# Patient Record
Sex: FEMALE | Race: WHITE | HISPANIC OR LATINO | Employment: UNEMPLOYED | URBAN - NONMETROPOLITAN AREA
[De-identification: names, ages, dates, MRNs, and addresses within clinical notes are randomized per-mention and may not be internally consistent; named-entity substitution may affect disease eponyms.]

---

## 2017-06-19 ENCOUNTER — OFFICE VISIT (OUTPATIENT)
Dept: MEDICAL GROUP | Facility: CLINIC | Age: 32
End: 2017-06-19
Payer: MEDICAID

## 2017-06-19 VITALS
TEMPERATURE: 98.2 F | WEIGHT: 213 LBS | HEART RATE: 84 BPM | RESPIRATION RATE: 16 BRPM | DIASTOLIC BLOOD PRESSURE: 70 MMHG | SYSTOLIC BLOOD PRESSURE: 112 MMHG | OXYGEN SATURATION: 96 % | HEIGHT: 65 IN | BODY MASS INDEX: 35.49 KG/M2

## 2017-06-19 DIAGNOSIS — F41.0 PANIC ATTACK: ICD-10-CM

## 2017-06-19 DIAGNOSIS — E66.9 OBESITY (BMI 30-39.9): ICD-10-CM

## 2017-06-19 DIAGNOSIS — Z23 NEED FOR VACCINATION: ICD-10-CM

## 2017-06-19 PROCEDURE — 90472 IMMUNIZATION ADMIN EACH ADD: CPT | Performed by: PHYSICIAN ASSISTANT

## 2017-06-19 PROCEDURE — 90471 IMMUNIZATION ADMIN: CPT | Performed by: PHYSICIAN ASSISTANT

## 2017-06-19 PROCEDURE — 90715 TDAP VACCINE 7 YRS/> IM: CPT | Performed by: PHYSICIAN ASSISTANT

## 2017-06-19 PROCEDURE — 90707 MMR VACCINE SC: CPT | Performed by: PHYSICIAN ASSISTANT

## 2017-06-19 PROCEDURE — 99203 OFFICE O/P NEW LOW 30 MIN: CPT | Mod: 25 | Performed by: PHYSICIAN ASSISTANT

## 2017-06-19 RX ORDER — HYDROXYZINE HYDROCHLORIDE 25 MG/1
25 TABLET, FILM COATED ORAL 3 TIMES DAILY PRN
Qty: 30 TAB | Refills: 0 | Status: SHIPPED | OUTPATIENT
Start: 2017-06-19 | End: 2017-07-17 | Stop reason: SDUPTHER

## 2017-06-19 ASSESSMENT — PATIENT HEALTH QUESTIONNAIRE - PHQ9: CLINICAL INTERPRETATION OF PHQ2 SCORE: 2

## 2017-06-19 NOTE — PROGRESS NOTES
Chief Complaint   Patient presents with   • Establish Care   • Panic Attack     2 x week        HISTORY OF THE PRESENT ILLNESS: This is a 32 y.o. female new patient to our practice who presents today for evaluation and management of:    Panic attack  During her episodes of anxiety, patient will have racing, illogical thoughts. She feels lightheaded and confused with slightly illogical speech. She has epigastric pain and some shortness of breath. She has an episode approx 2 times per week on average. She states she will occasionally wake up startled and begin to feel symptoms. She has never taken a medication for this before. These began when her  passed away 3 years ago. She has been through therapy for a month to help with grieving but states they did not give her tools to help alleviate anxiety.     Need for vaccination  Patient has an immunization record card from [facility] in [place] and it shows she needs MMR, Hep B #2, and TDaP today. She is healthy and willing to receive these vaccines today.     Obesity (BMI 30-39.9)  Patient is not currently working on losing weight however she is aware that she needs to do so.       History reviewed. No pertinent past medical history.    History reviewed. No pertinent past surgical history.    No family status information on file.   History reviewed. No pertinent family history.    Social History   Substance Use Topics   • Smoking status: Current Some Day Smoker     Types: Cigarettes   • Smokeless tobacco: Never Used   • Alcohol Use: 0.0 oz/week     0 Standard drinks or equivalent per week       Allergies: Penicillins    Current Outpatient Prescriptions Ordered in [facility]   Medication Sig Dispense Refill   • hydrOXYzine (ATARAX) 25 MG Tab Take 1 Tab by mouth 3 times a day as needed for Anxiety. 30 Tab 0     No current Epic-ordered facility-administered medications on file.     Review of Systems:   Constitutional: Negative for fever, chills, unplanned weight change and  "malaise/fatigue.   HENT: Negative for ear pain or tinnitus, nosebleeds, congestion, sore throat and neck pain.    Eyes: Negative for blurred or decreased vision.   Respiratory: Negative for cough, sputum production, shortness of breath and wheezing.    Cardiovascular: Negative for chest pain, palpitations, orthopnea, syncope and leg swelling.   Gastrointestinal: Negative for heartburn, nausea, vomiting.  Genitourinary: Negative for dysuria, urgency and frequency.   Musculoskeletal: Negative for myalgias, back pain and joint pain.   Skin: Negative for rash, itching, nail changes or skin changes.   Neurological: Negative for tremors, focal weakness, tingling and headaches.   Endo/Heme/Allergies: Does not bruise/bleed easily.    Psychiatric/Behavioral: See HPI above. Negative for depression, suicidal ideas and memory loss.  Pt does not use recreational drugs or excessive alcohol.        Exam:  Blood pressure 112/70, pulse 84, temperature 36.8 °C (98.2 °F), resp. rate 16, height 1.651 m (5' 5\"), weight 96.616 kg (213 lb), last menstrual period 06/12/2017, SpO2 96 %, not currently breastfeeding.  General:  Overweight female in NAD  Eyes: Conjunctiva clear, lids without ptosis, pupils equal and reactive to light accommodation.  ENMT: Nose and lips without deformity.   Neck: Supple without masses upon palpation. Thyroid is not visibly enlarged.  Pulmonary:  Normal effort.   Cardiovascular: Carotid and radial pulses are intact and equal bilaterally.   Extremities: No clubbing or cyanosis. Bilateral upper and lower extremities without edema.   GI: Soft, nontender, nondistended.   Skin: Warm and dry.  No obvious lesions.  Musculoskeletal: Normal gait.   Psych: Normal mood and affect. Alert and oriented x3. Judgment and insight is normal.    Medical Decision Making & Discussion:   1. Panic attack  Patient educated about the 04975 grounding technique to help her during an acute panic or anxiety attack. She will try this in " addition to rescue doses of atarax as needed for anxiety. She was educated that she may need a daily medication to alleviate anxiety if she feels this continues to be problematic.   - hydrOXYzine (ATARAX) 25 MG Tab; Take 1 Tab by mouth 3 times a day as needed for Anxiety.  Dispense: 30 Tab; Refill: 0  - REFERRAL TO PSYCHOLOGY    2. Obesity (BMI 30-39.9)  - Patient identified as having weight management issue.  Appropriate orders and counseling given.    3. Need for vaccination  - Tdap =>8yo IM  - Consent for Tetanus/Diptheria/AcellularPertussis Booster  - Consent for Measles/ Mumps/Rubella Vaccine  - MMR SQ    Regarding Health Maintenance: Patient will schedule a well female exam ASAP. She is receiving needed vaccinations today although this clinic is out of Hep B vaccines so she will need to have this administered at a later date.     Please note that this dictation was created using voice recognition software. I have made every reasonable attempt to correct obvious errors, but I expect that there are errors of grammar and possibly content that I did not discover before finalizing the note.

## 2017-06-19 NOTE — ASSESSMENT & PLAN NOTE
Patient has an immunization record card from [facility] in [place] and it shows she needs MMR, Hep B #2, and TDaP today. She is healthy and willing to receive these vaccines today.

## 2017-06-19 NOTE — ASSESSMENT & PLAN NOTE
During her episodes of anxiety, patient will have racing, illogical thoughts. She feels lightheaded and confused with slightly illogical speech. She has epigastric pain and some shortness of breath. She has an episode approx 2 times per week on average. She states she will occasionally wake up startled and begin to feel symptoms. She has never taken a medication for this before. These began when her  passed away 3 years ago. She has been through therapy for a month to help with grieving but states they did not give her tools to help alleviate anxiety.

## 2017-06-19 NOTE — MR AVS SNAPSHOT
"        Alie Lowe   2017 9:00 AM   Office Visit   MRN: [id number]    Department:  [facility] Phone:  [phone]    Description:  Female : 1985   Provider:  Serina Moreland PA-C           Reason for Visit     Establish Care     Panic Attack 2 x week       Allergies as of 2017     Allergen Noted Reactions    Penicillins 2017   Unspecified    Previous hospitalization after penicillin - does not remember reaction      You were diagnosed with     Panic attack   [025593]       Obesity (BMI 30-39.9)   [732984]       Need for vaccination   [675448]         Vital Signs     Blood Pressure Pulse Temperature Respirations Height Weight    112/70 mmHg 84 36.8 °C (98.2 °F) 16 1.651 m (5' 5\") 96.616 kg (213 lb)    Body Mass Index Oxygen Saturation Last Menstrual Period Breastfeeding? Smoking Status       35.44 kg/m2 96% 2017 No Current Some Day Smoker       Basic Information     Date Of Birth Sex Race Ethnicity Preferred Language    1985 Female White Non- English      Problem List              ICD-10-CM Priority Class Noted - Resolved    Panic attack F41.0   2017 - Present    Obesity (BMI 30-39.9) E66.9   2017 - Present    Need for vaccination Z23   2017 - Present      Health Maintenance     Patient has no pending health maintenance at this time      Current Immunizations     Hepatitis A Vaccine, Ped/Adol 1999    Hepatitis B Vaccine Non-Recombivax (Ped/Adol) 1999    MMR Vaccine  Incomplete, 1999    TD Vaccine 1999    Tdap Vaccine  Incomplete      Below and/or attached are the medications your provider expects you to take. Review all of your home medications and newly ordered medications with your provider and/or pharmacist. Follow medication instructions as directed by your provider and/or pharmacist. Please keep your medication list with you and share with your provider. Update the information when medications are discontinued, " doses are changed, or new medications (including over-the-counter products) are added; and carry medication information at all times in the event of emergency situations     Allergies:  PENICILLINS - Unspecified               Medications  Valid as of: June 19, 2017 - 10:02 AM    Generic Name Brand Name Tablet Size Instructions for use    HydrOXYzine HCl (Tab) ATARAX 25 MG Take 1 Tab by mouth 3 times a day as needed for Anxiety.        .                 Medicines prescribed today were sent to:     [address] [place] [address]    Phone: [phone] Fax: [phone]    Open 24 Hours?: No      Medication refill instructions:       If your prescription bottle indicates you have medication refills left, it is not necessary to call your provider’s office. Please contact your pharmacy and they will refill your medication.    If your prescription bottle indicates you do not have any refills left, you may request refills at any time through one of the following ways: The online [employer] system (except Urgent Care), by calling your provider’s office, or by asking your pharmacy to contact your provider’s office with a refill request. Medication refills are processed only during regular business hours and may not be available until the next business day. Your provider may request additional information or to have a follow-up visit with you prior to refilling your medication.   *Please Note: Medication refills are assigned a new Rx number when refilled electronically. Your pharmacy may indicate that no refills were authorized even though a new prescription for the same medication is available at the pharmacy. Please request the medicine by name with the pharmacy before contacting your provider for a refill.        Referral     A referral request has been sent to our patient care coordination department. Please allow 3-5 business days for us to process this request  and contact you either by phone or mail. If you do not hear from us by the 5th business day, please call us at [phone].           [employer] Access Code: [email or web address]  Expires: 7/19/2017 10:02 AM    Your email address is not on file at "[facility].  Email Addresses are required for you to sign up for [employer], please contact [phone] to verify your personal information and to provide your email address prior to attempting to register for [employer].    Alie Lowe  [address] [place] [address]    [employer]  A secure, online tool to manage your health information     "[facility]’s [employer]® is a secure, online tool that connects you to your personalized health information from the privacy of your home -- day or night - making it very easy for you to manage your healthcare. Once the activation process is completed, you can even access your medical information using the [employer] jemma, which is available for free in the Apple Jemma store or Google Play store.     To learn more about [employer], visit [email or web address]    There are two levels of access available (as shown below):   My Chart Features  [facility] Primary Care Doctor [facility]  Specialists [facility]  Urgent  Care Non-[facility] Primary Care Doctor   Email your healthcare team securely and privately 24/7 X X X    Manage appointments: schedule your next appointment; view details of past/upcoming appointments X      Request prescription refills. X      View recent personal medical records, including lab and immunizations X X X X   View health record, including health history, allergies, medications X X X X   Read reports about your outpatient visits, procedures, consult and ER notes X X X X   See your discharge summary, which is a recap of your hospital and/or ER visit that includes your diagnosis, lab results, and care plan X X  X     How to register for [employer]:  Once your e-mail address has been verified, follow the following steps to sign up for  [employer].     1. Go to  [email or web address]  2. Click on the Sign Up Now box, which takes you to the New Member Sign Up page. You will need to provide the following information:  a. Enter your [employer] Access Code exactly as it appears at the top of this page. (You will not need to use this code after you’ve completed the sign-up process. If you do not sign up before the expiration date, you must request a new code.)   b. Enter your date of birth.   c. Enter your home email address.   d. Click Submit, and follow the next screen’s instructions.  3. Create a [employer] ID. This will be your [employer] login ID and cannot be changed, so think of one that is secure and easy to remember.  4. Create a [employer] password. You can change your password at any time.  5. Enter your Password Reset Question and Answer. This can be used at a later time if you forget your password.   6. Enter your e-mail address. This allows you to receive e-mail notifications when new information is available in [employer].  7. Click Sign Up. You can now view your health information.    For assistance activating your [employer] account, call [phone]         Quit Tobacco Information     Do you want to quit using tobacco?    Quitting tobacco decreases risks of cancer, heart and lung disease, increases life expectancy, improves sense of taste and smell, and increases spending money, among other benefits.    If you are thinking about quitting, we can help.  • [facility] Quit Tobacco Program: 900.854.9595  o Program occurs weekly for four weeks and includes pharmacist consultation on products to support quitting smoking or chewing tobacco. A provider referral is needed for pharmacist consultation.  • Tobacco Users Help Hotline: 7-800-QUIT-NOW (246-6385) or https://[place].quitlogix.org/  o Free, confidential telephone and online coaching for [place] residents. Sessions are designed on a schedule that is convenient for you. Eligible clients receive free  nicotine replacement therapy.  • Nationally: www.smokefree.gov  o Information and professional assistance to support both immediate and long-term needs as you become, and remain, a non-smoker. Smokefree.gov allows you to choose the help that best fits your needs.

## 2017-07-06 ENCOUNTER — HOSPITAL ENCOUNTER (OUTPATIENT)
Facility: MEDICAL CENTER | Age: 32
End: 2017-07-06
Attending: PHYSICIAN ASSISTANT
Payer: MEDICAID

## 2017-07-06 ENCOUNTER — OFFICE VISIT (OUTPATIENT)
Dept: MEDICAL GROUP | Facility: CLINIC | Age: 32
End: 2017-07-06
Payer: MEDICAID

## 2017-07-06 VITALS
WEIGHT: 216 LBS | SYSTOLIC BLOOD PRESSURE: 110 MMHG | OXYGEN SATURATION: 98 % | DIASTOLIC BLOOD PRESSURE: 68 MMHG | HEIGHT: 65 IN | BODY MASS INDEX: 35.99 KG/M2 | TEMPERATURE: 98.9 F | RESPIRATION RATE: 16 BRPM | HEART RATE: 80 BPM

## 2017-07-06 DIAGNOSIS — Z01.419 WELL FEMALE EXAM WITH ROUTINE GYNECOLOGICAL EXAM: ICD-10-CM

## 2017-07-06 DIAGNOSIS — Z22.322 MRSA CARRIER: ICD-10-CM

## 2017-07-06 PROCEDURE — 87624 HPV HI-RISK TYP POOLED RSLT: CPT

## 2017-07-06 PROCEDURE — 88175 CYTOPATH C/V AUTO FLUID REDO: CPT

## 2017-07-06 PROCEDURE — 99395 PREV VISIT EST AGE 18-39: CPT | Mod: EP | Performed by: PHYSICIAN ASSISTANT

## 2017-07-06 PROCEDURE — 87491 CHLMYD TRACH DNA AMP PROBE: CPT

## 2017-07-06 PROCEDURE — 87591 N.GONORRHOEAE DNA AMP PROB: CPT

## 2017-07-06 ASSESSMENT — PAIN SCALES - GENERAL: PAINLEVEL: NO PAIN

## 2017-07-06 NOTE — MR AVS SNAPSHOT
"Alie Lowe   2017 11:00 AM   Office Visit   MRN: [id number]    Department:  [facility]   Dept Phone:  [phone]    Description:  Female : 1985   Provider:  Serina Moreland PA-C           Reason for Visit     Gynecologic Exam           Allergies as of 2017     Allergen Noted Reactions    Penicillins 2017   Unspecified    Previous hospitalization after penicillin - does not remember reaction      You were diagnosed with     Well female exam with routine gynecological exam   [662135]       MRSA carrier   [[id number]]         Vital Signs     Blood Pressure Pulse Temperature Respirations Height Weight    110/68 mmHg 80 37.2 °C (98.9 °F) 16 1.651 m (5' 5\") 97.977 kg (216 lb)    Body Mass Index Oxygen Saturation Last Menstrual Period Smoking Status          35.94 kg/m2 98% 2017 Current Some Day Smoker        Basic Information     Date Of Birth Sex Race Ethnicity Preferred Language    1985 Female White Non- English      Your appointments     2017 11:20 AM   Established Patient with Serina Moreland PA-C   [facility] (--)    [address] [place] [address]   [phone]           You will be receiving a confirmation call a few days before your appointment from our automated call confirmation system.              Problem List              ICD-10-CM Priority Class Noted - Resolved    Panic attack F41.0   2017 - Present    Obesity (BMI 30-39.9) E66.9   2017 - Present    Need for vaccination Z23   2017 - Present    Well female exam with routine gynecological exam Z01.419   2017 - Present    MRSA carrier Z22.322   2017 - Present      Health Maintenance        Date Due Completion Dates    PAP SMEAR 2006 ---    IMM INFLUENZA (1) 2017 ---    IMM DTaP/Tdap/Td Vaccine (3 - Td) 2027, 1999            Current Immunizations     Hepatitis A Vaccine, Ped/Adol 1999   " Hepatitis B Vaccine Non-Recombivax (Ped/Adol) 8/12/1999    MMR Vaccine 6/19/2017, 8/12/1999    TD Vaccine 8/12/1999    Tdap Vaccine 6/19/2017      Below and/or attached are the medications your provider expects you to take. Review all of your home medications and newly ordered medications with your provider and/or pharmacist. Follow medication instructions as directed by your provider and/or pharmacist. Please keep your medication list with you and share with your provider. Update the information when medications are discontinued, doses are changed, or new medications (including over-the-counter products) are added; and carry medication information at all times in the event of emergency situations     Allergies:  PENICILLINS - Unspecified               Medications  Valid as of: July 06, 2017 - 12:49 PM    Generic Name Brand Name Tablet Size Instructions for use    HydrOXYzine HCl (Tab) ATARAX 25 MG Take 1 Tab by mouth 3 times a day as needed for Anxiety.        .                 Medicines prescribed today were sent to:     [address] [address]    Phone: [phone] Fax: [phone]    Open 24 Hours?: No      Medication refill instructions:       If your prescription bottle indicates you have medication refills left, it is not necessary to call your provider’s office. Please contact your pharmacy and they will refill your medication.    If your prescription bottle indicates you do not have any refills left, you may request refills at any time through one of the following ways: The online [employer] system (except Urgent Care), by calling your provider’s office, or by asking your pharmacy to contact your provider’s office with a refill request. Medication refills are processed only during regular business hours and may not be available until the next business day. Your provider may request additional information or to have a follow-up visit with  you prior to refilling your medication.   *Please Note: Medication refills are assigned a new Rx number when refilled electronically. Your pharmacy may indicate that no refills were authorized even though a new prescription for the same medication is available at the pharmacy. Please request the medicine by name with the pharmacy before contacting your provider for a refill.        Your To Do List     Future Labs/Procedures Complete By Expires    CHLAMYDIA/GC PCR URINE OR SWAB  As directed 7/6/2018    [id number]  As directed 7/6/2018    RPR (SYPHILIS)  As directed 7/6/2018    THINPREP PAP W/HPV AND CTNG  As directed 7/6/2018         [employer] Access Code: [email or web address]  Expires: 7/19/2017 10:02 AM    Your email address is not on file at [employer].  Email Addresses are required for you to sign up for [employer], please contact [phone] to verify your personal information and to provide your email address prior to attempting to register for [employer].    Alie Lowe  [address] [place] [address]    [employer]  A secure, online tool to manage your health information     [employer]’s [employer]® is a secure, online tool that connects you to your personalized health information from the privacy of your home -- day or night - making it very easy for you to manage your healthcare. Once the activation process is completed, you can even access your medical information using the [employer] jemma, which is available for free in the Apple Jemma store or Google Play store.     To learn more about [employer], visit [email or web address]    There are two levels of access available (as shown below):   My Chart Features  [facility] Primary Care Doctor [facility]  Specialists [facility]  Urgent  Care Non-[facility] Primary Care Doctor   Email your healthcare team securely and privately 24/7 X X X    Manage appointments: schedule your next appointment; view details of past/upcoming appointments X      Request prescription refills. X       View recent personal medical records, including lab and immunizations X X X X   View health record, including health history, allergies, medications X X X X   Read reports about your outpatient visits, procedures, consult and ER notes X X X X   See your discharge summary, which is a recap of your hospital and/or ER visit that includes your diagnosis, lab results, and care plan X X  X     How to register for [employer]:  Once your e-mail address has been verified, follow the following steps to sign up for [employer].     1. Go to  [email or web address]  2. Click on the Sign Up Now box, which takes you to the New Member Sign Up page. You will need to provide the following information:  a. Enter your [employer] Access Code exactly as it appears at the top of this page. (You will not need to use this code after you’ve completed the sign-up process. If you do not sign up before the expiration date, you must request a new code.)   b. Enter your date of birth.   c. Enter your home email address.   d. Click Submit, and follow the next screen’s instructions.  3. Create a [employer] ID. This will be your [employer] login ID and cannot be changed, so think of one that is secure and easy to remember.  4. Create a [employer] password. You can change your password at any time.  5. Enter your Password Reset Question and Answer. This can be used at a later time if you forget your password.   6. Enter your e-mail address. This allows you to receive e-mail notifications when new information is available in [employer].  7. Click Sign Up. You can now view your health information.    For assistance activating your [employer] account, call [phone]         Quit Tobacco Information     Do you want to quit using tobacco?    Quitting tobacco decreases risks of cancer, heart and lung disease, increases life expectancy, improves sense of taste and smell, and increases spending money, among other benefits.    If you are thinking about quitting, we can  help.  • [facility] Quit Tobacco Program: 004-903-3487  o Program occurs weekly for four weeks and includes pharmacist consultation on products to support quitting smoking or chewing tobacco. A provider referral is needed for pharmacist consultation.  • Tobacco Users Help Hotline: 8-912-QUIT-NOW (074-3151) or https://[place].quitlogix.org/  o Free, confidential telephone and online coaching for [place] residents. Sessions are designed on a schedule that is convenient for you. Eligible clients receive free nicotine replacement therapy.  • Nationally: www.smokefree.gov  o Information and professional assistance to support both immediate and long-term needs as you become, and remain, a non-smoker. Smokefree.gov allows you to choose the help that best fits your needs.

## 2017-07-06 NOTE — PROGRESS NOTES
"SUBJECTIVE: 32 y.o. female for routine pap and checkup.  Chief Complaint   Patient presents with   • Gynecologic Exam         Last Pap:   Contraception: condoms sometimes, none otherwise  H/O Abnormal Pap no  H/O STI no  Current partner: MALE, monogomous     LMP: Patient's last menstrual period was 2017.    Allergies: Penicillins     ROS:  Menses every month with mild, none cramping  Bleeding is moderate.    midol analgesics required during menses.  No menstrual depression, labile mood, anxiety, bloating/fluid retention, insomnia, migraine headaches, libido changes   no menopause symptoms of hot flashes, night sweats, sleep disruption, mood changes, vaginal dryness.   No pelvic pain, or dyspareunia. No vaginal discharge   No breast tenderness, mass, nipple discharge, changes in size or contour, or abnormal cyclic discomfort.  No urinary tract symptoms, no incontinence.   No abdominal pain, change in bowel habits, black or bloody stools.    No unusual headaches, no visual changes.   No prolonged cough. No dyspnea or chest pain on exertion.    No skin lesions, concerns.     Preventive Care:  Mammogram not due    DEXA not due.   Colonoscopy not due    Current Outpatient Prescriptions   Medication Sig Dispense Refill   • hydrOXYzine (ATARAX) 25 MG Tab Take 1 Tab by mouth 3 times a day as needed for Anxiety. 30 Tab 0     No current facility-administered medications for this visit.     She  has no past medical history on file.  She  has no past surgical history on file.     Family History: No family history on file.    Family History negative for :  Colon, Lung, or female organ cancer, CAD, Diabetes, osteoporosis.     OBJECTIVE:   /68 mmHg  Pulse 80  Temp(Src) 37.2 °C (98.9 °F)  Resp 16  Ht 1.651 m (5' 5\")  Wt 97.977 kg (216 lb)  BMI 35.94 kg/m2  SpO2 98%  LMP 2017  Body mass index is 35.94 kg/(m^2).      HEAD AND NECK:  Ears normal.  Throat, oral cavity and tongue normal.  Neck supple. " No adenopathy or masses in the neck or supraclavicular regions.  No carotid bruits. No thyromegaly. Tongue piercing present.  NEURO: Cranial nerves II-XI are normal.   CHEST:  Clear, good air entry, no wheezes or rales.   HEART:  Regular rate and rhythm.  S1 and S2 normal.  No edema or JVD. ABDOMEN:  Soft without tenderness, guarding, mass or organomegaly.  No CVA tenderness or inguinal adenopathy.   EXTREMITIES:  Extremities, reflexes and peripheral pulses are normal.    SKIN:  No rashes or suspicious skin lesions noted. One 1.5x 1.5 nevus present at left upper abdominal quadrant     Breast Exam: Performed with instruction during examination. No axillary lymphadenopathy. No fixed or dominant masses. No nipple retraction or skin abnormalities.    PELVIC EXAMINATION    Chaperone Desirae Cr MA    Labia: normal, no lesions or erythema noted   Urethral Orifice: normal  Vagina: vaginal canal clear, no lesions  Cervix: Friable and bleeds to gentle use of PAP brush. No polyps, masses or lesions noted. Yellow/green and slightly foul smelling discharge noted.     BIMANUAL EXAM   Vaginal Walls: normal  Cervix: No CMT  Uterus: normal   Adnexa: difficult to palpate due to habitus, no noticeable abnormalities.   Rectal: not performed.       <ASSESSMENT>  1. Well female exam with routine gynecological exam  CHLAMYDIA/GC PCR URINE OR SWAB    THINPREP PAP W/HPV AND CTNG    PANEL 165391 (HIV ANTIBODIES)    HEPATITIS PANEL ACUTE (4 COMPONENTS)    RPR (SYPHILIS)    [id number]    POCT Pregnancy   2. MRSA carrier         Discussed breast self exam, mammography screening, STD prevention, HIV risk factors and prevention, use and side effects of OCPs, use and side effects of HRT, family planning choices and menopause   Follow-up in 1 years for next Gyn exam and Pap, pending negative results 3 years will be appropriate.

## 2017-07-07 LAB
C TRACH DNA GENITAL QL NAA+PROBE: NEGATIVE
CYTOLOGY REG CYTOL: NORMAL
HPV HR 12 DNA CVX QL NAA+PROBE: NEGATIVE
HPV16 DNA SPEC QL NAA+PROBE: NEGATIVE
HPV18 DNA SPEC QL NAA+PROBE: NEGATIVE
N GONORRHOEA DNA GENITAL QL NAA+PROBE: NEGATIVE
SPECIMEN SOURCE: NORMAL
SPECIMEN SOURCE: NORMAL

## 2017-07-10 ENCOUNTER — TELEPHONE (OUTPATIENT)
Dept: URGENT CARE | Facility: PHYSICIAN GROUP | Age: 32
End: 2017-07-10

## 2017-07-10 NOTE — TELEPHONE ENCOUNTER
----- Message from Serina Moreland PA-C sent at 7/10/2017  8:45 AM PDT -----  I reviewed labs. Everything is within normal limits and looks good. Return for follow up as scheduled.

## 2017-07-10 NOTE — TELEPHONE ENCOUNTER
Phone Number Called: [phone] (home)       Message: spoke with patient she understand her results and will follow up as scheduled    Left Message for patient to call back: no

## 2017-07-17 ENCOUNTER — OFFICE VISIT (OUTPATIENT)
Dept: MEDICAL GROUP | Facility: CLINIC | Age: 32
End: 2017-07-17
Payer: MEDICAID

## 2017-07-17 VITALS
BODY MASS INDEX: 35.99 KG/M2 | WEIGHT: 216 LBS | DIASTOLIC BLOOD PRESSURE: 64 MMHG | RESPIRATION RATE: 18 BRPM | OXYGEN SATURATION: 94 % | HEIGHT: 65 IN | HEART RATE: 88 BPM | TEMPERATURE: 98.5 F | SYSTOLIC BLOOD PRESSURE: 122 MMHG

## 2017-07-17 DIAGNOSIS — F41.0 PANIC ATTACK: ICD-10-CM

## 2017-07-17 DIAGNOSIS — S91.302A OPEN WOUND OF FOOT, LEFT, INITIAL ENCOUNTER: ICD-10-CM

## 2017-07-17 PROBLEM — S91.309A OPEN WOUND OF FOOT: Status: ACTIVE | Noted: 2017-07-17

## 2017-07-17 PROBLEM — Z01.419 WELL FEMALE EXAM WITH ROUTINE GYNECOLOGICAL EXAM: Status: RESOLVED | Noted: 2017-07-06 | Resolved: 2017-07-17

## 2017-07-17 PROBLEM — Z23 NEED FOR VACCINATION: Status: RESOLVED | Noted: 2017-06-19 | Resolved: 2017-07-17

## 2017-07-17 PROCEDURE — 99212 OFFICE O/P EST SF 10 MIN: CPT | Performed by: PHYSICIAN ASSISTANT

## 2017-07-17 RX ORDER — HYDROXYZINE HYDROCHLORIDE 25 MG/1
25 TABLET, FILM COATED ORAL 3 TIMES DAILY PRN
Qty: 30 TAB | Refills: 5 | Status: SHIPPED | OUTPATIENT
Start: 2017-07-17 | End: 2019-01-05

## 2017-07-17 ASSESSMENT — PAIN SCALES - GENERAL: PAINLEVEL: NO PAIN

## 2017-07-17 NOTE — MR AVS SNAPSHOT
"        Alie Lowe   2017 1:40 PM   Office Visit   MRN: [id number]    Department:  [facility] Phone:  [phone]    Description:  Female : 1985   Provider:  Serina Moreland PA-C           Reason for Visit     Follow-Up medication - boyfriend states it makes her too calm      Allergies as of 2017     Allergen Noted Reactions    Penicillins 2017   Unspecified    Previous hospitalization after penicillin - does not remember reaction      You were diagnosed with     Panic attack   [[id number]]         Vital Signs     Blood Pressure Pulse Temperature Respirations Height Weight    122/64 mmHg 88 36.9 °C (98.5 °F) 18 1.651 m (5' 5\") 97.977 kg (216 lb)    Body Mass Index Oxygen Saturation Last Menstrual Period Smoking Status          35.94 kg/m2 94% 2017 Current Some Day Smoker        Basic Information     Date Of Birth Sex Race Ethnicity Preferred Language    1985 Female White Non- English      Problem List              ICD-10-CM Priority Class Noted - Resolved    Panic attack F41.0   2017 - Present    Obesity (BMI 30-39.9) E66.9   2017 - Present    Need for vaccination Z23   2017 - Present    MRSA carrier Z22.322   2017 - Present      Health Maintenance        Date Due Completion Dates    IMM INFLUENZA (1) 2017 ---    PAP SMEAR 2020, 2017    IMM DTaP/Tdap/Td Vaccine (3 - Td) 2027, 1999            Current Immunizations     Hepatitis A Vaccine, Ped/Adol 1999    Hepatitis B Vaccine Non-Recombivax (Ped/Adol) 1999    MMR Vaccine 2017, 1999    TD Vaccine 1999    Tdap Vaccine 2017      Below and/or attached are the medications your provider expects you to take. Review all of your home medications and newly ordered medications with your provider and/or pharmacist. Follow medication instructions as directed by your provider and/or pharmacist. Please keep your medication list with " you and share with your provider. Update the information when medications are discontinued, doses are changed, or new medications (including over-the-counter products) are added; and carry medication information at all times in the event of emergency situations     Allergies:  PENICILLINS - Unspecified               Medications  Valid as of: July 17, 2017 -  2:23 PM    Generic Name Brand Name Tablet Size Instructions for use    HydrOXYzine HCl (Tab) ATARAX 25 MG Take 1 Tab by mouth 3 times a day as needed for Anxiety.        .                 Medicines prescribed today were sent to:     [address] [place] [address]    Phone: [phone] Fax: [phone]    Open 24 Hours?: No      Medication refill instructions:       If your prescription bottle indicates you have medication refills left, it is not necessary to call your provider’s office. Please contact your pharmacy and they will refill your medication.    If your prescription bottle indicates you do not have any refills left, you may request refills at any time through one of the following ways: The online Caremerge system (except Urgent Care), by calling your provider’s office, or by asking your pharmacy to contact your provider’s office with a refill request. Medication refills are processed only during regular business hours and may not be available until the next business day. Your provider may request additional information or to have a follow-up visit with you prior to refilling your medication.   *Please Note: Medication refills are assigned a new Rx number when refilled electronically. Your pharmacy may indicate that no refills were authorized even though a new prescription for the same medication is available at the pharmacy. Please request the medicine by name with the pharmacy before contacting your provider for a refill.           Caremerge Access Code: [email or web address]  Expires: 7/19/2017  10:02 AM    Your email address is not on file at [facility].  Email Addresses are required for you to sign up for [employer], please contact [phone] to verify your personal information and to provide your email address prior to attempting to register for [employer].    Alie Lowe  [address] [place] [address]    goTennat  A secure, online tool to manage your health information     [facility]’s [employer]® is a secure, online tool that connects you to your personalized health information from the privacy of your home -- day or night - making it very easy for you to manage your healthcare. Once the activation process is completed, you can even access your medical information using the [employer] jemma, which is available for free in the Apple Jemma store or Google Play store.     To learn more about [employer], visit [email or web address]    There are two levels of access available (as shown below):   My Chart Features  [facility] Primary Care Doctor [facility]  Specialists [facility]  Urgent  Care Non-[facility] Primary Care Doctor   Email your healthcare team securely and privately 24/7 X X X    Manage appointments: schedule your next appointment; view details of past/upcoming appointments X      Request prescription refills. X      View recent personal medical records, including lab and immunizations X X X X   View health record, including health history, allergies, medications X X X X   Read reports about your outpatient visits, procedures, consult and ER notes X X X X   See your discharge summary, which is a recap of your hospital and/or ER visit that includes your diagnosis, lab results, and care plan X X  X     How to register for [employer]:  Once your e-mail address has been verified, follow the following steps to sign up for goTennat.     1. Go to  [email or web address]  2. Click on the Sign Up Now box, which takes you to the New Member Sign Up page. You will need to provide the following information:  a. Enter your  M[employer] Access Code exactly as it appears at the top of this page. (You will not need to use this code after you’ve completed the sign-up process. If you do not sign up before the expiration date, you must request a new code.)   b. Enter your date of birth.   c. Enter your home email address.   d. Click Submit, and follow the next screen’s instructions.  3. Create a Mazree ID. This will be your Mazree login ID and cannot be changed, so think of one that is secure and easy to remember.  4. Create a Blue Pillart password. You can change your password at any time.  5. Enter your Password Reset Question and Answer. This can be used at a later time if you forget your password.   6. Enter your e-mail address. This allows you to receive e-mail notifications when new information is available in Mazree.  7. Click Sign Up. You can now view your health information.    For assistance activating your Mazree account, call [phone]         Quit Tobacco Information     Do you want to quit using tobacco?    Quitting tobacco decreases risks of cancer, heart and lung disease, increases life expectancy, improves sense of taste and smell, and increases spending money, among other benefits.    If you are thinking about quitting, we can help.  • [facility] Quit Tobacco Program: 856.205.1248  o Program occurs weekly for four weeks and includes pharmacist consultation on products to support quitting smoking or chewing tobacco. A provider referral is needed for pharmacist consultation.  • Tobacco Users Help Hotline: 8-004-QUIT-NOW (878-8677) or https://[place].quitlogix.org/  o Free, confidential telephone and online coaching for [place] residents. Sessions are designed on a schedule that is convenient for you. Eligible clients receive free nicotine replacement therapy.  • Nationally: www.smokefree.gov  o Information and professional assistance to support both immediate and long-term needs as you become, and remain, a non-smoker.  Smokefree.gov allows you to choose the help that best fits your needs.

## 2017-07-17 NOTE — ASSESSMENT & PLAN NOTE
Patient complains that the very small wound on her right great toe seems to be getting warm and not healing very quickly. She hasn't tried anything to make this better and would like advice in wound care.

## 2017-07-17 NOTE — ASSESSMENT & PLAN NOTE
"Patient states her hydroxyzine is working very well to alleviate her occasional panic attacks. She states her fiancé even believes she may be \"too calm\". Patient believes that she is in fact now normal and that her fiancé simply is not use to this calm version of her. She is very happy with these results and would like to continue taking hydroxyzine on an as-needed basis. She is not currently having any negative side effects.  "

## 2017-07-17 NOTE — PROGRESS NOTES
"Chief Complaint   Patient presents with   • Follow-Up     medication - boyfriend states it makes her too calm       HISTORY OF PRESENT ILLNESS: Patient is a 32 y.o. female established patient who presents today for evaluation and management of:    Panic attack  Patient states her hydroxyzine is working very well to alleviate her occasional panic attacks. She states her fiancé even believes she may be \"too calm\". Patient believes that she is in fact now normal and that her fiancé simply is not use to this calm version of her. She is very happy with these results and would like to continue taking hydroxyzine on an as-needed basis. She is not currently having any negative side effects.    Open wound of foot  Patient complains that the very small wound on her right great toe seems to be getting warm and not healing very quickly. She hasn't tried anything to make this better and would like advice in wound care.          Patient Active Problem List    Diagnosis Date Noted   • Open wound of foot 07/17/2017   • MRSA carrier 07/06/2017   • Panic attack 06/19/2017   • Obesity (BMI 30-39.9) 06/19/2017       Allergies:Penicillins    Current Outpatient Prescriptions   Medication Sig Dispense Refill   • hydrOXYzine (ATARAX) 25 MG Tab Take 1 Tab by mouth 3 times a day as needed for Anxiety. 30 Tab 5     No current facility-administered medications for this visit.       Social History   Substance Use Topics   • Smoking status: Current Some Day Smoker -- 0.10 packs/day for 17 years     Types: Cigarettes   • Smokeless tobacco: Never Used   • Alcohol Use: 1.2 oz/week     2 Standard drinks or equivalent per week       No family status information on file.   History reviewed. No pertinent family history.    Review of Systems:   Constitutional: Negative for fever, chills, weight loss and malaise/fatigue.   HENT: Negative for ear pain, nosebleeds, congestion, sore throat and neck pain.    Eyes: Negative for blurred vision.   Respiratory: " "Negative for cough, sputum production, shortness of breath and wheezing.    Cardiovascular: Negative for chest pain, palpitations, orthopnea and leg swelling.   Gastrointestinal: Negative for heartburn, nausea, vomiting and abdominal pain.   Genitourinary: Negative for dysuria, urgency and frequency.   Musculoskeletal: Negative for myalgias, back pain and joint pain.   Skin: See HPI above.   Neurological: Negative for dizziness, tingling, tremors, sensory change, focal weakness and headaches.   Endo/Heme/Allergies: Does not bruise/bleed easily.   Psychiatric/Behavioral: Negative for depression, suicidal ideas and memory loss.  The patient is not nervous/anxious and does not have insomnia.      Exam:  Blood pressure 122/64, pulse 88, temperature 36.9 °C (98.5 °F), resp. rate 18, height 1.651 m (5' 5\"), weight 97.977 kg (216 lb), last menstrual period 08/11/2017, SpO2 94 %.  Body mass index is 35.94 kg/(m^2).  General:  Obese female in NAD  Head: is grossly normal.  Neck: Supple without masses. Thyroid is not visibly enlarged.  Pulmonary: Normal effort.  Extremities: no clubbing, cyanosis, or edema.  Skin: .75 x .5 cm open wound near right great toe is slightly dusky around the edges, erythematous and warm to touch. No oozing.     Medical decision-making and discussion:  1. Panic attack  Continue taking atarax as needed. Refills given today.   - hydrOXYzine (ATARAX) 25 MG Tab; Take 1 Tab by mouth 3 times a day as needed for Anxiety.  Dispense: 30 Tab; Refill: 5    2. Open wound of foot, left, initial encounter  Patient advised to clean the wound and apply antibiotic ointment with a bandage in the mornings and to allow the wound to breathe in the evenings. Should this wound fail to heal in the next 1-2 weeks, she was advised to return for follow up visit.       Return if symptoms worsen or fail to improve.    "

## 2017-08-16 ENCOUNTER — OFFICE VISIT (OUTPATIENT)
Dept: URGENT CARE | Facility: PHYSICIAN GROUP | Age: 32
End: 2017-08-16
Payer: MEDICAID

## 2017-08-16 ENCOUNTER — APPOINTMENT (OUTPATIENT)
Dept: RADIOLOGY | Facility: IMAGING CENTER | Age: 32
End: 2017-08-16
Attending: NURSE PRACTITIONER
Payer: MEDICAID

## 2017-08-16 VITALS
WEIGHT: 217 LBS | HEIGHT: 66 IN | RESPIRATION RATE: 20 BRPM | HEART RATE: 107 BPM | SYSTOLIC BLOOD PRESSURE: 124 MMHG | OXYGEN SATURATION: 97 % | BODY MASS INDEX: 34.87 KG/M2 | TEMPERATURE: 98.7 F | DIASTOLIC BLOOD PRESSURE: 70 MMHG

## 2017-08-16 DIAGNOSIS — M25.511 ACUTE PAIN OF RIGHT SHOULDER: ICD-10-CM

## 2017-08-16 DIAGNOSIS — S67.21XA CRUSHING INJURY OF RIGHT HAND, INITIAL ENCOUNTER: ICD-10-CM

## 2017-08-16 DIAGNOSIS — T07.XXXA BLUNT TRAUMA OF MULTIPLE SITES: ICD-10-CM

## 2017-08-16 DIAGNOSIS — S60.221A CONTUSION OF RIGHT HAND, INITIAL ENCOUNTER: ICD-10-CM

## 2017-08-16 DIAGNOSIS — S40.011A CONTUSION OF RIGHT SHOULDER, INITIAL ENCOUNTER: ICD-10-CM

## 2017-08-16 PROCEDURE — 73130 X-RAY EXAM OF HAND: CPT | Mod: RT | Performed by: FAMILY MEDICINE

## 2017-08-16 PROCEDURE — 99999 PR NO CHARGE: CPT | Performed by: NURSE PRACTITIONER

## 2017-08-16 PROCEDURE — 99204 OFFICE O/P NEW MOD 45 MIN: CPT | Performed by: NURSE PRACTITIONER

## 2017-08-16 PROCEDURE — 73030 X-RAY EXAM OF SHOULDER: CPT | Mod: RT | Performed by: FAMILY MEDICINE

## 2017-08-16 RX ORDER — KETOROLAC TROMETHAMINE 30 MG/ML
60 INJECTION, SOLUTION INTRAMUSCULAR; INTRAVENOUS ONCE
Status: COMPLETED | OUTPATIENT
Start: 2017-08-16 | End: 2017-08-16

## 2017-08-16 RX ADMIN — KETOROLAC TROMETHAMINE 60 MG: 30 INJECTION, SOLUTION INTRAMUSCULAR; INTRAVENOUS at 13:00

## 2017-08-16 ASSESSMENT — ENCOUNTER SYMPTOMS
ORTHOPNEA: 0
HEADACHES: 0
FALLS: 0
BLURRED VISION: 0
BACK PAIN: 0
FOCAL WEAKNESS: 0
SENSORY CHANGE: 1
FEVER: 0
PALPITATIONS: 0
BRUISES/BLEEDS EASILY: 0
TINGLING: 1
LOSS OF CONSCIOUSNESS: 0
DIZZINESS: 0
MYALGIAS: 1
DOUBLE VISION: 0
SHORTNESS OF BREATH: 0
WEAKNESS: 0
NECK PAIN: 0
PHOTOPHOBIA: 0
SPEECH CHANGE: 0
CHILLS: 0

## 2017-08-16 NOTE — PROGRESS NOTES
"Subjective:      Alie Lowe is a 32 y.o. female who presents with Shoulder Injury            Shoulder Injury   Associated symptoms include tingling. Pertinent negatives include no chest pain.   Alie is a 32 year old female who is here for right shoulder injury and right hand injury today.  was working under a car on jacks and the jacks fell and car was on top of her. States she was laying on her left shoulder at incident. States she was able to lift the car up to get out. States her right shoulder was leaning forward but managed to \"pop\" it back in place. States unable to move right hand at ring/pinky fingers and pain in palm of hand in same region. No pain or difficulty with moving right elbow, Some discomfort with right wrist but can move without difficulty. Ice application at time of incident. No NSAID used. States may have hit head on ground, no abrasion or bleeding from head. Denies HA, vision change, confusion of LOC. No witnesses at time of incident. Experiencing paresthesia at right hand.    PMH:  has no past medical history on file.  MEDS:   Current outpatient prescriptions:   •  hydrOXYzine (ATARAX) 25 MG Tab, Take 1 Tab by mouth 3 times a day as needed for Anxiety., Disp: 30 Tab, Rfl: 5    Current facility-administered medications:   •  ketorolac (TORADOL) injection 60 mg, 60 mg, Intramuscular, Once, Nora Dueñas, F.N.P.  ALLERGIES:   Allergies   Allergen Reactions   • Penicillins Unspecified     Previous hospitalization after penicillin - does not remember reaction     SURGHX: History reviewed. No pertinent past surgical history.  SOCHX:  reports that she has been smoking Cigarettes.  She has a 1.7 pack-year smoking history. She has never used smokeless tobacco. She reports that she drinks about 1.2 oz of alcohol per week. She reports that she does not use illicit drugs.  FH: Family history was reviewed, no pertinent findings to report      Review of Systems   Constitutional: " "Negative for fever, chills and malaise/fatigue.   Eyes: Negative for blurred vision, double vision and photophobia.   Respiratory: Negative for shortness of breath.    Cardiovascular: Negative for chest pain, palpitations and orthopnea.   Musculoskeletal: Positive for myalgias and joint pain. Negative for back pain, falls and neck pain.   Neurological: Positive for tingling and sensory change. Negative for dizziness, speech change, focal weakness, loss of consciousness, weakness and headaches.   Endo/Heme/Allergies: Does not bruise/bleed easily.   All other systems reviewed and are negative.         Objective:     /70 mmHg  Pulse 107  Temp(Src) 37.1 °C (98.7 °F)  Resp 20  Ht 1.676 m (5' 6\")  Wt 98.431 kg (217 lb)  BMI 35.04 kg/m2  SpO2 97%     Physical Exam   Constitutional: She is oriented to person, place, and time. Vital signs are normal. She appears well-developed and well-nourished. She is active and cooperative.  Non-toxic appearance. She does not have a sickly appearance. She does not appear ill. No distress.   HENT:   Head: Normocephalic.   Eyes: Conjunctivae and EOM are normal. Pupils are equal, round, and reactive to light.   Neck: Normal range of motion. Neck supple.   Cardiovascular: Normal rate and regular rhythm.    Pulmonary/Chest: Effort normal and breath sounds normal.   Musculoskeletal:        Right shoulder: She exhibits tenderness, bony tenderness and pain. She exhibits normal range of motion, no swelling, no effusion, no crepitus, no deformity and normal strength.        Arms:       Right hand: She exhibits decreased range of motion, tenderness, bony tenderness, disruption of two-point discrimination and swelling. She exhibits normal capillary refill, no deformity and no laceration. Decreased sensation noted. Decreased sensation is present in the ulnar distribution. Decreased strength noted. She exhibits finger abduction and thumb/finger opposition.        Hands:  TTP at 4th-5th " metacarpals of right hand with swelling, small superficial abrasion seen on lateral aspect of ulnar aspect of right hand with no active bleeding. Decreased ROM due to pain, decreased strength in hand due to pain. FROM of right wrist with discomfort. Decreased ROM of right shoulder joint bu able to lateral and front arm raise with difficulty due to discomfort. TTP at posterior and anterior aspect of right shoulder joint, Superficial abrasion/bruising seen at lateral aspect of right shoulder joint/upper deltoid muscle with TTP.   Neurological: She is alert and oriented to person, place, and time.   Skin: Skin is warm and dry. She is not diaphoretic. No erythema.   Vitals reviewed.    Right hand xray FINDINGS:  There is no evidence of fracture or dislocation. Alignment is maintained. No periosteal new bone formation is seen. No osseous erosion is identified. No substantial soft tissue swelling is seen.     Right shoulder xray FINDINGS:  There is no evidence of fracture or dislocation. Glenohumeral and acromioclavicular articulation is maintained.  Visualized right lung field is clear     MA applied right hand velcro wrist splint  Assessment/Plan:     1. Blunt trauma of multiple sites    - ketorolac (TORADOL) injection 60 mg; 2 mL by Intramuscular route Once.  - DX-HAND 3+ RIGHT; Future  - DX-SHOULDER 2+ RIGHT; Future    2. Acute pain of right shoulder    - ketorolac (TORADOL) injection 60 mg; 2 mL by Intramuscular route Once.  - DX-SHOULDER 2+ RIGHT; Future    3. Crushing injury of right hand, initial encounter    - ketorolac (TORADOL) injection 60 mg; 2 mL by Intramuscular route Once.  - DX-HAND 3+ RIGHT; Future    4. Contusion of right hand, initial encounter    5. Contusion of right shoulder, initial encounter    Take Ibuprofen prn for discomfort, up to 600 mg every 4-6 hrs prn  May use cool compresses for swelling and warm compresses for muscle stiffness   May perform muscle stretches as tolerated after warm  compresses to maintain mobility, avoid abdominal crunches  May apply topical analgesics prn  Perform proper body mechanics with lifting, twisting, bending and reaching. Ask for assistance with heavy objects  Monitor for numbness/tingling in upper extremities, decreased ROM with lifting difficulty- need re-evaluation

## 2017-08-16 NOTE — MR AVS SNAPSHOT
"        Alie Sommersuilar   2017 11:45 AM   Office Visit   MRN: [id number]    Department:  [facility]   Dept Phone:  [phone]    Description:  Female : 1985   Provider:  TESS Delgado           Reason for Visit     Shoulder Injury truck fell in right shoulder , right hand x today      Allergies as of 2017     Allergen Noted Reactions    Penicillins 2017   Unspecified    Previous hospitalization after penicillin - does not remember reaction      You were diagnosed with     Blunt trauma of multiple sites   [182578]       Acute pain of right shoulder   [1055632]       Crushing injury of right hand, initial encounter   [604521]       Contusion of right hand, initial encounter   [639821]       Contusion of right shoulder, initial encounter   [658811]         Vital Signs     Blood Pressure Pulse Temperature Respirations Height Weight    124/70 mmHg 107 37.1 °C (98.7 °F) 20 1.676 m (5' 6\") 98.431 kg (217 lb)    Body Mass Index Oxygen Saturation Smoking Status             35.04 kg/m2 97% Current Some Day Smoker         Basic Information     Date Of Birth Sex Race Ethnicity Preferred Language    1985 Female White Non- English      Problem List              ICD-10-CM Priority Class Noted - Resolved    Panic attack F41.0   2017 - Present    Obesity (BMI 30-39.9) E66.9   2017 - Present    MRSA carrier Z22.322   2017 - Present    Open wound of foot S91.309A   2017 - Present      Health Maintenance        Date Due Completion Dates    IMM INFLUENZA (1) 2017 ---    PAP SMEAR 2020, 2017    IMM DTaP/Tdap/Td Vaccine (3 - Td) 2027, 1999            Current Immunizations     Hepatitis A Vaccine, Ped/Adol 1999    Hepatitis B Vaccine Non-Recombivax (Ped/Adol) 1999    MMR Vaccine 2017, 1999    TD Vaccine 1999    Tdap Vaccine 2017      Below and/or attached are the medications your provider " expects you to take. Review all of your home medications and newly ordered medications with your provider and/or pharmacist. Follow medication instructions as directed by your provider and/or pharmacist. Please keep your medication list with you and share with your provider. Update the information when medications are discontinued, doses are changed, or new medications (including over-the-counter products) are added; and carry medication information at all times in the event of emergency situations     Allergies:  PENICILLINS - Unspecified               Medications  Valid as of: August 16, 2017 -  2:02 PM    Generic Name Brand Name Tablet Size Instructions for use    HydrOXYzine HCl (Tab) ATARAX 25 MG Take 1 Tab by mouth 3 times a day as needed for Anxiety.        .                 Medicines prescribed today were sent to:     [address] [address]    Phone: [phone] Fax: [phone]    Open 24 Hours?: No      Medication refill instructions:       If your prescription bottle indicates you have medication refills left, it is not necessary to call your provider’s office. Please contact your pharmacy and they will refill your medication.    If your prescription bottle indicates you do not have any refills left, you may request refills at any time through one of the following ways: The online [employer] system (except Urgent Care), by calling your provider’s office, or by asking your pharmacy to contact your provider’s office with a refill request. Medication refills are processed only during regular business hours and may not be available until the next business day. Your provider may request additional information or to have a follow-up visit with you prior to refilling your medication.   *Please Note: Medication refills are assigned a new Rx number when refilled electronically. Your pharmacy may indicate that no refills were authorized even  though a new prescription for the same medication is available at the pharmacy. Please request the medicine by name with the pharmacy before contacting your provider for a refill.        Your To Do List     Future Labs/Procedures Complete By Expires    DX-HAND 3+ RIGHT  As directed 8/16/2018    DX-SHOULDER 2+ RIGHT  As directed 8/16/2018         Abcellute Access Code: [email or web address]  Expires: 9/15/2017  2:02 PM    Abcellute  A secure, online tool to manage your health information     [employer] Abcellute® is a secure, online tool that connects you to your personalized health information from the privacy of your home -- day or night - making it very easy for you to manage your healthcare. Once the activation process is completed, you can even access your medical information using the Abcellute jemma, which is available for free in the Apple Jemma store or Google Play store.     Abcellute provides the following levels of access (as shown below):   My Chart Features   [facility] Primary Care Doctor [facility]  Specialists [facility]  Urgent  Care Non-[facility]  Primary Care  Doctor   Email your healthcare team securely and privately 24/7 X X X    Manage appointments: schedule your next appointment; view details of past/upcoming appointments X      Request prescription refills. X      View recent personal medical records, including lab and immunizations X X X X   View health record, including health history, allergies, medications X X X X   Read reports about your outpatient visits, procedures, consult and ER notes X X X X   See your discharge summary, which is a recap of your hospital and/or ER visit that includes your diagnosis, lab results, and care plan. X X       How to register for Abcellute:  1. Go to  [email or web address].  2. Click on the Sign Up Now box, which takes you to the New Member Sign Up page. You will need to provide the following information:  a. Enter your Abcellute Access Code exactly as it appears at the top of this  page. (You will not need to use this code after you’ve completed the sign-up process. If you do not sign up before the expiration date, you must request a new code.)   b. Enter your date of birth.   c. Enter your home email address.   d. Click Submit, and follow the next screen’s instructions.  3. Create a [employer] ID. This will be your [employer] login ID and cannot be changed, so think of one that is secure and easy to remember.  4. Create a [employer] password. You can change your password at any time.  5. Enter your Password Reset Question and Answer. This can be used at a later time if you forget your password.   6. Enter your e-mail address. This allows you to receive e-mail notifications when new information is available in [employer]  7. Click Sign Up. You can now view your health information.    For assistance activating your [employer] account, call [phone]        Quit Tobacco Information     Do you want to quit using tobacco?    Quitting tobacco decreases risks of cancer, heart and lung disease, increases life expectancy, improves sense of taste and smell, and increases spending money, among other benefits.    If you are thinking about quitting, we can help.  • [facility] Quit Tobacco Program: 390.458.4972  o Program occurs weekly for four weeks and includes pharmacist consultation on products to support quitting smoking or chewing tobacco. A provider referral is needed for pharmacist consultation.  • Tobacco Users Help Hotline: 8-800-QUIT-NOW (530-9628) or https://[place].quitlogix.org/  o Free, confidential telephone and online coaching for [place] residents. Sessions are designed on a schedule that is convenient for you. Eligible clients receive free nicotine replacement therapy.  • Nationally: www.smokefree.gov  o Information and professional assistance to support both immediate and long-term needs as you become, and remain, a non-smoker. Smokefree.gov allows you to choose the help that best fits your needs.

## 2018-06-06 ENCOUNTER — OFFICE VISIT (OUTPATIENT)
Dept: MEDICAL GROUP | Facility: CLINIC | Age: 33
End: 2018-06-06
Payer: MEDICAID

## 2018-06-06 VITALS
RESPIRATION RATE: 18 BRPM | BODY MASS INDEX: 36.64 KG/M2 | WEIGHT: 228 LBS | HEIGHT: 66 IN | SYSTOLIC BLOOD PRESSURE: 120 MMHG | OXYGEN SATURATION: 97 % | HEART RATE: 74 BPM | TEMPERATURE: 97.4 F | DIASTOLIC BLOOD PRESSURE: 70 MMHG

## 2018-06-06 DIAGNOSIS — Z23 NEED FOR VACCINATION: ICD-10-CM

## 2018-06-06 DIAGNOSIS — Z30.013 ENCOUNTER FOR INITIAL PRESCRIPTION OF INJECTABLE CONTRACEPTIVE: ICD-10-CM

## 2018-06-06 DIAGNOSIS — E66.9 OBESITY (BMI 30-39.9): ICD-10-CM

## 2018-06-06 PROBLEM — S91.309A OPEN WOUND OF FOOT: Status: RESOLVED | Noted: 2017-07-17 | Resolved: 2018-06-06

## 2018-06-06 LAB
INT CON NEG: NEGATIVE
INT CON POS: POSITIVE
POC URINE PREGNANCY TEST: NORMAL

## 2018-06-06 PROCEDURE — 81025 URINE PREGNANCY TEST: CPT | Performed by: PHYSICIAN ASSISTANT

## 2018-06-06 PROCEDURE — 90471 IMMUNIZATION ADMIN: CPT | Performed by: PHYSICIAN ASSISTANT

## 2018-06-06 PROCEDURE — 99213 OFFICE O/P EST LOW 20 MIN: CPT | Mod: 25 | Performed by: PHYSICIAN ASSISTANT

## 2018-06-06 PROCEDURE — 90732 PPSV23 VACC 2 YRS+ SUBQ/IM: CPT | Performed by: PHYSICIAN ASSISTANT

## 2018-06-06 RX ORDER — MEDROXYPROGESTERONE ACETATE 150 MG/ML
150 INJECTION, SUSPENSION INTRAMUSCULAR ONCE
OUTPATIENT
Start: 2018-06-20 | End: 2018-06-21

## 2018-06-06 ASSESSMENT — PATIENT HEALTH QUESTIONNAIRE - PHQ9: CLINICAL INTERPRETATION OF PHQ2 SCORE: 0

## 2018-06-06 NOTE — PROGRESS NOTES
Chief Complaint   Patient presents with   • Contraception     wants to get back on depo       HISTORY OF PRESENT ILLNESS: Patient is a 33 y.o. female established patient who presents today for evaluation and management of:    Obesity (BMI 30-39.9)  Patient is not currently working on losing weight however she is aware that she needs to do so.     Encounter for initial prescription of injectable contraceptive  This patient states that she has previously used Depo-Provera, before she became pregnant with her 2 children.  She is requesting to restart this medication as she has recently become sexually active and no longer wishes to become pregnant.  She has previously used Mirena which resulted in pain in her uterus and does typically remember to take a pill on a daily basis.  She is aware that Depo-Provera may result in weight gain and will remain cognizant of this.       Patient Active Problem List    Diagnosis Date Noted   • Encounter for initial prescription of injectable contraceptive 06/06/2018   • MRSA carrier 07/06/2017   • Panic attack 06/19/2017   • Obesity (BMI 30-39.9) 06/19/2017       Allergies:Penicillins    Current Outpatient Prescriptions   Medication Sig Dispense Refill   • hydrOXYzine (ATARAX) 25 MG Tab Take 1 Tab by mouth 3 times a day as needed for Anxiety. 30 Tab 5     Current Facility-Administered Medications   Medication Dose Route Frequency Provider Last Rate Last Dose   • [START ON 6/20/2018] medroxyPROGESTERone (DEPO-PROVERA) injection 150 mg  150 mg Intramuscular Once JESSICA WheelerAJesus-CHARLES           Social History   Substance Use Topics   • Smoking status: Current Some Day Smoker     Packs/day: 0.10     Years: 17.00     Types: Cigarettes   • Smokeless tobacco: Never Used      Comment: socially, only every few months   • Alcohol use 1.2 oz/week     2 Standard drinks or equivalent per week      Comment: socially - rare       Family Status   Relation Status   • Mother Alive   • Father Alive  "  • Sister Alive   • Brother Alive   • Sister Alive   • Child Alive   • Child Alive     Family History   Problem Relation Age of Onset   • Cancer Mother      uterine and skin   • Thyroid Mother      removed due to lump   • Diabetes Father    • Hypertension Father    • Cancer Sister      ovarian   • Alcohol abuse Brother        Review of Systems: See HPI above.   Constitutional: Negative for fever, chills, weight loss and malaise/fatigue.     Eyes: Negative for blurred vision.   Respiratory: Negative for cough, sputum production, shortness of breath and wheezing.    Cardiovascular: Negative for chest pain, palpitations, orthopnea and leg swelling.   Genitourinary: Negative for dysuria, urgency and frequency. LMP started today.   Musculoskeletal: Negative for myalgias, back pain and joint pain.   Neurological: Negative for dizzinessand headaches.   Endo/Heme/Allergies: Does not bruise/bleed easily.      Exam:  Blood pressure 120/70, pulse 74, temperature 36.3 °C (97.4 °F), resp. rate 18, height 1.676 m (5' 6\"), weight 103.4 kg (228 lb), SpO2 97 %.  Body mass index is 36.8 kg/m².  General:  Obese female in NAD  Head: is grossly normal.  Neck: Supple without masses. Thyroid is not visibly enlarged.  Pulmonary: Normal effort.  Cardiovascular:  Carotid and radial pulses are intact and equal bilaterally.  Extremities: no clubbing, cyanosis, or edema.    Medical decision-making and discussion:  1. Need for vaccination  - Pneumococal Polysaccharide Vaccine 23-Valent =>3yo SQ/IM    2. Encounter for initial prescription of injectable contraceptive  - POCT Pregnancy  - medroxyPROGESTERone (DEPO-PROVERA) injection 150 mg; 1 mL by Intramuscular route Once.    3. Obesity (BMI 30-39.9)  - Patient identified as having weight management issue.  Appropriate orders and counseling given.      Please note that this dictation was created using voice recognition software. I have made every reasonable attempt to correct obvious errors, but " I expect that there are errors of grammar and possibly content that I did not discover before finalizing the note.      Return for 2 weeks MA visit for depo provera injection.

## 2018-06-06 NOTE — ASSESSMENT & PLAN NOTE
This patient states that she has previously used Depo-Provera, before she became pregnant with her 2 children.  She is requesting to restart this medication as she has recently become sexually active and no longer wishes to become pregnant.  She has previously used Mirena which resulted in pain in her uterus and does typically remember to take a pill on a daily basis.  She is aware that Depo-Provera may result in weight gain and will remain cognizant of this.

## 2018-10-08 ENCOUNTER — OFFICE VISIT (OUTPATIENT)
Dept: URGENT CARE | Facility: PHYSICIAN GROUP | Age: 33
End: 2018-10-08
Payer: MEDICAID

## 2018-10-08 VITALS
WEIGHT: 220 LBS | BODY MASS INDEX: 35.36 KG/M2 | TEMPERATURE: 98.2 F | DIASTOLIC BLOOD PRESSURE: 80 MMHG | HEART RATE: 96 BPM | SYSTOLIC BLOOD PRESSURE: 110 MMHG | OXYGEN SATURATION: 98 % | HEIGHT: 66 IN

## 2018-10-08 DIAGNOSIS — M62.830 BACK MUSCLE SPASM: ICD-10-CM

## 2018-10-08 DIAGNOSIS — J02.9 SORE THROAT: ICD-10-CM

## 2018-10-08 DIAGNOSIS — J02.0 STREP PHARYNGITIS: ICD-10-CM

## 2018-10-08 LAB
INT CON NEG: NORMAL
INT CON POS: NORMAL
S PYO AG THROAT QL: POSITIVE

## 2018-10-08 PROCEDURE — 99214 OFFICE O/P EST MOD 30 MIN: CPT | Performed by: NURSE PRACTITIONER

## 2018-10-08 PROCEDURE — 87880 STREP A ASSAY W/OPTIC: CPT | Performed by: NURSE PRACTITIONER

## 2018-10-08 RX ORDER — AZITHROMYCIN 250 MG/1
TABLET, FILM COATED ORAL
Qty: 6 TAB | Refills: 0 | Status: SHIPPED | OUTPATIENT
Start: 2018-10-08 | End: 2018-11-07

## 2018-10-08 RX ORDER — KETOROLAC TROMETHAMINE 30 MG/ML
60 INJECTION, SOLUTION INTRAMUSCULAR; INTRAVENOUS ONCE
Status: COMPLETED | OUTPATIENT
Start: 2018-10-08 | End: 2018-10-08

## 2018-10-08 RX ORDER — CYCLOBENZAPRINE HCL 5 MG
5 TABLET ORAL 3 TIMES DAILY PRN
Qty: 21 TAB | Refills: 0 | Status: SHIPPED | OUTPATIENT
Start: 2018-10-08 | End: 2018-10-15

## 2018-10-08 RX ORDER — IBUPROFEN 200 MG
200 TABLET ORAL EVERY 6 HOURS PRN
COMMUNITY
End: 2019-05-03

## 2018-10-08 RX ADMIN — KETOROLAC TROMETHAMINE 60 MG: 30 INJECTION, SOLUTION INTRAMUSCULAR; INTRAVENOUS at 18:26

## 2018-10-09 ASSESSMENT — ENCOUNTER SYMPTOMS
COUGH: 0
ABDOMINAL PAIN: 0
ORTHOPNEA: 0
VOMITING: 0
SHORTNESS OF BREATH: 0
FEVER: 0
SORE THROAT: 1
MYALGIAS: 1
CHILLS: 0
FLANK PAIN: 0
BACK PAIN: 1
WEAKNESS: 0
BRUISES/BLEEDS EASILY: 0
PALPITATIONS: 0
NAUSEA: 0
SENSORY CHANGE: 0
TINGLING: 0

## 2018-10-09 NOTE — PROGRESS NOTES
"Subjective:      Alie Lowe is a 33 y.o. female who presents with Back Pain (x4 days. Back pain. Pain when moving.) and Pharyngitis (x2 days. Cough, sore throat.)            HPI  Alie is here for acute back pain x 4 days. States was picking up heavy boxes and \"threw my back out\". Denies numbness/tingling in lower extremities. Using NSAID, IcyHot with lidocaine, and stretching. States \"any movement causes pain\".  present.    PMH:  has no past medical history on file.  MEDS:   Current Outpatient Prescriptions:   •  ibuprofen (MOTRIN) 200 MG Tab, Take 200 mg by mouth every 6 hours as needed., Disp: , Rfl:   •  azithromycin (ZITHROMAX) 250 MG Tab, Take 2 tabs by mouth on day 1, then take 1 tab on days 2-5, Disp: 6 Tab, Rfl: 0  •  cyclobenzaprine (FLEXERIL) 5 MG tablet, Take 1 Tab by mouth 3 times a day as needed for Muscle Spasms for up to 7 days., Disp: 21 Tab, Rfl: 0  •  hydrOXYzine (ATARAX) 25 MG Tab, Take 1 Tab by mouth 3 times a day as needed for Anxiety., Disp: 30 Tab, Rfl: 5  ALLERGIES:   Allergies   Allergen Reactions   • Penicillins Unspecified     Previous hospitalization after penicillin - does not remember reaction     SURGHX: History reviewed. No pertinent surgical history.  SOCHX:  reports that she has been smoking Cigarettes.  She has a 1.70 pack-year smoking history. She has never used smokeless tobacco. She reports that she drinks about 1.2 oz of alcohol per week . She reports that she does not use drugs.  FH: Family history was reviewed, no pertinent findings to report    Review of Systems   Constitutional: Negative for chills, fever and malaise/fatigue.   HENT: Positive for sore throat. Negative for congestion and ear pain.    Respiratory: Negative for cough and shortness of breath.    Cardiovascular: Negative for chest pain, palpitations and orthopnea.   Gastrointestinal: Negative for abdominal pain, nausea and vomiting.   Genitourinary: Negative for dysuria, flank pain, frequency, " "hematuria and urgency.   Musculoskeletal: Positive for back pain and myalgias.   Neurological: Negative for tingling, sensory change and weakness.   Endo/Heme/Allergies: Does not bruise/bleed easily.   All other systems reviewed and are negative.         Objective:     /80   Pulse 96   Temp 36.8 °C (98.2 °F)   Ht 1.676 m (5' 6\")   Wt 99.8 kg (220 lb)   SpO2 98%   BMI 35.51 kg/m²      Physical Exam   Constitutional: She is oriented to person, place, and time. Vital signs are normal. She appears well-developed and well-nourished. She is active and cooperative.  Non-toxic appearance. She does not have a sickly appearance. She does not appear ill. No distress.   HENT:   Head: Normocephalic.   Right Ear: Hearing, tympanic membrane, external ear and ear canal normal.   Left Ear: Hearing, tympanic membrane, external ear and ear canal normal.   Nose: No mucosal edema, rhinorrhea or sinus tenderness.   Mouth/Throat: Uvula is midline. Mucous membranes are dry. No uvula swelling. Posterior oropharyngeal edema and posterior oropharyngeal erythema present. Tonsils are 1+ on the right. Tonsils are 1+ on the left. No tonsillar exudate.   Eyes: Pupils are equal, round, and reactive to light. Conjunctivae and EOM are normal.   Neck: Normal range of motion. Neck supple.   Cardiovascular: Normal rate and regular rhythm.    Pulmonary/Chest: Effort normal and breath sounds normal. No accessory muscle usage. No respiratory distress. She has no decreased breath sounds. She has no wheezes. She has no rhonchi. She has no rales.   Abdominal: Soft. Bowel sounds are normal. She exhibits no distension. There is no tenderness. There is no rebound and no guarding.   Musculoskeletal:        Lumbar back: She exhibits decreased range of motion, tenderness, pain and spasm. She exhibits no bony tenderness, no swelling, no edema and no deformity.   Neurological: She is alert and oriented to person, place, and time.   Skin: Skin is warm and " dry. She is not diaphoretic.   Vitals reviewed.              Assessment/Plan:     1. Back muscle spasm    - ketorolac (TORADOL) injection 60 mg; 2 mL by Intramuscular route Once.  - cyclobenzaprine (FLEXERIL) 5 MG tablet; Take 1 Tab by mouth 3 times a day as needed for Muscle Spasms for up to 7 days.  Dispense: 21 Tab; Refill: 0    2. Strep pharyngitis  - azithromycin (ZITHROMAX) 250 MG Tab; Take 2 tabs by mouth on day 1, then take 1 tab on days 2-5  Dispense: 6 Tab; Refill: 0    3. Sore throat    - POCT Rapid Strep A    Increase water intake  May use Ibuprofen/Tylenol prn for any fever, body aches or throat pain  May take long acting antihistamine for seasonal allergy symptoms prn  May use saline nasal spray/wan pot for nasal congestion prn  May use Nasacort/Flonase prn for nasal congestion  May use throat lozenges for throat discomfort  May gargle with salt water prn for throat discomfort  May drink smoothies for nutrition if too painful to swallow solid foods  Monitor for continued fever with treatment, any sinus pain/pressure with sinus congestion with thick mucus production and HA- need re-evaluation  Monitor for productive cough, SOB and chest pain/tightness, fever- need re-evaluation    Take NSAID prn for discomfort  May use cool compresses for swelling and warm compresses for muscle stiffness   May perform muscle stretches as tolerated after warm compresses to maintain mobility, avoid abdominal crunches  May continue Flexeril prn when at home only   May apply topical analgesics prn  Perform proper body mechanics with lifting, twisting, bending and reaching. Ask for assistance with heay objects  Monitor for bowel/urination problems, numbness/tingling in lower extremities, decreased ROM with ambulation difficulty- need re-evaluation

## 2018-11-07 ENCOUNTER — OFFICE VISIT (OUTPATIENT)
Dept: URGENT CARE | Facility: PHYSICIAN GROUP | Age: 33
End: 2018-11-07
Payer: MEDICAID

## 2018-11-07 VITALS
HEIGHT: 66 IN | OXYGEN SATURATION: 96 % | RESPIRATION RATE: 16 BRPM | DIASTOLIC BLOOD PRESSURE: 76 MMHG | BODY MASS INDEX: 38.25 KG/M2 | TEMPERATURE: 99.3 F | HEART RATE: 100 BPM | WEIGHT: 238 LBS | SYSTOLIC BLOOD PRESSURE: 120 MMHG

## 2018-11-07 DIAGNOSIS — R20.2 NUMBNESS AND TINGLING IN RIGHT HAND: ICD-10-CM

## 2018-11-07 DIAGNOSIS — R20.0 NUMBNESS AND TINGLING IN RIGHT HAND: ICD-10-CM

## 2018-11-07 DIAGNOSIS — M54.50 ACUTE MIDLINE LOW BACK PAIN WITHOUT SCIATICA: ICD-10-CM

## 2018-11-07 PROCEDURE — 99214 OFFICE O/P EST MOD 30 MIN: CPT | Performed by: FAMILY MEDICINE

## 2018-11-07 RX ORDER — KETOROLAC TROMETHAMINE 30 MG/ML
60 INJECTION, SOLUTION INTRAMUSCULAR; INTRAVENOUS ONCE
Status: COMPLETED | OUTPATIENT
Start: 2018-11-07 | End: 2018-11-07

## 2018-11-07 RX ORDER — CYCLOBENZAPRINE HCL 5 MG
TABLET ORAL
Qty: 30 TAB | Refills: 0 | Status: SHIPPED | OUTPATIENT
Start: 2018-11-07 | End: 2019-01-05

## 2018-11-07 RX ADMIN — KETOROLAC TROMETHAMINE 60 MG: 30 INJECTION, SOLUTION INTRAMUSCULAR; INTRAVENOUS at 18:36

## 2018-11-08 NOTE — PROGRESS NOTES
Chief Complaint:    Chief Complaint   Patient presents with   • Numbness     right hand today   • Back Pain     low back x 1.5 days       History of Present Illness:    Boyfriend present. This is a new problem. For 1.5 days, she has severe pain in lower back, most of it is in the midline, maybe a little in the right lower back. No injury or trauma. No radiating pain down legs. No loss of bowel/bladder control. She had similar back pain at visit 10/8/18, for which Toradol 60 mg and Cyclobenzaprine 5 mg worked well and were tolerated. Today, she also has felt some numbness and tingling in right hand. No loss of movement or strength in right arm/hand. No change in temperature in right arm/hand.      Review of Systems:    Constitutional: Negative for fever, chills, and diaphoresis.   Eyes: Negative for change in vision, photophobia, pain, redness, and discharge.  ENT: Negative for ear pain, ear discharge, hearing loss, tinnitus, nasal congestion, nosebleeds, and sore throat.    Respiratory: Negative for cough, hemoptysis, sputum production, shortness of breath, wheezing, and stridor.    Cardiovascular: Negative for chest pain, palpitations, orthopnea, claudication, leg swelling, and PND.   Gastrointestinal: Negative for abdominal pain, nausea, vomiting, diarrhea, constipation, blood in stool, and melena.   Genitourinary: Negative for dysuria, urinary urgency, urinary frequency, hematuria, and flank pain.   Musculoskeletal: See HPI.   Skin: Negative for rash and itching.   Neurological: See HPI.   Endo: Negative for polydipsia.   Heme: Does not bruise/bleed easily.   Psychiatric/Behavioral: Negative for depression, suicidal ideas, hallucinations, memory loss and substance abuse. The patient is not nervous/anxious and does not have insomnia.        Past Medical History:    History reviewed. No pertinent past medical history.    Past Surgical History:    History reviewed. No pertinent surgical history.    Social  "History:    Social History     Social History   • Marital status: Single     Spouse name: N/A   • Number of children: N/A   • Years of education: N/A     Occupational History   • Not on file.     Social History Main Topics   • Smoking status: Current Some Day Smoker     Packs/day: 0.10     Years: 17.00     Types: Cigarettes   • Smokeless tobacco: Never Used      Comment: socially, only every few months   • Alcohol use 1.2 oz/week     2 Standard drinks or equivalent per week      Comment: socially - rare   • Drug use: No   • Sexual activity: Yes     Partners: Male     Birth control/ protection: Condom     Other Topics Concern   • Not on file     Social History Narrative   • No narrative on file     Family History:    Family History   Problem Relation Age of Onset   • Cancer Mother         uterine and skin   • Thyroid Mother         removed due to lump   • Diabetes Father    • Hypertension Father    • Cancer Sister         ovarian   • Alcohol abuse Brother      Medications:    Current Outpatient Prescriptions on File Prior to Visit   Medication Sig Dispense Refill   • ibuprofen (MOTRIN) 200 MG Tab Take 200 mg by mouth every 6 hours as needed.     • hydrOXYzine (ATARAX) 25 MG Tab Take 1 Tab by mouth 3 times a day as needed for Anxiety. (Patient not taking: Reported on 11/7/2018) 30 Tab 5     No current facility-administered medications on file prior to visit.      Allergies:    Allergies   Allergen Reactions   • Penicillins Unspecified     Previous hospitalization after penicillin - does not remember reaction       Vitals:    Vitals:    11/07/18 1748   BP: 120/76   Pulse: 100   Resp: 16   Temp: 37.4 °C (99.3 °F)   TempSrc: Temporal   SpO2: 96%   Weight: 108 kg (238 lb)   Height: 1.676 m (5' 6\")       Physical Exam:    Constitutional: Vital signs reviewed. Appears well-developed and well-nourished. No acute distress.   Eyes: Sclera white, conjunctivae clear.  ENT: External ears normal. Hearing normal.  Cardiovascular: " Peripheral pulses 2+.   Pulmonary/Chest: Respirations non-labored.  Musculoskeletal: Severely decreased lumbar range of motion due to pain, mostly in midline lower back. Moderately tender to palpation midline lower back. Some movements of neck and shoulders cause pain in mid-lower back, but not pain in neck or shoulders. Normal range of motion throughout both arms. No muscular atrophy or weakness.  Neurological: Alert and oriented to person, place, and time. Muscle tone normal. Coordination normal. Light touch and sensation normal. Reflexes 2+.  Skin: No rashes or lesions. Warm, dry, normal turgor.  Psychiatric: Normal mood and affect. Behavior is normal. Judgment and thought content normal.       Assessment / Plan:    1. Acute midline low back pain without sciatica  - ketorolac (TORADOL) injection 60 mg; 2 mL by Intramuscular route Once.  - cyclobenzaprine (FLEXERIL) 5 MG tablet; 1 TAB EVERY 8 HOURS ONLY IF NEEDED FOR PAIN, MUSCLE SPASM, AND/OR MUSCLE TIGHTNESS. MAY CAUSE DROWSINESS.  Dispense: 30 Tab; Refill: 0    2. Numbness and tingling in right hand      Discussed with them DDX, management options, and risks, benefits, and alternatives to treatment plan agreed upon.    Likely MSK inflammation and/or muscle tightness/spasm as cause of lower back symptoms.     Rec'd relative rest.    Agreeable to medications given and prescribed.    She has no loss of movement or strength throughout both arms/hands and normal temperature in arms/hands, just numbness feeling in right hand that started today. Will further observe this and see if problem self-resolves. Discussed red flag signs and symptoms and to be seen for evaluation if develops red flag signs and symptoms.    Discussed expected course of duration, time for improvement, and to seek follow-up in Emergency Room, urgent care, or with PCP if getting worse at any time or not improving within expected time frame.

## 2019-01-05 ENCOUNTER — OFFICE VISIT (OUTPATIENT)
Dept: URGENT CARE | Facility: PHYSICIAN GROUP | Age: 34
End: 2019-01-05
Payer: MEDICAID

## 2019-01-05 VITALS
SYSTOLIC BLOOD PRESSURE: 110 MMHG | TEMPERATURE: 97.3 F | HEART RATE: 100 BPM | DIASTOLIC BLOOD PRESSURE: 72 MMHG | OXYGEN SATURATION: 95 % | RESPIRATION RATE: 16 BRPM

## 2019-01-05 DIAGNOSIS — K52.9 ACUTE GASTROENTERITIS: ICD-10-CM

## 2019-01-05 DIAGNOSIS — R52 BODY ACHES: ICD-10-CM

## 2019-01-05 PROCEDURE — 99214 OFFICE O/P EST MOD 30 MIN: CPT | Performed by: FAMILY MEDICINE

## 2019-01-05 RX ORDER — KETOROLAC TROMETHAMINE 30 MG/ML
60 INJECTION, SOLUTION INTRAMUSCULAR; INTRAVENOUS ONCE
Status: COMPLETED | OUTPATIENT
Start: 2019-01-05 | End: 2019-01-05

## 2019-01-05 RX ORDER — DICYCLOMINE HCL 20 MG
TABLET ORAL
Qty: 20 TAB | Refills: 0 | Status: SHIPPED | OUTPATIENT
Start: 2019-01-05 | End: 2019-05-03

## 2019-01-05 RX ORDER — ONDANSETRON 4 MG/1
TABLET, ORALLY DISINTEGRATING ORAL
Qty: 15 TAB | Refills: 0 | Status: SHIPPED | OUTPATIENT
Start: 2019-01-05 | End: 2019-05-03

## 2019-01-05 RX ORDER — ONDANSETRON 2 MG/ML
4 INJECTION INTRAMUSCULAR; INTRAVENOUS ONCE
Status: COMPLETED | OUTPATIENT
Start: 2019-01-05 | End: 2019-01-05

## 2019-01-05 RX ADMIN — ONDANSETRON 4 MG: 2 INJECTION INTRAMUSCULAR; INTRAVENOUS at 12:26

## 2019-01-05 RX ADMIN — KETOROLAC TROMETHAMINE 60 MG: 30 INJECTION, SOLUTION INTRAMUSCULAR; INTRAVENOUS at 12:25

## 2019-01-05 NOTE — PROGRESS NOTES
Chief Complaint:    Chief Complaint   Patient presents with   • Emesis     Pt states poss food poison since yesterday       History of Present Illness:    This is a new problem. Yesterday, she started with vomiting and diarrhea after eating [employer]. Her roommate also had [employer] and had similar symptoms. Patient had intense, prolonged episodes of vomiting and dry heaving and eventually she started hurting in bilateral rib cage regions. No meds taken for this. Able to keep some small amount of fluids down. Has had subjective fever. Mild nasal congestion with purulent mucus from nose which she attributes to allergies as this is typical for her. She has some sore throat she feels is due to vomiting. No cough or urine symptoms. Toradol IM has worked well in past for other pains.      Review of Systems:    Constitutional: See HPI.  Eyes: Negative for change in vision, photophobia, pain, redness, and discharge.  ENT: See HPI.  Respiratory: Negative for cough, hemoptysis, sputum production, shortness of breath, wheezing, and stridor.    Cardiovascular: Negative for chest pain, palpitations, orthopnea, claudication, leg swelling, and PND.   Gastrointestinal: See HPI.   Genitourinary: Negative for dysuria, urinary urgency, urinary frequency, hematuria, and flank pain.   Musculoskeletal: See HPI.   Skin: Negative for rash and itching.   Neurological: Negative for dizziness, tingling, tremors, sensory change, speech change, focal weakness, seizures, loss of consciousness, and headaches.   Endo: Negative for polydipsia.   Heme: Does not bruise/bleed easily.   Psychiatric/Behavioral: Negative for depression, suicidal ideas, hallucinations, memory loss and substance abuse. The patient is not nervous/anxious and does not have insomnia.        Past Medical History:    History reviewed. No pertinent past medical history.    Past Surgical History:    History reviewed. No pertinent surgical history.    Social History:    Social  History     Social History   • Marital status: Single     Spouse name: N/A   • Number of children: N/A   • Years of education: N/A     Occupational History   • Not on file.     Social History Main Topics   • Smoking status: Current Some Day Smoker     Packs/day: 0.10     Years: 17.00     Types: Cigarettes   • Smokeless tobacco: Never Used      Comment: socially, only every few months   • Alcohol use 1.2 oz/week     2 Standard drinks or equivalent per week      Comment: socially - rare   • Drug use: No   • Sexual activity: Yes     Partners: Male     Birth control/ protection: Condom     Other Topics Concern   • Not on file     Social History Narrative   • No narrative on file     Family History:    Family History   Problem Relation Age of Onset   • Cancer Mother         uterine and skin   • Thyroid Mother         removed due to lump   • Diabetes Father    • Hypertension Father    • Cancer Sister         ovarian   • Alcohol abuse Brother      Medications:    Current Outpatient Prescriptions on File Prior to Visit   Medication Sig Dispense Refill   • ibuprofen (MOTRIN) 200 MG Tab Take 200 mg by mouth every 6 hours as needed.       No current facility-administered medications on file prior to visit.      Allergies:    Allergies   Allergen Reactions   • Penicillins Unspecified     Previous hospitalization after penicillin - does not remember reaction       Vitals:    Vitals:    01/05/19 1140   BP: 110/72   Pulse: 100   Resp: 16   Temp: 36.3 °C (97.3 °F)   TempSrc: Temporal   SpO2: 95%     LMP ended 12/24/18      Physical Exam:      Constitutional: Vital signs reviewed. Appears well-developed and well-nourished. No acute distress.   Eyes: Sclera white, conjunctivae clear.   ENT: External ears normal. External auditory canals normal without discharge. TMs translucent and non-bulging. Hearing normal. Nasal mucosa pink. Lips/teeth are normal. Oral mucosa pink and moist. Posterior pharynx: WNL.  Neck: Neck supple.    Cardiovascular: Regular rate and rhythm. No murmur.  Pulmonary/Chest: Respirations non-labored. Clear to auscultation bilaterally.  Abdomen: Bowel sounds are normal active. Soft, non-distended, and non-tender to palpation.  Musculoskeletal: Tender to palpation bilateral lateral ribs and mid back regions. Normal gait. No muscular atrophy or weakness.  Neurological: Alert and oriented to person, place, and time. Muscle tone normal. Coordination normal.   Skin: No rashes or lesions. Warm, dry, normal turgor.  Psychiatric: Normal mood and affect. Behavior is normal. Judgment and thought content normal.       Assessment / Plan:    1. Acute gastroenteritis  - ondansetron (ZOFRAN) syringe/vial injection 4 mg; 2 mL by Intramuscular route Once.  - ondansetron (ZOFRAN ODT) 4 MG TABLET DISPERSIBLE; 1 TAB, ALLOW TO DISINTEGRATE IN MOUTH, EVERY 8 HOURS ONLY IF NEEDED FOR NAUSEA OR VOMITING.  Dispense: 15 Tab; Refill: 0  - dicyclomine (BENTYL) 20 MG Tab; 1 TAB BY MOUTH EVERY 6 HOURS ONLY IF NEEDED FOR INTESTINAL CRAMPS AND/OR DIARRHEA.  Dispense: 20 Tab; Refill: 0    2. Body aches  - ketorolac (TORADOL) injection 60 mg; 2 mL by Intramuscular route Once.      Work note given - excuse for 1/5 thru and including 1/7/19. May return sooner if feeling better.    Discussed with her DDX, management options, and risks, benefits, and alternatives to treatment plan agreed upon.    Recommended to treat symptoms, but otherwise GI symptoms will likely need to self-resolve, as likely viral etiology at this time.    Agreeable to medications given and prescribed.    Recommended stay hydrated with small but frequent quantities of p.o. fluids. May eat small quantities of soft, bland foods. Advance diet as tolerated.    Discussed signs and symptoms of dehydration and go to Emergency Room for likely IV fluids (we do not do here) if symptoms are severe.    Discussed expected course of duration, time for improvement, and to seek follow-up in  Emergency Room, urgent care, or with PCP if getting worse at any time or not improving within expected time frame.

## 2019-01-05 NOTE — LETTER
January 5, 2019         Patient: Alie Lowe   YOB: 1985   Date of Visit: 1/5/2019           To Whom it May Concern:    Alie Lowe was seen in my clinic on 1/5/2019.     Please excuse from work for 1/5 thru and including 1/7/19 due to medical condition.    May return sooner if feeling better.    If you have any questions or concerns, please don't hesitate to call.        Sincerely,           Sushant Ryder M.D.  Electronically Signed

## 2019-03-23 ENCOUNTER — HOSPITAL ENCOUNTER (EMERGENCY)
Facility: MEDICAL CENTER | Age: 34
End: 2019-03-23
Attending: EMERGENCY MEDICINE
Payer: COMMERCIAL

## 2019-03-23 VITALS
HEART RATE: 99 BPM | RESPIRATION RATE: 16 BRPM | BODY MASS INDEX: 38.87 KG/M2 | SYSTOLIC BLOOD PRESSURE: 165 MMHG | OXYGEN SATURATION: 97 % | HEIGHT: 66 IN | WEIGHT: 241.84 LBS | DIASTOLIC BLOOD PRESSURE: 89 MMHG | TEMPERATURE: 98 F
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
## 2019-03-23 PROCEDURE — 99283 EMERGENCY DEPT VISIT LOW MDM: CPT

## 2019-03-23 RX ORDER — DOXYCYCLINE 100 MG/1
100 CAPSULE ORAL 2 TIMES DAILY
Qty: 10 CAP | Refills: 0 | Status: SHIPPED | OUTPATIENT
Start: 2019-03-23 | End: 2019-03-28

## 2019-03-24 NOTE — ED TRIAGE NOTES
Chief Complaint   Patient presents with   • Dog Bite     Bilateral hands hand left wrist abrasions and puncture wounds.  History of MRSA.  Up to date on TDAP.  Bleeding controlled.  Wound bandaged.  Triage process explained to patient.  Pt back to waiting room.  Pt instructed to inform RN if any changes or questions arise.

## 2019-03-24 NOTE — ED PROVIDER NOTES
"ED Provider Note    CHIEF COMPLAINT  Chief Complaint   Patient presents with   • Dog Bite       HPI  Alie Lowe is a 33 y.o. female who presents with a bite to her left forearm by her family dog.  The patient does have her tetanus status up-to-date.  She denies any numbness or tingling.  She said this happened a few hours prior.  She describes it as aching pain but is able to move her arm and wrist without any significant difficulty.    REVIEW OF SYSTEMS  See HPI for further details. All other systems are negative.     PAST MEDICAL HISTORY       SOCIAL HISTORY  Social History     Social History Main Topics   • Smoking status: Current Some Day Smoker     Packs/day: 0.10     Years: 17.00     Types: Cigarettes   • Smokeless tobacco: Never Used      Comment: socially, only every few months   • Alcohol use 1.2 oz/week     2 Standard drinks or equivalent per week      Comment: socially - rare   • Drug use: No   • Sexual activity: Yes     Partners: Male     Birth control/ protection: Condom       SURGICAL HISTORY  patient denies any surgical history    CURRENT MEDICATIONS  Home Medications    **Home medications have not yet been reviewed for this encounter**         ALLERGIES  Allergies   Allergen Reactions   • Penicillins Unspecified     Previous hospitalization after penicillin - does not remember reaction       PHYSICAL EXAM  VITAL SIGNS: BP (!) 165/89   Pulse 99   Temp 36.7 °C (98 °F) (Temporal)   Resp 16   Ht 1.676 m (5' 6\")   Wt 109.7 kg (241 lb 13.5 oz)   SpO2 97%   BMI 39.03 kg/m²  @DAVID[[id number]::@  Pulse ox interpretation: I interpret this pulse ox as normal.  Constitutional: Alert in no apparent distress.  HENT: Normocephalic, Atraumatic, Bilateral external ears normal. Nose normal.   Eyes: Pupils are equal and reactive. Conjunctiva normal, non-icteric.   Heart: Regular rate and rythm, no murmurs.    Lungs: Clear to auscultation bilaterally.  Skin: 3 puncture wounds noted to the ventral aspect of the " patient's left upper extremity around the wrist area.  She is able to range the wrist without any difficulty.  There is 1 puncture wound on the dorsal aspect as well.  There is some bruising noted.  There is no bony tenderness of the radius or ulna.  Neurologic: Alert, Grossly non-focal.   Psychiatric: Affect normal, Judgment normal, Mood normal, Appears appropriate and not intoxicated.           COURSE & MEDICAL DECISION MAKING  Pertinent Labs & Imaging studies reviewed. (See chart for details)    33-year-old female who presents with what appears to be a dog bite.  This is not appear to be infected.  At this time my plan is to clean the wound and give the patient Augmentin.  I will give her strict return precautions and follow-up.    The patient will not drink alcohol nor drive with prescribed medications. The patient will return for worsening symptoms and is stable at the time of discharge. The patient verbalizes understanding and will comply.    FINAL IMPRESSION  1. Dog bite, initial encounter              Electronically signed by: Reji Oseguera, 3/23/2019 9:50 PM

## 2019-05-03 ENCOUNTER — OFFICE VISIT (OUTPATIENT)
Dept: URGENT CARE | Facility: PHYSICIAN GROUP | Age: 34
End: 2019-05-03
Payer: COMMERCIAL

## 2019-05-03 VITALS
WEIGHT: 230 LBS | DIASTOLIC BLOOD PRESSURE: 60 MMHG | TEMPERATURE: 98.6 F | SYSTOLIC BLOOD PRESSURE: 132 MMHG | HEART RATE: 76 BPM | BODY MASS INDEX: 37.12 KG/M2 | OXYGEN SATURATION: 99 % | RESPIRATION RATE: 16 BRPM

## 2019-05-03 DIAGNOSIS — Z32.01 POSITIVE URINE PREGNANCY TEST: ICD-10-CM

## 2019-05-03 LAB
INT CON NEG: NORMAL
INT CON POS: NORMAL
POC URINE PREGNANCY TEST: POSITIVE

## 2019-05-03 PROCEDURE — 81025 URINE PREGNANCY TEST: CPT | Performed by: FAMILY MEDICINE

## 2019-05-03 PROCEDURE — 99213 OFFICE O/P EST LOW 20 MIN: CPT | Performed by: FAMILY MEDICINE

## 2019-05-03 NOTE — PROGRESS NOTES
Chief Complaint:    Chief Complaint   Patient presents with   • Other     pregnancy test-positive at home       History of Present Illness:    She had positive pregnancy test at home, here to confirm. LMP was mid March 2019. Some tolerable nausea. She has had 2 kids, last OB was in [place], [place].      Review of Systems:    Constitutional: Negative for fever, chills, and diaphoresis.   Eyes: Negative for change in vision, photophobia, pain, redness, and discharge.  ENT: Negative for ear pain, ear discharge, hearing loss, tinnitus, nasal congestion, nosebleeds, and sore throat.    Respiratory: Negative for cough, hemoptysis, sputum production, shortness of breath, wheezing, and stridor.    Cardiovascular: Negative for chest pain, palpitations, orthopnea, claudication, leg swelling, and PND.   Gastrointestinal: See HPI. Negative for abdominal pain, vomiting, diarrhea, constipation, blood in stool, and melena.   Genitourinary: Negative for dysuria, urinary urgency, urinary frequency, hematuria, and flank pain.   Musculoskeletal: Negative for myalgias, joint pain, neck pain, and back pain.   Skin: Negative for rash and itching.   Neurological: Negative for dizziness, tingling, tremors, sensory change, speech change, focal weakness, seizures, loss of consciousness, and headaches.   Endo: Negative for polydipsia.   Heme: Does not bruise/bleed easily.   Psychiatric/Behavioral: Negative for depression, suicidal ideas, hallucinations, memory loss and substance abuse. The patient is not nervous/anxious and does not have insomnia.        Past Medical History:    History reviewed. No pertinent past medical history.    Past Surgical History:    History reviewed. No pertinent surgical history.    Social History:    Social History     Social History   • Marital status:      Spouse name: N/A   • Number of children: N/A   • Years of education: N/A     Occupational History   • Not on file.     Social History Main Topics   •  Smoking status: Current Some Day Smoker     Packs/day: 0.10     Years: 17.00     Types: Cigarettes   • Smokeless tobacco: Never Used      Comment: socially, only every few months   • Alcohol use 1.2 oz/week     2 Standard drinks or equivalent per week      Comment: socially - rare   • Drug use: No   • Sexual activity: Yes     Partners: Male     Birth control/ protection: Condom     Other Topics Concern   • Not on file     Social History Narrative   • No narrative on file     Family History:    Family History   Problem Relation Age of Onset   • Cancer Mother         uterine and skin   • Thyroid Mother         removed due to lump   • Diabetes Father    • Hypertension Father    • Cancer Sister         ovarian   • Alcohol abuse Brother      Medications:    No current outpatient prescriptions on file prior to visit.     No current facility-administered medications on file prior to visit.      Allergies:    Allergies   Allergen Reactions   • Penicillins Unspecified     Previous hospitalization after penicillin - does not remember reaction       Vitals:    Vitals:    05/03/19 1001   BP: 132/60   Pulse: 76   Resp: 16   Temp: 37 °C (98.6 °F)   SpO2: 99%   Weight: 104.3 kg (230 lb)       Physical Exam:    Constitutional: Vital signs reviewed. Appears well-developed and well-nourished. No acute distress.   Eyes: Sclera white, conjunctivae clear.   ENT: External ears normal. Hearing normal.  Neck: Neck supple.   Pulmonary/Chest: Respirations non-labored.  Abdomen: Bowel sounds are normal active. Soft, non-distended, and non-tender to palpation.  Musculoskeletal: Normal gait. Normal range of motion. No muscular atrophy or weakness.  Neurological: Alert and oriented to person, place, and time. Muscle tone normal. Coordination normal.   Skin: No rashes or lesions. Warm, dry, normal turgor.  Psychiatric: Normal mood and affect. Behavior is normal. Judgment and thought content normal.     Diagnostics:    POCT PREGNANCY (Order  #[id number]) on 5/3/19   Component Results     Component Value Ref Range & Units Status   POC Urine Pregnancy Test POSITIVE  Negative Final   Internal Control Positive Valid   Final   Internal Control Negative Valid   Final   Last Resulted Time   Fri May 3, 2019 10:07 AM       Assessment / Plan:    1. Positive urine pregnancy test  - POCT PREGNANCY      Note given: She is currently pregnant, confirmed by testing in clinic today.    Discussed with her DDX, management options, and risks, benefits, and alternatives to treatment plan agreed upon.    Advised no alcohol or smoking, get OTC Prenatal Vitamins and take, and see OB for further management.    May return to urgent care if needed.

## 2019-05-03 NOTE — LETTER
May 3, 2019         Patient: Alie Lowe   YOB: 1985   Date of Visit: 5/3/2019           To Whom it May Concern:    Alie Lowe was seen in my clinic on 5/3/2019.     She is currently pregnant, confirmed by testing in clinic today.    If you have any questions or concerns, please don't hesitate to call.        Sincerely,           Sushant Ryder M.D.  Electronically Signed

## 2019-05-06 ENCOUNTER — OFFICE VISIT (OUTPATIENT)
Dept: URGENT CARE | Facility: PHYSICIAN GROUP | Age: 34
End: 2019-05-06
Payer: COMMERCIAL

## 2019-05-06 VITALS
OXYGEN SATURATION: 97 % | BODY MASS INDEX: 36.96 KG/M2 | SYSTOLIC BLOOD PRESSURE: 108 MMHG | HEART RATE: 90 BPM | TEMPERATURE: 98 F | RESPIRATION RATE: 14 BRPM | HEIGHT: 66 IN | WEIGHT: 230 LBS | DIASTOLIC BLOOD PRESSURE: 66 MMHG

## 2019-05-06 DIAGNOSIS — R12 HEART BURN: ICD-10-CM

## 2019-05-06 PROCEDURE — 99214 OFFICE O/P EST MOD 30 MIN: CPT | Performed by: PHYSICIAN ASSISTANT

## 2019-05-06 RX ORDER — RANITIDINE 150 MG/1
150 TABLET ORAL 2 TIMES DAILY
Qty: 60 TAB | Refills: 0 | Status: SHIPPED | OUTPATIENT
Start: 2019-05-06 | End: 2019-07-15

## 2019-05-06 NOTE — PROGRESS NOTES
Chief Complaint   Patient presents with   • Gastrophageal Reflux     making her to vomit       HISTORY OF PRESENT ILLNESS: Patient is a 34 y.o. female who presents today for the following:    Heart burn - chronic  Worse over last week or so; recently pregnant  Hasn't taken anything OTC  Doesn't yet have an appt with OB     Patient Active Problem List    Diagnosis Date Noted   • Encounter for initial prescription of injectable contraceptive 06/06/2018   • MRSA carrier 07/06/2017   • Panic attack 06/19/2017   • Obesity (BMI 30-39.9) 06/19/2017       Allergies:Penicillins    Current Outpatient Prescriptions Ordered in [facility]   Medication Sig Dispense Refill   • raNITidine (ZANTAC) 150 MG Tab Take 1 Tab by mouth 2 times a day. 60 Tab 0     No current Epic-ordered facility-administered medications on file.        No past medical history on file.    Social History   Substance Use Topics   • Smoking status: Current Some Day Smoker     Packs/day: 0.10     Years: 17.00     Types: Cigarettes   • Smokeless tobacco: Never Used      Comment: socially, only every few months   • Alcohol use 1.2 oz/week     2 Standard drinks or equivalent per week      Comment: socially - rare       Family Status   Relation Status   • Mo Alive   • Fa Alive   • Sis Alive   • Bro Alive   • Sis Alive   • Child Alive   • Child Alive     Family History   Problem Relation Age of Onset   • Cancer Mother         uterine and skin   • Thyroid Mother         removed due to lump   • Diabetes Father    • Hypertension Father    • Cancer Sister         ovarian   • Alcohol abuse Brother        Review of Systems:    Constitutional ROS: No unexpected change in weight, No weakness, No fatigue  Eye ROS: No recent significant change in vision, No eye pain, redness, discharge  Ear ROS: No drainage, No tinnitus or vertigo, No recent change in hearing  Mouth/Throat ROS: No teeth or gum problems, No bleeding gums, No tongue complaints  Neck ROS: No swollen glands, No  "significant pain in neck  Pulmonary ROS: No chronic cough, sputum, or hemoptysis, No dyspnea on exertion, No wheezing  Cardiovascular ROS: No diaphoresis, No edema, No palpitations  Gastrointestinal ROS: Positive for heartburn.  Musculoskeletal/Extremities ROS: No peripheral edema, No pain, redness or swelling on the joints  Hematologic/Lymphatic ROS: No chills, No night sweats, No weight loss  Skin/Integumentary ROS: No edema, No evidence of rash, No itching      Exam:  /66   Pulse 90   Temp 36.7 °C (98 °F) (Temporal)   Resp 14   Ht 1.676 m (5' 6\")   Wt 104.3 kg (230 lb)   SpO2 97%   General: Well developed, well nourished. No distress.  Pulmonary: Unlabored respiratory effort.    Neurologic: Grossly nonfocal. No facial asymmetry noted.  Skin: Warm, dry, good turgor. No rashes in visible areas.   Psych: Normal mood. Alert and oriented x3. Judgment and insight is normal.    Assessment/Plan:  Use all medication as directed.  Follow-up with OB. Follow up for worsening or persistent symptoms.  1. Heart burn  raNITidine (ZANTAC) 150 MG Tab    H/o the same. Worse since pregnancy.        "

## 2019-05-21 ENCOUNTER — OFFICE VISIT (OUTPATIENT)
Dept: URGENT CARE | Facility: PHYSICIAN GROUP | Age: 34
End: 2019-05-21
Payer: COMMERCIAL

## 2019-05-21 VITALS
WEIGHT: 239 LBS | TEMPERATURE: 97.4 F | HEART RATE: 78 BPM | RESPIRATION RATE: 16 BRPM | BODY MASS INDEX: 38.58 KG/M2 | SYSTOLIC BLOOD PRESSURE: 124 MMHG | DIASTOLIC BLOOD PRESSURE: 70 MMHG | OXYGEN SATURATION: 97 %

## 2019-05-21 DIAGNOSIS — O21.0 HYPEREMESIS GRAVIDARUM: ICD-10-CM

## 2019-05-21 LAB
APPEARANCE UR: NORMAL
BILIRUB UR STRIP-MCNC: NORMAL MG/DL
COLOR UR AUTO: NORMAL
GLUCOSE UR STRIP.AUTO-MCNC: NORMAL MG/DL
KETONES UR STRIP.AUTO-MCNC: NORMAL MG/DL
LEUKOCYTE ESTERASE UR QL STRIP.AUTO: NORMAL
NITRITE UR QL STRIP.AUTO: NORMAL
PH UR STRIP.AUTO: 7 [PH] (ref 5–8)
PROT UR QL STRIP: NORMAL MG/DL
RBC UR QL AUTO: NORMAL
SP GR UR STRIP.AUTO: 1.02
UROBILINOGEN UR STRIP-MCNC: 1 MG/DL

## 2019-05-21 PROCEDURE — 81002 URINALYSIS NONAUTO W/O SCOPE: CPT | Performed by: FAMILY MEDICINE

## 2019-05-21 PROCEDURE — 99214 OFFICE O/P EST MOD 30 MIN: CPT | Mod: 25 | Performed by: FAMILY MEDICINE

## 2019-05-21 RX ORDER — ONDANSETRON HYDROCHLORIDE 8 MG/1
8 TABLET, FILM COATED ORAL EVERY 8 HOURS PRN
Qty: 20 TAB | Refills: 0 | Status: SHIPPED | OUTPATIENT
Start: 2019-05-21 | End: 2019-07-15

## 2019-05-21 ASSESSMENT — ENCOUNTER SYMPTOMS
BLOOD IN STOOL: 0
DIARRHEA: 0
VOMITING: 1
CONSTIPATION: 1
NAUSEA: 1
SHORTNESS OF BREATH: 0
HEADACHES: 0
FEVER: 0
ABDOMINAL PAIN: 0

## 2019-05-22 NOTE — PROGRESS NOTES
Subjective:     Alie Lowe is a 34 y.o. female who presents for Nausea (Pt is 9 wks pregnant)    HPI  Pt presents for evaluation of a new problem   Patient is G3, P2  Pt is currently 9 weeks pregnant   Has been vomiting the past 2 weeks   Vomiting several times per day   Has had significant vomiting with all pregnancies in the past, however this pregnancy has been the worst  Last meal was yesterday at 3pm   Was previously seen in ER and given fluids about a week ago  Was given Zofran and helped greatly, however has run out of this medication  She has appointment with an OB/GYN in 4 weeks    Review of Systems   Constitutional: Negative for fever.   Respiratory: Negative for shortness of breath.    Cardiovascular: Negative for chest pain.   Gastrointestinal: Positive for constipation, nausea and vomiting. Negative for abdominal pain, blood in stool and diarrhea.   Skin: Negative for rash.   Neurological: Negative for headaches.       PMH: Chronic GERD  MEDS:   Current Outpatient Prescriptions:   •  raNITidine (ZANTAC) 150 MG Tab, Take 1 Tab by mouth 2 times a day., Disp: 60 Tab, Rfl: 0  ALLERGIES:   Allergies   Allergen Reactions   • Penicillins Unspecified     Previous hospitalization after penicillin - does not remember reaction     SURGHX: No past surgical history on file.  SOCHX:  reports that she has been smoking Cigarettes.  She has a 1.70 pack-year smoking history. She has never used smokeless tobacco. She reports that she drinks about 1.2 oz of alcohol per week . She reports that she does not use drugs.  FH: Family history was reviewed, not contributing to acute vomiting     Objective:   /70   Pulse 78   Temp 36.3 °C (97.4 °F) (Temporal)   Resp 16   Wt 108.4 kg (239 lb)   SpO2 97%   BMI 38.58 kg/m²     Physical Exam   Constitutional: She is oriented to person, place, and time. She appears well-developed and well-nourished. No distress.   HENT:   Head: Normocephalic and atraumatic.   Eyes:  Conjunctivae and EOM are normal.   Neck: Normal range of motion. No tracheal deviation present.   Pulmonary/Chest: Effort normal.   Abdominal: Soft. Bowel sounds are normal. She exhibits no distension and no mass. There is no tenderness. There is no rebound and no guarding.   Neurological: She is alert and oriented to person, place, and time. No sensory deficit.   Skin: Skin is warm and dry. She is not diaphoretic. No erythema.   Psychiatric: She has a normal mood and affect. Her behavior is normal. Judgment and thought content normal.       Assessment/Plan:   Assessment    1. Hyperemesis gravidarum  Patient is a 34-year-old female with hyperemesis.  Does not have any urinary symptoms and point-of-care urine not consistent with infection.  Will treat with Zofran and fluids.  Reviewed diet advancement, follow-up precautions, and ER precautions.  She will follow-up with her OB/GYN.  - POCT Urinalysis  - ondansetron (ZOFRAN) 8 MG Tab; Take 1 Tab by mouth every 8 hours as needed for Nausea/Vomiting.  Dispense: 20 Tab; Refill: 0

## 2019-06-12 LAB
ABO GROUP BLD: NORMAL
HBV SURFACE AG SERPL QL IA: NORMAL
HIV 1+2 AB+HIV1 P24 AG SERPL QL IA: NORMAL
RH BLD: NORMAL
RUBV IGG SERPL IA-ACNC: NORMAL

## 2019-07-01 PROCEDURE — 99283 EMERGENCY DEPT VISIT LOW MDM: CPT

## 2019-07-02 ENCOUNTER — HOSPITAL ENCOUNTER (EMERGENCY)
Facility: MEDICAL CENTER | Age: 34
End: 2019-07-02
Attending: EMERGENCY MEDICINE
Payer: COMMERCIAL

## 2019-07-02 ENCOUNTER — APPOINTMENT (OUTPATIENT)
Dept: RADIOLOGY | Facility: MEDICAL CENTER | Age: 34
End: 2019-07-02
Attending: EMERGENCY MEDICINE
Payer: COMMERCIAL

## 2019-07-02 ENCOUNTER — NON-PROVIDER VISIT (OUTPATIENT)
Dept: OCCUPATIONAL MEDICINE | Facility: CLINIC | Age: 34
End: 2019-07-02

## 2019-07-02 VITALS
HEART RATE: 69 BPM | SYSTOLIC BLOOD PRESSURE: 113 MMHG | BODY MASS INDEX: 38.13 KG/M2 | TEMPERATURE: 97.9 F | RESPIRATION RATE: 16 BRPM | HEIGHT: 65 IN | OXYGEN SATURATION: 96 % | WEIGHT: 228.84 LBS | DIASTOLIC BLOOD PRESSURE: 76 MMHG

## 2019-07-02 DIAGNOSIS — Z02.1 PRE-EMPLOYMENT DRUG SCREENING: ICD-10-CM

## 2019-07-02 DIAGNOSIS — S39.012A STRAIN OF LUMBAR REGION, INITIAL ENCOUNTER: ICD-10-CM

## 2019-07-02 DIAGNOSIS — Z02.83 ENCOUNTER FOR DRUG SCREENING: ICD-10-CM

## 2019-07-02 DIAGNOSIS — Z34.92 SECOND TRIMESTER PREGNANCY: ICD-10-CM

## 2019-07-02 LAB
AMP AMPHETAMINE: NORMAL
APPEARANCE UR: CLEAR
BAR BARBITURATES: NORMAL
BILIRUB UR QL STRIP.AUTO: NEGATIVE
BREATH ALCOHOL COMMENT: NORMAL
BZO BENZODIAZEPINES: NORMAL
COC COCAINE: NORMAL
COLOR UR: YELLOW
GLUCOSE UR STRIP.AUTO-MCNC: NEGATIVE MG/DL
INT CON NEG: NORMAL
INT CON POS: NORMAL
KETONES UR STRIP.AUTO-MCNC: NEGATIVE MG/DL
LEUKOCYTE ESTERASE UR QL STRIP.AUTO: NEGATIVE
MDMA ECSTASY: NORMAL
MET METHAMPHETAMINES: NORMAL
MICRO URNS: NORMAL
MTD METHADONE: NORMAL
NITRITE UR QL STRIP.AUTO: NEGATIVE
OPI OPIATES: NORMAL
OXY OXYCODONE: NORMAL
PCP PHENCYCLIDINE: NORMAL
PH UR STRIP.AUTO: 5.5 [PH]
POC BREATHALIZER: 0 PERCENT (ref 0–0.01)
POC URINE DRUG SCREEN OCDRS: NEGATIVE
PROT UR QL STRIP: NEGATIVE MG/DL
RBC UR QL AUTO: NEGATIVE
SP GR UR REFRACTOMETRY: 1.02
THC: NORMAL

## 2019-07-02 PROCEDURE — 82075 ASSAY OF BREATH ETHANOL: CPT | Performed by: PREVENTIVE MEDICINE

## 2019-07-02 PROCEDURE — 76815 OB US LIMITED FETUS(S): CPT

## 2019-07-02 PROCEDURE — 8899 PR URINE 11 PANEL - AFTER HOURS: Performed by: PREVENTIVE MEDICINE

## 2019-07-02 PROCEDURE — 80305 DRUG TEST PRSMV DIR OPT OBS: CPT | Performed by: PREVENTIVE MEDICINE

## 2019-07-02 PROCEDURE — 81003 URINALYSIS AUTO W/O SCOPE: CPT

## 2019-07-02 RX ORDER — ACETAMINOPHEN 500 MG
500-1000 TABLET ORAL EVERY 6 HOURS PRN
Qty: 20 TAB | Refills: 0 | Status: SHIPPED | OUTPATIENT
Start: 2019-07-02 | End: 2023-02-25

## 2019-07-02 NOTE — LETTER
"  FORM C-4:  EMPLOYEE’S CLAIM FOR COMPENSATION/ REPORT OF INITIAL TREATMENT  EMPLOYEE’S CLAIM - PROVIDE ALL INFORMATION REQUESTED   First Name  Alie Last Name  Mynor Birthdate             Age  1985 34 y.o. Sex  female Claim Number   Home Employee Address  [address]                                     Zip  [address] Height  1.651 m (5' 5\") Weight  103.8 kg (228 lb 13.4 oz) [facility]  [id number]   Mailing Employee Address                           [address]               Zip  [address] Telephone  [phone] (home)  Primary Language Spoken  ENGLISH   Insurer  *** Third Party   [employer] Employee's Occupation (Job Title) When Injury or Occupational Disease Occurred     Employer's Name  [employer] Telephone  [phone]    Employer Address  [address] Zip  [address]   Date of Injury  7/1/2019       Hour of Injury  9:45 PM Date Employer Notified  7/1/2019 Last Day of Work after Injury or Occupational Disease  7/1/2019 Supervisor to Whom Injury Reported  [place]   Address or Location of Acc[facility] (if applicable)  [facility]   What were you doing at the time of accident? (if applicable)  work    How did this injury or occupational disease occur? Be specific and answer in detail. Use additional sheet if necessary)  was scouting back chair when it tripped over was able to catch self but hurt back still   If you believe that you have an occupational disease, when did you first have knowledge of the disability and it relationship to your employment?  n/a Witnesses to the Accident  Silvestre     Nature of Injury or Occupational Disease  Sprain  Part(s) of Body Injured or Affected  Lower Back Area (Lumbar Area & Lumbo-Sacral), N/A, N/A    I certify that the above is true and correct to the best of my knowledge and that I have provided this " information in order to obtain the benefits of [place]).  I hereby authorize any physician, chiropractor, surgeon, practitioner, or other person, any hospital, including [facility] hospital, any medical service organization, any insurance company, or other institution or organization to release to each other, any medical or other information, including benefits paid or payable, pertinent to this injury or disease, except information relative to diagnosis, treatment and/or counseling for AIDS, psychological conditions, alcohol or controlled substances, for which I must give specific authorization.  A Photostat of this authorization shall be as valid as the original.   Date Place   Employee’s Signature   THIS REPORT MUST BE COMPLETED AND MAILED WITHIN 3 WORKING DAYS OF TREATMENT   Place  [facility], EMERGENCY DEPT  Name of Facility   [facility]   Date  7/1/2019 Diagnosis  No diagnosis found. Is there evidence the injured employee was under the influence of alcohol and/or another controlled substance at the time of accident?   Hour  2:51 AM Description of Injury or Disease       Treatment     Have you advised the patient to remain off work five days or more?             X-Ray Findings      If Yes   From Date    To Date      From information given by the employee, together with medical evidence, can you directly connect this injury or occupational disease as job incurred?    If No, is the employee capable of: Full Duty    Modified Duty      Is additional medical care by a physician indicated?    If Modified Duty, Specify any Limitations / Restrictions        Do you know of any previous injury or disease contributing to this condition or occupational disease?      Date  7/2/2019 Print Doctor’s Name  Robin Pretty I certify the  "employer’s copy of this form was mailed on:   Address  [address] [place] [id number] [phone] Insurer’s Use Only   [facility]  [id number]    Provider’s Tax ID Number    Telephone  Dept: [phone]    Doctor’s Signature    Degree       Original - TREATING PHYSICIAN OR CHIROPRACTOR   Pg 2-Insurer/TPA   Pg 3-Employer   Pg 4-Employee                                                                                                  Form C-4 (rev01/03)     BRIEF DESCRIPTION OF RIGHTS AND BENEFITS  (Pursuant to [place] 616C.[place])    Notice of Injury or Occupational Disease (Incident Report Form C-1): If an injury or occupational disease (OD) arises out of and in the course of employment, you must provide written notice to your employer as soon as practicable, but no later than 7 days after the accident or OD. Your employer shall maintain a sufficient supply of the required forms.    Claim for Compensation (Form C-4): If medical treatment is sought, the form C-4 is available at the place of initial treatment. A completed \"Claim for Compensation\" (Form C-4) must be filed within 90 days after an accident or OD. The treating physician or chiropractor must, within 3 working days after treatment, complete and mail to the employer, the employer's insurer and third-party , the Claim for Compensation.    Medical Treatment: If you require medical treatment for your on-the-job injury or OD, you may be required to select a physician or chiropractor from a list provided by your workers’ compensation insurer, if it has contracted with an Organization for Managed Care (MCO) or Preferred Provider Organization (PPO) or providers of health care. If your employer has not entered into a contract with an MCO or PPO, you may select a physician or chiropractor from the Panel of Physicians and Chiropractors. Any medical costs related to your industrial injury or OD will be paid by your " insurer.    Temporary Total Disability (TTD): If your doctor has certified that you are unable to work for a period of at least 5 consecutive days, or 5 cumulative days in a 20-day period, or places restrictions on you that your employer does not accommodate, you may be entitled to TTD compensation.    Temporary Partial Disability (TPD): If the wage you receive upon reemployment is less than the compensation for TTD to which you are entitled, the insurer may be required to pay you TPD compensation to make up the difference. TPD can only be paid for a maximum of 24 months.    Permanent Partial Disability (PPD): When your medical condition is stable and there is an indication of a PPD as a result of your injury or OD, within 30 days, your insurer must arrange for an evaluation by a rating physician or chiropractor to determine the degree of your PPD. The amount of your PPD award depends on the date of injury, the results of the PPD evaluation and your age and wage.    Permanent Total Disability (PTD): If you are medically certified by a treating physician or chiropractor as permanently and totally disabled and have been granted a PTD status by your insurer, you are entitled to receive monthly benefits not to exceed 66 2/3% of your average monthly wage. The amount of your PTD payments is subject to reduction if you previously received a PPD award.    Vocational Rehabilitation Services: You may be eligible for vocational rehabilitation services if you are unable to return to the job due to a permanent physical impairment or permanent restrictions as a result of your injury or occupational disease.    Transportation and Per Ronnie Reimbursement: You may be eligible for travel expenses and per ronnie associated with medical treatment.  Reopening: You may be able to reopen your claim if your condition worsens after claim closure.    Appeal Process: If you disagree with a written determination issued by the insurer or the  insurer does not respond to your request, you may appeal to the Department of Administration, , by following the instructions contained in your determination letter. You must appeal the determination within 70 days from the date of the determination letter at [address], [address], [place], [place] [address], or [address], [address], [place], [place] [address]. If you disagree with the  decision, you may appeal to the Department of Administration, . You must file your appeal within 30 days from the date of the  decision letter at [address], [address], [place], [place] [address], or [address], [address], [place], [place] [address]. If you disagree with a decision of an , you may file a petition for judicial review with the District Court. You must do so within 30 days of the Appeal Officer’s decision. You may be represented by an  at your own expense or you may contact the [facility] for possible representation.    [place]  for Injured Workers (NAIW): If you disagree with a  decision, you may request that NAIW represent you without charge at an  Hearing. For information regarding denial of benefits, you may contact the [facility] at: [address], [address], [place] [address], [phone], or [address], [place] [address], [phone]    To File a Complaint with the Division: If you wish to file a complaint with the  of the Division of Industrial Relations (DIR), please contact the Workers’ Compensation Section, [address], [address], [place], [place] [address], telephone [phone]) [phone], or [address], [place] [address], telephone [phone].    For assistance with Workers’ Compensation Issues: you may contact the Office of the [facility] Consumer Health [facility], [address], [address], [place], [place] [address], Toll Free 1-609.911.2290, Web site: [email or web address].[email or web address]., E-mail [email or web address].                                                                                                                                                                               __________________________________________________________________                                    _________________            Employee Name / Signature                                                                                                                            Date                                       D-2 (rev. 10/07)

## 2019-07-02 NOTE — ED NOTES
Dc instructions and medications discussed with patient at bedside. All questions answered at this time. No IV to remove. VSS. Pt ambulated to lobby with steady gait.

## 2019-07-02 NOTE — ED PROVIDER NOTES
ED Provider Note    ED Provider Note    Scribed for Vic Pretty MD by Vic Pretty. 7/2/2019, 1:10 AM.    Primary care provider: Serina Moreland P.A.-C.  Means of arrival: Private  History obtained from: Patient  History limited by: None    CHIEF COMPLAINT  Chief Complaint   Patient presents with   • Back Pain     was attempting to scoot chair back and it tipped over and landing on back   pt is16 weeks pregnant         HPI  Alie Lowe is a 34 y.o. female who presents to the Emergency Department of fall off chair.  She is 16 weeks pregnant.  This occurred at about 945, noting she was trying to exclude a chair backward, she fell forward against the table.  She has no actual pelvic or abdominal pain, no vaginal bleeding.  She notes pain more so to the bilateral flanks describes a spasm sensation.  She is able to ambulate, she notes no numbness or weakness, nausea but no vomiting, no fever, no hematuria.  Pain more so with movement, improves somewhat when sitting still.    REVIEW OF SYSTEMS  Pertinent positives include pain in back, second trimester pregnancy. Pertinent negatives include no vaginal bleeding, no numbness or weakness.      PAST MEDICAL HISTORY       SURGICAL HISTORY  patient denies any surgical history    SOCIAL HISTORY  Social History   Substance Use Topics   • Smoking status: Former Smoker     Packs/day: 0.10     Years: 17.00   • Smokeless tobacco: Never Used      Comment: socially, only every few months   • Alcohol use No      Comment: socially - rare      History   Drug Use No       FAMILY HISTORY  Family History   Problem Relation Age of Onset   • Cancer Mother         uterine and skin   • Thyroid Mother         removed due to lump   • Diabetes Father    • Hypertension Father    • Cancer Sister         ovarian   • Alcohol abuse Brother        CURRENT MEDICATIONS  Home Medications     Reviewed by Richelle Teixeira R.N. (Registered Nurse) on 07/02/19 at 0010  Med List  "Status: Partial   Medication Last Dose Status   Ibuprofen (MOTRIN PO) 7/2/2019 Active   ondansetron (ZOFRAN) 8 MG Tab 7/2/2019 Active   raNITidine (ZANTAC) 150 MG Tab 7/2/2019 Active                ALLERGIES  Allergies   Allergen Reactions   • Penicillins Unspecified     Previous hospitalization after penicillin - does not remember reaction       PHYSICAL EXAM  VITAL SIGNS: /76   Pulse 69   Temp 36.6 °C (97.9 °F)   Resp 16   Ht 1.651 m (5' 5\")   Wt 103.8 kg (228 lb 13.4 oz)   LMP 03/10/2019   SpO2 96%   BMI 38.08 kg/m²     General: Alert, no acute distress  Skin: Warm, dry, normal for ethnicity  Head: Normocephalic, atraumatic  Neck: Trachea midline, no tenderness  Eye: PERRL, normal conjunctiva  ENMT: Oral mucosa moist, no pharyngeal erythema or exudate  Cardiovascular: Regular rate and rhythm, No murmur, Normal peripheral perfusion  Respiratory: Lungs CTA, respirations are non-labored, breath sounds are equal  Gastrointestinal: Soft, nontender, non distended, gravid.  Musculoskeletal: No swelling, no deformity.  Paraspinous/CVA tenderness to the upper lumbar region, no midline tenderness  Neurological: Alert and oriented to person, place, time, and situation.  No footdrop, no saddle anesthesia, 5 x 5 dorsal and plantar flexion of the feet against resistance, 2+ symmetrical patellar reflexes.  Lymphatics: No lymphadenopathy  Psychiatric: Cooperative, appropriate mood & affect      DIAGNOSTIC STUDIES/PROCEDURES    LABS  Results for orders placed or performed during the hospital encounter of 07/02/19   URINALYSIS CULTURE, IF INDICATED   Result Value Ref Range    Color Yellow     Character Clear     Ph 5.5 5.0 - 8.0    Glucose Negative Negative mg/dL    Ketones Negative Negative mg/dL    Protein Negative Negative mg/dL    Bilirubin Negative Negative    Nitrite Negative Negative    Leukocyte Esterase Negative Negative    Occult Blood Negative Negative    Micro Urine Req see below    REFRACTOMETER SG "   Result Value Ref Range    Specific Gravity 1.025      All labs reviewed by me.      RADIOLOGY  US-OB LIMITED TRANSABDOMINAL   Final Result      Single intrauterine pregnancy of an estimated gestational age of Average 16w 2d with an estimated date of delivery of 12/15/2019.      This does not constitute a formal diagnostic fetal anatomic survey.        The radiologist's interpretation of all radiological studies have been reviewed by me.    COURSE & MEDICAL DECISION MAKING  Pertinent Labs & Imaging studies reviewed. (See chart for details)    1:10 AM - Patient seen and examined at bedside.  Ordered urinalysis and transabdominal pelvic ultrasound to evaluate her symptoms. The differential diagnoses include but are not limited to: Contusion, sprain    0345: Patient reassessed, apologize for the long wait to obtain ultrasound, she is in no acute distress.  She does tell me she is been taking ibuprofen, I cautioned her against taking NSAIDs during pregnancy, recommend acetaminophen instead.    Decision Making:  This is a 34 y.o. year old female who presents with fall from chair.  No footdrop, no saddle anesthesia, no actual midline tenderness on exam of the back.  She is neurovascular intact and able to ambulate.  No indication for emergent MRI nor neurosurgical intervention.  Suspect likely contusion and sprain.  I counseled her against using NSAIDs as written for acetaminophen given she is pregnant.  There is no hematuria, no proteinuria that would be consistent with any kidney injury.  Ultrasound unremarkable showing normal-appearing intrauterine pregnancy with size compatible for dates.    The patient will return for new or worsening symptoms and is stable at the time of discharge.    Patient has had high blood pressure while in the emergency department, felt likely secondary to medical condition. Counseled patient to monitor blood pressure at home and follow up with primary care physician.      DISPOSITION:  Patient will be discharged home in stable condition.    FOLLOW UP:  Serina Moreland P.A.-C.  [address]  [facility] [id number] [phone]    Schedule an appointment as soon as possible for a visit in 3 days        OUTPATIENT MEDICATIONS:  Discharge Medication List as of 7/2/2019  3:52 AM      START taking these medications    Details   acetaminophen (TYLENOL) 500 MG Tab Take 1-2 Tabs by mouth every 6 hours as needed for Moderate Pain., Disp-20 Tab, R-0, Print Rx Paper               FINAL IMPRESSION  1. Strain of lumbar region, initial encounter    2. Second trimester pregnancy          Vic SHINE (Scribe), am scribing for, and in the presence of, Vic Pretty MD.    Electronically signed by: Vic Pretty (Scribe), 7/2/2019    Vic SHINE MD personally performed the services described in this documentation, as scribed by Vic Pretty in my presence, and it is both accurate and complete    The note accurately reflects work and decisions made by me.  Vic Pretty  7/2/2019  6:40 AM

## 2019-07-02 NOTE — LETTER
"  FORM C-4:  EMPLOYEE’S CLAIM FOR COMPENSATION/ REPORT OF INITIAL TREATMENT  EMPLOYEE’S CLAIM - PROVIDE ALL INFORMATION REQUESTED   First Name  Alie Last Name  Mynor Birthdate             Age  1985 34 y.o. Sex  female Claim Number   Home Employee Address  [address]                                     Zip  [address] Height  1.651 m (5' 5\") Weight  103.8 kg (228 lb 13.4 oz) [facility]  [id number]   Mailing Employee Address                           [address]               Zip  [address] Telephone  [phone] (home)  Primary Language Spoken  ENGLISH   Insurer  *** Third Party   [employer] Employee's Occupation (Job Title) When Injury or Occupational Disease Occurred     Employer's Name  [employer] Telephone  [phone]    Employer Address  [address] Zip  [address]   Date of Injury  7/1/2019       Hour of Injury  9:45 PM Date Employer Notified  7/1/2019 Last Day of Work after Injury or Occupational Disease  7/1/2019 Supervisor to Whom Injury Reported  [place]   Address or Location [facility] Acc[facility] (if applicable)  [facility]   What were you doing at the time of accident? (if applicable)  work    How did this injury or occupational disease occur? Be specific and answer in detail. Use additional sheet if necessary)  was scouting back chair when it tripped over was able to catch self but hurt back still   If you believe that you have an occupational disease, when did you first have knowledge of the disability and it relationship to your employment?  n/a Witnesses to the Accident  Silvestre     Nature of Injury or Occupational Disease  Sprain  Part(s) of Body Injured or Affected  Lower Back Area (Lumbar Area & Lumbo-Sacral), N/A, N/A    I certify that the above is true and correct to the best of my knowledge and that I have provided this " information in order to obtain the benefits of [place] inclusive or Chapter 617 of [place]RS).  I hereby authorize any physician, chiropractor, surgeon, practitioner, or other person, any hospital, including [facility], any medical service organization, any insurance company, or other institution or organization to release to each other, any medical or other information, including benefits paid or payable, pertinent to this injury or disease, except information relative to diagnosis, treatment and/or counseling for AIDS, psychological conditions, alcohol or controlled substances, for which I must give specific authorization.  A Photostat of this authorization shall be as valid as the original.   Date Place   Employee’s Signature   THIS REPORT MUST BE COMPLETED AND MAILED WITHIN 3 WORKING DAYS OF TREATMENT   Place  [facility], EMERGENCY DEPT  Name of Facility   [facility]   Date  7/1/2019 Diagnosis  (S39.012A) Strain of lumbar region, initial encounter  (Z34.92) Second trimester pregnancy Is there evidence the injured employee was under the influence of alcohol and/or another controlled substance at the time of accident?   Hour  3:47 AM Description of Injury or Disease  Strain of lumbar region, initial encounter  Second trimester pregnancy     Treatment     Have you advised the patient to remain off work five days or more?             X-Ray Findings      If Yes   From Date    To Date      From information given by the employee, together with medical evidence, can you directly connect this injury or occupational disease as job incurred?    If No, is the employee capable of: Full Duty    Modified Duty      Is additional medical care by a physician indicated?    If Modified Duty, Specify any Limitations / Restrictions        Do you know of any previous injury or disease  "contributing to this condition or occupational disease?      Date  7/2/2019 Print Doctor’s Name  Sergio PrettyTarikcarolyn GM SHINE certify the employer’s copy of this form was mailed on:   Address  [address] [place] [address] [phone] Insurer’s Use Only   [facility]  [id number]    Provider’s Tax ID Number    Telephone  Dept: [phone]    Doctor’s Signature    Degree       Original - TREATING PHYSICIAN OR CHIROPRACTOR   Pg 2-Insurer/TPA   Pg 3-Employer   Pg 4-Employee                                                                                                  Form C-4 (rev01/03)     BRIEF DESCRIPTION OF RIGHTS AND BENEFITS  (Pursuant to NRS 616C.050)    Notice of Injury or Occupational Disease (Incident Report Form C-1): If an injury or occupational disease (OD) arises out of and in the course of employment, you must provide written notice to your employer as soon as practicable, but no later than 7 days after the accident or OD. Your employer shall maintain a sufficient supply of the required forms.    Claim for Compensation (Form C-4): If medical treatment is sought, the form C-4 is available at the place of initial treatment. A completed \"Claim for Compensation\" (Form C-4) must be filed within 90 days after an accident or OD. The treating physician or chiropractor must, within 3 working days after treatment, complete and mail to the employer, the employer's insurer and third-party , the Claim for Compensation.    Medical Treatment: If you require medical treatment for your on-the-job injury or OD, you may be required to select a physician or chiropractor from a list provided by your workers’ compensation insurer, if it has contracted with an Organization for Managed Care (MCO) or Preferred Provider Organization (PPO) or providers of health care. If your employer has not entered into a contract with an MCO or PPO, you may select a physician or chiropractor from the " Panel of Physicians and Chiropractors. Any medical costs related to your industrial injury or OD will be paid by your insurer.    Temporary Total Disability (TTD): If your doctor has certified that you are unable to work for a period of at least 5 consecutive days, or 5 cumulative days in a 20-day period, or places restrictions on you that your employer does not accommodate, you may be entitled to TTD compensation.    Temporary Partial Disability (TPD): If the wage you receive upon reemployment is less than the compensation for TTD to which you are entitled, the insurer may be required to pay you TPD compensation to make up the difference. TPD can only be paid for a maximum of 24 months.    Permanent Partial Disability (PPD): When your medical condition is stable and there is an indication of a PPD as a result of your injury or OD, within 30 days, your insurer must arrange for an evaluation by a rating physician or chiropractor to determine the degree of your PPD. The amount of your PPD award depends on the date of injury, the results of the PPD evaluation and your age and wage.    Permanent Total Disability (PTD): If you are medically certified by a treating physician or chiropractor as permanently and totally disabled and have been granted a PTD status by your insurer, you are entitled to receive monthly benefits not to exceed 66 2/3% of your average monthly wage. The amount of your PTD payments is subject to reduction if you previously received a PPD award.    Vocational Rehabilitation Services: You may be eligible for vocational rehabilitation services if you are unable to return to the job due to a permanent physical impairment or permanent restrictions as a result of your injury or occupational disease.    Transportation and Per Ronnie Reimbursement: You may be eligible for travel expenses and per ronnie associated with medical treatment.  Reopening: You may be able to reopen your claim if your condition worsens  after claim closure.    Appeal Process: If you disagree with a written determination issued by the insurer or the insurer does not respond to your request, you may appeal to the Department of Administration, , by following the instructions contained in your determination letter. You must appeal the determination within 70 days from the date of the determination letter at [address], [address], [place], [place] [address], or [address], [place], [place] [address]. If you disagree with the  decision, you may appeal to the Department of Administration, . You must file your appeal within 30 days from the date of the  decision letter at [address], [address], [place], [place] [address], or [address], [facility] [address], [place], [place] [address]. If you disagree with a decision of an , you may file a petition for judicial review with the District Court. You must do so within 30 days of the Appeal Officer’s decision. You may be represented by an  at your own expense or you may contact the [facility] for possible representation.    [place]  for Injured Workers (NAIW): If you disagree with a  decision, you may request that NAIW represent you without charge at an  Hearing. For information regarding denial of benefits, you may contact the NA at: [address], [address], [place] [address], [phone], or [address], [facility] [address], [place] [address], [phone]    To File a Complaint with the Division: If you wish to file a complaint with the  of the Division of Industrial Relations (DIR), please contact the Workers’ Compensation Section, [address], [facility] [address], [place], [place] [address], telephone [phone]) [phone], or [address] [facility], [place] [address], telephone [phone].    For  assistance with Workers’ Compensation Issues: you may contact the Office of the Governor Consumer Health Assistance, [address] [place] [address], [place] [address], [place] [address], Toll Free 1-197.815.9046, Web site: http://govcha.[facility].nv., E-mail [email or web address].                                                                                                                                                                               __________________________________________________________________                                    _________________            Employee Name / Signature                                                                                                                            Date                                       D-2 (rev. 10/07)

## 2019-07-02 NOTE — ED TRIAGE NOTES
Pt comes in from work  Pt was attempting to scoot chair back and it tipped over landing on her back  Now having pain to back s/p injury  Pt is 16 weeks pregnant  Work is concerned about injury and her being pregnant

## 2019-07-02 NOTE — LETTER
"  FORM C-4:  EMPLOYEE’S CLAIM FOR COMPENSATION/ REPORT OF INITIAL TREATMENT  EMPLOYEE’S CLAIM - PROVIDE ALL INFORMATION REQUESTED   First Name  Alie Last Name  Mynor Birthdate             Age  1985 34 y.o. Sex  female Claim Number   Home Employee Address  [address]                                     Zip  [address] Height  1.651 m (5' 5\") Weight  103.8 kg (228 lb 13.4 oz) [facility]     Mailing Employee Address                           [address]               Zip  [address] Telephone  [phone] (home)  Primary Language Spoken  ENGLISH   Insurer   Third Party   [employer] Employee's Occupation (Job Title) When Injury or Occupational Disease Occurred     Employer's Name  [employer] Telephone  [phone]    Employer Address  [address] [facility] [address]] Zip  [address]   Date of Injury  7/1/2019       Hour of Injury  9:45 PM Date Employer Notified  7/1/2019 Last Day of Work after Injury or Occupational Disease  7/1/2019 Supervisor to Whom Injury Reported  Patirck   Address or Location of Accident (if applicable)  [[facility]]   What were you doing at the time of accident? (if applicable)  work    How did this injury or occupational disease occur? Be specific and answer in detail. Use additional sheet if necessary)  was scouting back chair when it tripped over was able to catch self but hurt back still   If you believe that you have an occupational disease, when did you first have knowledge of the disability and it relationship to your employment?  n/a Witnesses to the Accident  Silvestre     Nature of Injury or Occupational Disease  Sprain  Part(s) of Body Injured or Affected  Lower Back Area (Lumbar Area & Lumbo-Sacral), N/A, N/A    I certify that the above is true and correct to the best of my knowledge and that I have provided this " information in order to obtain the benefits of [place]’s [place] and [place] ([place] 616A to [place]D, inclusive or Chapter 617 of NRS).  I hereby authorize any physician, chiropractor, surgeon, practitioner, or other person, any hospital, including [facility], any medical service organization, any insurance company, or other institution or organization to release to each other, any medical or other information, including benefits paid or payable, pertinent to this injury or disease, except information relative to diagnosis, treatment and/or counseling for AIDS, psychological conditions, alcohol or controlled substances, for which I must give specific authorization.  A Photostat of this authorization shall be as valid as the original.   Date  July 2, 2019 Place  [facility] Employee’s Signature   THIS REPORT MUST BE COMPLETED AND MAILED WITHIN 3 WORKING DAYS OF TREATMENT   Place  [facility], EMERGENCY DEPT  Name of Facility   [facility]   Date  7/1/2019 Diagnosis  (S39.012A) Strain of lumbar region, initial encounter  (Z34.92) Second trimester pregnancy Is there evidence the injured employee was under the influence of alcohol and/or another controlled substance at the time of accident?   Hour  3:49 AM Description of Injury or Disease  Strain of lumbar region, initial encounter  Second trimester pregnancy No   Treatment  Acetaminophen  Have you advised the patient to remain off work five days or more?         No   X-Ray Findings  Negative   If Yes   From Date  7/2/2019 To Date  7/3/2019   From information given by the employee, together with medical evidence, can you directly connect this injury or occupational disease as job incurred?  Yes If No, is the employee capable of: Full Duty  Yes Modified Duty      Is additional medical care by a physician indicated?    Comments:follow-up with  "established OB/GYN and workers' comp If Modified Duty, Specify any Limitations / Restrictions        Do you know of any previous injury or disease contributing to this condition or occupational disease?  No   Date  7/2/2019 Print Doctor’s Name  Samuel Pretty I certify the employer’s copy of this form was mailed on:   Address  [address] [place] [id number] [phone] Insurer’s Use Only   [facility]  [address]    Provider’s Tax ID Number  [id number] Telephone  Dept: [phone]    Doctor’s Signature  e-SAMUEL Lowe M.D. Degree   MD    Original - TREATING PHYSICIAN OR CHIROPRACTOR   Pg 2-Insurer/TPA   Pg 3-Employer   Pg 4-Employee                                                                                                  Form C-4 (rev01/03)     BRIEF DESCRIPTION OF RIGHTS AND BENEFITS  ([place]ursuant to NRS 616C.050)    Notice of Injury or Occupational Disease (Incident Report Form C-1): If an injury or occupational disease (OD) arises out of and in the course of employment, you must provide written notice to your employer as soon as practicable, but no later than 7 days after the accident or OD. Your employer shall maintain a sufficient supply of the required forms.    Claim for Compensation (Form C-4): If medical treatment is sought, the form C-4 is available at the place of initial treatment. A completed \"Claim for Compensation\" (Form C-4) must be filed within 90 days after an accident or OD. The treating physician or chiropractor must, within 3 working days after treatment, complete and mail to the employer, the employer's insurer and third-party , the Claim for Compensation.    Medical Treatment: If you require medical treatment for your on-the-job injury or OD, you may be required to select a physician or chiropractor from a list provided by your workers’ compensation insurer, if it has contracted with an Organization for Managed Care (MCO) or " Preferred Provider Organization (PPO) or providers of health care. If your employer has not entered into a contract with an MCO or PPO, you may select a physician or chiropractor from the Panel of Physicians and Chiropractors. Any medical costs related to your industrial injury or OD will be paid by your insurer.    Temporary Total Disability (TTD): If your doctor has certified that you are unable to work for a period of at least 5 consecutive days, or 5 cumulative days in a 20-day period, or places restrictions on you that your employer does not accommodate, you may be entitled to TTD compensation.    Temporary Partial Disability (TPD): If the wage you receive upon reemployment is less than the compensation for TTD to which you are entitled, the insurer may be required to pay you TPD compensation to make up the difference. TPD can only be paid for a maximum of 24 months.    Permanent Partial Disability (PPD): When your medical condition is stable and there is an indication of a PPD as a result of your injury or OD, within 30 days, your insurer must arrange for an evaluation by a rating physician or chiropractor to determine the degree of your PPD. The amount of your PPD award depends on the date of injury, the results of the PPD evaluation and your age and wage.    Permanent Total Disability (PTD): If you are medically certified by a treating physician or chiropractor as permanently and totally disabled and have been granted a PTD status by your insurer, you are entitled to receive monthly benefits not to exceed 66 2/3% of your average monthly wage. The amount of your PTD payments is subject to reduction if you previously received a PPD award.    Vocational Rehabilitation Services: You may be eligible for vocational rehabilitation services if you are unable to return to the job due to a permanent physical impairment or permanent restrictions as a result of your injury or occupational disease.    Transportation and  Per Ronnie Reimbursement: You may be eligible for travel expenses and per ronnie associated with medical treatment.  Reopening: You may be able to reopen your claim if your condition worsens after claim closure.    Appeal Process: If you disagree with a written determination issued by the insurer or the insurer does not respond to your request, you may appeal to the Department of Administration, , by following the instructions contained in your determination letter. You must appeal the determination within 70 days from the date of the determination letter at [address], [address], [place], [place] [address], or [address] [facility], [place], [place] [address]. If you disagree with the  decision, you may appeal to the Department of Administration, . You must file your appeal within 30 days from the date of the  decision letter at [address], [address], [place], [place] [address], or [address] [facility] [facility] [address], [place], [place] [address]. If you disagree with a decision of an , you may file a petition for judicial review with the District Court. You must do so within 30 days of the Appeal Officer’s decision. You may be represented by an  at your own expense or you may contact the [facility] for possible representation.    [place]  for Injured Workers (NAIW): If you disagree with a  decision, you may request that NAIW represent you without charge at an  Hearing. For information regarding denial of benefits, you may contact the [facility] at: [address], [address], [place] [address], [phone], or [address], [address], [place] [address], [phone]    To File a Complaint with the Division: If you wish to file a complaint with the  of the Division of Industrial Relations (DIR), please contact the Workers’ Compensation Section, [facility]  [facility], [address], [place], [place] [address], telephone (140) 735-3530, or [address], [address], [place], [place] [address], telephone [phone].    For assistance with Workers’ Compensation Issues: you may contact the Office of the [facility] Consumer Health Assistance, [address] [place] [address], [address], [place], [place] [address], Toll Free 1-364.815.8563, Web site: http://[employer]Ease.[facility].nv., E-mail [email or web address].                                                                                                                                                                               __________________________________________________________________                                    July 2, 2019            Employee Name / Signature                                                                                                                            Date                                       D-2 (rev. 10/07)

## 2019-07-02 NOTE — LETTER
"  FORM C-4:  EMPLOYEE’S CLAIM FOR COMPENSATION/ REPORT OF INITIAL TREATMENT  EMPLOYEE’S CLAIM - PROVIDE ALL INFORMATION REQUESTED   First Name  Alie Last Name  Mynor Birthdate             Age  1985 34 y.o. Sex  female Claim Number   Home Employee Address  [address]                                     Zip  [address] Height  1.651 m (5' 5\") Weight  103.8 kg (228 lb 13.4 oz) [facility]  [id number]   Mailing Employee Address                           [address]               Zip  [address] Telephone  [phone] (home)  Primary Language Spoken  ENGLISH   Insurer  *** Third Party   [employer] Employee's Occupation (Job Title) When Injury or Occupational Disease Occurred     Employer's Name  [employer] Telephone  [phone]    Employer Address  [address] Zip  [address]   Date of Injury  7/1/2019       Hour of Injury  9:45 PM Date Employer Notified  7/1/2019 Last Day of Work after Injury or Occupational Disease  7/1/2019 Supervisor to Whom Injury Reported  [place]   Address or Location of Accident (if applicable)  [facility]   What were you doing at the time of accident? (if applicable)  work    How did this injury or occupational disease occur? Be specific and answer in detail. Use additional sheet if necessary)  was scouting back chair when it tripped over was able to catch self but hurt back still   If you believe that you have an occupational disease, when did you first have knowledge of the disability and it relationship to your employment?  n/a Witnesses to the Accident  Silvestre     Nature of Injury or Occupational Disease  Sprain  Part(s) of Body Injured or Affected  Lower Back Area (Lumbar Area & Lumbo-Sacral), N/A, N/A    I certify that the above is true and correct to the best of my knowledge and that I have provided this " information in order to obtain the benefits of [place] or [place]).  I hereby authorize any physician, chiropractor, surgeon, practitioner, or other person, any hospital, including [facility] or [facility] hospital, any medical service organization, any insurance company, or other institution or organization to release to each other, any medical or other information, including benefits paid or payable, pertinent to this injury or disease, except information relative to diagnosis, treatment and/or counseling for AIDS, psychological conditions, alcohol or controlled substances, for which I must give specific authorization.  A Photostat of this authorization shall be as valid as the original.   Date Place   Employee’s Signature   THIS REPORT MUST BE COMPLETED AND MAILED WITHIN 3 WORKING DAYS OF TREATMENT   Place  [facility], EMERGENCY DEPT  Name of Facility   [facility]   Date  7/1/2019 Diagnosis  No diagnosis found. Is there evidence the injured employee was under the influence of alcohol and/or another controlled substance at the time of accident?   Hour  1:39 AM Description of Injury or Disease       Treatment     Have you advised the patient to remain off work five days or more?             X-Ray Findings      If Yes   From Date    To Date      From information given by the employee, together with medical evidence, can you directly connect this injury or occupational disease as job incurred?    If No, is the employee capable of: Full Duty    Modified Duty      Is additional medical care by a physician indicated?    If Modified Duty, Specify any Limitations / Restrictions        Do you know of any previous injury or disease contributing to this condition or occupational disease?      Date  7/2/2019 Print Doctor’s Name  Robin Pretty I certify the  "employer’s copy of this form was mailed on:   Address  [address] [place] [id number] [phone] Insurer’s Use Only   [facility]  [address]    Provider’s Tax ID Number    Telephone  Dept: [phone]    Doctor’s Signature    Degree       Original - TREATING PHYSICIAN OR CHIROPRACTOR   Pg 2-Insurer/TPA   Pg 3-Employer   Pg 4-Employee                                                                                                  Form C-4 (rev01/03)     BRIEF DESCRIPTION OF RIGHTS AND BENEFITS  (Pursuant to [place] 616C.050)    Notice of Injury or Occupational Disease (Incident Report Form C-1): If an injury or occupational disease (OD) arises out of and in the course of employment, you must provide written notice to your employer as soon as practicable, but no later than 7 days after the accident or OD. Your employer shall maintain a sufficient supply of the required forms.    Claim for Compensation (Form C-4): If medical treatment is sought, the form C-4 is available at the place of initial treatment. A completed \"Claim for Compensation\" (Form C-4) must be filed within 90 days after an accident or OD. The treating physician or chiropractor must, within 3 working days after treatment, complete and mail to the employer, the employer's insurer and third-party , the Claim for Compensation.    Medical Treatment: If you require medical treatment for your on-the-job injury or OD, you may be required to select a physician or chiropractor from a list provided by your workers’ compensation insurer, if it has contracted with an Organization for Managed Care (MCO) or Preferred Provider Organization (PPO) or providers of health care. If your employer has not entered into a contract with an MCO or PPO, you may select a physician or chiropractor from the Panel of Physicians and Chiropractors. Any medical costs related to your industrial injury or OD will be paid by your " insurer.    Temporary Total Disability (TTD): If your doctor has certified that you are unable to work for a period of at least 5 consecutive days, or 5 cumulative days in a 20-day period, or places restrictions on you that your employer does not accommodate, you may be entitled to TTD compensation.    Temporary Partial Disability (TPD): If the wage you receive upon reemployment is less than the compensation for TTD to which you are entitled, the insurer may be required to pay you TPD compensation to make up the difference. TPD can only be paid for a maximum of 24 months.    Permanent Partial Disability (PPD): When your medical condition is stable and there is an indication of a PPD as a result of your injury or OD, within 30 days, your insurer must arrange for an evaluation by a rating physician or chiropractor to determine the degree of your PPD. The amount of your PPD award depends on the date of injury, the results of the PPD evaluation and your age and wage.    Permanent Total Disability (PTD): If you are medically certified by a treating physician or chiropractor as permanently and totally disabled and have been granted a PTD status by your insurer, you are entitled to receive monthly benefits not to exceed 66 2/3% of your average monthly wage. The amount of your PTD payments is subject to reduction if you previously received a PPD award.    Vocational Rehabilitation Services: You may be eligible for vocational rehabilitation services if you are unable to return to the job due to a permanent physical impairment or permanent restrictions as a result of your injury or occupational disease.    Transportation and Per Ronnie Reimbursement: You may be eligible for travel expenses and per ronnie associated with medical treatment.  Reopening: You may be able to reopen your claim if your condition worsens after claim closure.    Appeal Process: If you disagree with a written determination issued by the insurer or the  insurer does not respond to your request, you may appeal to the Department of Administration, , by following the instructions contained in your determination letter. You must appeal the determination within 70 days from the date of the determination letter at [address], [address], [place], [place] [address], or [address], [address], [place], [place] [address]. If you disagree with the  decision, you may appeal to the Department of Administration, . You must file your appeal within 30 days from the date of the  decision letter at [address], [address], [place], [place] [address], or [address], [address], [place], [place] [address]. If you disagree with a decision of an , you may file a petition for judicial review with the District Court. You must do so within 30 days of the Appeal Officer’s decision. You may be represented by an  at your own expense or you may contact the [facility] for possible representation.    [place]  for Injured Workers (NAIW): If you disagree with a  decision, you may request that NAIW represent you without charge at an  Hearing. For information regarding denial of benefits, you may contact the [facility] at: [address], [address], [place] [address], [phone], or [address], [place] [address], [phone]    To File a Complaint with the Division: If you wish to file a complaint with the  of the Division of Industrial Relations (DIR), please contact the Workers’ Compensation Section, [address], [address], [place], [place] [address], telephone [phone], or [address], [place] [address], telephone [phone].    For assistance with Workers’ Compensation Issues: you may contact the Office of the [facility] Consumer Health Assistance, [address], [address], [place], [place] [address], Toll Free 1-512.545.5704, Web site: http://[email or web address]., E-mail [email or web address].                                                                                                                                                                               __________________________________________________________________                                    _________________            Employee Name / Signature                                                                                                                            Date                                       D-2 (rev. 10/07)

## 2019-07-02 NOTE — ED NOTES
Pt  upset again that US is taking a long time. I explained the process and apologized for long wait times. Pt not in distress at this time. Waiting for US

## 2019-07-15 LAB
ALBUMIN SERPL BCP-MCNC: 3.5 G/DL (ref 3.2–4.9)
ALBUMIN/GLOB SERPL: 0.9 G/DL
ALP SERPL-CCNC: 52 U/L (ref 30–99)
ALT SERPL-CCNC: 19 U/L (ref 2–50)
ANION GAP SERPL CALC-SCNC: 9 MMOL/L (ref 0–11.9)
APPEARANCE UR: CLEAR
AST SERPL-CCNC: 13 U/L (ref 12–45)
B-HCG SERPL-ACNC: ABNORMAL MIU/ML (ref 0–5)
BASOPHILS # BLD AUTO: 0.4 % (ref 0–1.8)
BASOPHILS # BLD: 0.05 K/UL (ref 0–0.12)
BILIRUB SERPL-MCNC: 0.2 MG/DL (ref 0.1–1.5)
BILIRUB UR QL STRIP.AUTO: NEGATIVE
BUN SERPL-MCNC: 12 MG/DL (ref 8–22)
CALCIUM SERPL-MCNC: 9.4 MG/DL (ref 8.5–10.5)
CHLORIDE SERPL-SCNC: 107 MMOL/L (ref 96–112)
CO2 SERPL-SCNC: 23 MMOL/L (ref 20–33)
COLOR UR: YELLOW
CREAT SERPL-MCNC: 0.71 MG/DL (ref 0.5–1.4)
EOSINOPHIL # BLD AUTO: 0.07 K/UL (ref 0–0.51)
EOSINOPHIL NFR BLD: 0.5 % (ref 0–6.9)
ERYTHROCYTE [DISTWIDTH] IN BLOOD BY AUTOMATED COUNT: 45.1 FL (ref 35.9–50)
GLOBULIN SER CALC-MCNC: 3.8 G/DL (ref 1.9–3.5)
GLUCOSE SERPL-MCNC: 88 MG/DL (ref 65–99)
GLUCOSE UR STRIP.AUTO-MCNC: NEGATIVE MG/DL
HCT VFR BLD AUTO: 36.6 % (ref 37–47)
HGB BLD-MCNC: 11.8 G/DL (ref 12–16)
IMM GRANULOCYTES # BLD AUTO: 0.04 K/UL (ref 0–0.11)
IMM GRANULOCYTES NFR BLD AUTO: 0.3 % (ref 0–0.9)
KETONES UR STRIP.AUTO-MCNC: NEGATIVE MG/DL
LEUKOCYTE ESTERASE UR QL STRIP.AUTO: NEGATIVE
LIPASE SERPL-CCNC: 12 U/L (ref 11–82)
LYMPHOCYTES # BLD AUTO: 4.59 K/UL (ref 1–4.8)
LYMPHOCYTES NFR BLD: 34.4 % (ref 22–41)
MCH RBC QN AUTO: 26.5 PG (ref 27–33)
MCHC RBC AUTO-ENTMCNC: 32.2 G/DL (ref 33.6–35)
MCV RBC AUTO: 82.1 FL (ref 81.4–97.8)
MICRO URNS: NORMAL
MONOCYTES # BLD AUTO: 0.71 K/UL (ref 0–0.85)
MONOCYTES NFR BLD AUTO: 5.3 % (ref 0–13.4)
NEUTROPHILS # BLD AUTO: 7.87 K/UL (ref 2–7.15)
NEUTROPHILS NFR BLD: 59.1 % (ref 44–72)
NITRITE UR QL STRIP.AUTO: NEGATIVE
NRBC # BLD AUTO: 0 K/UL
NRBC BLD-RTO: 0 /100 WBC
PH UR STRIP.AUTO: 5.5 [PH]
PLATELET # BLD AUTO: 344 K/UL (ref 164–446)
PMV BLD AUTO: 10 FL (ref 9–12.9)
POTASSIUM SERPL-SCNC: 3.7 MMOL/L (ref 3.6–5.5)
PROT SERPL-MCNC: 7.3 G/DL (ref 6–8.2)
PROT UR QL STRIP: NEGATIVE MG/DL
RBC # BLD AUTO: 4.46 M/UL (ref 4.2–5.4)
RBC UR QL AUTO: NEGATIVE
SODIUM SERPL-SCNC: 139 MMOL/L (ref 135–145)
SP GR UR STRIP.AUTO: >=1.03
UROBILINOGEN UR STRIP.AUTO-MCNC: 0.2 MG/DL
WBC # BLD AUTO: 13.3 K/UL (ref 4.8–10.8)

## 2019-07-15 PROCEDURE — 99283 EMERGENCY DEPT VISIT LOW MDM: CPT

## 2019-07-15 PROCEDURE — 85025 COMPLETE CBC W/AUTO DIFF WBC: CPT

## 2019-07-15 PROCEDURE — 84702 CHORIONIC GONADOTROPIN TEST: CPT

## 2019-07-15 PROCEDURE — 81003 URINALYSIS AUTO W/O SCOPE: CPT

## 2019-07-15 PROCEDURE — 36415 COLL VENOUS BLD VENIPUNCTURE: CPT

## 2019-07-15 PROCEDURE — 83690 ASSAY OF LIPASE: CPT

## 2019-07-15 PROCEDURE — 80053 COMPREHEN METABOLIC PANEL: CPT

## 2019-07-16 ENCOUNTER — HOSPITAL ENCOUNTER (EMERGENCY)
Facility: MEDICAL CENTER | Age: 34
End: 2019-07-16
Attending: EMERGENCY MEDICINE
Payer: COMMERCIAL

## 2019-07-16 VITALS
BODY MASS INDEX: 37.56 KG/M2 | RESPIRATION RATE: 16 BRPM | DIASTOLIC BLOOD PRESSURE: 91 MMHG | SYSTOLIC BLOOD PRESSURE: 125 MMHG | HEART RATE: 74 BPM | HEIGHT: 66 IN | WEIGHT: 233.69 LBS | OXYGEN SATURATION: 96 % | TEMPERATURE: 97.1 F

## 2019-07-16 DIAGNOSIS — Z3A.18 18 WEEKS GESTATION OF PREGNANCY: ICD-10-CM

## 2019-07-16 DIAGNOSIS — R10.9 BILATERAL FLANK PAIN: ICD-10-CM

## 2019-07-16 PROBLEM — Z33.1 IUP (INTRAUTERINE PREGNANCY), INCIDENTAL: Status: ACTIVE | Noted: 2019-07-16

## 2019-07-16 RX ORDER — LIDOCAINE HYDROCHLORIDE 10 MG/ML
INJECTION, SOLUTION INFILTRATION; PERINEURAL
Status: COMPLETED
Start: 2019-07-16 | End: 2019-07-16

## 2019-07-16 NOTE — ED PROVIDER NOTES
ED Provider Note    CHIEF COMPLAINT  Chief Complaint   Patient presents with   • Flank Pain   • Back Pain        HPI    Primary care provider: Serina Moreland P.A.-C.   History obtained from: Patient  History limited by: None     Alie Lowe is a 34 y.o. female who presents to the ED complaining of bilateral flank pain since yesterday without any known injury/trauma/inciting event.  Movement or sitting down seems to make her symptoms worse and she has not noticed anything that has helped with her symptoms.  Patient states that she is 18 weeks pregnant and she is currently  with one previous miscarriage and one elective AB.  She denies vaginal bleeding/discharge/pain/fever/diarrhea/constipation/dysuria.  She reports that her nausea and vomiting has significant improveded since her first trimester.  She has been seen by her OB/GYN in [place] during this pregnancy.    REVIEW OF SYSTEMS  Please see HPI for pertinent positives/negatives.  All other systems reviewed and are negative.     PAST MEDICAL HISTORY  Past Medical History:   Diagnosis Date   • Heart murmur         SURGICAL HISTORY  History reviewed. No pertinent surgical history.     SOCIAL HISTORY  Social History     Social History Main Topics   • Smoking status: Former Smoker     Packs/day: 0.10     Years: 17.00   • Smokeless tobacco: Never Used      Comment: socially, only every few months   • Alcohol use No      Comment: socially - rare   • Drug use: No   • Sexual activity: Yes     Partners: Male     Birth control/ protection: Condom        FAMILY HISTORY  Family History   Problem Relation Age of Onset   • Cancer Mother         uterine and skin   • Thyroid Mother         removed due to lump   • Diabetes Father    • Hypertension Father    • Cancer Sister         ovarian   • Alcohol abuse Brother         CURRENT MEDICATIONS  Home Medications     Reviewed by Elaine Barker R.N. (Registered Nurse) on 19 at 0050  Med List Status: Complete  "  Medication Last Dose Status   acetaminophen (TYLENOL) 500 MG Tab  Active   Prenatal w/o A Vit-Fe Fum-FA (PRENATA PO)  Active                 ALLERGIES  Allergies   Allergen Reactions   • Penicillins Unspecified     Previous hospitalization after penicillin - does not remember reaction    Cannot tolerate any Cillin        PHYSICAL EXAM  VITAL SIGNS: /91   Pulse 74   Temp 36.2 °C (97.1 °F) (Temporal)   Resp 16   Ht 1.676 m (5' 6\")   Wt 106 kg (233 lb 11 oz)   LMP 03/10/2019   SpO2 96%   BMI 37.72 kg/m²  @DAVID[[id number]::@     Pulse ox interpretation: 100% I interpret this pulse ox as normal     Constitutional: Well developed, well nourished, alert in no apparent distress, nontoxic appearance    HENT: No external signs of trauma, normocephalic, oropharynx moist and clear, nose normal    Eyes: PERRL, conjunctiva without erythema, no discharge, no icterus    Neck: Soft and supple, trachea midline, no stridor, no tenderness, no LAD, no JVD, good ROM    Cardiovascular: Regular rate and rhythm, no murmurs/rubs/gallops, strong distal pulses and good perfusion    Thorax & Lungs: No respiratory distress, CTAB   Abdomen: Soft, mild bilateral flank pain, nondistended, no guarding, no rebound, normal BS    Back: No CVAT    Extremities: No cyanosis, no edema, no gross deformity, good ROM, no tenderness, intact distal pulses with brisk cap refill    Skin: Warm, dry, no pallor/cyanosis, no rash noted    Lymphatic: No lymphadenopathy noted    Neuro: A/O times 3, no focal deficits noted    Psychiatric: Cooperative, normal mood and affect, normal judgement, appropriate for clinical situation        DIAGNOSTIC STUDIES / PROCEDURES    Limited bedside ultrasound performed by me showed active IUP with positive fetal heartbeat,    LABS  All labs reviewed by me.     Results for orders placed or performed during the hospital encounter of 07/16/19   CBC WITH DIFFERENTIAL   Result Value Ref Range    WBC 13.3 (H) 4.8 - 10.8 K/uL    " RBC 4.46 4.20 - 5.40 M/uL    Hemoglobin 11.8 (L) 12.0 - 16.0 g/dL    Hematocrit 36.6 (L) 37.0 - 47.0 %    MCV 82.1 81.4 - 97.8 fL    MCH 26.5 (L) 27.0 - 33.0 pg    MCHC 32.2 (L) 33.6 - 35.0 g/dL    RDW 45.1 35.9 - 50.0 fL    Platelet Count 344 164 - 446 K/uL    MPV 10.0 9.0 - 12.9 fL    Neutrophils-Polys 59.10 44.00 - 72.00 %    Lymphocytes 34.40 22.00 - 41.00 %    Monocytes 5.30 0.00 - 13.40 %    Eosinophils 0.50 0.00 - 6.90 %    Basophils 0.40 0.00 - 1.80 %    Immature Granulocytes 0.30 0.00 - 0.90 %    Nucleated RBC 0.00 /100 WBC    Neutrophils (Absolute) 7.87 (H) 2.00 - 7.15 K/uL    Lymphs (Absolute) 4.59 1.00 - 4.80 K/uL    Monos (Absolute) 0.71 0.00 - 0.85 K/uL    Eos (Absolute) 0.07 0.00 - 0.51 K/uL    Baso (Absolute) 0.05 0.00 - 0.12 K/uL    Immature Granulocytes (abs) 0.04 0.00 - 0.11 K/uL    NRBC (Absolute) 0.00 K/uL   COMP METABOLIC PANEL   Result Value Ref Range    Sodium 139 135 - 145 mmol/L    Potassium 3.7 3.6 - 5.5 mmol/L    Chloride 107 96 - 112 mmol/L    Co2 23 20 - 33 mmol/L    Anion Gap 9.0 0.0 - 11.9    Glucose 88 65 - 99 mg/dL    Bun 12 8 - 22 mg/dL    Creatinine 0.71 0.50 - 1.40 mg/dL    Calcium 9.4 8.5 - 10.5 mg/dL    AST(SGOT) 13 12 - 45 U/L    ALT(SGPT) 19 2 - 50 U/L    Alkaline Phosphatase 52 30 - 99 U/L    Total Bilirubin 0.2 0.1 - 1.5 mg/dL    Albumin 3.5 3.2 - 4.9 g/dL    Total Protein 7.3 6.0 - 8.2 g/dL    Globulin 3.8 (H) 1.9 - 3.5 g/dL    A-G Ratio 0.9 g/dL   LIPASE   Result Value Ref Range    Lipase 12 11 - 82 U/L   HCG QUANTITATIVE   Result Value Ref Range    Bhcg 31501.9 (H) 0.0 - 5.0 mIU/mL   URINALYSIS,CULTURE IF INDICATED   Result Value Ref Range    Color Yellow     Character Clear     Specific Gravity >=1.030 <1.035    Ph 5.5 5.0 - 8.0    Glucose Negative Negative mg/dL    Ketones Negative Negative mg/dL    Protein Negative Negative mg/dL    Bilirubin Negative Negative    Urobilinogen, Urine 0.2 Negative    Nitrite Negative Negative    Leukocyte Esterase Negative Negative     Occult Blood Negative Negative    Micro Urine Req see below    ESTIMATED GFR   Result Value Ref Range    GFR If African American >60 >60 mL/min/1.73 m 2    GFR If Non African American >60 >60 mL/min/1.73 m 2        RADIOLOGY  The radiologist's interpretation of all radiological studies have been reviewed by me.     No orders to display          COURSE & MEDICAL DECISION MAKING  Nursing notes, VS, PMSFHx reviewed in chart.     Review of past medical records shows the patient was last seen at [facility] ED July 2, 2019 for back pain after she fell off a chair.  Ultrasound performed at that time showed viable single IUP at 16 weeks 2 days.      Differential diagnoses considered include but are not limited to: Pancreatitis, KS/renal colic, UTI/pyelo, muscle strain, hernia, pregnancy      History and physical exam as above.  This is a pleasant well-appearing patient in no acute distress and nontoxic in appearance with mild bilateral flank pain but otherwise benign exam.  Bedside ultrasound performed by me showed active IUP with positive fetal heartbeat.  Patient declined pelvic exam.  Labs are fairly unremarkable except for mild leukocytosis and anemia.  No overt signs of infection in her urine.  Findings discussed with the patient.  Discussed with patient that this is likely musculoskeletal pain or perhaps round ligament pain.  Low clinical suspicion at this time for more serious acute abdominal/pelvic pathology given the history/exam/findings.  I discussed with her worrisome signs and symptoms and return to ED precautions and she was advised on outpatient follow-up with her OB/GYN.  Patient verbalized understanding and agreed with plan of care with no further questions or concerns.      The patient is referred to a primary physician for blood pressure management, diabetic screening, and for all other preventative health concerns.       FINAL IMPRESSION  1. Bilateral flank pain Acute   2. 18 weeks gestation of  pregnancy Active          DISPOSITION  Patient will be discharged home in stable condition.       FOLLOW UP  Please follow-up with your OB/GYN    Call today      [facility], Emergency Dept  [address]  [place] [address] [phone]    If symptoms worsen         OUTPATIENT MEDICATIONS  Discharge Medication List as of 7/16/2019  1:10 AM             Electronically signed by: Walker Beavers, 7/16/2019 12:49 AM      Portions of this record were made with voice recognition software.  Despite my review, spelling/grammar/context errors may still remain.  Interpretation of this chart should be taken in this context.

## 2019-07-16 NOTE — ED NOTES
Pt discharged; Pt verbalized understanding of discharge information as well as follow-up directions. Pt provided work note to return to work on 7-19 per ERP. Pt ambulated to the lobby with steady gait.

## 2019-07-16 NOTE — ED NOTES
RN rounded with patient. No changes. Pt continues to have abdominal and flank pain. Asked to alert staff to changes.

## 2019-07-16 NOTE — ED TRIAGE NOTES
Pt to ED with complaints of bilateral flank swelling and pain. Started yesterday. Cramping/ sharp in nature. +pain to low back with BM - denies hard stools. No burning or painful urination. +18 weeks pregnant. No vaginal bleeding or DC. Has been following with OB in [facility]. Pregnancy has been without complications. A2

## 2019-09-10 ENCOUNTER — INITIAL PRENATAL (OUTPATIENT)
Dept: OBGYN | Facility: CLINIC | Age: 34
End: 2019-09-10
Payer: COMMERCIAL

## 2019-09-10 VITALS
BODY MASS INDEX: 37.45 KG/M2 | SYSTOLIC BLOOD PRESSURE: 114 MMHG | HEIGHT: 66 IN | DIASTOLIC BLOOD PRESSURE: 68 MMHG | WEIGHT: 233 LBS

## 2019-09-10 DIAGNOSIS — Z34.80 SUPERVISION OF OTHER NORMAL PREGNANCY, ANTEPARTUM: Primary | ICD-10-CM

## 2019-09-10 DIAGNOSIS — O09.299 HISTORY OF MACROSOMIA IN INFANT IN PRIOR PREGNANCY, CURRENTLY PREGNANT: ICD-10-CM

## 2019-09-10 PROBLEM — Z30.013 ENCOUNTER FOR INITIAL PRESCRIPTION OF INJECTABLE CONTRACEPTIVE: Status: RESOLVED | Noted: 2018-06-06 | Resolved: 2019-09-10

## 2019-09-10 PROBLEM — Z33.1 IUP (INTRAUTERINE PREGNANCY), INCIDENTAL: Status: RESOLVED | Noted: 2019-07-16 | Resolved: 2019-09-10

## 2019-09-10 LAB
APPEARANCE UR: NORMAL
BILIRUB UR STRIP-MCNC: NORMAL MG/DL
COLOR UR AUTO: NORMAL
GLUCOSE UR STRIP.AUTO-MCNC: NEGATIVE MG/DL
KETONES UR STRIP.AUTO-MCNC: NEGATIVE MG/DL
LEUKOCYTE ESTERASE UR QL STRIP.AUTO: NORMAL
NITRITE UR QL STRIP.AUTO: NEGATIVE
PH UR STRIP.AUTO: 6 [PH] (ref 5–8)
PROT UR QL STRIP: NORMAL MG/DL
RBC UR QL AUTO: NEGATIVE
SP GR UR STRIP.AUTO: 1.02
UROBILINOGEN UR STRIP-MCNC: NORMAL MG/DL

## 2019-09-10 PROCEDURE — 81002 URINALYSIS NONAUTO W/O SCOPE: CPT | Performed by: NURSE PRACTITIONER

## 2019-09-10 PROCEDURE — 59402 PR NEW OB HIGH RISK: CPT | Performed by: NURSE PRACTITIONER

## 2019-09-10 ASSESSMENT — ENCOUNTER SYMPTOMS
EYES NEGATIVE: 1
RESPIRATORY NEGATIVE: 1
MUSCULOSKELETAL NEGATIVE: 1
CONSTITUTIONAL NEGATIVE: 1
NEUROLOGICAL NEGATIVE: 1
GASTROINTESTINAL NEGATIVE: 1
PSYCHIATRIC NEGATIVE: 1
CARDIOVASCULAR NEGATIVE: 1

## 2019-09-10 NOTE — PROGRESS NOTES
S:  Alie Lowe is a 34 y.o.  who presents for her new OB exam.  She is 26w2d with and TAMMIE of Estimated Date of Delivery: 12/15/19 based off of LMP . She has no complaints.  She is currently working at home. Discussed heavy lifting and chemical exposure. No ER visits. Was getting care in this pregnancy up until 2.5 months ago.     Too late for AFP.  Declines CF.  Denies VB, LOF, or cramping.  Denies dysuria, vaginal DC. Reports good fetal movement.     Pt is single and lives with boyfriend and 1 child.  Pregnancy is unplanned but desired.  She has history of 2 previous vaginal deliveries, last child being 9lbs. Denies any pregnancy or delivery complications. Also has had 1 SAB and 1 TAB both without any complications or problems.      Discussed diet and exercise during pregnancy. Encouraged good nutrition, and daily exercise including walking or swimming. Discussed expected weight gain during pregnancy. Discussed adequate hydration during pregnancy.    Past Medical History:   Diagnosis Date   • Heart murmur    • MRSA carrier      Family History   Problem Relation Age of Onset   • Cancer Mother         uterine and skin   • Thyroid Mother         removed due to lump   • Diabetes Father    • Hypertension Father    • Cancer Sister         ovarian   • Alcohol abuse Brother      Social History     Socioeconomic History   • Marital status:      Spouse name: Not on file   • Number of children: Not on file   • Years of education: Not on file   • Highest education level: Not on file   Occupational History   • Not on file   Social Needs   • Financial resource strain: Not on file   • Food insecurity:     Worry: Not on file     Inability: Not on file   • Transportation needs:     Medical: Not on file     Non-medical: Not on file   Tobacco Use   • Smoking status: Former Smoker     Packs/day: 0.10     Years: 17.00     Pack years: 1.70   • Smokeless tobacco: Never Used   • Tobacco comment: socially, only  "every few months   Substance and Sexual Activity   • Alcohol use: No     Alcohol/week: 1.2 oz     Types: 2 Standard drinks or equivalent per week     Comment: socially - rare   • Drug use: No   • Sexual activity: Yes     Partners: Male   Lifestyle   • Physical activity:     Days per week: Not on file     Minutes per session: Not on file   • Stress: Not on file   Relationships   • Social connections:     Talks on phone: Not on file     Gets together: Not on file     Attends Denominational service: Not on file     Active member of club or organization: Not on file     Attends meetings of clubs or organizations: Not on file     Relationship status: Not on file   • Intimate partner violence:     Fear of current or ex partner: Not on file     Emotionally abused: Not on file     Physically abused: Not on file     Forced sexual activity: Not on file   Other Topics Concern   • Not on file   Social History Narrative   • Not on file     OB History    Para Term  AB Living   3 2 2     2   SAB TAB Ectopic Molar Multiple Live Births             2      # Outcome Date GA Lbr Daniel/2nd Weight Sex Delivery Anes PTL Lv   3 Current            2 Term 11 41w0d  3.685 kg (8 lb 2 oz) M Vag-Spont  N SURY   1 Term 10/30/06 40w0d  4.252 kg (9 lb 6 oz) M Vag-Spont  N SURY       History of Varicella Virus: no  History of HSV I or II in self or partner: boyfriend with genital HSV  History of Thyroid problems: no    O:  /68   Ht 1.676 m (5' 6\")   Wt 105.7 kg (233 lb)    See Prenatal Physical.    Wet mount: deferred, no s/sx  Chaperone offered: yes    A:   1.  IUP @ 26w2d per LMP        2.  S=D        3.  See problem list below        4.  Transfer of care       Patient Active Problem List    Diagnosis Date Noted   • Supervision of other normal pregnancy, antepartum 09/10/2019   • History of macrosomia in infant in prior pregnancy, currently pregnant 09/10/2019   • MRSA carrier 2017   • Panic attack 2017   • Obesity " "(BMI 30-39.9) 06/19/2017         P:  1.  GC/CT & pap done at previous clinic        2.  Prenatal labs done at previous clinic        3.  Discussed PNV, diet, avoidances and adequate water intake        4.  NOB packet given        5.  Return to office in 3-4 wks        6.  Complete OB US at next available        7.  Glucose testing now    Orders Placed This Encounter   • US-OB 2ND 3RD TRI COMPLETE   • GLUCOSE 1HR GESTATIONAL   • HGB   • HCT   • T.PALLIDUM AB EIA   • POCT Urinalysis       HPI    Review of Systems   Constitutional: Negative.    HENT: Negative.    Eyes: Negative.    Respiratory: Negative.    Cardiovascular: Negative.    Gastrointestinal: Negative.    Genitourinary: Negative.    Musculoskeletal: Negative.    Skin: Negative.    Neurological: Negative.    Endo/Heme/Allergies: Negative.    Psychiatric/Behavioral: Negative.    All other systems reviewed and are negative.         Objective:     /68   Ht 1.676 m (5' 6\")   Wt 105.7 kg (233 lb)   LMP 03/10/2019   BMI 37.61 kg/m²      Physical Exam   Constitutional: She is oriented to person, place, and time. She appears well-developed and well-nourished.   HENT:   Head: Normocephalic.   Nose: Nose normal.   Eyes: Conjunctivae are normal.   Neck: Normal range of motion. Neck supple.   Cardiovascular: Normal rate, regular rhythm, normal heart sounds and intact distal pulses.   Pulmonary/Chest: Effort normal and breath sounds normal.   Abdominal: Soft. Bowel sounds are normal.   Genitourinary: Vagina normal and uterus normal.   Musculoskeletal: Normal range of motion.   Neurological: She is alert and oriented to person, place, and time.   Skin: Skin is warm and dry.   Psychiatric: She has a normal mood and affect. Her behavior is normal. Judgment and thought content normal.   Nursing note and vitals reviewed.       Assessment/Plan:     1. Supervision of other normal pregnancy, antepartum  TAMMIE 12/15/19  - US-OB 2ND 3RD TRI COMPLETE; Future  - GLUCOSE 1HR " GESTATIONAL; Future  - HGB; Future  - HCT; Future  - T.PALLIDUM AB EIA; Future  - POCT Urinalysis    2. History of macrosomia in infant in prior pregnancy, currently pregnant

## 2019-09-10 NOTE — LETTER
Cystic Fibrosis Carrier Testing  Alie Lowe    The following information is about a blood test that can be done to determine if you and/or your partner carry the gene for cystic fibrosis.    WHAT IS CYSTIC FIBROSIS?  · Cystic fibrosis (CF) is an inherited disease that affects more than 25,000 American children and young adults.  · Symptoms of CF vary but include lung congestion, pneumonia, diarrhea and poor growth.  Most people with CF have severe medical problems and some die at a young age.  Others have so few symptoms they are unaware they have CF.  · CF does not affect intelligence.  · Although there is no cure for CF at this time, scientists are making progress in improving treatment and in searching for a cure.  In the past many people with CF  at a very young age.  Today, many are living into their 20’s and 30’s.    IS THERE A CHANCE MY BABY COULD HAVE CYSTIC FIBROSIS?  · You can have a child with CF even if there is no history in your family (see chart below).  · CF testing can help determine if you are a carrier and at risk to have a child with CF.  Note: if both parents are carriers, there is a 1 in 4 (25%) chance with each pregnancy that they will have a child with CF.  · Carriers have one normal CF gene and one altered CF gene.  · People with CF have two altered CF genes.  · Most people have two normal copies of the CF gene.    Approximate risk that a couple with no family history of cystic fibrosis will have a child with cystic fibrosis:    Ethnic background / Risk     couple:  1 in 2,500   couple:  1 in 15,000            couple:  1 in 8,000     American couple:  1 in 32,000     WHAT TESTING IS AVAILABLE?  · There is a blood test that can be done to find out if you or your partner is a carrier.  · It is important to understand that CF carrier testing does not detect all CF carriers.  · If the test shows that you are both CF carriers, you unborn baby can  be tested to find out if the baby has CF.    HOW MUCH DOES IT COST TO HAVE CYSTIC FIBROSIS CARRIER TESTING?  · Cost and insurance coverage for CF carrier testing vary depending upon the laboratory used and your insurance policy.  · The average cost for CF carrier testing is $300 per person.  · Your genetic counselor can provide you with more information about cystic fibrosis carrier testing.    _____  Yes, I am interested in discussing carrier testing with a genetic counselor.    _____  No, I am not interested in CF carrier testing or in receiving more information about CF carrier testing.      Client signature: ________________________________________  9/10/2019

## 2019-09-10 NOTE — PROGRESS NOTES
NOB today. Had some PN carte in [place]. Request for medical information  faxed by .   Per patient she had US at around 15 weeks, blood work done as well as pap with GC/Ct done  LMP; 3/10/19  Last pap: 2019  Phone # [phone]  Pharmacy confirmed  1 gtt pended and instructions given.  C/o pelvic pressure where she can even walk for a long time or sitting for a long time

## 2019-09-18 ENCOUNTER — PATIENT MESSAGE (OUTPATIENT)
Dept: OBGYN | Facility: CLINIC | Age: 34
End: 2019-09-18

## 2019-09-19 RX ORDER — ONDANSETRON 4 MG/1
4 TABLET, FILM COATED ORAL EVERY 4 HOURS PRN
Qty: 20 TAB | Refills: 0 | Status: ON HOLD | OUTPATIENT
Start: 2019-09-19 | End: 2019-12-19

## 2019-10-07 ENCOUNTER — ROUTINE PRENATAL (OUTPATIENT)
Dept: OBGYN | Facility: CLINIC | Age: 34
End: 2019-10-07
Payer: COMMERCIAL

## 2019-10-07 ENCOUNTER — APPOINTMENT (OUTPATIENT)
Dept: RADIOLOGY | Facility: IMAGING CENTER | Age: 34
End: 2019-10-07
Attending: NURSE PRACTITIONER
Payer: COMMERCIAL

## 2019-10-07 VITALS — BODY MASS INDEX: 37.45 KG/M2 | SYSTOLIC BLOOD PRESSURE: 118 MMHG | DIASTOLIC BLOOD PRESSURE: 66 MMHG | WEIGHT: 232 LBS

## 2019-10-07 DIAGNOSIS — O09.299 HISTORY OF MACROSOMIA IN INFANT IN PRIOR PREGNANCY, CURRENTLY PREGNANT: ICD-10-CM

## 2019-10-07 DIAGNOSIS — Z34.80 SUPERVISION OF OTHER NORMAL PREGNANCY, ANTEPARTUM: ICD-10-CM

## 2019-10-07 PROCEDURE — 76805 OB US >/= 14 WKS SNGL FETUS: CPT | Performed by: OBSTETRICS & GYNECOLOGY

## 2019-10-07 PROCEDURE — 90040 PR PRENATAL FOLLOW UP: CPT | Performed by: OBSTETRICS & GYNECOLOGY

## 2019-10-07 NOTE — PROGRESS NOTES
Pt here today for OB follow up  Pt states doing well  Reports +FM  Good # [phone]  Pharmacy Confirmed.  Flu vaccine declines  Kick count sheet given

## 2019-10-07 NOTE — LETTER
"Count Your Baby's Movements  Another step to a healthy delivery    A Epic Dress Re Test             Dept: [phone]    How Many Weeks Pregnant? 30w1d    Date to Begin Counting: 10/07/19              How to use this chart    One way for your physician to keep track of your baby's health is by knowing how often the baby moves (or \"kicks\") in your womb.  You can help your physician to do this by using this chart every day.    Every day, you should see how many hours it takes for your baby to move 10 times.  Start in the morning, as soon as you get up.    · First, write down the time your baby moves until you get to 10.  · Check off one box every time your baby moves until you get to 10.  · Write down the time you finished counting in the last column.  · Total how long it took to count up all 10 movements.  · Finally, fill in the box that shows how long this took.  After counting 10 movements, you no longer have to count any more that day.  The next morning, just start counting again as soon as you get up.    What should you call a \"movement\"?  It is hard to say, because it will feel different from one mother to another and from one pregnancy to the next.  The important thing is that you count the movements the same way throughout your pregnancy.  If you have more questions, you should ask your physician.    Count carefully every day!  SAMPLE:  Week 28    How many hours did it take to feel 10 movements?       Start  Time     1     2     3     4     5     6     7     8     9     10   Finish Time   Mon 8:20 ·  ·  ·  ·  ·  ·  ·  ·  ·  ·  11:40   Tue Wed Thu Fri               Sat               Sun                 IMPORTANT: You should contact your physician if it takes more than two hours for you to feel 10 movements.  Each morning, write down the time and start to count the movements of your baby.  Keep track by checking off one box every time you feel one movement.  When you " "have felt 10 \"kicks\", write down the time you finished counting in the last column.  Then fill in the   box (over the check milad) for the number of hours it took.  Be sure to read the complete instructions on the previous page.            "

## 2019-10-07 NOTE — PROGRESS NOTES
OB Follow Up--- High Risk  Hx of Macrosomia     obesity     34 y.o. is a 30w1d presents in prenatal follow up.    Subjective:no complaints  . Fetal Movement  positive    Problem List:has Panic attack; Obesity (BMI 30-39.9); MRSA carrier; Supervision of other normal pregnancy, antepartum; and History of macrosomia in infant in prior pregnancy, currently pregnant on their problem list.     Objective:   /66   Wt 105.2 kg (232 lb)   LMP 03/10/2019   BMI 37.45 kg/m²     Abdomen:  S>D  Extremities:Normal        Lab:No results found for this or any previous visit (from the past 336 hour(s)).      Assessment:    30w1d     Hx of Macrosomia   Hx of 3rd/ 4th degree   No pain, bleeding, unusual discharge or leeking    Plan:  2 weeks  Continue water intake  Ambulate nightly after 37 weeks  Continue Kick counts

## 2019-10-16 ENCOUNTER — APPOINTMENT (OUTPATIENT)
Dept: LAB | Facility: MEDICAL CENTER | Age: 34
End: 2019-10-16
Attending: NURSE PRACTITIONER
Payer: COMMERCIAL

## 2019-10-16 ENCOUNTER — TELEPHONE (OUTPATIENT)
Dept: OBGYN | Facility: CLINIC | Age: 34
End: 2019-10-16

## 2019-10-16 NOTE — TELEPHONE ENCOUNTER
----- Message from Ashli Brand D.O. sent at 10/8/2019  1:26 PM PDT -----  Please call the patient regarding results of recent ultrasound.Her baby's weight is growing appropriately.  However the umbilical cord only has 2 vessels instead of 3.  Sometimes this is not significant and other times this is associated with slow growth of babies inside the wound.  She needs twice weekly NSTs starting at 32 weeks.    10/16/19 1643 Called pt, unable to reach her. KAROLINA.  Pt has appt on 10/22/19, SARITA placed in chart

## 2019-10-29 ENCOUNTER — HOSPITAL ENCOUNTER (OUTPATIENT)
Dept: LAB | Facility: MEDICAL CENTER | Age: 34
End: 2019-10-29
Attending: NURSE PRACTITIONER
Payer: COMMERCIAL

## 2019-10-29 DIAGNOSIS — Z34.80 SUPERVISION OF OTHER NORMAL PREGNANCY, ANTEPARTUM: ICD-10-CM

## 2019-10-29 LAB
GLUCOSE 1H P 50 G GLC PO SERPL-MCNC: 98 MG/DL (ref 70–139)
HCT VFR BLD AUTO: 37.1 % (ref 37–47)
HGB BLD-MCNC: 11.5 G/DL (ref 12–16)
TREPONEMA PALLIDUM IGG+IGM AB [PRESENCE] IN SERUM OR PLASMA BY IMMUNOASSAY: NON REACTIVE

## 2019-10-29 PROCEDURE — 85014 HEMATOCRIT: CPT

## 2019-10-29 PROCEDURE — 36415 COLL VENOUS BLD VENIPUNCTURE: CPT

## 2019-10-29 PROCEDURE — 86780 TREPONEMA PALLIDUM: CPT

## 2019-10-29 PROCEDURE — 82950 GLUCOSE TEST: CPT

## 2019-10-29 PROCEDURE — 85018 HEMOGLOBIN: CPT

## 2019-11-01 ENCOUNTER — ROUTINE PRENATAL (OUTPATIENT)
Dept: OBGYN | Facility: CLINIC | Age: 34
End: 2019-11-01
Payer: COMMERCIAL

## 2019-11-01 ENCOUNTER — ROUTINE PRENATAL (OUTPATIENT)
Dept: OBGYN | Facility: CLINIC | Age: 34
End: 2019-11-01

## 2019-11-01 VITALS — DIASTOLIC BLOOD PRESSURE: 70 MMHG | BODY MASS INDEX: 36.32 KG/M2 | SYSTOLIC BLOOD PRESSURE: 114 MMHG | WEIGHT: 225 LBS

## 2019-11-01 DIAGNOSIS — O09.899 TWO VESSEL UMBILICAL CORD IN SINGLETON PREGNANCY, ANTEPARTUM: ICD-10-CM

## 2019-11-01 DIAGNOSIS — Z34.80 SUPERVISION OF OTHER NORMAL PREGNANCY, ANTEPARTUM: ICD-10-CM

## 2019-11-01 LAB
NST ACOUSTIC STIMULATION: NORMAL
NST ACTION NECESSARY: NORMAL
NST ASSESSMENT: NORMAL
NST BASELINE: NORMAL
NST INDICATIONS: NORMAL
NST OTHER DATA: NORMAL
NST READ BY: NORMAL
NST RETURN: NORMAL
NST UTERINE ACTIVITY: NORMAL

## 2019-11-01 PROCEDURE — 90040 PR PRENATAL FOLLOW UP: CPT | Performed by: OBSTETRICS & GYNECOLOGY

## 2019-11-01 PROCEDURE — 59025 FETAL NON-STRESS TEST: CPT | Performed by: OBSTETRICS & GYNECOLOGY

## 2019-11-01 NOTE — PROGRESS NOTES
Pt here for OB f/u. Denies any complications today. Reports +FM.  Good phone# [phone]  Pharmacy verified with pt.

## 2019-11-01 NOTE — PROGRESS NOTES
Alie Lowe, a  at 33w5d with an TAMMIE of 12/15/2019, by Ultrasound, was seen at [facility] for a nonstress test.    Nonstress Test  Reason for NST: Other (Comment)(SUA)  Variability: Moderate  Decelerations: None  Accelerations: Yes  Acoustic Stimulator: No  Baseline: 120  Uterine Irritability: No  Contractions: Not present  Nonstress Test Interpretation  Comments: as per my read, reactive nst

## 2019-11-01 NOTE — PROGRESS NOTES
S: Pt presents for routine OB follow up.  No contractions, vaginal bleeding, or leaking fluids. Good fetal movement.    O: LMP 03/10/2019   There were no vitals filed for this visit.  Vitals, fundal height , fetal position, and FHR reviewed on flowsheet    Patient Active Problem List   Diagnosis   • Panic attack   • Obesity (BMI 30-39.9)   • MRSA carrier   • Supervision of other normal pregnancy, antepartum   • History of macrosomia in infant in prior pregnancy, currently pregnant       SVE: deferred  FH: 140s  Fundal height: 36cm    Prenatal labs:  T&S: A+/ ab neg ([facility])  First TM labs: wnl ([facility])  Genetic screening: Quad screen negative with [facility]  1 hour: 98   GBS: NA    Ultrasounds:  Level II: prominent cisterna magna and SUA. Otherwise normal anatomy. EFW 1689, 68%    A/P:  34 y.o.  at 33w5d based on LMP presents for routine obstetric follow-up.   #SUA   - discussed with patient today.   - recommend twice weekly NSTs    #Prenatal care  - Delivery plan: anticipate   - Continue prenatal vitamins.  -  labor precautions and fetal kick counts at 28 weeks.    #Health Maintenance   - Pap smear: NILM neg HPV 2017.   - Postpartum prophylaxis: not assessed    - Follow-up in 1 weeks for twice weekly NSTs

## 2019-11-05 ENCOUNTER — ROUTINE PRENATAL (OUTPATIENT)
Dept: OBGYN | Facility: CLINIC | Age: 34
End: 2019-11-05
Payer: COMMERCIAL

## 2019-11-05 DIAGNOSIS — O09.899 TWO VESSEL UMBILICAL CORD IN SINGLETON PREGNANCY, ANTEPARTUM: ICD-10-CM

## 2019-11-05 LAB
NST ACOUSTIC STIMULATION: NO
NST ACTION NECESSARY: NORMAL
NST ASSESSMENT: REACTIVE
NST BASELINE: 130
NST INDICATIONS: NORMAL
NST OTHER DATA: NORMAL
NST READ BY: NORMAL
NST RETURN: NORMAL
NST UTERINE ACTIVITY: NO

## 2019-11-05 PROCEDURE — 59025 FETAL NON-STRESS TEST: CPT | Performed by: OBSTETRICS & GYNECOLOGY

## 2019-11-12 ENCOUNTER — ROUTINE PRENATAL (OUTPATIENT)
Dept: OBGYN | Facility: CLINIC | Age: 34
End: 2019-11-12
Payer: COMMERCIAL

## 2019-11-12 DIAGNOSIS — Q27.0 SINGLE UMBILICAL ARTERY: ICD-10-CM

## 2019-11-12 PROCEDURE — 59025 FETAL NON-STRESS TEST: CPT | Performed by: OBSTETRICS & GYNECOLOGY

## 2019-11-12 NOTE — PROGRESS NOTES
Alie oLwe, a  at 35w2d with an TAMMIE of 12/15/2019, by Ultrasound, was seen at [facility] for a nonstress test.    Nonstress Test  Reason for NST: Other (Comment)  Variability: Moderate  Decelerations: None  Accelerations: Yes  Acoustic Stimulator: No  Baseline: 120  Uterine Irritability: No  Contractions: Not present  Nonstress Test Interpretation  Comments: NST for SUA. Reactive Category I

## 2019-11-12 NOTE — PROCEDURES
Alie Lowe, a  at 35w2d with an TAMMIE of 12/15/2019, by Ultrasound, was seen at [facility] for a nonstress test.    Nonstress Test  Reason for NST: Other (Comment)  Variability: Moderate  Decelerations: None  Accelerations: Yes  Acoustic Stimulator: No  Baseline: 120  Uterine Irritability: No  Contractions: Not present  Nonstress Test Interpretation  Comments: NST for SUA. Reactive Category I

## 2019-11-15 ENCOUNTER — ROUTINE PRENATAL (OUTPATIENT)
Dept: OBGYN | Facility: CLINIC | Age: 34
End: 2019-11-15

## 2019-11-15 ENCOUNTER — ROUTINE PRENATAL (OUTPATIENT)
Dept: OBGYN | Facility: CLINIC | Age: 34
End: 2019-11-15
Payer: COMMERCIAL

## 2019-11-15 VITALS — DIASTOLIC BLOOD PRESSURE: 61 MMHG | SYSTOLIC BLOOD PRESSURE: 131 MMHG | BODY MASS INDEX: 36.8 KG/M2 | WEIGHT: 228 LBS

## 2019-11-15 DIAGNOSIS — Z34.80 SUPERVISION OF OTHER NORMAL PREGNANCY, ANTEPARTUM: ICD-10-CM

## 2019-11-15 DIAGNOSIS — Q27.0 SINGLE UMBILICAL ARTERY: ICD-10-CM

## 2019-11-15 LAB
NST ACOUSTIC STIMULATION: NORMAL
NST ACTION NECESSARY: NORMAL
NST ASSESSMENT: NORMAL
NST BASELINE: 115
NST INDICATIONS: NORMAL
NST OTHER DATA: NORMAL
NST READ BY: NORMAL
NST RETURN: NORMAL
NST UTERINE ACTIVITY: NORMAL

## 2019-11-15 PROCEDURE — 90040 PR PRENATAL FOLLOW UP: CPT | Performed by: NURSE PRACTITIONER

## 2019-11-15 PROCEDURE — 59025 FETAL NON-STRESS TEST: CPT | Performed by: NURSE PRACTITIONER

## 2019-11-15 NOTE — PROGRESS NOTES
SUBJECTIVE:  Pt is a 34 y.o.   at 35w5d  gestation. Presents today for follow-up prenatal care. Reports no issues at this time.  Reports good fetal movement. Denies cramping/contractions, bleeding or leaking of fluid. Denies dysuria, headaches, N/V. Generally feels well today except returning nausea and headaches on and off.     OBJECTIVE:  - See prenatal vitals flow  -   Vitals:    11/15/19 0850   BP: 131/61   Weight: 103.4 kg (228 lb)                 ASSESSMENT:   - IUP at 35w5d    - S=D   -   Patient Active Problem List    Diagnosis Date Noted   • History of macrosomia in infant in prior pregnancy, currently pregnant 09/10/2019     Priority: High   • Supervision of other normal pregnancy, antepartum 09/10/2019   • Panic attack 2017         PLAN:  - S/sx pregnancy and labor warning signs vs general discomforts discussed  - Fetal movements and/or kick counts reviewed   - Adequate hydration reinforced  - Nutrition/exercise/vitamin education; continue PNV  - Declines vaccines  - Anticipatory guidance given  - RTC in 1 weeks for follow-up prenatal care  - Continue NSTs  - Has growth US scheduled

## 2019-11-19 ENCOUNTER — TELEPHONE (OUTPATIENT)
Dept: OBGYN | Facility: CLINIC | Age: 34
End: 2019-11-19

## 2019-11-19 ENCOUNTER — ROUTINE PRENATAL (OUTPATIENT)
Dept: OBGYN | Facility: CLINIC | Age: 34
End: 2019-11-19
Payer: COMMERCIAL

## 2019-11-19 DIAGNOSIS — Q27.0 SINGLE UMBILICAL ARTERY: ICD-10-CM

## 2019-11-19 PROCEDURE — 59025 FETAL NON-STRESS TEST: CPT | Performed by: NURSE PRACTITIONER

## 2019-11-19 NOTE — PROGRESS NOTES
NST per my read GDM  Baseline 140, pos accels, no decels, moderate variability  NO UC's noted Category one nst.

## 2019-11-19 NOTE — TELEPHONE ENCOUNTER
Pt called states she was not scheduled for her NST today and she is across street, wants to see if we can see her.  Pt scheduled and staff notified of pt arrival now instead of 1000.  Will be check in as soon as she arrive.

## 2019-11-21 ENCOUNTER — APPOINTMENT (OUTPATIENT)
Dept: RADIOLOGY | Facility: IMAGING CENTER | Age: 34
End: 2019-11-21
Attending: OBSTETRICS & GYNECOLOGY
Payer: COMMERCIAL

## 2019-11-21 DIAGNOSIS — Z34.80 SUPERVISION OF OTHER NORMAL PREGNANCY, ANTEPARTUM: ICD-10-CM

## 2019-11-21 PROCEDURE — 76816 OB US FOLLOW-UP PER FETUS: CPT | Performed by: OBSTETRICS & GYNECOLOGY

## 2019-11-22 ENCOUNTER — DOCUMENTATION (OUTPATIENT)
Dept: OBGYN | Facility: CLINIC | Age: 34
End: 2019-11-22

## 2019-11-22 ENCOUNTER — DATING (OUTPATIENT)
Dept: OBGYN | Facility: CLINIC | Age: 34
End: 2019-11-22

## 2019-11-27 ENCOUNTER — HOSPITAL ENCOUNTER (OUTPATIENT)
Facility: MEDICAL CENTER | Age: 34
End: 2019-11-27
Attending: NURSE PRACTITIONER
Payer: COMMERCIAL

## 2019-11-27 ENCOUNTER — ROUTINE PRENATAL (OUTPATIENT)
Dept: OBGYN | Facility: CLINIC | Age: 34
End: 2019-11-27
Payer: COMMERCIAL

## 2019-11-27 ENCOUNTER — ROUTINE PRENATAL (OUTPATIENT)
Dept: OBGYN | Facility: CLINIC | Age: 34
End: 2019-11-27

## 2019-11-27 VITALS — SYSTOLIC BLOOD PRESSURE: 135 MMHG | BODY MASS INDEX: 37.12 KG/M2 | DIASTOLIC BLOOD PRESSURE: 64 MMHG | WEIGHT: 230 LBS

## 2019-11-27 DIAGNOSIS — Q27.0 SINGLE UMBILICAL ARTERY: ICD-10-CM

## 2019-11-27 DIAGNOSIS — Q27.0 TWO VESSEL CORD: ICD-10-CM

## 2019-11-27 LAB
NST ACOUSTIC STIMULATION: NORMAL
NST ACTION NECESSARY: NORMAL
NST ASSESSMENT: NORMAL
NST BASELINE: 120
NST INDICATIONS: NORMAL
NST OTHER DATA: NORMAL
NST READ BY: NORMAL
NST RETURN: NORMAL
NST UTERINE ACTIVITY: NORMAL

## 2019-11-27 PROCEDURE — 90040 PR PRENATAL FOLLOW UP: CPT | Performed by: NURSE PRACTITIONER

## 2019-11-27 PROCEDURE — 59025 FETAL NON-STRESS TEST: CPT | Performed by: NURSE PRACTITIONER

## 2019-11-27 PROCEDURE — 87150 DNA/RNA AMPLIFIED PROBE: CPT

## 2019-11-27 PROCEDURE — 87081 CULTURE SCREEN ONLY: CPT

## 2019-11-27 ASSESSMENT — EDINBURGH POSTNATAL DEPRESSION SCALE (EPDS)
I HAVE BEEN SO UNHAPPY THAT I HAVE BEEN CRYING: ONLY OCCASIONALLY
TOTAL SCORE: 13
I HAVE BEEN SO UNHAPPY THAT I HAVE HAD DIFFICULTY SLEEPING: NOT VERY OFTEN
THE THOUGHT OF HARMING MYSELF HAS OCCURRED TO ME: HARDLY EVER
I HAVE FELT SCARED OR PANICKY FOR NO GOOD REASON: YES, SOMETIMES
I HAVE BEEN ANXIOUS OR WORRIED FOR NO GOOD REASON: YES, SOMETIMES
I HAVE LOOKED FORWARD WITH ENJOYMENT TO THINGS: AS MUCH AS I EVER DID
I HAVE FELT SAD OR MISERABLE: YES, QUITE OFTEN
I HAVE BLAMED MYSELF UNNECESSARILY WHEN THINGS WENT WRONG: YES, SOME OF THE TIME
THINGS HAVE BEEN GETTING ON TOP OF ME: YES, SOMETIMES I HAVEN'T BEEN COPING AS WELL AS USUAL
I HAVE BEEN ABLE TO LAUGH AND SEE THE FUNNY SIDE OF THINGS: AS MUCH AS I ALWAYS COULD

## 2019-11-27 NOTE — PROGRESS NOTES
Pt here today for OB follow up @37w3d  Pt states that she is having green discharge since Saturday  Reports +FM  Good # [phone]  Pharmacy Confirmed.

## 2019-11-27 NOTE — PROGRESS NOTES
SUBJECTIVE:  Pt is a 34 y.o.   at 37w3d  gestation. Presents today for follow-up prenatal care. Reports no issues at this time.  Reports less  fetal movement the last two days but have been better today. Denies regular cramping/contractions, bleeding or leaking of fluid. Denies dysuria, headaches, N/V. Generally feels well today. Reports some d/c that is green and smells different starting on Saturday but does not smell like fish.     OBJECTIVE:  - See prenatal vitals flow  -   Vitals:    19 1343   BP: 135/64   Weight: 104.3 kg (230 lb)                 ASSESSMENT:   - IUP at 37w3d    - S=D   -   Patient Active Problem List    Diagnosis Date Noted   • History of macrosomia in infant in prior pregnancy, currently pregnant 09/10/2019     Priority: High   • Two vessel cord 2019   • Supervision of other normal pregnancy, antepartum 09/10/2019   • Panic attack 2017         PLAN:  - S/sx pregnancy and labor warning signs vs general discomforts discussed  - Fetal movements and/or kick counts reviewed   - Adequate hydration reinforced  - Nutrition/exercise/vitamin education; continue PNV  - Continue 2x weekly NST  - Close kick counts tonite and to report to LnD with anything less than 10 in two hours; usually counts in morning or after dinner   - GBS collected along with EPDS screening   - Anticipatory guidance given  - RTC in 1 weeks for follow-up prenatal care

## 2019-11-28 LAB — GP B STREP DNA SPEC QL NAA+PROBE: NEGATIVE

## 2019-12-06 ENCOUNTER — ROUTINE PRENATAL (OUTPATIENT)
Dept: OBGYN | Facility: CLINIC | Age: 34
End: 2019-12-06
Payer: COMMERCIAL

## 2019-12-06 VITALS — WEIGHT: 228 LBS | DIASTOLIC BLOOD PRESSURE: 60 MMHG | BODY MASS INDEX: 36.8 KG/M2 | SYSTOLIC BLOOD PRESSURE: 130 MMHG

## 2019-12-06 DIAGNOSIS — O09.93 SUPERVISION OF HIGH RISK PREGNANCY IN THIRD TRIMESTER: ICD-10-CM

## 2019-12-06 DIAGNOSIS — O09.899 TWO VESSEL UMBILICAL CORD IN SINGLETON PREGNANCY, ANTEPARTUM: Primary | ICD-10-CM

## 2019-12-06 PROCEDURE — 90471 IMMUNIZATION ADMIN: CPT | Performed by: OBSTETRICS & GYNECOLOGY

## 2019-12-06 PROCEDURE — 90040 PR PRENATAL FOLLOW UP: CPT | Performed by: OBSTETRICS & GYNECOLOGY

## 2019-12-06 PROCEDURE — 90715 TDAP VACCINE 7 YRS/> IM: CPT | Performed by: OBSTETRICS & GYNECOLOGY

## 2019-12-06 PROCEDURE — 59025 FETAL NON-STRESS TEST: CPT | Performed by: OBSTETRICS & GYNECOLOGY

## 2019-12-06 NOTE — PROGRESS NOTES
Pt here today for OB follow up @ 38w5d  Pt states denies VB and LOF  Reports +FM  Good # [phone]  Pharmacy Confirmed.

## 2019-12-06 NOTE — PROGRESS NOTES
34 y.o.  38w5d The patient is here for routine obstetrical followup.  She denies contractions, vaginal bleeding, or leaking of fluid.  She complains of decreased fetal movement.  She also complains of frequent vomiting after eating.    The patient's pregnancy is complicated by   Patient Active Problem List    Diagnosis Date Noted   • History of macrosomia in infant in prior pregnancy, currently pregnant 09/10/2019     Priority: High   • Two vessel cord 2019   • Supervision of other normal pregnancy, antepartum 09/10/2019   • Panic attack 2017   GBS neg  NST cat 1 today    The patient is anxious regarding the decreased fetal movement.  NST is 1 and reassuring today.  She requests elective induction of labor after 39 weeks.  We discussed that her cervix is relatively unfavorable, which may put her at risk for prolonged induction, infection, and high risk of .  She is aware of the risks and would like to proceed with elective induction of labor.  Induction order placed for after 39 weeks.      Followup in 1 week  Labor precautions were discussed with patient  Fetal kick counts were discussed with patient.  Patient instructed to return to labor and delivery tomorrow if not meeting kick counts.

## 2019-12-08 ENCOUNTER — HOSPITAL ENCOUNTER (OUTPATIENT)
Facility: MEDICAL CENTER | Age: 34
End: 2019-12-08
Attending: OBSTETRICS & GYNECOLOGY | Admitting: OBSTETRICS & GYNECOLOGY
Payer: COMMERCIAL

## 2019-12-08 VITALS
HEART RATE: 83 BPM | WEIGHT: 228 LBS | DIASTOLIC BLOOD PRESSURE: 63 MMHG | BODY MASS INDEX: 36.8 KG/M2 | SYSTOLIC BLOOD PRESSURE: 121 MMHG | TEMPERATURE: 96.3 F

## 2019-12-08 PROCEDURE — 59025 FETAL NON-STRESS TEST: CPT

## 2019-12-09 ENCOUNTER — HOSPITAL ENCOUNTER (OUTPATIENT)
Facility: MEDICAL CENTER | Age: 34
End: 2019-12-09
Attending: OBSTETRICS & GYNECOLOGY | Admitting: OBSTETRICS & GYNECOLOGY
Payer: COMMERCIAL

## 2019-12-09 VITALS
BODY MASS INDEX: 36.64 KG/M2 | RESPIRATION RATE: 20 BRPM | HEART RATE: 90 BPM | WEIGHT: 228 LBS | SYSTOLIC BLOOD PRESSURE: 115 MMHG | HEIGHT: 66 IN | DIASTOLIC BLOOD PRESSURE: 56 MMHG | OXYGEN SATURATION: 99 % | TEMPERATURE: 100.3 F

## 2019-12-09 PROCEDURE — A9270 NON-COVERED ITEM OR SERVICE: HCPCS | Performed by: OBSTETRICS & GYNECOLOGY

## 2019-12-09 PROCEDURE — 700102 HCHG RX REV CODE 250 W/ 637 OVERRIDE(OP): Performed by: OBSTETRICS & GYNECOLOGY

## 2019-12-09 PROCEDURE — 59025 FETAL NON-STRESS TEST: CPT

## 2019-12-09 RX ORDER — OSELTAMIVIR PHOSPHATE 75 MG/1
75 CAPSULE ORAL 2 TIMES DAILY
Status: DISCONTINUED | OUTPATIENT
Start: 2019-12-09 | End: 2019-12-10 | Stop reason: HOSPADM

## 2019-12-09 RX ORDER — ACETAMINOPHEN 500 MG
1000 TABLET ORAL EVERY 4 HOURS PRN
Status: DISCONTINUED | OUTPATIENT
Start: 2019-12-09 | End: 2019-12-10 | Stop reason: HOSPADM

## 2019-12-09 RX ADMIN — OSELTAMIVIR PHOSPHATE 75 MG: 75 CAPSULE ORAL at 20:52

## 2019-12-09 RX ADMIN — ACETAMINOPHEN 1000 MG: 500 TABLET ORAL at 20:40

## 2019-12-09 ASSESSMENT — PAIN SCALES - GENERAL: PAINLEVEL: 10 - WORST POSSIBLE PAIN

## 2019-12-10 ENCOUNTER — TELEPHONE (OUTPATIENT)
Dept: OBGYN | Facility: CLINIC | Age: 34
End: 2019-12-10

## 2019-12-10 ENCOUNTER — DOCUMENTATION (OUTPATIENT)
Dept: OBGYN | Facility: MEDICAL CENTER | Age: 34
End: 2019-12-10

## 2019-12-10 NOTE — PROGRESS NOTES
" Patient arrived to unit and placed in S216, RN placed EFM and toco to ensure fetal well being and monitor uterine activity. RN educated patient on need for monitors and patient verbalized understanding. Vital signs taken. Prenatal labs / records reviewed by RN. Patient's chief complaint body aches, 10/10 back pain, patient stated she was at urgent care earlier and was diagnosed with the flu.      RN notified Dr. Rutherford regarding patient arrival, patient chief complaint was patient was diagnosed with the flu earlier at an urgent care and was given a prescription for tamiflu, patient refused to pay the $100 copay so she came to the hospital. FHR tachycardic 175s/180s, patient states she has a lot of body aches and back pain, denies feeling contractions, patient states, \"I just feel like crap\".  15lttod3/7days  scheduled for induction 12/10 for 2 vessel cord and hx of macrasomia. Orders received to give Tamiflu 75mg and tylenol 1000mg.      Patient stated she is feeling much better, patient looks visually better as well. Patient states positive fetal movement.        Dr. Rutherford notified that patient wanted to know the plan of care, patient feels better and looks better, temperature went from 100.2 axillary to 99.2 axillary, fhr in 140s/145s now, corazon irregularly.      RN at bedside, discharge instructions given to patient and significant other, all questions answered. Patient discharged in stable condition, with significant other,  given labor precautions.    General Instructions:  · If you think you are in labor, time contractions (lying on your left side) from the beginning of one contraction to the beginning of the next contraction for at least one hour.  · Increase fluid intake: you should consume 10-12 8 oz glasses of non-caffeinated fluid per day.  · Report any pressure or burning on urination to your physician.  · Monitor fetal movement: If you notice an absence or decrease in " fetal movement, drink a large glass of water and rest on your side.  If there is no increase in movement, call your physician or go to the hospital for further evaluation.  · Report any sudden, sharp abdominal pain.  · Report any bleeding.  Spotting or pinkish discharge is normal after vaginal exam.  You may also spot after sexual intercourse.     Labor Instructions (37 - 39 weeks):  Call your physician or return to hospital if:  · You have regular contractions that get progressively closer, longer and stronger.  · Your water breaks (remember time and color).  · You have bleeding like a period.  · Your baby does not move enough to complete the daily kick counts (10 movements in 2 hours)  · Your baby moves much less often than on the days before or you have not felt your baby move all day.

## 2019-12-10 NOTE — TELEPHONE ENCOUNTER
Pt called requesting to cancel her NST for today due to having flu (pt stated she was diagnosed at Urgent Care in [place]) pt has IOL scheduled for tonight, pt denies any vaginal bleeding, LOF and UC. Consulted with Dr. Martel and stated pt needs to let L&D know about + Flu and give labor precautions. Pt notified of above and labor precautions given. Pt understood and will comply. Pt had no other questions or concerns

## 2019-12-10 NOTE — TELEPHONE ENCOUNTER
Pt called back and notified of Dr. Betancur's recommendations, pt understood and confirmed IOL for Tuesday at 0800. Damaris Del Valle notified     1525 Called pt to let her know she needs to come in for NST tomorrow and Monday, with IOL on Tuesday. Pt voiced understanding and will comply. Call transferred to [facility] to schedule NSTs

## 2019-12-10 NOTE — PROGRESS NOTES
Pt scheduled this evening for elective IOL @ 39w2d, however just started tamiflu for influenza diagnosed recently (started treatment yesterday)    Given infectious risk (and need to separate baby and mom if delivers with influenza now) will need to reschedule elective IOL (I spoke with charge RN and Allie slot available next Tuesday 8AM which would be ideal to allow recover from influenza, will hold this spot for her).      Tried to call pt, no answer. VM left asking pt to call us at [facility] asa.  Will have office staff try to reach her as well.  Ideally       Emmie Betancur MD  [facility], Women's Health

## 2019-12-11 ENCOUNTER — ROUTINE PRENATAL (OUTPATIENT)
Dept: OBGYN | Facility: CLINIC | Age: 34
End: 2019-12-11
Payer: COMMERCIAL

## 2019-12-11 DIAGNOSIS — O09.899 TWO VESSEL UMBILICAL CORD IN SINGLETON PREGNANCY, ANTEPARTUM: Primary | ICD-10-CM

## 2019-12-11 PROCEDURE — 59025 FETAL NON-STRESS TEST: CPT | Performed by: NURSE PRACTITIONER

## 2019-12-16 ENCOUNTER — ROUTINE PRENATAL (OUTPATIENT)
Dept: OBGYN | Facility: CLINIC | Age: 34
End: 2019-12-16
Payer: COMMERCIAL

## 2019-12-16 DIAGNOSIS — O09.899 TWO VESSEL UMBILICAL CORD IN SINGLETON PREGNANCY, ANTEPARTUM: ICD-10-CM

## 2019-12-16 LAB
NST ACOUSTIC STIMULATION: NO
NST ACTION NECESSARY: NORMAL
NST ASSESSMENT: NORMAL
NST BASELINE: NORMAL
NST INDICATIONS: NORMAL
NST OTHER DATA: NORMAL
NST READ BY: NORMAL
NST RETURN: NORMAL
NST UTERINE ACTIVITY: NO

## 2019-12-16 PROCEDURE — 59025 FETAL NON-STRESS TEST: CPT | Performed by: OBSTETRICS & GYNECOLOGY

## 2019-12-17 ENCOUNTER — HOSPITAL ENCOUNTER (INPATIENT)
Facility: MEDICAL CENTER | Age: 34
LOS: 2 days | End: 2019-12-19
Attending: OBSTETRICS & GYNECOLOGY | Admitting: OBSTETRICS & GYNECOLOGY
Payer: COMMERCIAL

## 2019-12-17 ENCOUNTER — APPOINTMENT (OUTPATIENT)
Dept: OBGYN | Facility: MEDICAL CENTER | Age: 34
End: 2019-12-17
Attending: OBSTETRICS & GYNECOLOGY
Payer: COMMERCIAL

## 2019-12-17 ENCOUNTER — ANESTHESIA EVENT (OUTPATIENT)
Dept: ANESTHESIOLOGY | Facility: MEDICAL CENTER | Age: 34
End: 2019-12-17
Payer: COMMERCIAL

## 2019-12-17 ENCOUNTER — ANESTHESIA (OUTPATIENT)
Dept: ANESTHESIOLOGY | Facility: MEDICAL CENTER | Age: 34
End: 2019-12-17
Payer: COMMERCIAL

## 2019-12-17 LAB
BASOPHILS # BLD AUTO: 0.2 % (ref 0–1.8)
BASOPHILS # BLD: 0.02 K/UL (ref 0–0.12)
EOSINOPHIL # BLD AUTO: 0.02 K/UL (ref 0–0.51)
EOSINOPHIL NFR BLD: 0.2 % (ref 0–6.9)
ERYTHROCYTE [DISTWIDTH] IN BLOOD BY AUTOMATED COUNT: 45.9 FL (ref 35.9–50)
HCT VFR BLD AUTO: 35.5 % (ref 37–47)
HGB BLD-MCNC: 11.1 G/DL (ref 12–16)
HOLDING TUBE BB 8507: NORMAL
IMM GRANULOCYTES # BLD AUTO: 0.05 K/UL (ref 0–0.11)
IMM GRANULOCYTES NFR BLD AUTO: 0.5 % (ref 0–0.9)
LYMPHOCYTES # BLD AUTO: 3.23 K/UL (ref 1–4.8)
LYMPHOCYTES NFR BLD: 32.3 % (ref 22–41)
MCH RBC QN AUTO: 26.2 PG (ref 27–33)
MCHC RBC AUTO-ENTMCNC: 31.3 G/DL (ref 33.6–35)
MCV RBC AUTO: 83.7 FL (ref 81.4–97.8)
MONOCYTES # BLD AUTO: 0.46 K/UL (ref 0–0.85)
MONOCYTES NFR BLD AUTO: 4.6 % (ref 0–13.4)
NEUTROPHILS # BLD AUTO: 6.23 K/UL (ref 2–7.15)
NEUTROPHILS NFR BLD: 62.2 % (ref 44–72)
NRBC # BLD AUTO: 0 K/UL
NRBC BLD-RTO: 0 /100 WBC
PLATELET # BLD AUTO: 219 K/UL (ref 164–446)
PMV BLD AUTO: 11.3 FL (ref 9–12.9)
RBC # BLD AUTO: 4.24 M/UL (ref 4.2–5.4)
WBC # BLD AUTO: 10 K/UL (ref 4.8–10.8)

## 2019-12-17 PROCEDURE — A9270 NON-COVERED ITEM OR SERVICE: HCPCS | Performed by: PHYSICIAN ASSISTANT

## 2019-12-17 PROCEDURE — 85025 COMPLETE CBC W/AUTO DIFF WBC: CPT

## 2019-12-17 PROCEDURE — 770002 HCHG ROOM/CARE - OB PRIVATE (112)

## 2019-12-17 PROCEDURE — 700111 HCHG RX REV CODE 636 W/ 250 OVERRIDE (IP)

## 2019-12-17 PROCEDURE — 700105 HCHG RX REV CODE 258: Performed by: PHYSICIAN ASSISTANT

## 2019-12-17 PROCEDURE — 700102 HCHG RX REV CODE 250 W/ 637 OVERRIDE(OP): Performed by: PHYSICIAN ASSISTANT

## 2019-12-17 PROCEDURE — 700105 HCHG RX REV CODE 258

## 2019-12-17 PROCEDURE — 700111 HCHG RX REV CODE 636 W/ 250 OVERRIDE (IP): Performed by: ANESTHESIOLOGY

## 2019-12-17 PROCEDURE — 500726 HCHG BAKRI TAPENADE BALLOON

## 2019-12-17 PROCEDURE — 700111 HCHG RX REV CODE 636 W/ 250 OVERRIDE (IP): Performed by: PHYSICIAN ASSISTANT

## 2019-12-17 RX ORDER — SODIUM CHLORIDE, SODIUM LACTATE, POTASSIUM CHLORIDE, CALCIUM CHLORIDE 600; 310; 30; 20 MG/100ML; MG/100ML; MG/100ML; MG/100ML
INJECTION, SOLUTION INTRAVENOUS CONTINUOUS
Status: DISPENSED | OUTPATIENT
Start: 2019-12-17 | End: 2019-12-18

## 2019-12-17 RX ORDER — SODIUM CHLORIDE, SODIUM LACTATE, POTASSIUM CHLORIDE, AND CALCIUM CHLORIDE .6; .31; .03; .02 G/100ML; G/100ML; G/100ML; G/100ML
250 INJECTION, SOLUTION INTRAVENOUS PRN
Status: DISCONTINUED | OUTPATIENT
Start: 2019-12-17 | End: 2019-12-18 | Stop reason: HOSPADM

## 2019-12-17 RX ORDER — BUPIVACAINE HYDROCHLORIDE 2.5 MG/ML
INJECTION, SOLUTION EPIDURAL; INFILTRATION; INTRACAUDAL PRN
Status: DISCONTINUED | OUTPATIENT
Start: 2019-12-17 | End: 2019-12-18 | Stop reason: SURG

## 2019-12-17 RX ORDER — SODIUM CHLORIDE, SODIUM LACTATE, POTASSIUM CHLORIDE, CALCIUM CHLORIDE 600; 310; 30; 20 MG/100ML; MG/100ML; MG/100ML; MG/100ML
INJECTION, SOLUTION INTRAVENOUS
Status: COMPLETED
Start: 2019-12-17 | End: 2019-12-17

## 2019-12-17 RX ORDER — DEXTROSE, SODIUM CHLORIDE, SODIUM LACTATE, POTASSIUM CHLORIDE, AND CALCIUM CHLORIDE 5; .6; .31; .03; .02 G/100ML; G/100ML; G/100ML; G/100ML; G/100ML
INJECTION, SOLUTION INTRAVENOUS CONTINUOUS
Status: DISCONTINUED | OUTPATIENT
Start: 2019-12-18 | End: 2019-12-19 | Stop reason: HOSPADM

## 2019-12-17 RX ORDER — ROPIVACAINE HYDROCHLORIDE 2 MG/ML
INJECTION, SOLUTION EPIDURAL; INFILTRATION; PERINEURAL CONTINUOUS
Status: DISCONTINUED | OUTPATIENT
Start: 2019-12-18 | End: 2019-12-18

## 2019-12-17 RX ORDER — ROPIVACAINE HYDROCHLORIDE 2 MG/ML
INJECTION, SOLUTION EPIDURAL; INFILTRATION; PERINEURAL
Status: COMPLETED
Start: 2019-12-17 | End: 2019-12-17

## 2019-12-17 RX ORDER — SODIUM CHLORIDE, SODIUM LACTATE, POTASSIUM CHLORIDE, AND CALCIUM CHLORIDE .6; .31; .03; .02 G/100ML; G/100ML; G/100ML; G/100ML
1000 INJECTION, SOLUTION INTRAVENOUS
Status: DISCONTINUED | OUTPATIENT
Start: 2019-12-17 | End: 2019-12-18 | Stop reason: HOSPADM

## 2019-12-17 RX ADMIN — FENTANYL CITRATE 100 MCG: 0.05 INJECTION, SOLUTION INTRAMUSCULAR; INTRAVENOUS at 23:06

## 2019-12-17 RX ADMIN — OXYTOCIN 1 MILLI-UNITS/MIN: 10 INJECTION, SOLUTION INTRAMUSCULAR; INTRAVENOUS at 22:08

## 2019-12-17 RX ADMIN — MISOPROSTOL 25 MCG: 100 TABLET ORAL at 17:59

## 2019-12-17 RX ADMIN — ROPIVACAINE HYDROCHLORIDE 200 MG: 2 INJECTION, SOLUTION EPIDURAL; INFILTRATION; PERINEURAL at 23:29

## 2019-12-17 RX ADMIN — ROPIVACAINE HYDROCHLORIDE 200 MG: 2 INJECTION, SOLUTION EPIDURAL; INFILTRATION at 23:29

## 2019-12-17 RX ADMIN — SODIUM CHLORIDE, POTASSIUM CHLORIDE, SODIUM LACTATE AND CALCIUM CHLORIDE: 600; 310; 30; 20 INJECTION, SOLUTION INTRAVENOUS at 23:14

## 2019-12-17 RX ADMIN — SODIUM CHLORIDE, POTASSIUM CHLORIDE, SODIUM LACTATE AND CALCIUM CHLORIDE: 600; 310; 30; 20 INJECTION, SOLUTION INTRAVENOUS at 18:04

## 2019-12-17 RX ADMIN — BUPIVACAINE HYDROCHLORIDE 5 ML: 2.5 INJECTION, SOLUTION EPIDURAL; INFILTRATION; INTRACAUDAL; PERINEURAL at 23:30

## 2019-12-17 SDOH — ECONOMIC STABILITY: FOOD INSECURITY: WITHIN THE PAST 12 MONTHS, THE FOOD YOU BOUGHT JUST DIDN'T LAST AND YOU DIDN'T HAVE MONEY TO GET MORE.: NEVER TRUE

## 2019-12-17 SDOH — ECONOMIC STABILITY: FOOD INSECURITY: WITHIN THE PAST 12 MONTHS, YOU WORRIED THAT YOUR FOOD WOULD RUN OUT BEFORE YOU GOT MONEY TO BUY MORE.: NEVER TRUE

## 2019-12-17 SDOH — ECONOMIC STABILITY: TRANSPORTATION INSECURITY
IN THE PAST 12 MONTHS, HAS LACK OF TRANSPORTATION KEPT YOU FROM MEETINGS, WORK, OR FROM GETTING THINGS NEEDED FOR DAILY LIVING?: NO

## 2019-12-17 SDOH — ECONOMIC STABILITY: TRANSPORTATION INSECURITY
IN THE PAST 12 MONTHS, HAS THE LACK OF TRANSPORTATION KEPT YOU FROM MEDICAL APPOINTMENTS OR FROM GETTING MEDICATIONS?: YES

## 2019-12-17 ASSESSMENT — LIFESTYLE VARIABLES
EVER_SMOKED: NEVER
ALCOHOL_USE: NO

## 2019-12-17 ASSESSMENT — PATIENT HEALTH QUESTIONNAIRE - PHQ9
SUM OF ALL RESPONSES TO PHQ9 QUESTIONS 1 AND 2: 0
2. FEELING DOWN, DEPRESSED, IRRITABLE, OR HOPELESS: NOT AT ALL
1. LITTLE INTEREST OR PLEASURE IN DOING THINGS: NOT AT ALL

## 2019-12-18 ENCOUNTER — TELEPHONE (OUTPATIENT)
Dept: OBGYN | Facility: CLINIC | Age: 34
End: 2019-12-18

## 2019-12-18 LAB
ERYTHROCYTE [DISTWIDTH] IN BLOOD BY AUTOMATED COUNT: 44.8 FL (ref 35.9–50)
HCT VFR BLD AUTO: 32.8 % (ref 37–47)
HGB BLD-MCNC: 10.3 G/DL (ref 12–16)
MCH RBC QN AUTO: 26 PG (ref 27–33)
MCHC RBC AUTO-ENTMCNC: 31.4 G/DL (ref 33.6–35)
MCV RBC AUTO: 82.8 FL (ref 81.4–97.8)
PLATELET # BLD AUTO: 210 K/UL (ref 164–446)
PMV BLD AUTO: 11.6 FL (ref 9–12.9)
RBC # BLD AUTO: 3.96 M/UL (ref 4.2–5.4)
WBC # BLD AUTO: 13.3 K/UL (ref 4.8–10.8)

## 2019-12-18 PROCEDURE — 700102 HCHG RX REV CODE 250 W/ 637 OVERRIDE(OP): Performed by: PHYSICIAN ASSISTANT

## 2019-12-18 PROCEDURE — 3E0P7VZ INTRODUCTION OF HORMONE INTO FEMALE REPRODUCTIVE, VIA NATURAL OR ARTIFICIAL OPENING: ICD-10-PCS | Performed by: OBSTETRICS & GYNECOLOGY

## 2019-12-18 PROCEDURE — 770002 HCHG ROOM/CARE - OB PRIVATE (112)

## 2019-12-18 PROCEDURE — 36415 COLL VENOUS BLD VENIPUNCTURE: CPT

## 2019-12-18 PROCEDURE — 10907ZC DRAINAGE OF AMNIOTIC FLUID, THERAPEUTIC FROM PRODUCTS OF CONCEPTION, VIA NATURAL OR ARTIFICIAL OPENING: ICD-10-PCS | Performed by: OBSTETRICS & GYNECOLOGY

## 2019-12-18 PROCEDURE — 303615 HCHG EPIDURAL/SPINAL ANESTHESIA FOR LABOR

## 2019-12-18 PROCEDURE — 700111 HCHG RX REV CODE 636 W/ 250 OVERRIDE (IP): Performed by: PHYSICIAN ASSISTANT

## 2019-12-18 PROCEDURE — 85027 COMPLETE CBC AUTOMATED: CPT

## 2019-12-18 PROCEDURE — 0HQ9XZZ REPAIR PERINEUM SKIN, EXTERNAL APPROACH: ICD-10-PCS | Performed by: OBSTETRICS & GYNECOLOGY

## 2019-12-18 PROCEDURE — 59400 OBSTETRICAL CARE: CPT | Performed by: NURSE PRACTITIONER

## 2019-12-18 PROCEDURE — 59409 OBSTETRICAL CARE: CPT

## 2019-12-18 PROCEDURE — 304965 HCHG RECOVERY SERVICES

## 2019-12-18 PROCEDURE — A9270 NON-COVERED ITEM OR SERVICE: HCPCS | Performed by: PHYSICIAN ASSISTANT

## 2019-12-18 PROCEDURE — 3E033VJ INTRODUCTION OF OTHER HORMONE INTO PERIPHERAL VEIN, PERCUTANEOUS APPROACH: ICD-10-PCS | Performed by: OBSTETRICS & GYNECOLOGY

## 2019-12-18 RX ORDER — ONDANSETRON 2 MG/ML
4 INJECTION INTRAMUSCULAR; INTRAVENOUS EVERY 6 HOURS PRN
Status: DISCONTINUED | OUTPATIENT
Start: 2019-12-18 | End: 2019-12-19 | Stop reason: HOSPADM

## 2019-12-18 RX ORDER — SODIUM CHLORIDE, SODIUM LACTATE, POTASSIUM CHLORIDE, CALCIUM CHLORIDE 600; 310; 30; 20 MG/100ML; MG/100ML; MG/100ML; MG/100ML
INJECTION, SOLUTION INTRAVENOUS PRN
Status: DISCONTINUED | OUTPATIENT
Start: 2019-12-18 | End: 2019-12-19 | Stop reason: HOSPADM

## 2019-12-18 RX ORDER — VITAMIN A ACETATE, BETA CAROTENE, ASCORBIC ACID, CHOLECALCIFEROL, .ALPHA.-TOCOPHEROL ACETATE, DL-, THIAMINE MONONITRATE, RIBOFLAVIN, NIACINAMIDE, PYRIDOXINE HYDROCHLORIDE, FOLIC ACID, CYANOCOBALAMIN, CALCIUM CARBONATE, FERROUS FUMARATE, ZINC OXIDE, CUPRIC OXIDE 3080; 12; 120; 400; 1; 1.84; 3; 20; 22; 920; 25; 200; 27; 10; 2 [IU]/1; UG/1; MG/1; [IU]/1; MG/1; MG/1; MG/1; MG/1; MG/1; [IU]/1; MG/1; MG/1; MG/1; MG/1; MG/1
1 TABLET, FILM COATED ORAL EVERY MORNING
Status: DISCONTINUED | OUTPATIENT
Start: 2019-12-19 | End: 2019-12-19 | Stop reason: HOSPADM

## 2019-12-18 RX ORDER — OXYCODONE AND ACETAMINOPHEN 10; 325 MG/1; MG/1
1 TABLET ORAL EVERY 4 HOURS PRN
Status: DISCONTINUED | OUTPATIENT
Start: 2019-12-18 | End: 2019-12-19 | Stop reason: HOSPADM

## 2019-12-18 RX ORDER — SODIUM CHLORIDE, SODIUM LACTATE, POTASSIUM CHLORIDE, CALCIUM CHLORIDE 600; 310; 30; 20 MG/100ML; MG/100ML; MG/100ML; MG/100ML
INJECTION, SOLUTION INTRAVENOUS CONTINUOUS
Status: ACTIVE | OUTPATIENT
Start: 2019-12-18 | End: 2019-12-18

## 2019-12-18 RX ORDER — METHYLERGONOVINE MALEATE 0.2 MG/ML
0.2 INJECTION INTRAVENOUS
Status: DISCONTINUED | OUTPATIENT
Start: 2019-12-18 | End: 2019-12-19 | Stop reason: HOSPADM

## 2019-12-18 RX ORDER — BISACODYL 10 MG
10 SUPPOSITORY, RECTAL RECTAL PRN
Status: DISCONTINUED | OUTPATIENT
Start: 2019-12-18 | End: 2019-12-19 | Stop reason: HOSPADM

## 2019-12-18 RX ORDER — MISOPROSTOL 200 UG/1
600 TABLET ORAL
Status: DISCONTINUED | OUTPATIENT
Start: 2019-12-18 | End: 2019-12-19 | Stop reason: HOSPADM

## 2019-12-18 RX ORDER — ONDANSETRON 4 MG/1
4 TABLET, ORALLY DISINTEGRATING ORAL EVERY 6 HOURS PRN
Status: DISCONTINUED | OUTPATIENT
Start: 2019-12-18 | End: 2019-12-19 | Stop reason: HOSPADM

## 2019-12-18 RX ORDER — IBUPROFEN 600 MG/1
600 TABLET ORAL EVERY 6 HOURS PRN
Status: DISCONTINUED | OUTPATIENT
Start: 2019-12-18 | End: 2019-12-19 | Stop reason: HOSPADM

## 2019-12-18 RX ORDER — CARBOPROST TROMETHAMINE 250 UG/ML
250 INJECTION, SOLUTION INTRAMUSCULAR
Status: DISCONTINUED | OUTPATIENT
Start: 2019-12-18 | End: 2019-12-19 | Stop reason: HOSPADM

## 2019-12-18 RX ORDER — DOCUSATE SODIUM 100 MG/1
100 CAPSULE, LIQUID FILLED ORAL 2 TIMES DAILY PRN
Status: DISCONTINUED | OUTPATIENT
Start: 2019-12-18 | End: 2019-12-19 | Stop reason: HOSPADM

## 2019-12-18 RX ORDER — OXYCODONE HYDROCHLORIDE AND ACETAMINOPHEN 5; 325 MG/1; MG/1
1 TABLET ORAL EVERY 4 HOURS PRN
Status: DISCONTINUED | OUTPATIENT
Start: 2019-12-18 | End: 2019-12-19 | Stop reason: HOSPADM

## 2019-12-18 RX ADMIN — OXYTOCIN 125 ML/HR: 10 INJECTION, SOLUTION INTRAMUSCULAR; INTRAVENOUS at 06:34

## 2019-12-18 RX ADMIN — IBUPROFEN 600 MG: 600 TABLET ORAL at 17:26

## 2019-12-18 RX ADMIN — IBUPROFEN 600 MG: 600 TABLET ORAL at 10:25

## 2019-12-18 RX ADMIN — OXYTOCIN 2000 ML/HR: 10 INJECTION, SOLUTION INTRAMUSCULAR; INTRAVENOUS at 05:17

## 2019-12-18 ASSESSMENT — EDINBURGH POSTNATAL DEPRESSION SCALE (EPDS)
I HAVE FELT SCARED OR PANICKY FOR NO GOOD REASON: NO, NOT MUCH
THINGS HAVE BEEN GETTING ON TOP OF ME: NO, MOST OF THE TIME I HAVE COPED QUITE WELL
I HAVE BEEN SO UNHAPPY THAT I HAVE BEEN CRYING: ONLY OCCASIONALLY
I HAVE BEEN ANXIOUS OR WORRIED FOR NO GOOD REASON: HARDLY EVER
I HAVE BLAMED MYSELF UNNECESSARILY WHEN THINGS WENT WRONG: YES, SOME OF THE TIME
I HAVE BEEN ABLE TO LAUGH AND SEE THE FUNNY SIDE OF THINGS: AS MUCH AS I ALWAYS COULD
I HAVE FELT SAD OR MISERABLE: NOT VERY OFTEN
I HAVE LOOKED FORWARD WITH ENJOYMENT TO THINGS: AS MUCH AS I EVER DID
I HAVE BEEN SO UNHAPPY THAT I HAVE HAD DIFFICULTY SLEEPING: NOT AT ALL
THE THOUGHT OF HARMING MYSELF HAS OCCURRED TO ME: NEVER

## 2019-12-18 ASSESSMENT — PAIN SCALES - GENERAL: PAIN_LEVEL: 6

## 2019-12-18 ASSESSMENT — PATIENT HEALTH QUESTIONNAIRE - PHQ9
SUM OF ALL RESPONSES TO PHQ9 QUESTIONS 1 AND 2: 0
1. LITTLE INTEREST OR PLEASURE IN DOING THINGS: NOT AT ALL
2. FEELING DOWN, DEPRESSED, IRRITABLE, OR HOPELESS: NOT AT ALL

## 2019-12-18 NOTE — PROGRESS NOTES
Pt arrived via wheelchair with belongings to room S326. Report received from Kelly Brewer RN. Pt oriented to room, call light & infant security. Assessment done. Fundus firm, lochia light. Vital signs stable.  IV infusing 125 of pitocin.  Pt states pain is tolerable, see MAR.  Pt aware of dangers related to sleeping with infant, reviewed plan of care with pt & encouraged to call with needs or prior to ambulating. Call light in place. Will continue to monitor.

## 2019-12-18 NOTE — TELEPHONE ENCOUNTER
Sylvie from [facility]  nurse called requesting HIV results, notified Sylvie results are under media-records from [place].

## 2019-12-18 NOTE — PROGRESS NOTES
Blood sugar checked via accu check per mom request that baby is acting lethargic.  DS- 71 mom expresses feeling relieved.

## 2019-12-18 NOTE — ANESTHESIA QCDR
2019 [facility] Clinical Data Registry (for Quality Improvement)     Postoperative nausea/vomiting risk protocol (Adult = 18 yrs and Pediatric 3-17 yrs)- (430 and 463)  General inhalation anesthetic (NOT TIVA) with PONV risk factors: No  Provision of anti-emetic therapy with at least 2 different classes of agents: N/A  Patient DID NOT receive anti-emetic therapy and reason is documented in Medical Record: N/A    Multimodal Pain Management- (AQI59)  Patient undergoing Elective Surgery (i.e. Outpatient, or ASC, or Prescheduled Surgery prior to Hospital Admission): No  Use of Multimodal Pain Management, two or more drugs and/or interventions, NOT including systemic opioids: N/A  Exception: Documented allergy to multiple classes of analgesics: N/A    PACU assessment of acute postoperative pain prior to Anesthesia Care End- Applies to Patients Age = 18- (ABG7)  Initial PACU pain score is which of the following: < 7/10  Patient unable to report pain score: N/A    Post-anesthetic transfer of care checklist/protocol to PACU/ICU- (426 and 427)  Upon conclusion of case, patient transferred to which of the following locations: PACU/Non-ICU  Use of transfer checklist/protocol: Yes  Exclusion: Service Performed in Patient Hospital Room (and thus did not require transfer): N/A    PACU Reintubation- (AQI31)  General anesthesia requiring endotracheal intubation (ETT) along with subsequent extubation in OR or PACU: No  Required reintubation in the PACU: N/A  Extubation was a planned trial documented in the medical record prior to removal of the original airway device: N/A    Unplanned admission to ICU related to anesthesia service up through end of PACU care- (MD51)  Unplanned admission to ICU (not initially anticipated at anesthesia start time): No

## 2019-12-18 NOTE — PROGRESS NOTES
0700- Report received from EARLE Pérez. Pt resting comfortably in bed with Baby at warmer. Pt denies any significant pain at this time. Fundus if firm with light lochia rubra. Pitocin running in at 125 ml/hr. VSS. POC discussed and assumed. Pt legs feel pretty good. Informed pt that we will try to transfer after 0730, pt agrees with plan.   0740- Epidural cath d/c'd with tip intact and band-aid applied. Pt assisted up to BR and voided successfully. New pad and underwear applied.   0755- Pt transferred to post partum unit with infant in arms and FOB at side.   0805- Report given to EARLE Seals.

## 2019-12-18 NOTE — H&P
History and Physical    Alie Lowe is a 34 y.o. female  -Para:     Gestational Age:  40w2d  Admitted for:   Induction of labor  Admitted to  [facility].  [facility]    HPI: Patient is admitted with the above mentioned Chief Complaint and States   Loss of fluid:   negative  Abdominal Pain:  negative  Uterine Contractions:  negative  Vaginal Bleeding:  negative  Fetal Movement:  normal  Patient denies fever, chills, nausea, vomiting , headache, visual disturbance, or dysuria  Patient's last menstrual period was 03/10/2019.  Estimated Date of Delivery: 12/15/19  Final TAMMIE: 12/15/2019, by Ultrasound    Patient Active Problem List    Diagnosis Date Noted   • History of macrosomia in infant in prior pregnancy, currently pregnant 09/10/2019     Priority: High   • Two vessel cord 2019   • Supervision of other normal pregnancy, antepartum 09/10/2019   • Panic attack 2017       Admitting DX: Pregnancy  Labor and delivery indication for care or intervention   Pregnancy Complications:  Two vessel cord  OB Risk Factors:   none  Labor State:    Not in labor.    History:   has a past medical history of Heart murmur. She also has no past medical history of Blood transfusion without reported diagnosis.     has a past surgical history that includes other (Left, 2015).    OB History    Para Term  AB Living   5 2 2   2 2   SAB TAB Ectopic Molar Multiple Live Births   2         2      # Outcome Date GA Lbr Daniel/2nd Weight Sex Delivery Anes PTL Lv   5 Current            4 Term 11 41w0d  3.685 kg (8 lb 2 oz) M Vag-Spont  N SURY   3 Term 10/30/06 40w0d  4.252 kg (9 lb 6 oz) M Vag-Spont  N SURY   2 SAB            1 SAB                Medications:  No current facility-administered medications on file prior to encounter.      Current Outpatient Medications on File Prior to Encounter   Medication Sig Dispense Refill   • Prenatal  "MV-Min-Fe Fum-FA-DHA (PRENATAL 1 PO) Take  by mouth.     • ondansetron (ZOFRAN) 4 MG Tab tablet Take 1 Tab by mouth every four hours as needed for Nausea/Vomiting. 20 Tab 0   • acetaminophen (TYLENOL) 500 MG Tab Take 1-2 Tabs by mouth every 6 hours as needed for Moderate Pain. 20 Tab 0       Allergies:  Penicillins    ROS:   Neuro: negative    Cardiovascular: negative  Gastro intestinal: negative  Genitourinary: negative            Physical Exam:  /67   Pulse 86   Temp 36.8 °C (98.3 °F) (Temporal)   Resp 16   Ht 1.676 m (5' 6\")   Wt 103.4 kg (228 lb)   LMP 03/10/2019   BMI 36.80 kg/m²   Constitutional: healthy-appearing, Well-developed, well-nourished  No JVD: while supine  HEENT: PERRLA  Breast Exam: negative  Cardio: regular rate and rhythm  Lung: unlabored respirations, no intercostal retractions or accessory muscle use  Abdomen: abdomen is soft without significant tenderness, masses, organomegaly or guarding  Extremity: extremities, peripheral pulses and reflexes normal    Cervical Exam: 0%  Cervix Dilatation: 1  Station: negative 4  Pelvis: Normal  Fetal Assessment: Fetal heart variability: moderate  Fetal Heart Rate decelerations: none  Fetal Heart Rate accelerations: yes  Baseline FHR: 140 per minute  Uterine contractions: none  Estimated Fetal Weight: 3500 - 4000g      Labs:      Prenatal Results     General (Most Recent Result)     Test Value Date Time    ABO A  06/12/19     Rh pos  06/12/19     Antibody screen       HbA1c       Gonorrhea Negative  07/06/17 1156    Chlamydia  by PCR Negative  07/06/17 1156    Chlamydia by DNA       RPR/Syphilus Non Reactive  10/29/19 1044    HSV 1/2 by PCR (non-serum)       HSV 1/2 (serum)       HSV 1       HSV 2       HPV (16) Negative  07/06/17 1156    HPV (18) Negative  07/06/17 1156    HPV (other) Negative  07/06/17 1156    HBsAg Not reactive  06/12/19     HIV-1 HIV-2 Antibodies not reactive  06/12/19     Rubella immune  06/12/19     Tb             Pap " Smear (Most Recent Result)     Test Value Date Time    Pap smear       Pap smear w/HPV       Pap smear w/CTNG       Pap smar w/HPV CTNG  (See Report)   07/06/17 1156    Pap smear (reflex HPV ACUS)       Pap smear (reflex HPV ASCUS w/CTNG)       Pathology gyn specimen  (See Report)   07/06/17 1156          Urinalysis (Most Recent Result)     Test Value Date Time    Urinalysis       Urinalysis (culture if indicated)  (See Report)   07/15/19 2225    POC urinalysis  (See Report)   09/10/19 1435    Urine drug screen       Urine drug screen (w/o conf)       Urine culture ([id number])       Urine culture ([id number])             1st Trimester     Test Value Date Time    Hgb       Hct       Fasting Glucose Tolerance       GTT, 1 hour       GTT, 2 hours       GTT, 3 hours             2nd Trimester     Test Value Date Time    Hgb 11.8 g/dL 07/15/19 2225    Hct 36.6 % 07/15/19 2225    Urinalysis       Urine Culture       AST 13 U/L 07/15/19 2225    ALT 19 U/L 07/15/19 2225    Uric Acid       Fasting Glucose Tolerance       GTT, 1 hour       GTT, 2 hours       GTT, 3 hours       Urine Protein/Creatinine Ratio             3rd Trimester     Test Value Date Time    Hgb 11.5 g/dL 10/29/19 1044    Hct 37.1 % 10/29/19 1044    Platelet count       GBS (RAMSEY BROTH) Negative  11/27/19 1358    GBS (Direct) Negative  11/27/19 1358    Urinalysis       Urine Culture       Urine Drug Screen       Urine Protein/Creatinine Ratio             Congenital Disease Screening     Test Value Date Time    First Trimester Screen       Quad Screen       Sickle Cell       Thalassemia       [facility]       Cystic Fibrosis Carrier Study                     Assessment:  Gestational Age:  40w2d  Labor State:   Labor, Active  Risk Factors:   Two vessel cord  Pregnancy Complications: none    Patient Active Problem List    Diagnosis Date Noted   • History of macrosomia in infant in prior pregnancy, currently pregnant 09/10/2019     Priority: High   • Two vessel  cord 2019   • Supervision of other normal pregnancy, antepartum 09/10/2019   • Panic attack 2017       Plan:   Admitted for: Induction of Labor, GBS neg, pain control, US to confirm vtx position, start with Cytotec or PGE Gel for induction, anticipate .      JAZZMINE Luna.

## 2019-12-18 NOTE — CARE PLAN
"  Problem: Potential for postpartum infection related to presence of episiotomy/vaginal tear and/or uterine contamination  Goal: Patient will be absent from signs and symptoms of infection  Outcome: PROGRESSING AS EXPECTED  Note:   Patient shows no s/s of infection.  Will continue to monitor with hourly rounding.      Problem: Potential anxiety related to difficulty adapting to parental role  Goal: Patient will verbalize and demonstrate effective bonding and parenting behavior  Outcome: PROGRESSING AS EXPECTED  Note:   Patient is bonding well with baby, recognizing cues, and responding appropriately. Discussed risks of baby catching flu and gave recommendations to wear mask, new gown, and wash hands before holding baby.  Patient has chosen not to use any precautions.  FOB encouraged to go home and rest, but he has states that he thinks he \"is fine.\"     "

## 2019-12-18 NOTE — ANESTHESIA PREPROCEDURE EVALUATION
Relevant Problems   NEURO   (+) History of macrosomia in infant in prior pregnancy, currently pregnant      CARDIAC   (+) Two vessel cord      @ 40w2d, IUP, [place]  Hx Macrosomia      Physical Exam    Airway   Mallampati: II  TM distance: >3 FB  Neck ROM: full       Cardiovascular - normal exam  Rhythm: regular  Rate: normal  (-) murmur     Dental - normal exam         Pulmonary - normal exam  Breath sounds clear to auscultation     Abdominal    Neurological - normal exam                 Anesthesia Plan    ASA 2       Plan - epidural   Neuraxial block will be labor analgesia              Pertinent diagnostic labs and testing reviewed    Informed Consent:    Anesthetic plan and risks discussed with patient.

## 2019-12-18 NOTE — ANESTHESIA PROCEDURE NOTES
Epidural Block  Date/Time: 12/17/2019 11:26 PM  Performed by: Carlin Stafford D.O.  Authorized by: Carlin Stafford D.O.     Patient Location:  OB  Start Time:  12/17/2019 11:26 PM  End Time:  12/17/2019 11:30 PM  Reason for Block: labor analgesia    patient identified, IV checked, site marked, risks and benefits discussed, surgical consent, monitors and equipment checked, pre-op evaluation and timeout performed    Patient Position:  Sitting  Prep: ChloraPrep, patient draped and sterile technique    Monitoring:  Blood pressure, continuous pulse oximetry and heart rate  Approach:  Midline  Location:  L3-L4  Injection Technique:  ALBERTO air  Skin infiltration:  Lidocaine  Strength:  1%  Dose:  3ml  Needle Type:  Tuohy  Needle Gauge:  17 G  Needle Length:  3.5 in  Loss of resistance::  6  Catheter Size:  19 G  Catheter at Skin Depth:  10  Test Dose:  Lidocaine 1.5% with epinephrine 1-to-200,000  Test Dose Result:  Negative

## 2019-12-18 NOTE — LACTATION NOTE
This note was copied from a baby's chart.  Progression to breastfeeding discussed with mother. Outlined the supportive measures that should be in place for now, to include skin to skin and other basic strategies, hand expression and intrinsic factors (smell, touch, sight and visualization). Encouraged parents to wake baby every few hours and stimulate him to provide opportunities to learn, explore and practice techniques.

## 2019-12-18 NOTE — PROGRESS NOTES
S: Pt is comfortable with epidural.      O:    Vitals:    12/18/19 0100 12/18/19 0106 12/18/19 0126 12/18/19 0147   BP:  101/53 (!) 95/53 (!) 93/55   Pulse:  63 66 61   Resp:       Temp: 36.4 °C (97.5 °F)      TempSrc: Temporal      SpO2:       Weight:       Height:               FHTs:  Baseline 130, + accels, - decels, moderate variability        Cheriton: Contractions q 2 minutes, firm to palpation, pitocin @ 2 milliunits        SVE: 6-7/80/-1     A/P:  1.  IUP @ 40w3d            2.  Cat 1 FHTs             3.  AROM-meconium             4.  Will reassess as indicatede    Jelly Osorio CNM, APRN

## 2019-12-18 NOTE — PROGRESS NOTES
"S: Pt is begginning to feel CTX. Discussed labor management options, pt agreeable to Cook catheter and low dose pitocin.      O:    Vitals:    12/17/19 1624 12/17/19 1626 12/17/19 1629 12/17/19 1804   BP: 133/67 133/67 133/67 123/66   Pulse: 86 86 86 65   Resp:  16 16    Temp:  36.8 °C (98.3 °F) 36.8 °C (98.3 °F)    TempSrc:  Temporal Temporal    Weight:  103.4 kg (228 lb)     Height:  1.676 m (5' 6\")             FHTs:  Baseline 135, + accels, - decels, moderate variability        [place]: Contractions q 3-5 minutes, firm to palpation        SVE: 2/50/-2     A/P:  1.  IUP @ 40w2d            2.  Cat 1 FHTs             3.  Cook catheter placed             4.  Start low dose pitocin     Jelly Osorio CNM, APRN    "

## 2019-12-18 NOTE — PROGRESS NOTES
Patient comes in for IOL at 40weeks 2 days.  Admission done, labs sent, IV started.  SVE is 1/thick/high.  T Crispin notified.  Cytotec placed at 1759.  Report given to Elisa GO.

## 2019-12-18 NOTE — PROCEDURES
SPONTANEOUS VAGINAL DELIVERY PROCEDURE NOTE    PATIENT ID:  NAME:  Alie Lowe  MRN:               [id number]  YOB: 1985    Labor Course: Patient was admitted to Labor and Delivery at 40w3d for induction of labor due to single umbilical artery .    Labor course: admission exam: /-4. Induction begun with Cytotec x1 dose. Labor progressed to 2 cm at 2210, Cook catheter placed and low dose pitocin begun. Pt recd epidural for pain management. Labor progressed normally with balloon coming out on its own at 0050. SVE: 4-5//-2. Low dose pitocin continued. AROM 0247 with mod meconium noted. Labor progressed to complete at 0503. Pushing effectively.    On 2019  at 05:14, this 34 y.o., now  40w3d , GBS negative female delivered via  under epidural  anesthesia a viable male infant weight pending skin to skin transition with APGAR scores of 8 and 9 at one and five minutes. The infant head delivered in JAQUELIN with rotation to LOT. There was no nuchal cord and anterior shoulder delivered with slight downward traction. The vigorous infant was placed on mothers abdomen and bulb suctioned. Delayed cord clamping occurred. Afterward the cord was doubly clamped, cut and infant repositioned on mothers chest. An intact placenta was delivered spontaneously at 05:14 with 3 vessel cord. Upon vaginal exam, there was a small 1st degree laceration which was repaired using 3.0 Chromic in the usual sterile fashion. Estimated blood loss was 300cc. Patient will be transferred to postpartum in stable condition and infant to  nursery.      Delivery attended by Jelly Osorio, JASMINA, APRN. Dr Betancur attending physician on duty and available.

## 2019-12-18 NOTE — ANESTHESIA TIME REPORT
Anesthesia Start and Stop Event Times     Date Time Event    12/17/2019 2322 Ready for Procedure     2324 Anesthesia Start    12/18/2019 0511 Anesthesia Stop        Responsible Staff  12/17/19 to 12/18/19    Name Role Begin End    Carlin Stafford D.O. Anesth 2324 0511        Preop Diagnosis (Free Text):  Pre-op Diagnosis             Preop Diagnosis (Codes):    Post op Diagnosis  Normal delivery      Premium Reason  A. 3PM - 7AM    Comments:

## 2019-12-18 NOTE — PROGRESS NOTES
1900-report received from FAUSTINO Arreguin RN. Pt eating dinner; starting to feel UCs. FOB at bedside.  No needs at this time. SHALA Osorio CNM at bedside to assess pt. Will recheck cervix at 2200.   2200-Cervical ripening balloon placed by CNM. SVE 2/50/-2. Order received to start Pitocin.   2329-epidural placed by Dr. Stafford.   0050-Balloon fell out, SVE=4-5/60/-2. SHALA Osorio notified. Order received to restart Pitocin.   0247-AROM, mod meconium. SVE 6-7/80/-1  0503-pt feeling pressure with UCs. SVE=complete.  0511-del of viable male, APGARs 8/9.   0700-bleeding has been wnl since delivery. Report given to Kelly OG

## 2019-12-19 VITALS
BODY MASS INDEX: 36.64 KG/M2 | OXYGEN SATURATION: 93 % | SYSTOLIC BLOOD PRESSURE: 111 MMHG | RESPIRATION RATE: 18 BRPM | WEIGHT: 228 LBS | HEART RATE: 64 BPM | TEMPERATURE: 97.9 F | DIASTOLIC BLOOD PRESSURE: 65 MMHG | HEIGHT: 66 IN

## 2019-12-19 PROBLEM — O09.299 HISTORY OF MACROSOMIA IN INFANT IN PRIOR PREGNANCY, CURRENTLY PREGNANT: Status: RESOLVED | Noted: 2019-09-10 | Resolved: 2019-12-19

## 2019-12-19 PROBLEM — Q27.0 TWO VESSEL CORD: Status: RESOLVED | Noted: 2019-11-27 | Resolved: 2019-12-19

## 2019-12-19 PROBLEM — Z34.80 SUPERVISION OF OTHER NORMAL PREGNANCY, ANTEPARTUM: Status: RESOLVED | Noted: 2019-09-10 | Resolved: 2019-12-19

## 2019-12-19 RX ORDER — IBUPROFEN 800 MG/1
800 TABLET ORAL EVERY 6 HOURS PRN
Qty: 30 TAB | Refills: 0 | Status: SHIPPED | OUTPATIENT
Start: 2019-12-19 | End: 2021-12-21

## 2019-12-19 ASSESSMENT — EDINBURGH POSTNATAL DEPRESSION SCALE (EPDS)
I HAVE BEEN SO UNHAPPY THAT I HAVE HAD DIFFICULTY SLEEPING: NOT AT ALL
I HAVE LOOKED FORWARD WITH ENJOYMENT TO THINGS: AS MUCH AS I EVER DID
I HAVE BEEN ABLE TO LAUGH AND SEE THE FUNNY SIDE OF THINGS: AS MUCH AS I ALWAYS COULD
I HAVE BEEN ANXIOUS OR WORRIED FOR NO GOOD REASON: NO, NOT AT ALL
I HAVE FELT SAD OR MISERABLE: NO, NOT AT ALL
THINGS HAVE BEEN GETTING ON TOP OF ME: NO, I HAVE BEEN COPING AS WELL AS EVER
I HAVE FELT SCARED OR PANICKY FOR NO GOOD REASON: NO, NOT AT ALL
I HAVE BLAMED MYSELF UNNECESSARILY WHEN THINGS WENT WRONG: NO, NEVER
THE THOUGHT OF HARMING MYSELF HAS OCCURRED TO ME: NEVER
I HAVE BEEN SO UNHAPPY THAT I HAVE BEEN CRYING: NO, NEVER

## 2019-12-19 NOTE — PROGRESS NOTES
"0700- Report received from EARLE Aleman. POC discussed and assumed.   0900- Assessment completed. VSS. Pt denies any significant pain at this time. No needs at this time.   1350- RN in room to discuss discharge and pt stated she \"forgot\" about having him circumcised. Pt would like to have him circumcised prior to discharge if possible. Nursery EARLE Schultz made aware and she will reach out to Dr Price and get back to us with plan.   1500- Dr Price states pt and baby can be discharged and baby can be circumcised outpatient. Pt made aware of plan and agrees.   1525- EARLE Reynolds at bedside to review discharge paper work.   "

## 2019-12-19 NOTE — DISCHARGE SUMMARY
Discharge Summary:      Alie Lowe    Admit Date:   2019  Discharge Date:  2019     Admitting diagnosis:  Pregnancy  Labor and delivery indication for care or intervention  Discharge Diagnosis: Status post vaginal, spontaneous.  Pregnancy Complications: abnormal fetal US  Tubal Ligation:  N\A        History:  Past Medical History:   Diagnosis Date   • Heart murmur      OB History    Para Term  AB Living   5 3 3   2 3   SAB TAB Ectopic Molar Multiple Live Births   2       0 3      # Outcome Date GA Lbr Daniel/2nd Weight Sex Delivery Anes PTL Lv   5 Term 19 40w3d / 00:08 3.505 kg (7 lb 11.6 oz) M Vag-Spont EPI N SURY   4 Term 11 41w0d  3.685 kg (8 lb 2 oz) M Vag-Spont  N SURY   3 Term 10/30/06 40w0d  4.252 kg (9 lb 6 oz) M Vag-Spont  N SURY   2 SAB            1 SAB                 Penicillins  Patient Active Problem List    Diagnosis Date Noted   • Postpartum care following vaginal delivery 2019   •  (normal spontaneous vaginal delivery) 2019   • Panic attack 2017        Hospital Course:   34 y.o. , now para 3, was admitted with the above mentioned diagnosis, underwent Induction of Labor, vaginal, spontaneous. Patient postpartum course was unremarkable, with progressive advancement in diet , ambulation and toleration of oral analgesia. Patient without complaints today and desires discharge.      Vitals:    19 1000 19 1400 19 1800 19 0600   BP: 114/64 120/80 111/54 111/65   Pulse: 61 67 69 64   Resp: 17    Temp: 37.2 °C (98.9 °F) 36.9 °C (98.5 °F) 36.8 °C (98.2 °F) 36.6 °C (97.9 °F)   TempSrc: Temporal Temporal Temporal Temporal   SpO2: 94% 93% 95% 93%   Weight:       Height:           Current Facility-Administered Medications   Medication Dose   • ondansetron (ZOFRAN ODT) dispertab 4 mg  4 mg    Or   • ondansetron (ZOFRAN) syringe/vial injection 4 mg  4 mg   • oxytocin (PITOCIN) infusion (for postpartum)    mL/hr   • ibuprofen (MOTRIN) tablet 600 mg  600 mg   • oxyCODONE-acetaminophen (PERCOCET) 5-325 MG per tablet 1 Tab  1 Tab   • oxyCODONE-acetaminophen (PERCOCET-10)  MG per tablet 1 Tab  1 Tab   • LR infusion     • PRN oxytocin (PITOCIN) (20 Units/1000 mL) PRN for excessive uterine bleeding - See Admin Instr  125-999 mL/hr   • miSOPROStol (CYTOTEC) tablet 600 mcg  600 mcg   • methylergonovine (METHERGINE) injection 0.2 mg  0.2 mg   • carboPROST (HEMABATE) injection 250 mcg  250 mcg   • docusate sodium (COLACE) capsule 100 mg  100 mg   • bisacodyl (DULCOLAX) suppository 10 mg  10 mg   • magnesium hydroxide (MILK OF MAGNESIA) suspension 30 mL  30 mL   • prenatal plus vitamin (STUARTNATAL 1+1) 27-1 MG tablet 1 Tab  1 Tab   • D5LR infusion     • oxytocin (PITOCIN) 20 UNITS/1000ML LR (induction of labor)  0.5-20 neida-units/min       Exam:  Breast Exam: negative  Abdomen: Abdomen soft, non-tender. BS normal. No masses,  No organomegaly  Fundus Non Tender: yes  Incision: none  Perineum: perineal incision healing appropriately  Extremity: extremities, peripheral pulses and reflexes normal, no edema, redness or tenderness in the calves or thighs, Homans sign is negative, no sign of DVT, feet normal, good pulses, normal color, temperature and sensation     Labs:  Recent Labs     12/17/19  1703 12/18/19  1343   WBC 10.0 13.3*   RBC 4.24 3.96*   HEMOGLOBIN 11.1* 10.3*   HEMATOCRIT 35.5* 32.8*   MCV 83.7 82.8   MCH 26.2* 26.0*   MCHC 31.3* 31.4*   RDW 45.9 44.8   PLATELETCT 219 210   MPV 11.3 11.6        Activity:   Discharge to home  Pelvic Rest x 6 weeks    Assessment:  normal postpartum course  Discharge Assessment: Taking adequate diet and fluids, no heavy bleeding or foul discharge. Voiding without difficulty     [facility] for vaginal      Discharge Meds:   Current Outpatient Medications   Medication Sig Dispense Refill   • ibuprofen (MOTRIN) 800 MG Tab Take 1 Tab by  mouth every 6 hours as needed (For cramping after delivery; do not give if patient is receiving ketorolac (Toradol)). 30 Tab 0     Pt need to RT TPC or ER if any of the following occur:  Fever over 100,5  Severe abd pain  Red streaks or painful masses in the breasts  Foul smelling d/c or lochia  Heavy vaginal bleeding saturating a pad per hour  S/s of PP depression  Unsure of desired BCM.    CHARLES SeniorNBRET.

## 2019-12-19 NOTE — PROGRESS NOTES
Took report from Mila OG. Assumed patient care. Patient on isolation droplet precautions due to flu.  Patient assessed VS stable. Pain reported as 0/10 on pain scale. Breasts soft. Fundus firm 1 below U, lochia light rubra. Legs show no signs of swelling, heat, redness, pain. All questions and concerns answered at this time. Bed rails up x 2. Call light in reach. Patient belongings in reach. Will continue to monitor.

## 2019-12-19 NOTE — PROGRESS NOTES
Discussed discharge instructions and follow up information for patient and infant discussed with patient. Patient verbalizes understanding.

## 2019-12-19 NOTE — CARE PLAN
Problem: Altered physiologic condition related to immediate post-delivery state and potential for bleeding/hemorrhage  Goal: Patient physiologically stable as evidenced by normal lochia, palpable uterine involution and vital signs within normal limits  Outcome: PROGRESSING AS EXPECTED  Note:   Patient VS stable and within defined limits. Fundus firm 1 below U, lochia light rubra at time of assessment.      Problem: Alteration in comfort related to episiotomy, vaginal repair and/or after birth pains  Goal: Patient is able to ambulate, care for self and infant  Outcome: PROGRESSING AS EXPECTED  Note:   Patient is able to ambulate around the room. Patient is able to take care of infant by feeding, diapering, and swaddling.

## 2019-12-20 NOTE — LACTATION NOTE
This note was copied from a baby's chart.  Mother prefers to supplement her breast fed infant with formula, has done this with past babies. She will wean supplements once her milk production increases. Education provided.Double electric breast pump kit provided and mother plans to rent a hospital grade pump for the next week.

## 2020-02-26 NOTE — PROGRESS NOTES
2-month-old infant being seen today.  Mother scored a 10 on the [place] depression scale.  Referral placed to [facility] for postpartum support.

## 2020-05-14 ENCOUNTER — OFFICE VISIT (OUTPATIENT)
Dept: URGENT CARE | Facility: CLINIC | Age: 35
End: 2020-05-14
Payer: MEDICAID

## 2020-05-14 VITALS
BODY MASS INDEX: 36 KG/M2 | DIASTOLIC BLOOD PRESSURE: 70 MMHG | HEART RATE: 89 BPM | SYSTOLIC BLOOD PRESSURE: 112 MMHG | WEIGHT: 224 LBS | RESPIRATION RATE: 14 BRPM | OXYGEN SATURATION: 98 % | HEIGHT: 66 IN | TEMPERATURE: 96.9 F

## 2020-05-14 DIAGNOSIS — M62.830 MUSCLE SPASM OF BACK: ICD-10-CM

## 2020-05-14 DIAGNOSIS — M54.6 ACUTE LEFT-SIDED THORACIC BACK PAIN: ICD-10-CM

## 2020-05-14 PROCEDURE — 99214 OFFICE O/P EST MOD 30 MIN: CPT | Performed by: NURSE PRACTITIONER

## 2020-05-14 RX ORDER — CYCLOBENZAPRINE HCL 5 MG
5-10 TABLET ORAL 3 TIMES DAILY PRN
Qty: 30 TAB | Refills: 0 | Status: SHIPPED | OUTPATIENT
Start: 2020-05-14 | End: 2021-12-21

## 2020-05-14 RX ORDER — KETOROLAC TROMETHAMINE 30 MG/ML
30 INJECTION, SOLUTION INTRAMUSCULAR; INTRAVENOUS ONCE
Status: COMPLETED | OUTPATIENT
Start: 2020-05-14 | End: 2020-05-14

## 2020-05-14 RX ADMIN — KETOROLAC TROMETHAMINE 30 MG: 30 INJECTION, SOLUTION INTRAMUSCULAR; INTRAVENOUS at 11:49

## 2020-05-14 ASSESSMENT — ENCOUNTER SYMPTOMS
VOMITING: 0
COUGH: 0
CHILLS: 0
SHORTNESS OF BREATH: 0
FEVER: 0
NAUSEA: 0
SORE THROAT: 0
BACK PAIN: 1
TINGLING: 1

## 2020-05-14 ASSESSMENT — FIBROSIS 4 INDEX: FIB4 SCORE: 0.5

## 2020-05-14 NOTE — PROGRESS NOTES
"Subjective:   Alie Lowe is a 35 y.o. female who presents for Other (Possible Rib cage infection,left side ribs, intense amount of pain but extreme hot to the touch, trying to breathe its very painful,  x  2 days )        HPI   Patient with new onset thoracic back pain and left sided posterior rib pain that started 2 days ago. Denies fall or recent trauma. States some heavy lifting. States symptoms are persistent and unchanged. Has taken Tylenol with mild relief. Complains of mild tingling to the left arm. Denies chest pain or SOB. Pain is moderate to severe and worsened with movement.    States that she \"throws her back out often, every 3 months.\"    Review of Systems   Constitutional: Negative for chills, fever and malaise/fatigue.   HENT: Negative for congestion and sore throat.    Respiratory: Negative for cough and shortness of breath.    Cardiovascular: Negative for chest pain.   Gastrointestinal: Negative for nausea and vomiting.   Musculoskeletal: Positive for back pain.   Skin: Negative for rash.   Neurological: Positive for tingling.     PMH:  has a past medical history of Heart murmur. She also has no past medical history of Blood transfusion without reported diagnosis.  ALLERGIES:   Allergies   Allergen Reactions   • Penicillins Unspecified     Previous hospitalization after penicillin - does not remember reaction    Cannot tolerate any Cillin       Patient's PMH, SocHx, SurgHx, FamHx, Drug allergies and medications reviewed.     Objective:   /70   Pulse 89   Temp 36.1 °C (96.9 °F) (Temporal)   Resp 14   Ht 1.676 m (5' 6\")   Wt 101.6 kg (224 lb)   SpO2 98%   BMI 36.15 kg/m²   Physical Exam  Vitals signs reviewed.   Constitutional:       General: She is not in acute distress.     Appearance: She is well-developed. She is not diaphoretic.   HENT:      Head: Normocephalic.      Right Ear: Hearing normal.      Left Ear: Hearing normal.      Nose: Nose normal.   Eyes:      General: " Lids are normal.      Conjunctiva/sclera: Conjunctivae normal.      Pupils: Pupils are equal, round, and reactive to light.   Neck:      Musculoskeletal: Normal range of motion.   Cardiovascular:      Rate and Rhythm: Normal rate and regular rhythm.      Heart sounds: Normal heart sounds.   Pulmonary:      Effort: Pulmonary effort is normal. No respiratory distress.      Breath sounds: Normal breath sounds. No decreased breath sounds or wheezing.   Musculoskeletal:      Thoracic back: She exhibits tenderness, pain and spasm. She exhibits no swelling.        Back:       Comments: Slightly diminished left hand  when compared to right. ROM left upper extremity decreased   Skin:     General: Skin is warm.      Findings: No bruising, erythema or rash.   Neurological:      Mental Status: She is alert.      Comments: Neurovascular and sensation intact - left hand/arm     Psychiatric:         Speech: Speech normal.         Behavior: Behavior normal.         Thought Content: Thought content normal.         Judgment: Judgment normal.           Assessment/Plan:   Assessment    1. Muscle spasm of back  cyclobenzaprine (FLEXERIL) 5 MG tablet    REFERRAL TO SPORTS MEDICINE   2. Acute left-sided thoracic back pain  ketorolac (TORADOL) injection 30 mg    REFERRAL TO SPORTS MEDICINE     Discussed no NSAIDs for next 24 hours following Toradol.  Discussed sedating effects of muscle relaxer and to not drive or operate heavy machinery while taking the medication or combine with any other sedating medications.  Apply heat to affected area, 10 minutes at a time, PRN pain  Referral to Sports Medicine for follow up.    Differential diagnosis, natural history, supportive care, and indications for immediate follow-up discussed.     **Please note that all invasive procedures during this visit were performed by myself and/or the Medical Assistant under the supervision of the PA or MD in office**

## 2020-05-27 ENCOUNTER — TELEPHONE (OUTPATIENT)
Dept: MEDICAL GROUP | Facility: CLINIC | Age: 35
End: 2020-05-27

## 2020-05-27 ENCOUNTER — HOSPITAL ENCOUNTER (OUTPATIENT)
Dept: RADIOLOGY | Facility: MEDICAL CENTER | Age: 35
End: 2020-05-27
Attending: PHYSICIAN ASSISTANT
Payer: MEDICAID

## 2020-05-27 DIAGNOSIS — Z12.31 VISIT FOR SCREENING MAMMOGRAM: ICD-10-CM

## 2020-05-27 PROCEDURE — 77067 SCR MAMMO BI INCL CAD: CPT

## 2020-05-27 NOTE — LETTER
May 27, 2020        Alie Santos Mynor  [address]  [facility] [address]          Dear Ms. Lowe,    Your mammogram did not show any significant findings, according to the radiologist’s interpretation.    Please remember that some cancers (about 10-15%) cannot be found by mammography alone, and that early detection requires a combination of monthly self-examination, yearly physical examination, and periodic mammography.    The American Cancer Society Guidelines recommend screening mammograms and physical breast examinations every year beginning at the age of 40.    Please continue regular breast self-examinations and report any changes that concern you, even before your next appointment.  If you have any further questions, please contact your doctor.        Sincerely,           Meron Montez P.A.-C.  Electronically Signed

## 2020-05-27 NOTE — TELEPHONE ENCOUNTER
The patient called Sports Medicine to make an appointment for a referral, but after looking into the chart, the patient has Medicaid. I told the patient that we are contracted with the insurance and discussed options with the patient. The patient would like to have the referral redirected to a provider who takes the insurance.  I redirected the referral back to the referral department to re-process it.   Nidhi

## 2020-06-08 ENCOUNTER — TELEMEDICINE (OUTPATIENT)
Dept: MEDICAL GROUP | Facility: CLINIC | Age: 35
End: 2020-06-08
Payer: MEDICAID

## 2020-06-08 DIAGNOSIS — K21.9 GASTROESOPHAGEAL REFLUX DISEASE WITHOUT ESOPHAGITIS: ICD-10-CM

## 2020-06-08 DIAGNOSIS — R63.5 WEIGHT GAIN: ICD-10-CM

## 2020-06-08 PROCEDURE — 99214 OFFICE O/P EST MOD 30 MIN: CPT | Performed by: PHYSICIAN ASSISTANT

## 2020-06-08 RX ORDER — PANTOPRAZOLE SODIUM 40 MG/1
40 TABLET, DELAYED RELEASE ORAL DAILY
Qty: 30 TAB | Refills: 3 | Status: SHIPPED | OUTPATIENT
Start: 2020-06-08 | End: 2021-12-21

## 2020-06-08 NOTE — PROGRESS NOTES
Telemedicine Visit: Established Patient     This encounter was conducted via Zoom .   Verbal consent was obtained. Patient's identity was verified.    Patient agrees to virtual telehealth visit.    Subjective:   CC: establish care  Alie Lowe is a 35 y.o. female presenting for evaluation and management of:    Patient states that she is in need of a general check up.  She states that after her 3rd child acid reflux stayed.  She has been taking something otc.  She states that it helps some, but it does not help as well as she would like.  She states that she will have some vomiting with acid reflux.     Patient delivered in December.  She is due to meet with OB for her pap follow up and possible ocps.  Patient states that she was on depo in the past.     Patient states that she is trying to see someone for her back.  She was actually given a 5 lb weight limit.  She was referred to [facility].    Patient states that she is having weight gain.  She denies hair loss.      ROS denies any other symptoms unless previously indicated.   Denies any recent fevers or chills. No nausea or vomiting. No chest pains or shortness of breath.     Allergies   Allergen Reactions   • Penicillins Unspecified     Previous hospitalization after penicillin - does not remember reaction    Cannot tolerate any Cillin       Current medicines (including changes today)  Current Outpatient Medications   Medication Sig Dispense Refill   • pantoprazole (PROTONIX) 40 MG Tablet Delayed Response Take 1 Tab by mouth every day. 30 Tab 3   • cyclobenzaprine (FLEXERIL) 5 MG tablet Take 1-2 Tabs by mouth 3 times a day as needed for Severe Pain or Muscle Spasms. 30 Tab 0   • ibuprofen (MOTRIN) 800 MG Tab Take 1 Tab by mouth every 6 hours as needed (For cramping after delivery; do not give if patient is receiving ketorolac (Toradol)). 30 Tab 0   • Prenatal MV-Min-Fe Fum-FA-DHA (PRENATAL 1 PO) Take  by mouth.     •  acetaminophen (TYLENOL) 500 MG Tab Take 1-2 Tabs by mouth every 6 hours as needed for Moderate Pain. 20 Tab 0     No current facility-administered medications for this visit.        Patient Active Problem List    Diagnosis Date Noted   • Gastroesophageal reflux disease without esophagitis 06/08/2020   • Weight gain 06/08/2020   • Panic attack 06/19/2017       Family History   Problem Relation Age of Onset   • Cancer Mother         uterine and skin   • Thyroid Mother         removed due to lump   • Diabetes Father    • Hypertension Father    • Cancer Sister         ovarian   • Alcohol abuse Brother        She  has a past medical history of Heart murmur. She also has no past medical history of Blood transfusion without reported diagnosis.  She  has a past surgical history that includes other (Left, 2015).       Objective:   There were no vitals taken for this visit.    Physical Exam:  Constitutional: Alert, no distress, well-groomed.  Skin: No rashes in visible areas.  Eye: Round. Conjunctiva clear, lids normal. No icterus.   ENMT: Lips pink without lesions, good dentition, moist mucous membranes. Phonation normal.  Neck: No masses, no thyromegaly. Moves freely without pain.  Respiratory: Unlabored respiratory effort, no cough or audible wheeze  Psych: Alert and oriented x3, normal affect and mood.       Assessment and Plan:   The following treatment plan was discussed:     1. Gastroesophageal reflux disease without esophagitis  - H. PYLORI BREATH TEST  - pantoprazole (PROTONIX) 40 MG Tablet Delayed Response; Take 1 Tab by mouth every day.  Dispense: 30 Tab; Refill: 3    Labs have been ordered to evaluate further.  Will start patient on Protonix.  However advised patient to hold off on medication until she can get labs drawn for more accurate results.    2. Weight gain  - Lipid Profile; Future  - Comp Metabolic Panel; Future  - TSH+FREE T4    Labs been ordered to evaluate further.  Will notify patient of results when  received.    As of note, I did recommend patient follow-up with OB for her Pap and if needed birth control.  However if they are fine with our office during the Pap smear and starting birth control, we are happy to do that here as well.    Follow-up: No follow-ups on file.

## 2020-07-23 ENCOUNTER — HOSPITAL ENCOUNTER (OUTPATIENT)
Dept: LAB | Facility: MEDICAL CENTER | Age: 35
End: 2020-07-23
Attending: PHYSICIAN ASSISTANT
Payer: MEDICAID

## 2020-07-23 DIAGNOSIS — E87.1 HYPONATREMIA: ICD-10-CM

## 2020-07-23 DIAGNOSIS — R63.5 WEIGHT GAIN: ICD-10-CM

## 2020-07-23 LAB
ALBUMIN SERPL BCP-MCNC: 3.8 G/DL (ref 3.2–4.9)
ALBUMIN/GLOB SERPL: 1.1 G/DL
ALP SERPL-CCNC: 64 U/L (ref 30–99)
ALT SERPL-CCNC: 13 U/L (ref 2–50)
ANION GAP SERPL CALC-SCNC: 7 MMOL/L (ref 7–16)
AST SERPL-CCNC: 13 U/L (ref 12–45)
BILIRUB SERPL-MCNC: 0.4 MG/DL (ref 0.1–1.5)
BUN SERPL-MCNC: 11 MG/DL (ref 8–22)
CALCIUM SERPL-MCNC: 8.9 MG/DL (ref 8.5–10.5)
CHLORIDE SERPL-SCNC: 101 MMOL/L (ref 96–112)
CHOLEST SERPL-MCNC: 167 MG/DL (ref 100–199)
CO2 SERPL-SCNC: 24 MMOL/L (ref 20–33)
CREAT SERPL-MCNC: 0.63 MG/DL (ref 0.5–1.4)
GLOBULIN SER CALC-MCNC: 3.6 G/DL (ref 1.9–3.5)
GLUCOSE SERPL-MCNC: 111 MG/DL (ref 65–99)
HDLC SERPL-MCNC: 41 MG/DL
LDLC SERPL CALC-MCNC: 94 MG/DL
POTASSIUM SERPL-SCNC: 4 MMOL/L (ref 3.6–5.5)
PROT SERPL-MCNC: 7.4 G/DL (ref 6–8.2)
SODIUM SERPL-SCNC: 132 MMOL/L (ref 135–145)
T4 FREE SERPL-MCNC: 1.13 NG/DL (ref 0.93–1.7)
TRIGL SERPL-MCNC: 160 MG/DL (ref 0–149)
TSH SERPL DL<=0.005 MIU/L-ACNC: 1.34 UIU/ML (ref 0.38–5.33)

## 2020-07-23 PROCEDURE — 36415 COLL VENOUS BLD VENIPUNCTURE: CPT

## 2020-07-23 PROCEDURE — 80053 COMPREHEN METABOLIC PANEL: CPT

## 2020-07-23 PROCEDURE — 80061 LIPID PANEL: CPT

## 2020-07-23 PROCEDURE — 84443 ASSAY THYROID STIM HORMONE: CPT

## 2020-07-23 PROCEDURE — 83013 H PYLORI (C-13) BREATH: CPT

## 2020-07-23 PROCEDURE — 84439 ASSAY OF FREE THYROXINE: CPT

## 2020-07-25 LAB — UREA BREATH TEST QL: POSITIVE

## 2020-07-27 ENCOUNTER — TELEPHONE (OUTPATIENT)
Dept: MEDICAL GROUP | Facility: CLINIC | Age: 35
End: 2020-07-27

## 2020-07-27 DIAGNOSIS — A04.8 H. PYLORI INFECTION: ICD-10-CM

## 2020-07-27 RX ORDER — PANTOPRAZOLE SODIUM 20 MG/1
20 TABLET, DELAYED RELEASE ORAL 2 TIMES DAILY
Qty: 28 TAB | Refills: 0 | Status: SHIPPED | OUTPATIENT
Start: 2020-07-27 | End: 2020-07-28 | Stop reason: SDUPTHER

## 2020-07-27 RX ORDER — METRONIDAZOLE 500 MG/1
500 TABLET ORAL 2 TIMES DAILY
Qty: 28 TAB | Refills: 0 | Status: SHIPPED | OUTPATIENT
Start: 2020-07-27 | End: 2020-07-28 | Stop reason: SDUPTHER

## 2020-07-27 RX ORDER — CLARITHROMYCIN 500 MG/1
500 TABLET, COATED ORAL 2 TIMES DAILY
Qty: 28 TAB | Refills: 0 | Status: SHIPPED | OUTPATIENT
Start: 2020-07-27 | End: 2020-07-28 | Stop reason: SDUPTHER

## 2020-07-28 RX ORDER — PANTOPRAZOLE SODIUM 20 MG/1
20 TABLET, DELAYED RELEASE ORAL 2 TIMES DAILY
Qty: 28 TAB | Refills: 0 | Status: SHIPPED | OUTPATIENT
Start: 2020-07-28 | End: 2021-12-21

## 2020-07-28 RX ORDER — CLARITHROMYCIN 500 MG/1
500 TABLET, COATED ORAL 2 TIMES DAILY
Qty: 28 TAB | Refills: 0 | Status: SHIPPED | OUTPATIENT
Start: 2020-07-28 | End: 2020-08-11

## 2020-07-28 RX ORDER — METRONIDAZOLE 500 MG/1
500 TABLET ORAL 2 TIMES DAILY
Qty: 28 TAB | Refills: 0 | Status: SHIPPED | OUTPATIENT
Start: 2020-07-28 | End: 2021-12-21

## 2020-08-27 ENCOUNTER — HOSPITAL ENCOUNTER (OUTPATIENT)
Dept: LAB | Facility: MEDICAL CENTER | Age: 35
End: 2020-08-27
Attending: PHYSICIAN ASSISTANT
Payer: MEDICAID

## 2020-08-27 DIAGNOSIS — R35.0 FREQUENT URINATION: ICD-10-CM

## 2020-08-27 DIAGNOSIS — E87.1 HYPONATREMIA: ICD-10-CM

## 2020-08-27 LAB
APPEARANCE UR: ABNORMAL
BACTERIA #/AREA URNS HPF: ABNORMAL /HPF
BILIRUB UR QL STRIP.AUTO: NEGATIVE
COLOR UR: YELLOW
EPI CELLS #/AREA URNS HPF: NEGATIVE /HPF
GLUCOSE UR STRIP.AUTO-MCNC: NEGATIVE MG/DL
HYALINE CASTS #/AREA URNS LPF: ABNORMAL /LPF
KETONES UR STRIP.AUTO-MCNC: NEGATIVE MG/DL
LEUKOCYTE ESTERASE UR QL STRIP.AUTO: ABNORMAL
MICRO URNS: ABNORMAL
NITRITE UR QL STRIP.AUTO: NEGATIVE
PH UR STRIP.AUTO: 5 [PH] (ref 5–8)
PROT UR QL STRIP: NEGATIVE MG/DL
RBC # URNS HPF: ABNORMAL /HPF
RBC UR QL AUTO: NEGATIVE
SODIUM SERPL-SCNC: 139 MMOL/L (ref 135–145)
SP GR UR STRIP.AUTO: 1.02
UROBILINOGEN UR STRIP.AUTO-MCNC: 0.2 MG/DL
WBC #/AREA URNS HPF: ABNORMAL /HPF

## 2020-08-27 PROCEDURE — 36415 COLL VENOUS BLD VENIPUNCTURE: CPT

## 2020-08-27 PROCEDURE — 84295 ASSAY OF SERUM SODIUM: CPT

## 2020-08-27 PROCEDURE — 81001 URINALYSIS AUTO W/SCOPE: CPT

## 2020-08-27 PROCEDURE — 87086 URINE CULTURE/COLONY COUNT: CPT

## 2020-08-29 LAB
BACTERIA UR CULT: NORMAL
SIGNIFICANT IND 70042: NORMAL
SITE SITE: NORMAL
SOURCE SOURCE: NORMAL

## 2020-12-21 ENCOUNTER — HOSPITAL ENCOUNTER (OUTPATIENT)
Facility: MEDICAL CENTER | Age: 35
End: 2020-12-21
Attending: PHYSICIAN ASSISTANT
Payer: COMMERCIAL

## 2020-12-21 ENCOUNTER — OFFICE VISIT (OUTPATIENT)
Dept: URGENT CARE | Facility: PHYSICIAN GROUP | Age: 35
End: 2020-12-21
Payer: COMMERCIAL

## 2020-12-21 VITALS
OXYGEN SATURATION: 96 % | WEIGHT: 244 LBS | HEIGHT: 66 IN | DIASTOLIC BLOOD PRESSURE: 76 MMHG | HEART RATE: 124 BPM | TEMPERATURE: 99.8 F | RESPIRATION RATE: 20 BRPM | SYSTOLIC BLOOD PRESSURE: 128 MMHG | BODY MASS INDEX: 39.21 KG/M2

## 2020-12-21 DIAGNOSIS — R05.9 COUGH: ICD-10-CM

## 2020-12-21 DIAGNOSIS — R12 HEART BURN: ICD-10-CM

## 2020-12-21 DIAGNOSIS — R50.9 FEVER, UNSPECIFIED FEVER CAUSE: ICD-10-CM

## 2020-12-21 LAB
FLUAV+FLUBV AG SPEC QL IA: NEGATIVE
INT CON NEG: NEGATIVE
INT CON POS: POSITIVE

## 2020-12-21 PROCEDURE — 87804 INFLUENZA ASSAY W/OPTIC: CPT | Performed by: PHYSICIAN ASSISTANT

## 2020-12-21 PROCEDURE — U0003 INFECTIOUS AGENT DETECTION BY NUCLEIC ACID (DNA OR RNA); SEVERE ACUTE RESPIRATORY SYNDROME CORONAVIRUS 2 (SARS-COV-2) (CORONAVIRUS DISEASE [COVID-19]), AMPLIFIED PROBE TECHNIQUE, MAKING USE OF HIGH THROUGHPUT TECHNOLOGIES AS DESCRIBED BY CMS-2020-01-R: HCPCS

## 2020-12-21 PROCEDURE — 99214 OFFICE O/P EST MOD 30 MIN: CPT | Performed by: PHYSICIAN ASSISTANT

## 2020-12-21 RX ORDER — OMEPRAZOLE 20 MG/1
20 CAPSULE, DELAYED RELEASE ORAL DAILY
Qty: 30 CAP | Refills: 0 | Status: SHIPPED | OUTPATIENT
Start: 2020-12-21 | End: 2021-12-21

## 2020-12-21 ASSESSMENT — FIBROSIS 4 INDEX: FIB4 SCORE: 0.6

## 2020-12-22 DIAGNOSIS — R05.9 COUGH: ICD-10-CM

## 2020-12-22 DIAGNOSIS — R50.9 FEVER, UNSPECIFIED FEVER CAUSE: ICD-10-CM

## 2020-12-22 LAB
COVID ORDER STATUS COVID19: NORMAL
SARS-COV-2 RNA RESP QL NAA+PROBE: DETECTED
SPECIMEN SOURCE: ABNORMAL

## 2020-12-22 NOTE — PROGRESS NOTES
Chief Complaint   Patient presents with   • Migraine   • Fever       HISTORY OF PRESENT ILLNESS: Patient is a 35 y.o. female who presents today because she has a 3 to 4-day history of what she calls a migraine, she describes this as pain across the top of her head and on her temples bilaterally.  She has also had a low-grade fever and a cough and some tightness in her upper abdominal area.  Reports a history of GERD but is not on any acid inhibitors because she is in the process of finding a new primary care provider, but does report significant acid reflux symptoms    Patient Active Problem List    Diagnosis Date Noted   • H. pylori infection 07/27/2020   • Hyponatremia 07/23/2020   • Gastroesophageal reflux disease without esophagitis 06/08/2020   • Weight gain 06/08/2020   • Panic attack 06/19/2017       Allergies:Penicillins    Current Outpatient Medications Ordered in Epic   Medication Sig Dispense Refill   • omeprazole (PRILOSEC) 20 MG delayed-release capsule Take 1 Cap by mouth every day. 30 Cap 0   • pantoprazole (PROTONIX) 20 MG tablet Take 1 Tab by mouth 2 times a day. 28 Tab 0   • metroNIDAZOLE (FLAGYL) 500 MG Tab Take 1 Tab by mouth 2 Times a Day. 28 Tab 0   • pantoprazole (PROTONIX) 40 MG Tablet Delayed Response Take 1 Tab by mouth every day. 30 Tab 3   • cyclobenzaprine (FLEXERIL) 5 MG tablet Take 1-2 Tabs by mouth 3 times a day as needed for Severe Pain or Muscle Spasms. 30 Tab 0   • ibuprofen (MOTRIN) 800 MG Tab Take 1 Tab by mouth every 6 hours as needed (For cramping after delivery; do not give if patient is receiving ketorolac (Toradol)). 30 Tab 0   • Prenatal MV-Min-Fe Fum-FA-DHA (PRENATAL 1 PO) Take  by mouth.     • acetaminophen (TYLENOL) 500 MG Tab Take 1-2 Tabs by mouth every 6 hours as needed for Moderate Pain. 20 Tab 0     No current Epic-ordered facility-administered medications on file.        Past Medical History:   Diagnosis Date   • Heart murmur        Social History     Tobacco Use  "  • Smoking status: Former Smoker     Packs/day: 0.10     Years: 17.00     Pack years: 1.70   • Smokeless tobacco: Never Used   • Tobacco comment: socially, only every few months   Substance Use Topics   • Alcohol use: No     Alcohol/week: 1.2 oz     Types: 2 Standard drinks or equivalent per week     Comment: socially - rare   • Drug use: No       Family Status   Relation Name Status   • Mo  Alive   • Fa  Alive   • Sis  Alive   • Bro  Alive   • Sis  Alive   • Child  Alive   • Child  Alive     Family History   Problem Relation Age of Onset   • Cancer Mother         uterine and skin   • Thyroid Mother         removed due to lump   • Diabetes Father    • Hypertension Father    • Cancer Sister         ovarian   • Alcohol abuse Brother        ROS:  Review of Systems   Constitutional: Positive for fever, no chills, weight loss and malaise/fatigue.   HENT: Negative for ear pain, nosebleeds, congestion, sore throat and neck pain.    Eyes: Negative for blurred vision.   Respiratory: Positive for cough, no sputum production, shortness of breath and wheezing.    Cardiovascular: Negative for chest pain, palpitations, orthopnea and leg swelling.   Gastrointestinal: Negative for heartburn, nausea, vomiting and abdominal pain.   Genitourinary: Negative for dysuria, urgency and frequency.     Exam:  /76 (BP Location: Right arm, Patient Position: Sitting, BP Cuff Size: Large adult)   Pulse (!) 124   Temp 37.7 °C (99.8 °F) (Temporal)   Resp 20   Ht 1.676 m (5' 6\")   Wt 110.7 kg (244 lb)   SpO2 96%   General:  Well nourished, well developed female in NAD  Head:Normocephalic, atraumatic  Eyes: PERRLA, EOM within normal limits, no conjunctival injection, no scleral icterus, visual fields and acuity grossly intact.  Ears: Normal shape and symmetry, no tenderness, no discharge. External canals are without any significant edema or erythema. Tympanic membranes are without any inflammation, no effusion. Gross auditory acuity is " intact  Nose: Symmetrical without tenderness, no discharge.  Mouth: reasonable hygiene, no erythema exudates or tonsillar enlargement.  Neck: no masses, range of motion within normal limits, no tracheal deviation. No obvious thyroid enlargement.  Pulmonary: chest is symmetrical with respiration, no wheezes, crackles, or rhonchi.  Cardiovascular: regular rate and rhythm without murmurs, rubs, or gallops.  Extremities: no clubbing, cyanosis, or edema.    Influenza AB test is negative    Please note that this dictation was created using voice recognition software. I have made every reasonable attempt to correct obvious errors, but I expect that there are errors of grammar and possibly content that I did not discover before finalizing the note.    Assessment/Plan:  1. Fever, unspecified fever cause  COVID/SARS COV-2 PCR    POCT Influenza A/B   2. Heart burn  omeprazole (PRILOSEC) 20 MG delayed-release capsule   3. Cough  COVID/SARS COV-2 PCR    POCT Influenza A/B   Discussed strict isolation until Covid test returns, over-the-counter symptomatic relief as needed    Followup with primary care in the next 7-10 days if not significantly improving, return to the urgent care or go to the emergency room sooner for any worsening of symptoms.

## 2021-12-21 ENCOUNTER — OFFICE VISIT (OUTPATIENT)
Dept: URGENT CARE | Facility: PHYSICIAN GROUP | Age: 36
End: 2021-12-21
Payer: COMMERCIAL

## 2021-12-21 ENCOUNTER — HOSPITAL ENCOUNTER (OUTPATIENT)
Facility: MEDICAL CENTER | Age: 36
End: 2021-12-21
Attending: FAMILY MEDICINE
Payer: COMMERCIAL

## 2021-12-21 VITALS
WEIGHT: 243 LBS | SYSTOLIC BLOOD PRESSURE: 126 MMHG | RESPIRATION RATE: 16 BRPM | OXYGEN SATURATION: 99 % | DIASTOLIC BLOOD PRESSURE: 84 MMHG | BODY MASS INDEX: 39.05 KG/M2 | TEMPERATURE: 99.5 F | HEART RATE: 107 BPM | HEIGHT: 66 IN

## 2021-12-21 DIAGNOSIS — Z03.818 ENCOUNTER FOR PATIENT CONCERN ABOUT EXPOSURE TO INFECTIOUS ORGANISM: ICD-10-CM

## 2021-12-21 LAB
EXTERNAL QUALITY CONTROL: NORMAL
SARS-COV+SARS-COV-2 AG RESP QL IA.RAPID: NEGATIVE

## 2021-12-21 PROCEDURE — U0005 INFEC AGEN DETEC AMPLI PROBE: HCPCS

## 2021-12-21 PROCEDURE — 99213 OFFICE O/P EST LOW 20 MIN: CPT | Mod: CS | Performed by: FAMILY MEDICINE

## 2021-12-21 PROCEDURE — U0003 INFECTIOUS AGENT DETECTION BY NUCLEIC ACID (DNA OR RNA); SEVERE ACUTE RESPIRATORY SYNDROME CORONAVIRUS 2 (SARS-COV-2) (CORONAVIRUS DISEASE [COVID-19]), AMPLIFIED PROBE TECHNIQUE, MAKING USE OF HIGH THROUGHPUT TECHNOLOGIES AS DESCRIBED BY CMS-2020-01-R: HCPCS

## 2021-12-21 PROCEDURE — 87426 SARSCOV CORONAVIRUS AG IA: CPT | Performed by: FAMILY MEDICINE

## 2021-12-21 NOTE — PROGRESS NOTES
"  Subjective:      36 y.o. female presents to urgent care for cold symptoms that started yesterday. She is experiencing cough, body aches, headaches, sore throat, vomiting, diarrhea, and fevers. No loss of sense of taste/smell. She has not tried anything for her symptoms. She does smoke cigarettes intermittently. No history of asthma or COPD.  She is not vaccinated against COVID.  She has had no known sick contacts.    She denies any other questions or concerns at this time.    Current problem list, medication, and past medical/surgical history were reviewed in Epic.    ROS  See HPI     Objective:      /84   Pulse (!) 107   Temp 37.5 °C (99.5 °F) (Temporal)   Resp 16   Ht 1.676 m (5' 6\")   Wt 110 kg (243 lb)   SpO2 99%   BMI 39.22 kg/m²     Physical Exam  Constitutional:       General: She is not in acute distress.     Appearance: She is not diaphoretic.   Cardiovascular:      Rate and Rhythm: Normal rate and regular rhythm.      Heart sounds: Normal heart sounds.   Pulmonary:      Effort: Pulmonary effort is normal. No respiratory distress.      Breath sounds: Normal breath sounds.   Neurological:      Mental Status: She is alert.   Psychiatric:         Mood and Affect: Affect normal.         Judgment: Judgment normal.       Assessment/Plan:     1. Encounter for patient concern about exposure to infectious organism  Rapid Covid negative.  PCR Covid has been sent. In the meantime patient was advised to isolate until COVID test results returned. I encouraged mask use, frequent handwashing, wiping down hard surfaces, etc. Tylenol and Ibuprofen were recommended for symptomatic relief. If positive they will be contacted by their local health department regarding return to work/school protocols. Patient is currently without indication of need for higher level of care. Patient/Guardian was given precautionary signs/symptoms that mandate immediate follow up and evaluation in the ED. The patient and/or guardian " demonstrated a good understanding and agreed with the treatment plan.  - POCT SARS-COV Antigen SPEEDY (Symptomatic Only)  - Referral to establish with [facility] PCP  - SARS-CoV-2 PCR (24 hour In-House): Collect NP swab in VTM; Future      Instructed to return to Urgent Care or nearest Emergency Department if symptoms fail to improve, for any change in condition, further concerns, or new concerning symptoms. Patient states understanding of the plan of care and discharge instructions.    Katie Boyd M.D.

## 2021-12-22 DIAGNOSIS — Z03.818 ENCOUNTER FOR PATIENT CONCERN ABOUT EXPOSURE TO INFECTIOUS ORGANISM: ICD-10-CM

## 2021-12-22 LAB
COVID ORDER STATUS COVID19: NORMAL
SARS-COV-2 RNA RESP QL NAA+PROBE: NOTDETECTED
SPECIMEN SOURCE: NORMAL

## 2022-01-25 ENCOUNTER — APPOINTMENT (OUTPATIENT)
Dept: RADIOLOGY | Facility: IMAGING CENTER | Age: 37
End: 2022-01-25
Attending: NURSE PRACTITIONER
Payer: COMMERCIAL

## 2022-01-25 ENCOUNTER — OCCUPATIONAL MEDICINE (OUTPATIENT)
Dept: URGENT CARE | Facility: PHYSICIAN GROUP | Age: 37
End: 2022-01-25
Payer: COMMERCIAL

## 2022-01-25 VITALS
DIASTOLIC BLOOD PRESSURE: 72 MMHG | TEMPERATURE: 97.6 F | SYSTOLIC BLOOD PRESSURE: 110 MMHG | RESPIRATION RATE: 12 BRPM | WEIGHT: 246.4 LBS | HEIGHT: 65 IN | OXYGEN SATURATION: 100 % | HEART RATE: 80 BPM | BODY MASS INDEX: 41.05 KG/M2

## 2022-01-25 DIAGNOSIS — S67.193A CRUSHING INJURY OF LEFT MIDDLE FINGER, INITIAL ENCOUNTER: ICD-10-CM

## 2022-01-25 PROCEDURE — 99213 OFFICE O/P EST LOW 20 MIN: CPT | Performed by: NURSE PRACTITIONER

## 2022-01-25 PROCEDURE — 73130 X-RAY EXAM OF HAND: CPT | Mod: TC,FY,LT | Performed by: RADIOLOGY

## 2022-01-25 ASSESSMENT — ENCOUNTER SYMPTOMS
CHILLS: 0
FEVER: 0

## 2022-01-25 NOTE — LETTER
[facility]  [address] [place] [address] [place] [address]  Phone:  [phone] - Fax:  [phone]   [facility] Progress Report and Disability Certification  Date of Service: 1/25/2022   No Show:  No  Date / Time of Next Visit: 2/1/2022   Claim Information   Patient Name: Alie Lowe  Claim Number:     Employer: [employer]  Date of Injury: 1/23/2022     Insurer / TPA: Tameka Garcia  ID / SSN:     Occupation:   Diagnosis: The encounter diagnosis was Crushing injury of left middle finger, initial encounter.    Medical Information   Related to Industrial Injury? Yes    Subjective Complaints:  DOI 1/23/22; 1st visit.  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  It was approximately 30 pounds and fell down, crushing her left middle fingertip between the filter and a metal bar.  She noted immediate pain and throbbing. She had an acrylic nail that was partially broken so she removed the remainder.  She notes persistent pain, rated 4/10.  She is taking 800 mg ibuprofen with some relief.  No prior history of similar injury or baseline limitation. She is right hand dominant.  She is off work for the next 5 days due to unrelated URI, scheduled to return to work 1/30/22.   Objective Findings: Alie is alert, oriented, and in no acute distress.  Blood pressure 110/72.  Heart rate 80.  Afebrile.  Left middle finger demonstrates broken nail at the distal aspect of the nail with no significant injury to the nail bed.  No subungual hematoma.  1+ generalized fingertip swelling with TTP.  NV, sensation, and strength grossly intact. ROM grossly intact but painful DIP flexion noted.  Cap refill < 2 seconds.  No bleeding, erythema, or purulence.     Pre-Existing Condition(s):     Assessment:   Initial Visit    Status: Additional Care Required  Comments:Follow up in 1 week, sooner if worse.   Permanent Disability:No    Plan:  Medication  Comments:OTC analgesics, ice compress and hand elevaiton prn pain.    Diagnostics: X-ray  Comments:No acute fracture or dislocation    Comments:       Disability Information   Status: Released to Full Duty    From:  1/25/2022  Through: 2/1/2022 Restrictions are:     Physical Restrictions   Sitting:    Standing:    Stooping:    Bending:      Squatting:    Walking:    Climbing:    Pushing:      Pulling:    Other:    Reaching Above Shoulder (L):   Reaching Above Shoulder (R):       Reaching Below Shoulder (L):    Reaching Below Shoulder (R):      Not to exceed Weight Limits   Carrying(hrs):   Weight Limit(lb):   Lifting(hrs):   Weight  Limit(lb):     Comments:      Repetitive Actions   Hands: i.e. Fine Manipulations from Grasping:     Feet: i.e. Operating Foot Controls:     Driving / Operate Machinery:     Health Care Provider’s Original or Electronic Signature  RASTA [facility]ell Health Care Provider’s Original or Electronic Signature    Reji Arthur MD         Clinic Name / Location: [address] [address] Clinic Phone Number: Dept: [phone]   Appointment Time: 12:20 Pm Visit Start Time: 2:44 PM   Check-In Time:  12:38 Pm Visit Discharge Time:  3:40 PM   Original-Treating Physician or Chiropractor    Page 2-Insurer/TPA    Page 3-Employer    Page 4-Employee

## 2022-01-25 NOTE — LETTER
"EMPLOYEE’S CLAIM FOR COMPENSATION/ REPORT OF INITIAL TREATMENT  FORM C-4    EMPLOYEE’S CLAIM - PROVIDE ALL INFORMATION REQUESTED   First Name  Alie Last Name  Mynor Birthdate                    1985                Sex  female Claim Number (Insurer’s Use Only)   Home Address  [address] [place] [address] 7 Age  36 y.o. Height  1.651 m (5' 5\") Weight  112 kg (246 lb 6.4 oz) [facility]     [place] Zip  [address] Telephone  [phone] (home)    Mailing Address  [address] [place] [address] [address] [facility] Zip  [address] Primary Language Spoken  English    Insurer  *** Third-Party   Tameka Garcia   Employee's Occupation (Job Title) When Injury or Occupational Disease Occurred      Employer's Name/Company Name  [employer]  Telephone  [phone]    Office Mail Address (Number and Street)  [address]  [facility]  Zip  [address]    Date of Injury  1/23/2022               Hours Injury   Date Employer Notified  1/23/2022 Last Day of Work after Injury     or Occupational Disease  1/25/2022 Supervisor to Whom Injury     Reported  Rajiv Roldanian   Address or Location of Accident (if applicable)  [[address], [place]]   What were you doing at the time of accident? (if applicable)  Was changing filter on [employer]    How did this injury or occupational disease occur? (Be specific an answer in detail. Use additional sheet if necessary)  was changing filter on a dirty batch, when changing the filter to the right was not ready for the weight and it smashed down on finger and broke half of nail off   If you believe that you have an occupational disease, when did you first have knowledge of the disability and it relationship to your employment?  NA Witnesses to the Accident  NA      Nature of Injury or Occupational Disease  Workers' Compensation  Part(s) of Body Injured or " Affected  Finger (L), ,     I certify that the above is true and correct to the best of my knowledge and that I have provided this information in order to obtain the benefits of [place]  I hereby authorize any physician, chiropractor, surgeon, practitioner, or other person, any hospital, including [facility] or [facility], any medical service organization, any insurance company, or other institution or organization to release to each other, any medical or other information, including benefits paid or payable, pertinent to this injury or disease, except information relative to diagnosis, treatment and/or counseling for AIDS, psychological conditions, alcohol or controlled substances, for which I must give specific authorization.  A Photostat of this authorization shall be as valid as the original.     Date   Place Employee’s Original or  *Electronic Signature   THIS REPORT MUST BE COMPLETED AND MAILED WITHIN 3 WORKING DAYS OF TREATMENT   Place  [facility]  Name of Facility  [place]   Date  1/25/2022 Diagnosis and Description of Injury or Occupational Disease  (S67.193A) Crushing injury of left middle finger, initial encounter Is there evidence the injured employee was under the influence of alcohol and/or another controlled substance at the time of accident?  ? No ? Yes (if yes, please explain)   Hour  2:44 PM   The encounter diagnosis was Crushing injury of left middle finger, initial encounter. No   Treatment  OTC analgesics, ice compress and hand elevation prn pain.  Have you advised the patient to remain off work five days or     more?    X-Ray Findings  Negative  Comments:No acute fracture or dislocation.   ? Yes Indicate dates:   From   To      From information given by the employee, together with medical evidence, can        you directly connect this injury or  "occupational disease as job incurred?  Yes ? No If no, is the injured employee capable of:  ? full duty  Yes ? modified duty      Is additional medical care by a physician indicated?  Yes  Comments:Follow up in 1 week, sooner if worse. If Modified Duty, Specify any Limitations / Restrictions      Do you know of any previous injury or disease contributing to this condition or occupational disease?  ? Yes ? No (Explain if yes)                          No   Date  1/25/2022 Print Health Care Provider's   RASTA Newell I certify the employer’s copy of  this form was mailed on:   Address  [address] Insurer’s Use Only     [facility] Zip  [address]    Provider’s Tax ID Number  [id number] Telephone  Dept: [phone]             Health Care Provider’s Original or Electronic Signature  e-SHABBIR Brown Degree (MD,DO, FREDA,PAERIC,APRN)  {Provider Degrees:[id number]}      * Complete and attach Release of Information (Form C-4A) when injured employee signs C-4 Form electronically  ORIGINAL - TREATING HEALTHCARE PROVIDER PAGE 2 - INSURER/TPA PAGE 3 - EMPLOYER PAGE 4 - EMPLOYEE             Form C-4 (rev.08/21)           BRIEF DESCRIPTION OF RIGHTS AND BENEFITS  (Pursuant to [place]RS 616C.050)    Notice of Injury or Occupational Disease (Incident Report Form C-1): If an injury or occupational disease (OD) arises out of and in the course of employment, you must provide written notice to your employer as soon as practicable, but no later than 7 days after the accident or OD. Your employer shall maintain a sufficient supply of the required forms.    Claim for Compensation (Form C-4): If medical treatment is sought, the form C-4 is available at the place of initial treatment. A completed \"Claim for Compensation\" (Form C-4) must be filed within 90 days after an accident or OD. The treating physician or chiropractor must, within 3 working days after treatment, complete and mail to the employer, " the employer's insurer and third-party , the Claim for Compensation.    Medical Treatment: If you require medical treatment for your on-the-job injury or OD, you may be required to select a physician or chiropractor from a list provided by your workers’ compensation insurer, if it has contracted with an Organization for Managed Care (MCO) or Preferred Provider Organization (PPO) or providers of health care. If your employer has not entered into a contract with an MCO or PPO, you may select a physician or chiropractor from the Panel of Physicians and Chiropractors. Any medical costs related to your industrial injury or OD will be paid by your insurer.    Temporary Total Disability (TTD): If your doctor has certified that you are unable to work for a period of at least 5 consecutive days, or 5 cumulative days in a 20-day period, or places restrictions on you that your employer does not accommodate, you may be entitled to TTD compensation.    Temporary Partial Disability (TPD): If the wage you receive upon reemployment is less than the compensation for TTD to which you are entitled, the insurer may be required to pay you TPD compensation to make up the difference. TPD can only be paid for a maximum of 24 months.    Permanent Partial Disability (PPD): When your medical condition is stable and there is an indication of a PPD as a result of your injury or OD, within 30 days, your insurer must arrange for an evaluation by a rating physician or chiropractor to determine the degree of your PPD. The amount of your PPD award depends on the date of injury, the results of the PPD evaluation, your age and wage.    Permanent Total Disability (PTD): If you are medically certified by a treating physician or chiropractor as permanently and totally disabled and have been granted a PTD status by your insurer, you are entitled to receive monthly benefits not to exceed 66 2/3% of your average monthly wage. The amount of your  PTD payments is subject to reduction if you previously received a lump-sum PPD award.    Vocational Rehabilitation Services: You may be eligible for vocational rehabilitation services if you are unable to return to the job due to a permanent physical impairment or permanent restrictions as a result of your injury or occupational disease.    Transportation and Per Ronnie Reimbursement: You may be eligible for travel expenses and per ronnie associated with medical treatment.    Reopening: You may be able to reopen your claim if your condition worsens after claim closure.     Appeal Process: If you disagree with a written determination issued by the insurer or the insurer does not respond to your request, you may appeal to the Department of Administration, , by following the instructions contained in your determination letter. You must appeal the determination within 70 days from the date of the determination letter at [address], [address], [place], [place] [address], or [address], [address], [place] [address]. If you disagree with the  decision, you may appeal to the Department of Administration, . You must file your appeal within 30 days from the date of the  decision letter at [address], [address], [place], [place] [address], or [address], [address], [place], [place] [address]. If you disagree with a decision of an , you may file a petition for judicial review with the District Court. You must do so within 30 days of the Appeal Officer’s decision. You may be represented by an  at your own expense or you may contact the [facility] for possible representation.    [place]  for Injured Workers (NAIW): If you disagree with a  decision, you may request that NAIW represent you without charge at an  Hearing. For information regarding denial of benefits, you may  contact the [facility] at: [address] [facility], [address], [place] [address], [phone], or [address], [address], [place] [address], [phone]    To File a Complaint with the Division: If you wish to file a complaint with the  of the Division of Industrial Relations (DIR),  please contact the Workers’ Compensation Section, [address], [address], [place], [place] [address], telephone [phone]) [phone], or [address], [address], [place], [place] [address], telephone [phone]) [phone].    For assistance with Workers’ Compensation Issues: You may contact the [facility] Office for Consumer Health Assistance, [address], [address], [place] [address], Toll Free 1-991.209.9328, Web site: http://[facility].nv.[facility]/Programs/LIVIA E-mail: [email or web address]              __________________________________________________________________                                    _________________            Employee Name / Signature                                                                                                                            Date                                                                                                                                                                                                                              D-2 (rev. 10/20)

## 2022-01-25 NOTE — LETTER
[facility]  [address] [place] [address] [place] [address]  Phone:  [phone] - Fax:  [phone]   [facility] Progress Report and Disability Certification  Date of Service: 1/25/2022   No Show:  No  Date / Time of Next Visit: 2/1/2022   Claim Information   Patient Name: Alie Lowe  Claim Number:     Employer: [employer]  Date of Injury: 1/23/2022     Insurer / TPA: Tameka Garcia  ID / SSN: [id number]    Occupation:   Diagnosis: The encounter diagnosis was Crushing injury of left middle finger, initial encounter.    Medical Information   Related to Industrial Injury? Yes    Subjective Complaints:  DOI 1/23/22; 1st visit.  Alie was at work changing a filter.  It was approximately 30 pounds and fell down, crushing her left middle fingertip between the filter and a metal bar.  She noted immediate pain and throbbing. She had an acrylic nail that was partially broken so she removed the remainder.  She notes persistent pain, rated 4/10.  She is taking 800 mg ibuprofen with some relief.  No prior history of similar injury or baseline limitation. She is right hand dominant.  She is off work for the next 5 days due to unrelated URI, scheduled to return to work 1/30/22.   Objective Findings: Alie is alert, oriented, and in no acute distress.  Blood pressure 110/72.  Heart rate 80.  Afebrile.  Left middle finger demonstrates broken nail at the distal aspect of the nail with no significant injury to the nail bed.  No subungual hematoma.  1+ generalized fingertip swelling with TTP.  [place], sensation, and strength grossly intact. ROM grossly intact but painful DIP flexion noted.  Cap refill < 2 seconds.  No bleeding, erythema, or purulence.     Pre-Existing Condition(s):     Assessment:   Initial Visit    Status: Additional Care Required  Comments:Follow up in 1 week, sooner if worse.   Permanent Disability:No    Plan:  Medication  Comments:OTC analgesics, ice compress and hand elevaiton prn pain.    Diagnostics: X-ray  Comments:No acute fracture or dislocation    Comments:       Disability Information   Status: Released to Full Duty    From:  1/25/2022  Through: 2/1/2022 Restrictions are:     Physical Restrictions   Sitting:    Standing:    Stooping:    Bending:      Squatting:    Walking:    Climbing:    Pushing:      Pulling:    Other:    Reaching Above Shoulder (L):   Reaching Above Shoulder (R):       Reaching Below Shoulder (L):    Reaching Below Shoulder (R):      Not to exceed Weight Limits   Carrying(hrs):   Weight Limit(lb):   Lifting(hrs):   Weight  Limit(lb):     Comments:      Repetitive Actions   Hands: i.e. Fine Manipulations from Grasping:     Feet: i.e. Operating Foot Controls:     Driving / Operate Machinery:     Health Care Provider’s Original or Electronic Signature  RASTA Newell Health Care Provider’s Original or Electronic Signature    Reji Arthur MD         Clinic Name / Location: [address] [address] Clinic Phone Number: Dept: [phone]   Appointment Time: 12:20 Pm Visit Start Time: 2:44 PM   Check-In Time:  12:38 Pm Visit Discharge Time:     Original-Treating Physician or Chiropractor    Page 2-Insurer/TPA    Page 3-Employer    Page 4-Employee

## 2022-02-04 ENCOUNTER — OFFICE VISIT (OUTPATIENT)
Dept: MEDICAL GROUP | Facility: PHYSICIAN GROUP | Age: 37
End: 2022-02-04
Attending: FAMILY MEDICINE
Payer: COMMERCIAL

## 2022-02-04 ENCOUNTER — APPOINTMENT (OUTPATIENT)
Dept: RADIOLOGY | Facility: IMAGING CENTER | Age: 37
End: 2022-02-04
Attending: FAMILY MEDICINE
Payer: COMMERCIAL

## 2022-02-04 ENCOUNTER — APPOINTMENT (OUTPATIENT)
Dept: URGENT CARE | Facility: PHYSICIAN GROUP | Age: 37
End: 2022-02-04
Payer: COMMERCIAL

## 2022-02-04 VITALS
TEMPERATURE: 97.5 F | SYSTOLIC BLOOD PRESSURE: 112 MMHG | RESPIRATION RATE: 16 BRPM | BODY MASS INDEX: 40.37 KG/M2 | HEART RATE: 85 BPM | DIASTOLIC BLOOD PRESSURE: 78 MMHG | WEIGHT: 251.2 LBS | HEIGHT: 66 IN

## 2022-02-04 DIAGNOSIS — Z80.0 FAMILY HISTORY OF COLON CANCER: ICD-10-CM

## 2022-02-04 DIAGNOSIS — G89.29 CHRONIC RIGHT SHOULDER PAIN: ICD-10-CM

## 2022-02-04 DIAGNOSIS — K21.9 GASTROESOPHAGEAL REFLUX DISEASE WITHOUT ESOPHAGITIS: ICD-10-CM

## 2022-02-04 DIAGNOSIS — G56.01 CARPAL TUNNEL SYNDROME OF RIGHT WRIST: ICD-10-CM

## 2022-02-04 DIAGNOSIS — D64.9 ANEMIA, UNSPECIFIED TYPE: ICD-10-CM

## 2022-02-04 DIAGNOSIS — E66.01 MORBID OBESITY WITH BMI OF 40.0-44.9, ADULT (HCC): ICD-10-CM

## 2022-02-04 DIAGNOSIS — Z23 NEED FOR VACCINATION: ICD-10-CM

## 2022-02-04 DIAGNOSIS — E78.1 HIGH TRIGLYCERIDES: ICD-10-CM

## 2022-02-04 DIAGNOSIS — M25.511 CHRONIC RIGHT SHOULDER PAIN: ICD-10-CM

## 2022-02-04 DIAGNOSIS — R73.01 ELEVATED FASTING GLUCOSE: ICD-10-CM

## 2022-02-04 PROCEDURE — 90746 HEPB VACCINE 3 DOSE ADULT IM: CPT | Performed by: FAMILY MEDICINE

## 2022-02-04 PROCEDURE — 90471 IMMUNIZATION ADMIN: CPT | Performed by: FAMILY MEDICINE

## 2022-02-04 PROCEDURE — 73030 X-RAY EXAM OF SHOULDER: CPT | Mod: TC,FY,RT | Performed by: FAMILY MEDICINE

## 2022-02-04 PROCEDURE — 99214 OFFICE O/P EST MOD 30 MIN: CPT | Mod: 25 | Performed by: FAMILY MEDICINE

## 2022-02-04 RX ORDER — GABAPENTIN 300 MG/1
300 CAPSULE ORAL 3 TIMES DAILY
Qty: 90 CAPSULE | Refills: 3 | Status: SHIPPED | OUTPATIENT
Start: 2022-02-04 | End: 2022-08-31

## 2022-02-04 RX ORDER — CYCLOBENZAPRINE HCL 5 MG
5 TABLET ORAL 3 TIMES DAILY PRN
Qty: 30 TABLET | Refills: 0 | Status: SHIPPED | OUTPATIENT
Start: 2022-02-04 | End: 2022-08-31

## 2022-02-04 RX ORDER — OMEPRAZOLE 20 MG/1
20 CAPSULE, DELAYED RELEASE ORAL DAILY
Qty: 90 CAPSULE | Refills: 3 | Status: ON HOLD | OUTPATIENT
Start: 2022-02-04 | End: 2024-02-27

## 2022-02-04 ASSESSMENT — PATIENT HEALTH QUESTIONNAIRE - PHQ9: CLINICAL INTERPRETATION OF PHQ2 SCORE: 0

## 2022-02-04 NOTE — ASSESSMENT & PLAN NOTE
Severe gerd, chronic  Worse with pregnancies  Sometimes has some blood from her throat now.   Advised reflux precautions, weight loss, avoid hot temp and spicy, acidic, caffeine, tobacco.   rx omeprazole.   Recheck hpyllaura ws + 2020 and was treated.   Ref to GI for egd, colonoscopy (fly hx colon ca both grandmothers)

## 2022-02-04 NOTE — ASSESSMENT & PLAN NOTE
Patient has pain in right wrist, numbness and weakness of fingers of right hand especially 3rd and 4th digits.   Will check emg  rx for gabapentin provided.

## 2022-02-04 NOTE — PROGRESS NOTES
Subjective:   Alie Lowe is a 36 y.o. female here today for evaluation and management of:     Chronic right shoulder pain  5 years ago patient had a crush injury of the right shoulder in an accident when she was under a truck and it fell on her.  At that time her shoulder had dislocated.  Sometimes patient notes her shoulder joint will pop really hard.  Over the last 7 months she has increasing shoulder pain in the right shoulder.  With certain actions the pain is worse especially with raising her arm up over her head.  She also has some carpal tunnel symptoms with pain radiating elbow to wrist and decreased strength in the fourth and fifth digits of her right hand.    She has been using ibuprofen and Tylenol and heat also helps and she also uses ice treatment.  I will get an x-ray done today of her right shoulder.  Referral to physical therapy provided  Prescription for muscle relaxant provided  If these measures do not help patient will need an MRI in the future to evaluate for rotator cuff tear as she is young and this would need to be treated to preserve full strength and range of motion in her shoulder.    Carpal tunnel syndrome of right wrist  Patient has pain in right wrist, numbness and weakness of fingers of right hand especially 3rd and 4th digits.   Will check emg  rx for gabapentin provided.       Family history of colon cancer  Both her grandmothers had colon cancer.       Gastroesophageal reflux disease without esophagitis  Severe gerd, chronic  Worse with pregnancies  Sometimes has some blood from her throat now.   Advised reflux precautions, weight loss, avoid hot temp and spicy, acidic, caffeine, tobacco.   rx omeprazole.   Recheck hpylori ws + 2020 and was treated.   Ref to GI for egd, colonoscopy (fly hx colon ca both grandmothers)         Current medicines (including changes today)  Current Outpatient Medications   Medication Sig Dispense Refill   • cyclobenzaprine (FLEXERIL) 5 mg  "tablet Take 1 Tablet by mouth 3 times a day as needed. 30 Tablet 0   • gabapentin (NEURONTIN) 300 MG Cap Take 1 Capsule by mouth 3 times a day. For nerve pain 90 Capsule 3   • omeprazole (PRILOSEC) 20 MG delayed-release capsule Take 1 Capsule by mouth every day. 90 Capsule 3   • acetaminophen (TYLENOL) 500 MG Tab Take 1-2 Tabs by mouth every 6 hours as needed for Moderate Pain. 20 Tab 0     No current facility-administered medications for this visit.     She  has a past medical history of Heart murmur. She also has no past medical history of Blood transfusion without reported diagnosis.    ROS  No chest pain, no shortness of breath, no abdominal pain       Objective:     /78   Pulse 85   Temp 36.4 °C (97.5 °F) (Temporal)   Resp 16   Ht 1.676 m (5' 6\")   Wt 114 kg (251 lb 3.2 oz)  Body mass index is 40.54 kg/m².   Physical Exam:  Constitutional: Alert, no distress.  Skin: Warm, dry, good turgor, no rashes in visible areas.  Eye: Equal, round and reactive, conjunctiva clear, lids normal.  ENMT: Lips without lesions, good dentition, oropharynx clear.  Neck: Trachea midline, no masses, no thyromegaly. No cervical or supraclavicular lymphadenopathy  Respiratory: Unlabored respiratory effort, lungs clear to auscultation, no wheezes, no ronchi.  Cardiovascular: Normal S1, S2, no murmur, no edema.  Abdomen: Soft, non-tender, no masses, no hepatosplenomegaly.  Psych: Alert and oriented x3, normal affect and mood.        Assessment and Plan:   The following treatment plan was discussed    1. Chronic right shoulder pain    - DX-SHOULDER 2+ RIGHT; Future  - cyclobenzaprine (FLEXERIL) 5 mg tablet; Take 1 Tablet by mouth 3 times a day as needed.  Dispense: 30 Tablet; Refill: 0  - Referral to Physical Therapy    2. Carpal tunnel syndrome of right wrist    - gabapentin (NEURONTIN) 300 MG Cap; Take 1 Capsule by mouth 3 times a day. For nerve pain  Dispense: 90 Capsule; Refill: 3  - Referral to Neurodiagnostics " (EEG,EP,EMG/NCS/DBS)  - Referral to Physical Therapy    3. Need for vaccination    - Hepatitis B Vaccine Adult IM    4. Elevated fasting glucose    - Comp Metabolic Panel; Future  - HEMOGLOBIN A1C; Future    5. High triglycerides    - Comp Metabolic Panel; Future  - Lipid Profile; Future    6. Anemia, unspecified type    - CBC WITH DIFFERENTIAL; Future      Followup: Return in about 6 months (around 8/4/2022) for shoulder pain, acid reflux.

## 2022-02-04 NOTE — PATIENT INSTRUCTIONS
Fasting labs in 6 months.     Gastroesophageal Reflux Disease, Adult  Gastroesophageal reflux (CHRISTA) happens when acid from the stomach flows up into the tube that connects the mouth and the stomach (esophagus). Normally, food travels down the esophagus and stays in the stomach to be digested. With CHRISTA, food and stomach acid sometimes move back up into the esophagus. You may have a disease called gastroesophageal reflux disease (GERD) if the reflux:  · Happens often.  · Causes frequent or very bad symptoms.  · Causes problems such as damage to the esophagus.  When this happens, the esophagus becomes sore and swollen (inflamed). Over time, GERD can make small holes (ulcers) in the lining of the esophagus.  What are the causes?  This condition is caused by a problem with the muscle between the esophagus and the stomach. When this muscle is weak or not normal, it does not close properly to keep food and acid from coming back up from the stomach. The muscle can be weak because of:  · Tobacco use.  · Pregnancy.  · Having a certain type of hernia (hiatal hernia).  · Alcohol use.  · Certain foods and drinks, such as coffee, chocolate, onions, and peppermint.  What increases the risk?  You are more likely to develop this condition if you:  · Are overweight.  · Have a disease that affects your connective tissue.  · Use NSAID medicines.  What are the signs or symptoms?  Symptoms of this condition include:  · Heartburn.  · Difficult or painful swallowing.  · The feeling of having a lump in the throat.  · A bitter taste in the mouth.  · Bad breath.  · Having a lot of saliva.  · Having an upset or bloated stomach.  · Belching.  · Chest pain. Different conditions can cause chest pain. Make sure you see your doctor if you have chest pain.  · Shortness of breath or noisy breathing (wheezing).  · Ongoing (chronic) cough or a cough at night.  · Wearing away of the surface of teeth (tooth enamel).  · Weight loss.  How is this  treated?  Treatment will depend on how bad your symptoms are. Your doctor may suggest:  · Changes to your diet.  · Medicine.  · Surgery.  Follow these instructions at home:  Eating and drinking    · Follow a diet as told by your doctor. You may need to avoid foods and drinks such as:  ? Coffee and tea (with or without caffeine).  ? Drinks that contain alcohol.  ? Energy drinks and sports drinks.  ? Bubbly (carbonated) drinks or sodas.  ? Chocolate and cocoa.  ? Peppermint and mint flavorings.  ? Garlic and onions.  ? Horseradish.  ? Spicy and acidic foods. These include peppers, chili powder, wu powder, vinegar, hot sauces, and BBQ sauce.  ? Citrus fruit juices and citrus fruits, such as oranges, renae, and limes.  ? Tomato-based foods. These include red sauce, chili, salsa, and pizza with red sauce.  ? Fried and fatty foods. These include donuts, french fries, potato chips, and high-fat dressings.  ? High-fat meats. These include hot dogs, rib eye steak, sausage, ham, and ross.  ? High-fat dairy items, such as whole milk, butter, and cream cheese.  · Eat small meals often. Avoid eating large meals.  · Avoid drinking large amounts of liquid with your meals.  · Avoid eating meals during the 2-3 hours before bedtime.  · Avoid lying down right after you eat.  · Do not exercise right after you eat.  Lifestyle    · Do not use any products that contain nicotine or tobacco. These include cigarettes, e-cigarettes, and chewing tobacco. If you need help quitting, ask your doctor.  · Try to lower your stress. If you need help doing this, ask your doctor.  · If you are overweight, lose an amount of weight that is healthy for you. Ask your doctor about a safe weight loss goal.  General instructions  · Pay attention to any changes in your symptoms.  · Take over-the-counter and prescription medicines only as told by your doctor. Do not take aspirin, ibuprofen, or other NSAIDs unless your doctor says it is okay.  · Wear loose  clothes. Do not wear anything tight around your waist.  · Raise (elevate) the head of your bed about 6 inches (15 cm).  · Avoid bending over if this makes your symptoms worse.  · Keep all follow-up visits as told by your doctor. This is important.  Contact a doctor if:  · You have new symptoms.  · You lose weight and you do not know why.  · You have trouble swallowing or it hurts to swallow.  · You have wheezing or a cough that keeps happening.  · Your symptoms do not get better with treatment.  · You have a hoarse voice.  Get help right away if:  · You have pain in your arms, neck, jaw, teeth, or back.  · You feel sweaty, dizzy, or light-headed.  · You have chest pain or shortness of breath.  · You throw up (vomit) and your throw-up looks like blood or coffee grounds.  · You pass out (faint).  · Your poop (stool) is bloody or black.  · You cannot swallow, drink, or eat.  Summary  · If a person has gastroesophageal reflux disease (GERD), food and stomach acid move back up into the esophagus and cause symptoms or problems such as damage to the esophagus.  · Treatment will depend on how bad your symptoms are.  · Follow a diet as told by your doctor.  · Take all medicines only as told by your doctor.  This information is not intended to replace advice given to you by your health care provider. Make sure you discuss any questions you have with your health care provider.  Document Released: 06/05/2009 Document Revised: 06/26/2019 Document Reviewed: 06/26/2019  [employer] Patient Education © 2020 [employer]

## 2022-02-04 NOTE — ASSESSMENT & PLAN NOTE
5 years ago patient had a crush injury of the right shoulder in an accident when she was under a truck and it fell on her.  At that time her shoulder had dislocated.  Sometimes patient notes her shoulder joint will pop really hard.  Over the last 7 months she has increasing shoulder pain in the right shoulder.  With certain actions the pain is worse especially with raising her arm up over her head.  She also has some carpal tunnel symptoms with pain radiating elbow to wrist and decreased strength in the fourth and fifth digits of her right hand.    She has been using ibuprofen and Tylenol and heat also helps and she also uses ice treatment.  I will get an x-ray done today of her right shoulder.  Referral to physical therapy provided  Prescription for muscle relaxant provided  If these measures do not help patient will need an MRI in the future to evaluate for rotator cuff tear as she is young and this would need to be treated to preserve full strength and range of motion in her shoulder.

## 2022-02-05 NOTE — RESULT ENCOUNTER NOTE
Released to [facility],  Your right shoulder xray is normal!  Storm Miles M.D.

## 2022-03-08 ENCOUNTER — APPOINTMENT (OUTPATIENT)
Dept: NEUROLOGY | Facility: MEDICAL CENTER | Age: 37
End: 2022-03-08
Attending: PSYCHIATRY & NEUROLOGY
Payer: COMMERCIAL

## 2022-03-27 ENCOUNTER — OFFICE VISIT (OUTPATIENT)
Dept: URGENT CARE | Facility: PHYSICIAN GROUP | Age: 37
End: 2022-03-27
Payer: COMMERCIAL

## 2022-03-27 VITALS
WEIGHT: 245 LBS | DIASTOLIC BLOOD PRESSURE: 76 MMHG | HEIGHT: 65 IN | RESPIRATION RATE: 16 BRPM | OXYGEN SATURATION: 98 % | SYSTOLIC BLOOD PRESSURE: 138 MMHG | BODY MASS INDEX: 40.82 KG/M2 | TEMPERATURE: 97.2 F | HEART RATE: 97 BPM

## 2022-03-27 DIAGNOSIS — K08.89 PAIN, DENTAL: ICD-10-CM

## 2022-03-27 PROCEDURE — 99213 OFFICE O/P EST LOW 20 MIN: CPT | Performed by: FAMILY MEDICINE

## 2022-03-27 RX ORDER — CLINDAMYCIN HYDROCHLORIDE 150 MG/1
CAPSULE ORAL
COMMUNITY
Start: 2022-03-24 | End: 2022-08-31

## 2022-03-27 RX ORDER — HYDROCODONE BITARTRATE AND ACETAMINOPHEN 5; 325 MG/1; MG/1
TABLET ORAL
COMMUNITY
Start: 2022-03-10 | End: 2022-03-27

## 2022-03-27 RX ORDER — HYDROCODONE BITARTRATE AND ACETAMINOPHEN 5; 325 MG/1; MG/1
TABLET ORAL
Qty: 28 TABLET | Refills: 0 | Status: SHIPPED | OUTPATIENT
Start: 2022-03-27 | End: 2022-04-03

## 2022-03-27 NOTE — PROGRESS NOTES
Chief Complaint:    Chief Complaint   Patient presents with   • Dental Pain   • Abscess     Oral Surgery set 1 month out. Left side.       History of Present Illness:    She has been dealing with ongoing left posterior lower molar problem, currently on Clindamycin for this, but is requesting pain medication due to severity of pain. She reports Dentist won't prescribe this again without her being seen. OTC meds not helping. She has a scheduled appointment with Oral surgeon on 4/4/22.      Past Medical History:    Past Medical History:   Diagnosis Date   • Heart murmur      Past Surgical History:    Past Surgical History:   Procedure Laterality Date   • OTHER Left 2015    Left leg, debridement of wound with MRSA     Social History:    Social History     Socioeconomic History   • Marital status:      Spouse name: Not on file   • Number of children: Not on file   • Years of education: Not on file   • Highest education level: Not on file   Occupational History   • Not on file   Tobacco Use   • Smoking status: Current Every Day Smoker     Packs/day: 0.10     Years: 17.00     Pack years: 1.70   • Smokeless tobacco: Never Used   • Tobacco comment: socially, only every few months   Vaping Use   • Vaping Use: Never used   Substance and Sexual Activity   • Alcohol use: No     Alcohol/week: 1.2 oz     Types: 2 Standard drinks or equivalent per week     Comment: socially - rare   • Drug use: No   • Sexual activity: Yes     Partners: Male   Other Topics Concern   • Not on file   Social History Narrative   • Not on file     Social Determinants of Health     Financial Resource Strain: Not on file   Food Insecurity: Not on file   Transportation Needs: Not on file   Physical Activity: Not on file   Stress: Not on file   Social Connections: Not on file   Intimate Partner Violence: Not on file   Housing Stability: Not on file     Family History:    Family History   Problem Relation Age of Onset   • Cancer Mother         uterine  "and skin   • Thyroid Mother         removed due to lump   • Diabetes Father    • Hypertension Father    • Cancer Sister         ovarian   • Alcohol abuse Brother      Medications:    Current Outpatient Medications on File Prior to Visit   Medication Sig Dispense Refill   • cyclobenzaprine (FLEXERIL) 5 mg tablet Take 1 Tablet by mouth 3 times a day as needed. 30 Tablet 0   • gabapentin (NEURONTIN) 300 MG Cap Take 1 Capsule by mouth 3 times a day. For nerve pain 90 Capsule 3   • omeprazole (PRILOSEC) 20 MG delayed-release capsule Take 1 Capsule by mouth every day. 90 Capsule 3   • acetaminophen (TYLENOL) 500 MG Tab Take 1-2 Tabs by mouth every 6 hours as needed for Moderate Pain. 20 Tab 0   • clindamycin (CLEOCIN) 150 MG Cap TAKE 1 CAPSULE BY MOUTH EVERY 8 HOURS FOR 7 DAYS       No current facility-administered medications on file prior to visit.     Allergies:    Allergies   Allergen Reactions   • Penicillins Unspecified     Previous hospitalization after penicillin - does not remember reaction    Cannot tolerate any Cillin       Vitals:    Vitals:    22 1214   BP: 138/76   Pulse: 97   Resp: 16   Temp: 36.2 °C (97.2 °F)   TempSrc: Temporal   SpO2: 98%   Weight: 111 kg (245 lb)   Height: 1.651 m (5' 5\")       Physical Exam:    Constitutional: Vital signs reviewed. Appears well-developed and well-nourished. No acute distress.   Eyes: Sclera white, conjunctivae clear.   ENT: Left posterior lower molar is decayed. External ears normal. Hearing normal. Lips are normal. Oral mucosa pink and moist.   Neck: Neck supple.   Pulmonary/Chest: Respirations non-labored.   Musculoskeletal: Normal gait. No muscular atrophy or weakness.  Neurological: Alert and oriented to person, place, and time. Muscle tone normal. Coordination normal.   Skin: No rashes or lesions. Warm, dry, normal turgor.  Psychiatric: Normal mood and affect. Behavior is normal. Judgment and thought content normal.       Medical Decision Makin. Pain, " dental  - HYDROcodone-acetaminophen (NORCO) 5-325 MG Tab per tablet; 1 TAB BY MOUTH EVERY 6 HOURS ONLY IF NEEDED FOR PAIN FOR UP TO 7 DAYS. MAY CAUSE DROWSINESS.  Dispense: 28 Tablet; Refill: 0      Discussed with her DDX, management options, and risks, benefits, and alternatives to treatment plan agreed upon.    She has been dealing with ongoing left posterior lower molar problem, currently on Clindamycin for this, but is requesting pain medication due to severity of pain. She reports Dentist won't prescribe this again without her being seen. OTC meds not helping. She has a scheduled appointment with Oral surgeon on 4/4/22.    Left posterior lower molar is decayed.     Agreeable to medication prescribed.  report checked - last Rx was Hydrocodone-APAP 5-325 mg #12 on 3/10/22. No other Rx x 3 years.    In my professional judgment, after considering each of the factors set forth in , the CS is medically necessary and appropriate.    Advised to see Oral surgeon as scheduled on 4/4/22 for definitive treatment.    Will return to urgent care if needed.

## 2022-07-28 ENCOUNTER — HOSPITAL ENCOUNTER (OUTPATIENT)
Dept: LAB | Facility: MEDICAL CENTER | Age: 37
End: 2022-07-28
Attending: FAMILY MEDICINE
Payer: COMMERCIAL

## 2022-07-28 DIAGNOSIS — D64.9 ANEMIA, UNSPECIFIED TYPE: ICD-10-CM

## 2022-07-28 DIAGNOSIS — K21.9 GASTROESOPHAGEAL REFLUX DISEASE WITHOUT ESOPHAGITIS: ICD-10-CM

## 2022-07-28 DIAGNOSIS — E78.1 HIGH TRIGLYCERIDES: ICD-10-CM

## 2022-07-28 DIAGNOSIS — R73.01 ELEVATED FASTING GLUCOSE: ICD-10-CM

## 2022-07-28 LAB
ALBUMIN SERPL BCP-MCNC: 4.3 G/DL (ref 3.2–4.9)
ALBUMIN/GLOB SERPL: 1.2 G/DL
ALP SERPL-CCNC: 77 U/L (ref 30–99)
ALT SERPL-CCNC: 11 U/L (ref 2–50)
ANION GAP SERPL CALC-SCNC: 12 MMOL/L (ref 7–16)
AST SERPL-CCNC: 14 U/L (ref 12–45)
BASOPHILS # BLD AUTO: 0.5 % (ref 0–1.8)
BASOPHILS # BLD: 0.06 K/UL (ref 0–0.12)
BILIRUB SERPL-MCNC: 0.6 MG/DL (ref 0.1–1.5)
BUN SERPL-MCNC: 14 MG/DL (ref 8–22)
CALCIUM SERPL-MCNC: 9 MG/DL (ref 8.5–10.5)
CHLORIDE SERPL-SCNC: 107 MMOL/L (ref 96–112)
CHOLEST SERPL-MCNC: 177 MG/DL (ref 100–199)
CO2 SERPL-SCNC: 21 MMOL/L (ref 20–33)
CREAT SERPL-MCNC: 0.76 MG/DL (ref 0.5–1.4)
EOSINOPHIL # BLD AUTO: 0.08 K/UL (ref 0–0.51)
EOSINOPHIL NFR BLD: 0.7 % (ref 0–6.9)
ERYTHROCYTE [DISTWIDTH] IN BLOOD BY AUTOMATED COUNT: 45 FL (ref 35.9–50)
EST. AVERAGE GLUCOSE BLD GHB EST-MCNC: 126 MG/DL
FASTING STATUS PATIENT QL REPORTED: NORMAL
FERRITIN SERPL-MCNC: 45.2 NG/ML (ref 10–291)
GFR SERPLBLD CREATININE-BSD FMLA CKD-EPI: 103 ML/MIN/1.73 M 2
GLOBULIN SER CALC-MCNC: 3.5 G/DL (ref 1.9–3.5)
GLUCOSE SERPL-MCNC: 99 MG/DL (ref 65–99)
H PYLORI AG STL QL IA: NOT DETECTED
HBA1C MFR BLD: 6 % (ref 4–5.6)
HCT VFR BLD AUTO: 42.1 % (ref 37–47)
HDLC SERPL-MCNC: 34 MG/DL
HGB BLD-MCNC: 13.1 G/DL (ref 12–16)
IMM GRANULOCYTES # BLD AUTO: 0.02 K/UL (ref 0–0.11)
IMM GRANULOCYTES NFR BLD AUTO: 0.2 % (ref 0–0.9)
IRON SATN MFR SERPL: 21 % (ref 15–55)
IRON SERPL-MCNC: 68 UG/DL (ref 40–170)
LDLC SERPL CALC-MCNC: 113 MG/DL
LYMPHOCYTES # BLD AUTO: 4.62 K/UL (ref 1–4.8)
LYMPHOCYTES NFR BLD: 39.6 % (ref 22–41)
MCH RBC QN AUTO: 25.2 PG (ref 27–33)
MCHC RBC AUTO-ENTMCNC: 31.1 G/DL (ref 33.6–35)
MCV RBC AUTO: 81 FL (ref 81.4–97.8)
MONOCYTES # BLD AUTO: 0.59 K/UL (ref 0–0.85)
MONOCYTES NFR BLD AUTO: 5.1 % (ref 0–13.4)
NEUTROPHILS # BLD AUTO: 6.31 K/UL (ref 2–7.15)
NEUTROPHILS NFR BLD: 53.9 % (ref 44–72)
NRBC # BLD AUTO: 0 K/UL
NRBC BLD-RTO: 0 /100 WBC
PLATELET # BLD AUTO: 428 K/UL (ref 164–446)
PMV BLD AUTO: 10.2 FL (ref 9–12.9)
POTASSIUM SERPL-SCNC: 4.2 MMOL/L (ref 3.6–5.5)
PROT SERPL-MCNC: 7.8 G/DL (ref 6–8.2)
RBC # BLD AUTO: 5.2 M/UL (ref 4.2–5.4)
SODIUM SERPL-SCNC: 140 MMOL/L (ref 135–145)
TIBC SERPL-MCNC: 320 UG/DL (ref 250–450)
TRIGL SERPL-MCNC: 150 MG/DL (ref 0–149)
UIBC SERPL-MCNC: 252 UG/DL (ref 110–370)
VIT B12 SERPL-MCNC: 477 PG/ML (ref 211–911)
WBC # BLD AUTO: 11.7 K/UL (ref 4.8–10.8)

## 2022-07-28 PROCEDURE — 83036 HEMOGLOBIN GLYCOSYLATED A1C: CPT

## 2022-07-28 PROCEDURE — 83540 ASSAY OF IRON: CPT

## 2022-07-28 PROCEDURE — 80061 LIPID PANEL: CPT

## 2022-07-28 PROCEDURE — 36415 COLL VENOUS BLD VENIPUNCTURE: CPT

## 2022-07-28 PROCEDURE — 85025 COMPLETE CBC W/AUTO DIFF WBC: CPT

## 2022-07-28 PROCEDURE — 82728 ASSAY OF FERRITIN: CPT

## 2022-07-28 PROCEDURE — 82607 VITAMIN B-12: CPT

## 2022-07-28 PROCEDURE — 83550 IRON BINDING TEST: CPT

## 2022-07-28 PROCEDURE — 80053 COMPREHEN METABOLIC PANEL: CPT

## 2022-07-28 PROCEDURE — 84207 ASSAY OF VITAMIN B-6: CPT

## 2022-07-28 PROCEDURE — 87338 HPYLORI STOOL AG IA: CPT

## 2022-08-01 LAB — VIT B6 SERPL-MCNC: 21.5 NMOL/L (ref 20–125)

## 2022-08-07 ENCOUNTER — HOSPITAL ENCOUNTER (EMERGENCY)
Facility: MEDICAL CENTER | Age: 37
End: 2022-08-07
Attending: STUDENT IN AN ORGANIZED HEALTH CARE EDUCATION/TRAINING PROGRAM
Payer: COMMERCIAL

## 2022-08-07 ENCOUNTER — APPOINTMENT (OUTPATIENT)
Dept: RADIOLOGY | Facility: MEDICAL CENTER | Age: 37
End: 2022-08-07
Attending: STUDENT IN AN ORGANIZED HEALTH CARE EDUCATION/TRAINING PROGRAM
Payer: COMMERCIAL

## 2022-08-07 VITALS
DIASTOLIC BLOOD PRESSURE: 58 MMHG | WEIGHT: 245 LBS | OXYGEN SATURATION: 92 % | TEMPERATURE: 99 F | SYSTOLIC BLOOD PRESSURE: 116 MMHG | HEART RATE: 89 BPM | RESPIRATION RATE: 24 BRPM | HEIGHT: 65 IN | BODY MASS INDEX: 40.82 KG/M2

## 2022-08-07 DIAGNOSIS — U07.1 COVID-19: ICD-10-CM

## 2022-08-07 DIAGNOSIS — R51.9 NONINTRACTABLE HEADACHE, UNSPECIFIED CHRONICITY PATTERN, UNSPECIFIED HEADACHE TYPE: ICD-10-CM

## 2022-08-07 DIAGNOSIS — E86.0 DEHYDRATION: ICD-10-CM

## 2022-08-07 DIAGNOSIS — A41.9 SEPSIS, DUE TO UNSPECIFIED ORGANISM, UNSPECIFIED WHETHER ACUTE ORGAN DYSFUNCTION PRESENT (HCC): ICD-10-CM

## 2022-08-07 DIAGNOSIS — R11.0 NAUSEA: ICD-10-CM

## 2022-08-07 LAB
ALBUMIN SERPL BCP-MCNC: 4.6 G/DL (ref 3.2–4.9)
ALBUMIN/GLOB SERPL: 1.3 G/DL
ALP SERPL-CCNC: 76 U/L (ref 30–99)
ALT SERPL-CCNC: 14 U/L (ref 2–50)
ANION GAP SERPL CALC-SCNC: 15 MMOL/L (ref 7–16)
APPEARANCE UR: CLEAR
AST SERPL-CCNC: 15 U/L (ref 12–45)
BACTERIA #/AREA URNS HPF: NEGATIVE /HPF
BASOPHILS # BLD AUTO: 0.3 % (ref 0–1.8)
BASOPHILS # BLD: 0.04 K/UL (ref 0–0.12)
BILIRUB SERPL-MCNC: 0.2 MG/DL (ref 0.1–1.5)
BILIRUB UR QL STRIP.AUTO: NEGATIVE
BUN SERPL-MCNC: 9 MG/DL (ref 8–22)
CALCIUM SERPL-MCNC: 9.5 MG/DL (ref 8.5–10.5)
CHLORIDE SERPL-SCNC: 102 MMOL/L (ref 96–112)
CO2 SERPL-SCNC: 18 MMOL/L (ref 20–33)
COLOR UR: YELLOW
CREAT SERPL-MCNC: 0.8 MG/DL (ref 0.5–1.4)
EKG IMPRESSION: NORMAL
EOSINOPHIL # BLD AUTO: 0.01 K/UL (ref 0–0.51)
EOSINOPHIL NFR BLD: 0.1 % (ref 0–6.9)
EPI CELLS #/AREA URNS HPF: NEGATIVE /HPF
ERYTHROCYTE [DISTWIDTH] IN BLOOD BY AUTOMATED COUNT: 42.5 FL (ref 35.9–50)
FLUAV RNA SPEC QL NAA+PROBE: NEGATIVE
FLUBV RNA SPEC QL NAA+PROBE: NEGATIVE
GFR SERPLBLD CREATININE-BSD FMLA CKD-EPI: 97 ML/MIN/1.73 M 2
GLOBULIN SER CALC-MCNC: 3.5 G/DL (ref 1.9–3.5)
GLUCOSE SERPL-MCNC: 117 MG/DL (ref 65–99)
GLUCOSE UR STRIP.AUTO-MCNC: NEGATIVE MG/DL
HCG SERPL QL: NEGATIVE
HCT VFR BLD AUTO: 39.9 % (ref 37–47)
HGB BLD-MCNC: 13.2 G/DL (ref 12–16)
HYALINE CASTS #/AREA URNS LPF: NORMAL /LPF
IMM GRANULOCYTES # BLD AUTO: 0.03 K/UL (ref 0–0.11)
IMM GRANULOCYTES NFR BLD AUTO: 0.3 % (ref 0–0.9)
KETONES UR STRIP.AUTO-MCNC: NEGATIVE MG/DL
LACTATE SERPL-SCNC: 2 MMOL/L (ref 0.5–2)
LACTATE SERPL-SCNC: 2.6 MMOL/L (ref 0.5–2)
LEUKOCYTE ESTERASE UR QL STRIP.AUTO: ABNORMAL
LYMPHOCYTES # BLD AUTO: 1.14 K/UL (ref 1–4.8)
LYMPHOCYTES NFR BLD: 9.8 % (ref 22–41)
MCH RBC QN AUTO: 25.5 PG (ref 27–33)
MCHC RBC AUTO-ENTMCNC: 33.1 G/DL (ref 33.6–35)
MCV RBC AUTO: 77.2 FL (ref 81.4–97.8)
MICRO URNS: ABNORMAL
MONOCYTES # BLD AUTO: 0.51 K/UL (ref 0–0.85)
MONOCYTES NFR BLD AUTO: 4.4 % (ref 0–13.4)
NEUTROPHILS # BLD AUTO: 9.86 K/UL (ref 2–7.15)
NEUTROPHILS NFR BLD: 85.1 % (ref 44–72)
NITRITE UR QL STRIP.AUTO: NEGATIVE
NRBC # BLD AUTO: 0 K/UL
NRBC BLD-RTO: 0 /100 WBC
PH UR STRIP.AUTO: 7 [PH] (ref 5–8)
PLATELET # BLD AUTO: 356 K/UL (ref 164–446)
PMV BLD AUTO: 9.7 FL (ref 9–12.9)
POTASSIUM SERPL-SCNC: 4 MMOL/L (ref 3.6–5.5)
PROT SERPL-MCNC: 8.1 G/DL (ref 6–8.2)
PROT UR QL STRIP: NEGATIVE MG/DL
RBC # BLD AUTO: 5.17 M/UL (ref 4.2–5.4)
RBC # URNS HPF: NORMAL /HPF
RBC UR QL AUTO: NEGATIVE
RSV RNA SPEC QL NAA+PROBE: NEGATIVE
SARS-COV-2 RNA RESP QL NAA+PROBE: DETECTED
SODIUM SERPL-SCNC: 135 MMOL/L (ref 135–145)
SP GR UR STRIP.AUTO: 1.01
SPECIMEN SOURCE: ABNORMAL
TROPONIN T SERPL-MCNC: <6 NG/L (ref 6–19)
UROBILINOGEN UR STRIP.AUTO-MCNC: 0.2 MG/DL
WBC # BLD AUTO: 11.6 K/UL (ref 4.8–10.8)
WBC #/AREA URNS HPF: NORMAL /HPF

## 2022-08-07 PROCEDURE — 93005 ELECTROCARDIOGRAM TRACING: CPT | Performed by: STUDENT IN AN ORGANIZED HEALTH CARE EDUCATION/TRAINING PROGRAM

## 2022-08-07 PROCEDURE — 87040 BLOOD CULTURE FOR BACTERIA: CPT | Mod: 91

## 2022-08-07 PROCEDURE — 87086 URINE CULTURE/COLONY COUNT: CPT

## 2022-08-07 PROCEDURE — 36415 COLL VENOUS BLD VENIPUNCTURE: CPT

## 2022-08-07 PROCEDURE — 71045 X-RAY EXAM CHEST 1 VIEW: CPT

## 2022-08-07 PROCEDURE — C9803 HOPD COVID-19 SPEC COLLECT: HCPCS | Performed by: STUDENT IN AN ORGANIZED HEALTH CARE EDUCATION/TRAINING PROGRAM

## 2022-08-07 PROCEDURE — 700102 HCHG RX REV CODE 250 W/ 637 OVERRIDE(OP)

## 2022-08-07 PROCEDURE — C9803 HOPD COVID-19 SPEC COLLECT: HCPCS

## 2022-08-07 PROCEDURE — 96375 TX/PRO/DX INJ NEW DRUG ADDON: CPT

## 2022-08-07 PROCEDURE — 700105 HCHG RX REV CODE 258: Performed by: STUDENT IN AN ORGANIZED HEALTH CARE EDUCATION/TRAINING PROGRAM

## 2022-08-07 PROCEDURE — 0241U HCHG SARS-COV-2 COVID-19 NFCT DS RESP RNA 4 TRGT MIC: CPT

## 2022-08-07 PROCEDURE — 81001 URINALYSIS AUTO W/SCOPE: CPT

## 2022-08-07 PROCEDURE — 83605 ASSAY OF LACTIC ACID: CPT

## 2022-08-07 PROCEDURE — 84703 CHORIONIC GONADOTROPIN ASSAY: CPT

## 2022-08-07 PROCEDURE — 85025 COMPLETE CBC W/AUTO DIFF WBC: CPT

## 2022-08-07 PROCEDURE — 700111 HCHG RX REV CODE 636 W/ 250 OVERRIDE (IP): Performed by: STUDENT IN AN ORGANIZED HEALTH CARE EDUCATION/TRAINING PROGRAM

## 2022-08-07 PROCEDURE — 96374 THER/PROPH/DIAG INJ IV PUSH: CPT

## 2022-08-07 PROCEDURE — 99284 EMERGENCY DEPT VISIT MOD MDM: CPT

## 2022-08-07 PROCEDURE — A9270 NON-COVERED ITEM OR SERVICE: HCPCS

## 2022-08-07 PROCEDURE — 80053 COMPREHEN METABOLIC PANEL: CPT

## 2022-08-07 PROCEDURE — 84484 ASSAY OF TROPONIN QUANT: CPT

## 2022-08-07 RX ORDER — SODIUM CHLORIDE 9 MG/ML
30 INJECTION, SOLUTION INTRAVENOUS ONCE
Status: COMPLETED | OUTPATIENT
Start: 2022-08-07 | End: 2022-08-07

## 2022-08-07 RX ORDER — ONDANSETRON 4 MG/1
4 TABLET, FILM COATED ORAL EVERY 4 HOURS PRN
Qty: 20 TABLET | Refills: 0 | Status: SHIPPED | OUTPATIENT
Start: 2022-08-07 | End: 2022-08-14

## 2022-08-07 RX ORDER — PROCHLORPERAZINE EDISYLATE 5 MG/ML
10 INJECTION INTRAMUSCULAR; INTRAVENOUS ONCE
Status: COMPLETED | OUTPATIENT
Start: 2022-08-07 | End: 2022-08-07

## 2022-08-07 RX ORDER — DIPHENHYDRAMINE HYDROCHLORIDE 50 MG/ML
12.5 INJECTION INTRAMUSCULAR; INTRAVENOUS ONCE
Status: COMPLETED | OUTPATIENT
Start: 2022-08-07 | End: 2022-08-07

## 2022-08-07 RX ORDER — ACETAMINOPHEN 325 MG/1
650 TABLET ORAL ONCE
Status: COMPLETED | OUTPATIENT
Start: 2022-08-07 | End: 2022-08-07

## 2022-08-07 RX ADMIN — ACETAMINOPHEN 650 MG: 325 TABLET, FILM COATED ORAL at 18:45

## 2022-08-07 RX ADMIN — DIPHENHYDRAMINE HYDROCHLORIDE 12.5 MG: 50 INJECTION INTRAMUSCULAR; INTRAVENOUS at 20:28

## 2022-08-07 RX ADMIN — PROCHLORPERAZINE EDISYLATE 10 MG: 5 INJECTION INTRAMUSCULAR; INTRAVENOUS at 20:28

## 2022-08-07 RX ADMIN — SODIUM CHLORIDE 1710 ML: 9 INJECTION, SOLUTION INTRAVENOUS at 20:28

## 2022-08-07 ASSESSMENT — FIBROSIS 4 INDEX: FIB4 SCORE: 0.36

## 2022-08-07 ASSESSMENT — ENCOUNTER SYMPTOMS
LOSS OF CONSCIOUSNESS: 0
CHILLS: 1
NAUSEA: 1
SHORTNESS OF BREATH: 1
FEVER: 1
DIAPHORESIS: 1
NECK PAIN: 0
MYALGIAS: 1
HEADACHES: 1
SORE THROAT: 1
BLURRED VISION: 0
VOMITING: 0
ABDOMINAL PAIN: 0
COUGH: 1
DOUBLE VISION: 0
FALLS: 0

## 2022-08-08 NOTE — ED TRIAGE NOTES
"Chief Complaint   Patient presents with   • Fever     today   • Headache     This am, has hx of migraine. Pt is photosensitive   • Cough     Coughing yellowish sputum   • Flu Like Symptoms     This am.     Also c/o general body aches and \"sense of taste is off\". Tested positive for covid using home kit test. Sepsis score of 4  "

## 2022-08-08 NOTE — ED PROVIDER NOTES
ED Provider Note    Chief Complaint:   Headache    HPI:  Alie Lowe is a very pleasant 37-year-old female not vaccinated against COVID-19 who presents with fevers, chills, body aches, headache, sweats, nausea shortness of breath, dry cough, sore throat, congestion.  Patient reports history of GERD and on omeprazole at this time.  Patient reports that she is COVID-positive per home test.  Patient denies chest pain, altered mental status.  Patient reports the headache is dull, constant, moderate intensity.  She denies taking medicine at home.  Patient reports that her sense of smell is off.    Review of Systems:  Review of Systems   Constitutional: Positive for chills, diaphoresis and fever.   HENT: Positive for congestion and sore throat.    Eyes: Negative for blurred vision and double vision.   Respiratory: Positive for cough and shortness of breath.    Cardiovascular: Negative for chest pain and leg swelling.   Gastrointestinal: Positive for nausea. Negative for abdominal pain and vomiting.   Genitourinary: Negative for dysuria and hematuria.   Musculoskeletal: Positive for myalgias. Negative for falls and neck pain.   Skin: Negative for itching and rash.   Neurological: Positive for headaches. Negative for loss of consciousness.       Family History:  Family History   Problem Relation Age of Onset   • Cancer Mother         uterine and skin   • Thyroid Mother         removed due to lump   • Diabetes Father    • Hypertension Father    • Cancer Sister         ovarian   • Alcohol abuse Brother        Past Medical History:   has a past medical history of Heart murmur.    Social History:  Social History     Tobacco Use   • Smoking status: Current Every Day Smoker     Packs/day: 0.10     Years: 17.00     Pack years: 1.70   • Smokeless tobacco: Never Used   • Tobacco comment: socially, only every few months   Vaping Use   • Vaping Use: Never used   Substance and Sexual Activity   • Alcohol use: No      "Alcohol/week: 1.2 oz     Types: 2 Standard drinks or equivalent per week     Comment: socially - rare   • Drug use: No   • Sexual activity: Yes     Partners: Male       Surgical History:   has a past surgical history that includes other (Left, 2015).    Allergies:  Allergies   Allergen Reactions   • Penicillins Unspecified     Previous hospitalization after penicillin - does not remember reaction    Cannot tolerate any Cillin       Physical Exam:  Vital Signs: /58   Pulse 89   Temp 37.2 °C (99 °F) (Temporal)   Resp (!) 24   Ht 1.651 m (5' 5\")   Wt 111 kg (245 lb)   SpO2 92%   BMI 40.77 kg/m²   Physical Exam  Vitals and nursing note reviewed.   Constitutional:       Comments: Patient is lying in bed supine, pleasant, conversant, speaking in complete sentences, appears fatigued   HENT:      Head: Normocephalic and atraumatic.   Eyes:      Extraocular Movements: Extraocular movements intact.      Conjunctiva/sclera: Conjunctivae normal.      Pupils: Pupils are equal, round, and reactive to light.   Cardiovascular:      Pulses: Normal pulses.      Comments: HR 89  Pulmonary:      Effort: Pulmonary effort is normal. No respiratory distress.   Musculoskeletal:         General: No swelling. Normal range of motion.      Cervical back: Normal range of motion. No rigidity.   Skin:     General: Skin is warm and dry.      Capillary Refill: Capillary refill takes less than 2 seconds.   Neurological:      Mental Status: She is alert.         Medical records reviewed for continuity of care.     Results for orders placed or performed during the hospital encounter of 08/07/22   Lactic acid (lactate)   Result Value Ref Range    Lactic Acid 2.6 (H) 0.5 - 2.0 mmol/L   Lactic acid (lactate): Repeat if initial lactic acid result is greater than 2   Result Value Ref Range    Lactic Acid 2.0 0.5 - 2.0 mmol/L   CBC WITH DIFFERENTIAL   Result Value Ref Range    WBC 11.6 (H) 4.8 - 10.8 K/uL    RBC 5.17 4.20 - 5.40 M/uL    " Hemoglobin 13.2 12.0 - 16.0 g/dL    Hematocrit 39.9 37.0 - 47.0 %    MCV 77.2 (L) 81.4 - 97.8 fL    MCH 25.5 (L) 27.0 - 33.0 pg    MCHC 33.1 (L) 33.6 - 35.0 g/dL    RDW 42.5 35.9 - 50.0 fL    Platelet Count 356 164 - 446 K/uL    MPV 9.7 9.0 - 12.9 fL    Neutrophils-Polys 85.10 (H) 44.00 - 72.00 %    Lymphocytes 9.80 (L) 22.00 - 41.00 %    Monocytes 4.40 0.00 - 13.40 %    Eosinophils 0.10 0.00 - 6.90 %    Basophils 0.30 0.00 - 1.80 %    Immature Granulocytes 0.30 0.00 - 0.90 %    Nucleated RBC 0.00 /100 WBC    Neutrophils (Absolute) 9.86 (H) 2.00 - 7.15 K/uL    Lymphs (Absolute) 1.14 1.00 - 4.80 K/uL    Monos (Absolute) 0.51 0.00 - 0.85 K/uL    Eos (Absolute) 0.01 0.00 - 0.51 K/uL    Baso (Absolute) 0.04 0.00 - 0.12 K/uL    Immature Granulocytes (abs) 0.03 0.00 - 0.11 K/uL    NRBC (Absolute) 0.00 K/uL   COMP METABOLIC PANEL   Result Value Ref Range    Sodium 135 135 - 145 mmol/L    Potassium 4.0 3.6 - 5.5 mmol/L    Chloride 102 96 - 112 mmol/L    Co2 18 (L) 20 - 33 mmol/L    Anion Gap 15.0 7.0 - 16.0    Glucose 117 (H) 65 - 99 mg/dL    Bun 9 8 - 22 mg/dL    Creatinine 0.80 0.50 - 1.40 mg/dL    Calcium 9.5 8.5 - 10.5 mg/dL    AST(SGOT) 15 12 - 45 U/L    ALT(SGPT) 14 2 - 50 U/L    Alkaline Phosphatase 76 30 - 99 U/L    Total Bilirubin 0.2 0.1 - 1.5 mg/dL    Albumin 4.6 3.2 - 4.9 g/dL    Total Protein 8.1 6.0 - 8.2 g/dL    Globulin 3.5 1.9 - 3.5 g/dL    A-G Ratio 1.3 g/dL   URINALYSIS    Specimen: Urine   Result Value Ref Range    Color Yellow     Character Clear     Specific Gravity 1.011 <1.035    Ph 7.0 5.0 - 8.0    Glucose Negative Negative mg/dL    Ketones Negative Negative mg/dL    Protein Negative Negative mg/dL    Bilirubin Negative Negative    Urobilinogen, Urine 0.2 Negative    Nitrite Negative Negative    Leukocyte Esterase Small (A) Negative    Occult Blood Negative Negative    Micro Urine Req Microscopic    CoV-2, FLU A/B, and RSV by PCR (2-4 Hours CEPHEID) : Collect NP swab in VTM    Specimen: Respirate    Result Value Ref Range    Influenza virus A RNA Negative Negative    Influenza virus B, PCR Negative Negative    RSV, PCR Negative Negative    SARS-CoV-2 by PCR DETECTED (AA)     SARS-CoV-2 Source NP Swab    ESTIMATED GFR   Result Value Ref Range    GFR (CKD-EPI) 97 >60 mL/min/1.73 m 2   HCG QUAL SERUM   Result Value Ref Range    Beta-Hcg Qualitative Serum Negative Negative   TROPONIN   Result Value Ref Range    Troponin T <6 6 - 19 ng/L   URINE MICROSCOPIC (W/UA)   Result Value Ref Range    WBC 2-5 /hpf    RBC 0-2 /hpf    Bacteria Negative None /hpf    Epithelial Cells Negative /hpf    Hyaline Cast 0-2 /lpf   EKG   Result Value Ref Range    Report       [facility]    Test Date:  2022  Pt Name:    MERCEDES MEAD            Department: ER  MRN:        [id number]                      Room:       [facility]  Gender:     Female                       Technician: [id number]  :        [id number]                   Requested By:BRANDON MÉNDEZ  Order #:    [id number]                    Reading MD: Brandon Méndez MD    Measurements  Intervals                                Axis  Rate:       91                           P:          47  PA:         140                          QRS:        16  QRSD:       96                           T:          4  QT:         368  QTc:        453    Interpretive Statements  SINUS RHYTHM  No ST elevations or ST depressions  No T wave inversions  No ectopy, normal intervals  QTC within normal limits  Electronically Signed On 2022 22:26:56 PDT by Brandon Méndez MD         Radiology:  DX-CHEST-PORTABLE (1 VIEW)   Final Result         1.  No acute cardiopulmonary disease.           MDM:  EKG demonstrates no evidence of acute ischemic changes.  Chest x-ray demonstrates no acute cardiopulmonary process.  Urinalysis shows no evidence of UTI.  Troponin negative, ACS is inconsistent with patient presentation at this time.  hCG is negative.  CMP  demonstrates dehydration with bicarb of 18.  CBC demonstrates mild leukocytosis, COVID positive on respiratory viral panel.  Patient's vital signs of improved significantly following aggressive IV fluid resuscitation.  Patient has sepsis however her work-up is unremarkable for an acute bacterial infection.  Patient likely suffering from COVID-19.  Thankfully patient's oxygen saturations are stable at this time and did not require supplemental oxygen or inpatient admission.  Patient's fever, heart rate has improved.  Disposition Home with PCP follow-up turn precautions for worsening shortness of breath, symptoms.    Electronically signed by: Santosh Ménedz M.D., 8/7/2022, 10:23 PM    Patient headache is improved.  Repeat physical exam benign.  I doubt any serious emergency process at this time.  Patient and/or family, friends given strict return precautions and care instructions. They have demonstrated understanding of discharge instructions through teach back mechanism. Advised PCP follow-up in 1-2 days.  Patient/family/friend expresses understanding and agrees to plan.    This dictation has been created using voice recognition software. I am continuously working with the software to minimize the number of voice recognition errors and I have made every attempt to manually correct the errors within my dictation. However errors  related to this voice recognition software may still exist and should be interpreted within the appropriate context.     Electronically signed by: Santosh Méndez M.D., 8/7/2022 10:28 PM    Critical Care    I spent 32 minutes of critical care time with this patient. This does not include time spent on separately reported billable procedures.   This includes pulse ox interpretation, medical record review when available, interpretation of labs and imaging, specialist consultation, and hemodynamic monitoring.        Disposition:  Home    Final Impression:  1. COVID-19    2. Sepsis,  due to unspecified organism, unspecified whether acute organ dysfunction present (HCC)    3. Nonintractable headache, unspecified chronicity pattern, unspecified headache type    4. Nausea    5. Dehydration        Electronically signed by: Santosh Méndez M.D., 8/7/2022 10:28 PM

## 2022-08-10 LAB
BACTERIA UR CULT: NORMAL
SIGNIFICANT IND 70042: NORMAL
SITE SITE: NORMAL
SOURCE SOURCE: NORMAL

## 2022-08-12 LAB
BACTERIA BLD CULT: NORMAL
BACTERIA BLD CULT: NORMAL
SIGNIFICANT IND 70042: NORMAL
SIGNIFICANT IND 70042: NORMAL
SITE SITE: NORMAL
SITE SITE: NORMAL
SOURCE SOURCE: NORMAL
SOURCE SOURCE: NORMAL

## 2022-08-31 ENCOUNTER — OFFICE VISIT (OUTPATIENT)
Dept: MEDICAL GROUP | Facility: PHYSICIAN GROUP | Age: 37
End: 2022-08-31
Payer: COMMERCIAL

## 2022-08-31 ENCOUNTER — APPOINTMENT (OUTPATIENT)
Dept: RADIOLOGY | Facility: IMAGING CENTER | Age: 37
End: 2022-08-31
Attending: FAMILY MEDICINE
Payer: COMMERCIAL

## 2022-08-31 VITALS
HEIGHT: 66 IN | DIASTOLIC BLOOD PRESSURE: 76 MMHG | WEIGHT: 245 LBS | SYSTOLIC BLOOD PRESSURE: 124 MMHG | BODY MASS INDEX: 39.37 KG/M2 | OXYGEN SATURATION: 95 % | RESPIRATION RATE: 14 BRPM | HEART RATE: 95 BPM | TEMPERATURE: 98.2 F

## 2022-08-31 DIAGNOSIS — M54.6 THORACIC SPINE PAIN: ICD-10-CM

## 2022-08-31 DIAGNOSIS — M79.645 PAIN OF LEFT THUMB: ICD-10-CM

## 2022-08-31 PROCEDURE — 73120 X-RAY EXAM OF HAND: CPT | Mod: TC,FY,LT | Performed by: FAMILY MEDICINE

## 2022-08-31 PROCEDURE — 99214 OFFICE O/P EST MOD 30 MIN: CPT | Performed by: FAMILY MEDICINE

## 2022-08-31 RX ORDER — ETODOLAC 400 MG/1
400 TABLET, FILM COATED ORAL
COMMUNITY
Start: 2022-07-18 | End: 2023-02-25

## 2022-08-31 RX ORDER — METAXALONE 800 MG/1
TABLET ORAL
COMMUNITY
Start: 2022-07-18 | End: 2023-01-16

## 2022-08-31 ASSESSMENT — FIBROSIS 4 INDEX: FIB4 SCORE: 0.42

## 2022-08-31 NOTE — LETTER
August 31, 2022    To whom it may concern:     Alie Lowe was evaluated by me in clinic. Due to left thumb pain worsening please restrict her to light work: avoid repetitive motion of left thumb and avoid lifting objects more than 20 lbs.     Please contact me with any questions.     Thank you,           Storm Miles M.D.

## 2022-08-31 NOTE — ASSESSMENT & PLAN NOTE
Patient had work injury this year and found to have arthritis of spine. She has pain in T spine between her shoulder blades.   Ref to PT provided.   She is on NSAIDS and muscle relaxants but they helped temporarily and she works 12 hr days.   Ref to spine specialist provided.   She has home back exercises.

## 2022-08-31 NOTE — PATIENT INSTRUCTIONS
Please check [email or web address] for your referral information or call the referral department at .   You can also send me a TPACK message regarding your referral information.   Please note you will probably not get a call regarding the referral.      Please call  to schedule your xray/ultrasound/MRI/CT scan.   Xray alone is available at [facility]. Hours Monday-Friday 9 am -7 pm, Saturday and Sunday 9 am-3 pm

## 2022-08-31 NOTE — ASSESSMENT & PLAN NOTE
Has been feeling pain at base of left thumb.   Has a job using repetitive motions involving that joint.   Should use a thumb brace, is on ibuprofen since her back injury at work as xrays showed arthritis.   Ice advised  Xray ordered.   Limited range of motion of left thumb. Pain with extension.   Worse over last few days  Present for last few months.   Ref PT provided.

## 2022-09-01 NOTE — PROGRESS NOTES
"Subjective:   Alie Lowe is a 37 y.o. female here today for evaluation and management of:     Pain of left thumb  Has been feeling pain at base of left thumb.   Has a job using repetitive motions involving that joint.   Should use a thumb brace, is on ibuprofen since her back injury at work as xrays showed arthritis.   Ice advised  Xray ordered.   Limited range of motion of left thumb. Pain with extension.   Worse over last few days  Present for last few months.   Ref PT provided.     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 She has pain in T spine between her shoulder blades.   Ref to PT provided.   She is on NSAIDS and muscle relaxants but they helped temporarily and she works 12 hr days.   Ref to spine specialist provided.   She has home back exercises.        Current medicines (including changes today)  Current Outpatient Medications   Medication Sig Dispense Refill    etodolac (LODINE) 400 MG tablet Take 400 mg by mouth 3 times a day with meals.      metaxalone (SKELAXIN) 800 MG Tab TAKE 1/2 (ONE-HALF) TO 1 TABLET BY MOUTH THREE TIMES DAILY FOR SPASMS      omeprazole (PRILOSEC) 20 MG delayed-release capsule Take 1 Capsule by mouth every day. 90 Capsule 3    acetaminophen (TYLENOL) 500 MG Tab Take 1-2 Tabs by mouth every 6 hours as needed for Moderate Pain. 20 Tab 0     No current facility-administered medications for this visit.     She  has a past medical history of Heart murmur.    She has no past medical history of Blood transfusion without reported diagnosis.    ROS  No chest pain, no shortness of breath, no abdominal pain       Objective:     /76   Pulse 95   Temp 36.8 °C (98.2 °F) (Temporal)   Resp 14   Ht 1.676 m (5' 6\")   Wt 111 kg (245 lb)   SpO2 95%  Body mass index is 39.54 kg/m².   Physical Exam:  Constitutional: Alert, no distress.  Skin: Warm, dry, good turgor, no rashes in visible areas.  Eye: Equal, round and " reactive, conjunctiva clear, lids normal.  ENMT: Lips without lesions, good dentition, oropharynx clear.  Neck: Trachea midline, no masses, no thyromegaly. No cervical or supraclavicular lymphadenopathy  Respiratory: Unlabored respiratory effort, lungs clear to auscultation, no wheezes, no ronchi.  Cardiovascular: Normal S1, S2, no murmur, no edema.  Abdomen: Soft, non-tender, no masses, no hepatosplenomegaly.  Psych: Alert and oriented x3, normal affect and mood.        Assessment and Plan:   The following treatment plan was discussed    1. Pain of left thumb  - DX-HAND 2- LEFT; Future  - Referral to Physical Therapy    2. Thoracic spine pain  - Referral to Spine Surgery  - Referral to Physical Therapy    Other orders  - etodolac (LODINE) 400 MG tablet; Take 400 mg by mouth 3 times a day with meals.  - metaxalone (SKELAXIN) 800 MG Tab; TAKE 1/2 (ONE-HALF) TO 1 TABLET BY MOUTH THREE TIMES DAILY FOR SPASMS      Followup: Return in about 3 months (around 11/30/2022) for review labs, thumb pain.

## 2022-09-06 ENCOUNTER — TELEPHONE (OUTPATIENT)
Dept: MEDICAL GROUP | Facility: PHYSICIAN GROUP | Age: 37
End: 2022-09-06

## 2022-09-06 NOTE — TELEPHONE ENCOUNTER
1. Caller Name: Alie Sommersuilar                            Call Back Number: [phone] (home)         How would the patient prefer to be contacted with a response: Phone call do NOT leave a detailed message    Patient is requesting a note to return to work, please advise.

## 2022-12-15 ENCOUNTER — OFFICE VISIT (OUTPATIENT)
Dept: MEDICAL GROUP | Facility: PHYSICIAN GROUP | Age: 37
End: 2022-12-15
Payer: COMMERCIAL

## 2022-12-15 ENCOUNTER — PATIENT MESSAGE (OUTPATIENT)
Dept: MEDICAL GROUP | Facility: PHYSICIAN GROUP | Age: 37
End: 2022-12-15

## 2022-12-15 VITALS
HEART RATE: 99 BPM | TEMPERATURE: 98.6 F | RESPIRATION RATE: 14 BRPM | DIASTOLIC BLOOD PRESSURE: 82 MMHG | SYSTOLIC BLOOD PRESSURE: 130 MMHG | BODY MASS INDEX: 40.65 KG/M2 | OXYGEN SATURATION: 97 % | HEIGHT: 65 IN | WEIGHT: 244 LBS

## 2022-12-15 DIAGNOSIS — Z11.59 ENCOUNTER FOR HEPATITIS C SCREENING TEST FOR LOW RISK PATIENT: ICD-10-CM

## 2022-12-15 DIAGNOSIS — J02.9 SORE THROAT: ICD-10-CM

## 2022-12-15 DIAGNOSIS — R68.89 FLU-LIKE SYMPTOMS: ICD-10-CM

## 2022-12-15 DIAGNOSIS — Z23 NEED FOR VACCINATION: ICD-10-CM

## 2022-12-15 LAB
FLUAV+FLUBV AG SPEC QL IA: NEGATIVE
INT CON NEG: NORMAL
INT CON NEG: NORMAL
INT CON POS: NORMAL
INT CON POS: NORMAL
S PYO AG THROAT QL: POSITIVE

## 2022-12-15 PROCEDURE — 87880 STREP A ASSAY W/OPTIC: CPT | Performed by: FAMILY MEDICINE

## 2022-12-15 PROCEDURE — 87804 INFLUENZA ASSAY W/OPTIC: CPT | Performed by: FAMILY MEDICINE

## 2022-12-15 PROCEDURE — 99213 OFFICE O/P EST LOW 20 MIN: CPT | Performed by: FAMILY MEDICINE

## 2022-12-15 ASSESSMENT — FIBROSIS 4 INDEX: FIB4 SCORE: 0.42

## 2022-12-15 NOTE — ASSESSMENT & PLAN NOTE
Taking care of her 3 sick children who have uri  She has chills and body aches, headaches sore throat  Will check for strep, flu    advsied tylenol, hydration, mucinex, chlortab antihistamine

## 2022-12-15 NOTE — PROGRESS NOTES
"Subjective:   Alie Lowe is a 37 y.o. female here today for evaluation and management of:     Flu-like symptoms  Taking care of her 3 sick children who have uri  She has chills and body aches, headaches sore throat  Will check for strep, flu    advsied tylenol, hydration, mucinex, chlortab antihistamine             Current medicines (including changes today)  Current Outpatient Medications   Medication Sig Dispense Refill    etodolac (LODINE) 400 MG tablet Take 400 mg by mouth 3 times a day with meals.      metaxalone (SKELAXIN) 800 MG Tab TAKE 1/2 (ONE-HALF) TO 1 TABLET BY MOUTH THREE TIMES DAILY FOR SPASMS      omeprazole (PRILOSEC) 20 MG delayed-release capsule Take 1 Capsule by mouth every day. 90 Capsule 3    acetaminophen (TYLENOL) 500 MG Tab Take 1-2 Tabs by mouth every 6 hours as needed for Moderate Pain. 20 Tab 0     No current facility-administered medications for this visit.     She  has a past medical history of Heart murmur.    She has no past medical history of Blood transfusion without reported diagnosis.    ROS  No chest pain, no shortness of breath, no abdominal pain       Objective:     /82 (BP Location: Left arm, Patient Position: Sitting, BP Cuff Size: Adult long)   Pulse 99   Temp 37 °C (98.6 °F) (Temporal)   Resp 14   Ht 1.651 m (5' 5\")   Wt 111 kg (244 lb)   SpO2 97%  Body mass index is 40.6 kg/m².   Physical Exam:  Constitutional: Alert, no distress.  Skin: Warm, dry, good turgor, no rashes in visible areas.  Eye: Equal, round and reactive, conjunctiva clear, lids normal.  ENMT: Lips without lesions, good dentition, oropharynx clear.  Neck: Trachea midline, no masses, no thyromegaly. No cervical or supraclavicular lymphadenopathy  Respiratory: Unlabored respiratory effort, lungs clear to auscultation, no wheezes, no ronchi.  Cardiovascular: Normal S1, S2, no murmur, no edema.  Abdomen: Soft, non-tender, no masses, no hepatosplenomegaly.  Psych: Alert and oriented " x3, normal affect and mood.        Assessment and Plan:   The following treatment plan was discussed    1. Need for vaccination  - INFLUENZA VACCINE QUAD INJ (PF)  - Pneumococcal Conjugate Vaccine 20-Valent (19 yrs+)    2. Encounter for hepatitis C screening test for low risk patient  - HCV Scrn ( [id number] 1xLife); Future    3. Flu-like symptoms    4. Sore throat  - POCT Rapid Strep A  - POCT Influenza A/B      Followup: No follow-ups on file.

## 2022-12-16 RX ORDER — AZITHROMYCIN 250 MG/1
TABLET, FILM COATED ORAL
Qty: 6 TABLET | Refills: 0 | Status: SHIPPED | OUTPATIENT
Start: 2022-12-16 | End: 2022-12-20

## 2022-12-20 ENCOUNTER — OFFICE VISIT (OUTPATIENT)
Dept: URGENT CARE | Facility: PHYSICIAN GROUP | Age: 37
End: 2022-12-20
Payer: COMMERCIAL

## 2022-12-20 VITALS
HEART RATE: 92 BPM | TEMPERATURE: 97.4 F | RESPIRATION RATE: 16 BRPM | WEIGHT: 249 LBS | DIASTOLIC BLOOD PRESSURE: 88 MMHG | HEIGHT: 68 IN | OXYGEN SATURATION: 99 % | BODY MASS INDEX: 37.74 KG/M2 | SYSTOLIC BLOOD PRESSURE: 118 MMHG

## 2022-12-20 DIAGNOSIS — J02.0 PHARYNGITIS DUE TO STREPTOCOCCUS SPECIES: ICD-10-CM

## 2022-12-20 PROCEDURE — 99213 OFFICE O/P EST LOW 20 MIN: CPT | Performed by: FAMILY MEDICINE

## 2022-12-20 RX ORDER — CLINDAMYCIN HYDROCHLORIDE 300 MG/1
300 CAPSULE ORAL 3 TIMES DAILY
Qty: 30 CAPSULE | Refills: 0 | Status: SHIPPED | OUTPATIENT
Start: 2022-12-20 | End: 2022-12-30

## 2022-12-20 ASSESSMENT — FIBROSIS 4 INDEX: FIB4 SCORE: 0.42

## 2022-12-20 ASSESSMENT — ENCOUNTER SYMPTOMS
FEVER: 0
SORE THROAT: 1

## 2022-12-20 NOTE — PROGRESS NOTES
Subjective:     Alie Lowe is a 37 y.o. female who presents for Medical Clearance (Positive for strep on 12/15 needs note for work)    HPI  Pt presents for evaluation of pharyngitis  Patient with positive strep testing 5 days ago in office by her PCP  Placed on azithromycin due to penicillin allergy  Does not feel medication is working and continues to have sore throat constantly  Sore throat is much worse when swallowing  Sore throat is both sides  She also requires a note for work    Review of Systems   Constitutional:  Negative for fever.   HENT:  Positive for sore throat.    Skin:  Negative for rash.     PMH:  has a past medical history of Heart murmur.    She has no past medical history of Blood transfusion without reported diagnosis.  MEDS:   Current Outpatient Medications:     clindamycin (CLEOCIN) 300 MG Cap, Take 1 Capsule by mouth 3 times a day for 10 days., Disp: 30 Capsule, Rfl: 0    etodolac (LODINE) 400 MG tablet, Take 400 mg by mouth 3 times a day with meals., Disp: , Rfl:     metaxalone (SKELAXIN) 800 MG Tab, TAKE 1/2 (ONE-HALF) TO 1 TABLET BY MOUTH THREE TIMES DAILY FOR SPASMS, Disp: , Rfl:     omeprazole (PRILOSEC) 20 MG delayed-release capsule, Take 1 Capsule by mouth every day., Disp: 90 Capsule, Rfl: 3    acetaminophen (TYLENOL) 500 MG Tab, Take 1-2 Tabs by mouth every 6 hours as needed for Moderate Pain., Disp: 20 Tab, Rfl: 0  ALLERGIES:   Allergies   Allergen Reactions    Penicillins Unspecified     Previous hospitalization after penicillin - does not remember reaction    Cannot tolerate any Cillin     SURGHX:   Past Surgical History:   Procedure Laterality Date    OTHER Left 2015    Left leg, debridement of wound with MRSA     SOCHX:  reports that she has quit smoking. Her smoking use included cigarettes. She has a 1.70 pack-year smoking history. She has never used smokeless tobacco. She reports current alcohol use of about 1.2 oz per week. She reports that she does not use  "drugs.     Objective:   /88 (BP Location: Right arm, Patient Position: Sitting, BP Cuff Size: Adult long)   Pulse 92   Temp 36.3 °C (97.4 °F) (Temporal)   Resp 16   Ht 1.727 m (5' 8\")   Wt 113 kg (249 lb)   SpO2 99%   BMI 37.86 kg/m²     Physical Exam  Constitutional:       General: She is not in acute distress.     Appearance: She is well-developed. She is not diaphoretic.   HENT:      Nose: Nose normal.      Mouth/Throat:      Mouth: Mucous membranes are moist.      Comments: Mild erythema throughout posterior pharynx, no unilateral swelling, no uvular deviation  Pulmonary:      Effort: Pulmonary effort is normal.   Musculoskeletal:      Cervical back: Normal range of motion and neck supple. Tenderness present.   Lymphadenopathy:      Cervical: Cervical adenopathy present.   Neurological:      Mental Status: She is alert.       Assessment/Plan:   Assessment    1. Pharyngitis due to Streptococcus species  - clindamycin (CLEOCIN) 300 MG Cap; Take 1 Capsule by mouth 3 times a day for 10 days.  Dispense: 30 Capsule; Refill: 0    Patient here for follow-up of strep pharyngitis.  Not making expected improvements with azithromycin.  Will treat with clindamycin.  Follow-up in urgent care as needed.  "

## 2022-12-20 NOTE — LETTER
December 20, 2022    To Whom It May Concern:         This is confirmation that Alie Lowe attended her scheduled appointment with Fan Navas M.D. on 12/20/22.  Please excuse her from days missed from work.  She is being treated for strep pharyngitis.  She may return to work tomorrow.  Thank you for making accommodations as she recovers.         If you have any questions please do not hesitate to call me at the phone number listed below.    Sincerely,          Fan Navas M.D.  [phone]

## 2022-12-27 ENCOUNTER — TELEPHONE (OUTPATIENT)
Dept: URGENT CARE | Facility: PHYSICIAN GROUP | Age: 37
End: 2022-12-27
Payer: COMMERCIAL

## 2023-01-04 ENCOUNTER — TELEPHONE (OUTPATIENT)
Dept: MEDICAL GROUP | Facility: PHYSICIAN GROUP | Age: 38
End: 2023-01-04
Payer: COMMERCIAL

## 2023-01-16 ENCOUNTER — OFFICE VISIT (OUTPATIENT)
Dept: URGENT CARE | Facility: PHYSICIAN GROUP | Age: 38
End: 2023-01-16
Payer: COMMERCIAL

## 2023-01-16 VITALS
WEIGHT: 250 LBS | SYSTOLIC BLOOD PRESSURE: 124 MMHG | OXYGEN SATURATION: 98 % | DIASTOLIC BLOOD PRESSURE: 88 MMHG | TEMPERATURE: 98.9 F | BODY MASS INDEX: 41.65 KG/M2 | RESPIRATION RATE: 16 BRPM | HEART RATE: 86 BPM | HEIGHT: 65 IN

## 2023-01-16 DIAGNOSIS — M62.830 BACK SPASM: ICD-10-CM

## 2023-01-16 PROCEDURE — 99213 OFFICE O/P EST LOW 20 MIN: CPT | Performed by: FAMILY MEDICINE

## 2023-01-16 RX ORDER — CYCLOBENZAPRINE HCL 10 MG
10 TABLET ORAL 3 TIMES DAILY PRN
Qty: 30 TABLET | Refills: 0 | Status: SHIPPED | OUTPATIENT
Start: 2023-01-16 | End: 2023-03-30 | Stop reason: SDUPTHER

## 2023-01-16 ASSESSMENT — FIBROSIS 4 INDEX: FIB4 SCORE: 0.42

## 2023-01-16 NOTE — PROGRESS NOTES
"Chief Complaint   Patient presents with    Muscle Spasms     X1day, upper back             Chief Complaint   Patient presents with    Muscle Spasms     X1day, upper back              Back Pain  This is a new problem. The current episode started last night, after \"roughhousing\" with her child. The problem occurs constantly. The problem is unchanged. The pain is present in the rt thoracic spine. The quality of the pain is described as aching. The pain does not radiate. The pain is moderate. The symptoms are aggravated by position. Pertinent negatives include no bladder incontinence, bowel incontinence, dysuria, fever, headaches, numbness or weakness. Treatments tried: motrin.  The treatment provided no relief.        Past Medical History:   Diagnosis Date    Heart murmur        Social History     Tobacco Use    Smoking status: Former     Packs/day: 0.10     Years: 17.00     Pack years: 1.70     Types: Cigarettes    Smokeless tobacco: Never    Tobacco comments:     socially, only every few months   Vaping Use    Vaping Use: Never used   Substance Use Topics    Alcohol use: Yes     Alcohol/week: 1.2 oz     Types: 2 Standard drinks or equivalent per week     Comment: socially - rare    Drug use: No             Review of Systems   Constitutional: Negative for fever, chills and malaise/fatigue.   Eyes: Negative for vision changes, d/c.    Respiratory: Negative for cough and sputum production.    Cardiovascular: Negative for chest pain and palpitations.   Gastrointestinal: Negative for nausea, vomiting, abdominal pain, diarrhea and constipation.   Genitourinary: Negative for dysuria, urgency and frequency.   Skin: Negative for rash or  itching.   Neurological: Negative for dizziness and tingling.   Psychiatric/Behavioral: Negative for depression.   Hematologic/lymphatic - denies bruising or excessive bleeding  All other systems reviewed and are negative.         Objective:    /88   Pulse 86   Temp 37.2 °C (98.9 °F) " "(Temporal)   Resp 16   Ht 1.651 m (5' 5\")   Wt 113 kg (250 lb)   SpO2 98%       Physical Exam   Constitutional: pt is oriented to person, place, and time. Pt appears well-developed and well-nourished. No distress.   HENT:   Head: Normocephalic and atraumatic.   Eyes: EOM are normal. Pupils are equal, round, and reactive to light. No scleral icterus.   Neck: Normal range of motion. Neck supple. No thyromegaly present.   Cardiovascular: Normal rate, regular rhythm and normal heart sounds.    Pulmonary/Chest: Effort normal and breath sounds normal. No respiratory distress.  no wheezes.   no rales.  no tenderness.   Musculoskeletal: pt exhibits no lower ext edema.      Thoracic spine: full AROM.   There is point tenderness to palpation over rt paraspinal muscles.   + spasm.   no bony tenderness.   no swelling, no edema, no deformity  .   Neurological: patient is alert and oriented to person, place, and time. Patient has normal reflexes. No cranial nerve deficit. Patient exhibits normal muscle tone.      Skin: Skin is warm and dry. No rash noted. No erythema.   Psychiatric: patient has a normal mood and affect.  behavior is normal.   Nursing note and vitals reviewed.              Assessment/Plan:          1. Back spasm     - cyclobenzaprine (FLEXERIL) 10 mg Tab; Take 1 Tablet by mouth 3 times a day as needed for Moderate Pain.  Dispense: 30 Tablet; Refill: 0  - diclofenac sodium (VOLTAREN) 1 % Gel; Apply 4 g topically in the morning, at noon, and at bedtime.  Dispense: 50 g; Refill: 0      Follow up in one week if no improvement, sooner if symptoms worsen.       "

## 2023-01-16 NOTE — LETTER
January 16, 2023         Patient: Alie Lowe   YOB: 1985   Date of Visit: 1/16/2023           To Whom it May Concern:    Alie Lowe was seen in my clinic on 1/16/2023. She may return to work on 1/23.    If you have any questions or concerns, please don't hesitate to call.        Sincerely,           Ford Estevez M.D.  Electronically Signed

## 2023-01-23 ENCOUNTER — APPOINTMENT (OUTPATIENT)
Dept: RADIOLOGY | Facility: IMAGING CENTER | Age: 38
End: 2023-01-23
Attending: STUDENT IN AN ORGANIZED HEALTH CARE EDUCATION/TRAINING PROGRAM
Payer: COMMERCIAL

## 2023-01-23 ENCOUNTER — OFFICE VISIT (OUTPATIENT)
Dept: URGENT CARE | Facility: PHYSICIAN GROUP | Age: 38
End: 2023-01-23
Payer: COMMERCIAL

## 2023-01-23 VITALS
BODY MASS INDEX: 41.32 KG/M2 | RESPIRATION RATE: 16 BRPM | DIASTOLIC BLOOD PRESSURE: 60 MMHG | HEIGHT: 65 IN | SYSTOLIC BLOOD PRESSURE: 128 MMHG | WEIGHT: 248 LBS | HEART RATE: 115 BPM | OXYGEN SATURATION: 98 % | TEMPERATURE: 97.5 F

## 2023-01-23 DIAGNOSIS — M62.830 BACK SPASM: ICD-10-CM

## 2023-01-23 DIAGNOSIS — M54.6 BILATERAL THORACIC BACK PAIN, UNSPECIFIED CHRONICITY: ICD-10-CM

## 2023-01-23 PROCEDURE — 72070 X-RAY EXAM THORAC SPINE 2VWS: CPT | Mod: TC,FY | Performed by: STUDENT IN AN ORGANIZED HEALTH CARE EDUCATION/TRAINING PROGRAM

## 2023-01-23 PROCEDURE — 99214 OFFICE O/P EST MOD 30 MIN: CPT | Performed by: STUDENT IN AN ORGANIZED HEALTH CARE EDUCATION/TRAINING PROGRAM

## 2023-01-23 ASSESSMENT — ENCOUNTER SYMPTOMS
ABDOMINAL PAIN: 0
BACK PAIN: 1
WEAKNESS: 0
HEADACHES: 0
WEIGHT LOSS: 0
BOWEL INCONTINENCE: 0
TINGLING: 0
PERIANAL NUMBNESS: 0
MUSCLE SPASMS: 1
LEG PAIN: 0
PARESTHESIAS: 0
PARESIS: 0
NUMBNESS: 0
FEVER: 0

## 2023-01-23 ASSESSMENT — PAIN SCALES - GENERAL: PAINLEVEL: 8=MODERATE-SEVERE PAIN

## 2023-01-23 ASSESSMENT — FIBROSIS 4 INDEX: FIB4 SCORE: 0.42

## 2023-01-23 NOTE — PROGRESS NOTES
"Subjective     Alie Lowe is a 37 y.o. female who presents with Spasms (/Back spasms. Center of back)            Alie is a 37 y.o. female who presents to urgent care for back pain/muscle spasm.  Patient states she was recently seen in urgent care about a week ago and was diagnosed with muscle spasm and sent home with prescriptions for Flexeril and Voltaren.  Patient states that she has been taking Flexeril 3 times a day and has had some relief of symptoms.  Patient states that Flexeril only helps with back pain/muscle spasm for roughly 3 hours.  Patient states she is taking OTC ibuprofen which provides no relief of symptoms.  Patient has also been applying Voltaren gel and applying heat at home.  He states symptoms have been unchanged since onset and is now experiencing more pain with lateral rotation.  No radiation of pain.    Back Pain  This is a new problem. Episode onset: 1 week ago. The problem is unchanged. The pain is present in the thoracic spine. The pain does not radiate. The pain is moderate. Pertinent negatives include no abdominal pain, bladder incontinence, bowel incontinence, chest pain, dysuria, fever, headaches, leg pain, numbness, paresis, paresthesias, pelvic pain, perianal numbness, tingling, weakness or weight loss. She has tried NSAIDs and muscle relaxant for the symptoms. The treatment provided mild relief.     Review of Systems   Constitutional:  Negative for fever and weight loss.   Cardiovascular:  Negative for chest pain.   Gastrointestinal:  Negative for abdominal pain and bowel incontinence.   Genitourinary:  Negative for bladder incontinence, dysuria and pelvic pain.   Musculoskeletal:  Positive for back pain and muscle spasms.   Neurological:  Negative for tingling, weakness, numbness, headaches and paresthesias.            Objective     /60   Pulse (!) 115   Temp 36.4 °C (97.5 °F) (Temporal)   Resp 16   Ht 1.651 m (5' 5\")   Wt 112 kg (248 lb)   SpO2 98% "   BMI 41.27 kg/m²      Physical Exam  Vitals reviewed.   Constitutional:       General: She is not in acute distress.     Appearance: Normal appearance. She is not ill-appearing or toxic-appearing.   HENT:      Head: Normocephalic and atraumatic.   Eyes:      Extraocular Movements: Extraocular movements intact.      Conjunctiva/sclera: Conjunctivae normal.      Pupils: Pupils are equal, round, and reactive to light.   Cardiovascular:      Rate and Rhythm: Normal rate and regular rhythm.   Pulmonary:      Effort: Pulmonary effort is normal.      Breath sounds: Normal breath sounds.   Musculoskeletal:      Cervical back: Normal.      Thoracic back: Spasms and bony tenderness present. No swelling or deformity. Decreased range of motion (secondary to pain with flexion/extension and lateral rotation).      Lumbar back: Normal.      Comments: Bilateral upper and lower extremities with full range of motion and equal strength.  Neurovascularly intact.  Gait intact.   Skin:     General: Skin is warm and dry.   Neurological:      General: No focal deficit present.      Mental Status: She is alert. Mental status is at baseline.                RADIOLOGY RESULTS   DX-THORACIC SPINE-2 VIEWS    Result Date: 1/23/2023 1/23/2023 3:42 PM HISTORY/REASON FOR EXAM:  Atraumatic midthoracic back pain. TECHNIQUE/EXAM DESCRIPTION AND NUMBER OF VIEWS:  Thoracic spine, 3 views. COMPARISON:  None. FINDINGS: The alignment is normal. There is no evidence of acute fracture. There is mild anterior degenerative endplate spurring in the mid and lower thoracic spine. No rib fractures are seen in this field-of-view.     1.  There is minimal degenerative endplate spurring in the thoracic spine.                  Assessment & Plan        1. Bilateral thoracic back pain, unspecified chronicity  - DX-THORACIC SPINE-2 VIEWS  - Referral to Sports Medicine    2. Back spasm  - Referral to Sports Medicine     DX-THORACIC SPINE-2 VIEWS PER RADIOLOGY:  There is  minimal degenerative endplate spurring in the thoracic spine.     I personally reviewed prior external notes and test results pertinent to today's visit, including urgent care visit from 1/16/23.    Patient was prescribed Flexeril at previous urgent care visit on 1/16/23. Dispensed #30. She states she has bene taking flexeril 3 times a day and has approx. 2 days remaining. Advised to take only at night time and use OTC tylenol/ibuprofen during the day as needed for pain.  She can continue to use Voltaren as needed as well.  Patient advised to follow-up with PCP.  Referral to sports medicine placed if needed.  Work note provided per patient request.    Differential diagnoses, supportive care measures (rest, ice/heat, OTC Tylenol/ibuprofen, Voltaren, Flexeril at nighttime as needed, gentle stretching/range of motion) and indications for immediate follow-up discussed with patient. Pathogenesis of diagnosis discussed including typical length and natural progression.      My total time spent caring for the patient on the day of the encounter was 31 minutes in reviewing patient's chart, obtaining patient history, physical exam, reviewing imaging, discussing plan of care, treatment, supportive care measures, appropriate follow-up and indications for immediate follow-up. This does not include time spent on separately billable procedures/tests.     Instructed to return to urgent care or nearest emergency department if symptoms fail to improve, for any change in condition, further concerns, or new concerning symptoms.    Patient states understanding and agrees with the plan of care and discharge instructions.

## 2023-01-23 NOTE — LETTER
January 23, 2023    To Whom It May Concern:         This is confirmation that Alie Lowe attended her scheduled appointment with Gila Pappas P.A.-C. on 1/23/23.  Please excuse work absences today through 1/26/23 for medical reason. Alie can return to work on 1/27/23 without restrictions.         If you have any questions please do not hesitate to call me at the phone number listed below.    Sincerely,    Gila Pappas P.A.-C.  [phone]

## 2023-01-30 ENCOUNTER — OFFICE VISIT (OUTPATIENT)
Dept: URGENT CARE | Facility: PHYSICIAN GROUP | Age: 38
End: 2023-01-30
Payer: COMMERCIAL

## 2023-01-30 VITALS
SYSTOLIC BLOOD PRESSURE: 128 MMHG | OXYGEN SATURATION: 98 % | DIASTOLIC BLOOD PRESSURE: 66 MMHG | RESPIRATION RATE: 16 BRPM | TEMPERATURE: 97.1 F | BODY MASS INDEX: 41.82 KG/M2 | WEIGHT: 251 LBS | HEART RATE: 136 BPM | HEIGHT: 65 IN

## 2023-01-30 DIAGNOSIS — M62.830 SPASM OF THORACIC BACK MUSCLE: ICD-10-CM

## 2023-01-30 DIAGNOSIS — M54.6 BILATERAL THORACIC BACK PAIN, UNSPECIFIED CHRONICITY: ICD-10-CM

## 2023-01-30 PROCEDURE — 99214 OFFICE O/P EST MOD 30 MIN: CPT | Performed by: FAMILY MEDICINE

## 2023-01-30 RX ORDER — NAPROXEN 500 MG/1
500 TABLET ORAL 2 TIMES DAILY WITH MEALS
Qty: 20 TABLET | Refills: 0 | Status: SHIPPED | OUTPATIENT
Start: 2023-01-30 | End: 2023-02-09

## 2023-01-30 RX ORDER — KETOROLAC TROMETHAMINE 30 MG/ML
30 INJECTION, SOLUTION INTRAMUSCULAR; INTRAVENOUS ONCE
Status: COMPLETED | OUTPATIENT
Start: 2023-01-30 | End: 2023-01-30

## 2023-01-30 RX ADMIN — KETOROLAC TROMETHAMINE 30 MG: 30 INJECTION, SOLUTION INTRAMUSCULAR; INTRAVENOUS at 15:16

## 2023-01-30 ASSESSMENT — ENCOUNTER SYMPTOMS
FEVER: 0
PARESIS: 0
MYALGIAS: 0
BACK PAIN: 1
DIZZINESS: 0
BOWEL INCONTINENCE: 0
VOMITING: 0
NAUSEA: 0
FOCAL WEAKNESS: 0
COUGH: 0
CHILLS: 0
SORE THROAT: 0
SHORTNESS OF BREATH: 0

## 2023-01-30 ASSESSMENT — PAIN SCALES - GENERAL: PAINLEVEL: 10=SEVERE PAIN

## 2023-01-30 ASSESSMENT — FIBROSIS 4 INDEX: FIB4 SCORE: 0.42

## 2023-01-30 NOTE — PATIENT INSTRUCTIONS
Muscle Cramps and Spasms  Muscle cramps and spasms are when muscles tighten by themselves. They usually get better within minutes. Muscle cramps are painful. They are usually stronger and last longer than muscle spasms. Muscle spasms may or may not be painful. They can last a few seconds or much longer.  Cramps and spasms can affect any muscle, but they occur most often in the calf muscles of the leg. They are usually not caused by a serious problem. In many cases, the cause is not known. Some common causes include:  Doing more physical work or exercise than your body is ready for.  Using the muscles too much (overuse) by repeating certain movements too many times.  Staying in a certain position for a long time.  Playing a sport or doing an activity without preparing properly.  Using bad form or technique while playing a sport or doing an activity.  Not having enough water in your body (dehydration).  Injury.  Side effects of some medicines.  Low levels of the salts and minerals in your blood (electrolytes), such as low potassium or calcium.  Follow these instructions at home:  Managing pain and stiffness         Massage, stretch, and relax the muscle. Do this for many minutes at a time.  If told, put heat on tight or tense muscles as often as told by your doctor. Use the heat source that your doctor recommends, such as a moist heat pack or a heating pad.  Place a towel between your skin and the heat source.  Leave the heat on for 20-30 minutes.  Remove the heat if your skin turns bright red. This is very important if you are not able to feel pain, heat, or cold. You may have a greater risk of getting burned.  If told, put ice on the affected area. This may help if you are sore or have pain after a cramp or spasm.  Put ice in a plastic bag.  Place a towel between your skin and the bag.  Leave the ice on for 20 minutes, 2-3 times a day.  Try taking hot showers or baths to help relax tight muscles.  Eating and  drinking  Drink enough fluid to keep your pee (urine) pale yellow.  Eat a healthy diet to help ensure that your muscles work well. This should include:  Fruits and vegetables.  Lean protein.  Whole grains.  Low-fat or nonfat dairy products.  General instructions  If you are having cramps often, avoid intense exercise for several days.  Take over-the-counter and prescription medicines only as told by your doctor.  Watch for any changes in your symptoms.  Keep all follow-up visits as told by your doctor. This is important.  Contact a doctor if:  Your cramps or spasms get worse or happen more often.  Your cramps or spasms do not get better with time.  Summary  Muscle cramps and spasms are when muscles tighten by themselves. They usually get better within minutes.  Cramps and spasms occur most often in the calf muscles of the leg.  Massage, stretch, and relax the muscle. This may help the cramp or spasm go away.  Drink enough fluid to keep your pee (urine) pale yellow.  This information is not intended to replace advice given to you by your health care provider. Make sure you discuss any questions you have with your health care provider.  Document Released: 11/30/2009 Document Revised: 05/13/2019 Document Reviewed: 05/13/2019  [employer] Patient Education © 2020 [employer]

## 2023-01-30 NOTE — PROGRESS NOTES
Subjective:   Alie Lowe is a 37 y.o. female who presents for Back Pain (/Mid back pain, follow up from 01/23/2023. Pt states unable to stand for more than 1 hour )        Back Pain  Chronicity: reports thoracic back pain, onset January 15, 2023 when playing with the child, reports intermittent persistent thoracic back pain which limits ability to stand for more than 1 hour without limiting pain. Episode onset: 2 weeks. The problem occurs intermittently. The problem is unchanged. The pain is present in the thoracic spine. The quality of the pain is described as aching. The pain does not radiate. Exacerbated by: thoracic spine extension. Pertinent negatives include no bladder incontinence, bowel incontinence, fever or paresis. (Consultation with orthopedic sports medicine clinic is pending) She has tried muscle relaxant for the symptoms. The treatment provided no relief.   PMH:  has a past medical history of Heart murmur.    She has no past medical history of Blood transfusion without reported diagnosis.  MEDS:   Current Outpatient Medications:     naproxen (NAPROSYN) 500 MG Tab, Take 1 Tablet by mouth 2 times a day with meals for 10 days., Disp: 20 Tablet, Rfl: 0    cyclobenzaprine (FLEXERIL) 10 mg Tab, Take 1 Tablet by mouth 3 times a day as needed for Moderate Pain., Disp: 30 Tablet, Rfl: 0    diclofenac sodium (VOLTAREN) 1 % Gel, Apply 4 g topically in the morning, at noon, and at bedtime., Disp: 50 g, Rfl: 0    etodolac (LODINE) 400 MG tablet, Take 400 mg by mouth 3 times a day with meals., Disp: , Rfl:     omeprazole (PRILOSEC) 20 MG delayed-release capsule, Take 1 Capsule by mouth every day., Disp: 90 Capsule, Rfl: 3    acetaminophen (TYLENOL) 500 MG Tab, Take 1-2 Tabs by mouth every 6 hours as needed for Moderate Pain., Disp: 20 Tab, Rfl: 0  ALLERGIES:   Allergies   Allergen Reactions    Penicillins Unspecified     Previous hospitalization after penicillin - does not remember reaction    Cannot  "tolerate any Cillin     SURGHX:   Past Surgical History:   Procedure Laterality Date    OTHER Left 2015    Left leg, debridement of wound with MRSA     SOCHX:  reports that she has quit smoking. Her smoking use included cigarettes. She has a 1.70 pack-year smoking history. She has never used smokeless tobacco. She reports current alcohol use of about 1.2 oz per week. She reports that she does not use drugs.  FH:   Family History   Problem Relation Age of Onset    Cancer Mother         uterine and skin    Thyroid Mother         removed due to lump    Diabetes Father     Hypertension Father     Cancer Sister         ovarian    Alcohol abuse Brother      Review of Systems   Constitutional:  Negative for chills and fever.   HENT:  Negative for sore throat.    Respiratory:  Negative for cough and shortness of breath.    Gastrointestinal:  Negative for bowel incontinence, nausea and vomiting.   Genitourinary:  Negative for bladder incontinence.   Musculoskeletal:  Positive for back pain. Negative for myalgias.   Skin:  Negative for rash.   Neurological:  Negative for dizziness and focal weakness.      Objective:   /66   Pulse (!) 136   Temp 36.2 °C (97.1 °F) (Temporal)   Resp 16   Ht 1.651 m (5' 5\")   Wt 114 kg (251 lb)   SpO2 98%   BMI 41.77 kg/m²   Physical Exam  Vitals and nursing note reviewed.   Constitutional:       General: She is not in acute distress.     Appearance: She is well-developed.   HENT:      Head: Normocephalic and atraumatic.      Right Ear: External ear normal.      Left Ear: External ear normal.      Nose: Nose normal.      Mouth/Throat:      Mouth: Mucous membranes are moist.   Eyes:      Conjunctiva/sclera: Conjunctivae normal.   Cardiovascular:      Rate and Rhythm: Normal rate.   Pulmonary:      Effort: Pulmonary effort is normal. No respiratory distress.      Breath sounds: Normal breath sounds.   Abdominal:      General: There is no distension.   Musculoskeletal:      Thoracic " back: Spasms and tenderness (diffuse paravertebral, no bony midline bony point tenderness) present. No bony tenderness. Decreased range of motion (limited extension with guarding, forward flexion intact).   Skin:     General: Skin is warm and dry.   Neurological:      General: No focal deficit present.      Mental Status: She is alert and oriented to person, place, and time. Mental status is at baseline.      Sensory: Sensation is intact.      Motor: Motor function is intact.      Coordination: Coordination is intact.      Gait: Gait is intact. Gait (gait at baseline) normal.      Deep Tendon Reflexes:      Reflex Scores:       Patellar reflexes are 2+ on the right side and 2+ on the left side.  Psychiatric:         Judgment: Judgment normal.         Assessment/Plan:   1. Bilateral thoracic back pain, unspecified chronicity  - ketorolac (TORADOL) injection 30 mg  - naproxen (NAPROSYN) 500 MG Tab; Take 1 Tablet by mouth 2 times a day with meals for 10 days.  Dispense: 20 Tablet; Refill: 0    2. Spasm of thoracic back muscle  - ketorolac (TORADOL) injection 30 mg  - naproxen (NAPROSYN) 500 MG Tab; Take 1 Tablet by mouth 2 times a day with meals for 10 days.  Dispense: 20 Tablet; Refill: 0        Medical Decision Making/Course:  In the course of preparing for this visit with review of the pertinent past medical history, recent and past clinic visits, current medications, and performing chart, immunization, medical history and medication reconciliation, and in the further course of obtaining the current history pertinent to the clinic visit today, performing an exam and evaluation, ordering and independently evaluating labs, tests  , and/or procedures, prescribing any recommended new medications as noted above including administration of ketorolac 30 mg intermuscular once during urgent care course, providing any pertinent counseling and education and recommending further coordination of care including drafting of work  excuse letter and recommendation to return for any persistent or worsening symptoms and otherwise follow-up with orthopedic sports medicine clinic as scheduled, at least  36 minutes of total time were spent during this encounter.      Discussed close monitoring, return precautions, and supportive measures of maintaining adequate fluid hydration and caloric intake, relative rest and symptom management as needed for pain and/or fever.    Differential diagnosis, natural history, supportive care, and indications for immediate follow-up discussed.     Advised the patient to follow-up with the primary care physician for recheck, reevaluation, and consideration of further management.    Please note that this dictation was created using voice recognition software. I have worked with consultants from the vendor as well as technical experts from [employer] to optimize the interface. I have made every reasonable attempt to correct obvious errors, but I expect that there are errors of grammar and possibly content that I did not discover before finalizing the note.

## 2023-01-30 NOTE — LETTER
January 30, 2023         Patient: Alie Lowe   YOB: 1985   Date of Visit: 1/30/2023           To Whom it May Concern:    Alie Lowe was seen in my clinic on 1/30/2023.  Excuse from work 1/30/2023, 1/31/2023, 2/1/2023.  If you have any questions or concerns, please don't hesitate to call.        Sincerely,           Naga Back M.D.  Electronically Signed

## 2023-02-03 ENCOUNTER — HOSPITAL ENCOUNTER (OUTPATIENT)
Facility: MEDICAL CENTER | Age: 38
End: 2023-02-03
Attending: NURSE PRACTITIONER
Payer: COMMERCIAL

## 2023-02-03 ENCOUNTER — OFFICE VISIT (OUTPATIENT)
Dept: URGENT CARE | Facility: PHYSICIAN GROUP | Age: 38
End: 2023-02-03
Payer: COMMERCIAL

## 2023-02-03 ENCOUNTER — APPOINTMENT (OUTPATIENT)
Dept: RADIOLOGY | Facility: IMAGING CENTER | Age: 38
End: 2023-02-03
Attending: NURSE PRACTITIONER
Payer: COMMERCIAL

## 2023-02-03 VITALS
OXYGEN SATURATION: 98 % | RESPIRATION RATE: 16 BRPM | HEIGHT: 65 IN | TEMPERATURE: 97.3 F | BODY MASS INDEX: 41.99 KG/M2 | HEART RATE: 103 BPM | WEIGHT: 252 LBS | SYSTOLIC BLOOD PRESSURE: 134 MMHG | DIASTOLIC BLOOD PRESSURE: 68 MMHG

## 2023-02-03 DIAGNOSIS — M54.50 ACUTE BILATERAL LOW BACK PAIN WITHOUT SCIATICA: ICD-10-CM

## 2023-02-03 DIAGNOSIS — M53.3 COCCYX PAIN: ICD-10-CM

## 2023-02-03 DIAGNOSIS — R10.84 GENERALIZED ABDOMINAL PAIN: ICD-10-CM

## 2023-02-03 DIAGNOSIS — R19.7 DIARRHEA, UNSPECIFIED TYPE: ICD-10-CM

## 2023-02-03 DIAGNOSIS — R10.9 BILATERAL FLANK PAIN: ICD-10-CM

## 2023-02-03 DIAGNOSIS — R11.0 NAUSEA: ICD-10-CM

## 2023-02-03 DIAGNOSIS — R31.9 HEMATURIA, UNSPECIFIED TYPE: ICD-10-CM

## 2023-02-03 LAB
APPEARANCE UR: CLEAR
BILIRUB UR STRIP-MCNC: NORMAL MG/DL
COLOR UR AUTO: YELLOW
GLUCOSE UR STRIP.AUTO-MCNC: NORMAL MG/DL
KETONES UR STRIP.AUTO-MCNC: NORMAL MG/DL
LEUKOCYTE ESTERASE UR QL STRIP.AUTO: NORMAL
NITRITE UR QL STRIP.AUTO: NORMAL
PH UR STRIP.AUTO: 5.5 [PH] (ref 5–8)
POCT INT CON NEG: NEGATIVE
POCT INT CON POS: POSITIVE
POCT URINE PREGNANCY TEST: NEGATIVE
PROT UR QL STRIP: NORMAL MG/DL
RBC UR QL AUTO: NORMAL
SP GR UR STRIP.AUTO: >=1.03
UROBILINOGEN UR STRIP-MCNC: 0.2 MG/DL

## 2023-02-03 PROCEDURE — 99213 OFFICE O/P EST LOW 20 MIN: CPT | Mod: 25 | Performed by: NURSE PRACTITIONER

## 2023-02-03 PROCEDURE — 87086 URINE CULTURE/COLONY COUNT: CPT

## 2023-02-03 PROCEDURE — 72100 X-RAY EXAM L-S SPINE 2/3 VWS: CPT | Mod: TC,FY | Performed by: NURSE PRACTITIONER

## 2023-02-03 PROCEDURE — 81025 URINE PREGNANCY TEST: CPT | Performed by: NURSE PRACTITIONER

## 2023-02-03 PROCEDURE — 81002 URINALYSIS NONAUTO W/O SCOPE: CPT | Performed by: NURSE PRACTITIONER

## 2023-02-03 ASSESSMENT — ENCOUNTER SYMPTOMS
DIARRHEA: 1
NEUROLOGICAL NEGATIVE: 1
BACK PAIN: 1
CONSTITUTIONAL NEGATIVE: 1
FLANK PAIN: 1
NAUSEA: 1
SENSORY CHANGE: 0
ABDOMINAL PAIN: 1
FEVER: 0
WEAKNESS: 0
CARDIOVASCULAR NEGATIVE: 1
RESPIRATORY NEGATIVE: 1
VOMITING: 0

## 2023-02-03 ASSESSMENT — FIBROSIS 4 INDEX: FIB4 SCORE: 0.42

## 2023-02-03 ASSESSMENT — VISUAL ACUITY: OU: 1

## 2023-02-04 ENCOUNTER — HOSPITAL ENCOUNTER (OUTPATIENT)
Dept: RADIOLOGY | Facility: MEDICAL CENTER | Age: 38
End: 2023-02-04
Attending: NURSE PRACTITIONER
Payer: COMMERCIAL

## 2023-02-04 ENCOUNTER — TELEPHONE (OUTPATIENT)
Dept: URGENT CARE | Facility: CLINIC | Age: 38
End: 2023-02-04
Payer: COMMERCIAL

## 2023-02-04 DIAGNOSIS — R11.0 NAUSEA: ICD-10-CM

## 2023-02-04 DIAGNOSIS — R10.84 GENERALIZED ABDOMINAL PAIN: ICD-10-CM

## 2023-02-04 DIAGNOSIS — R31.9 HEMATURIA, UNSPECIFIED TYPE: ICD-10-CM

## 2023-02-04 PROCEDURE — 700117 HCHG RX CONTRAST REV CODE 255: Performed by: NURSE PRACTITIONER

## 2023-02-04 PROCEDURE — 74177 CT ABD & PELVIS W/CONTRAST: CPT

## 2023-02-04 RX ORDER — ONDANSETRON 4 MG/1
4 TABLET, ORALLY DISINTEGRATING ORAL EVERY 6 HOURS PRN
Qty: 10 TABLET | Refills: 0 | Status: ON HOLD | OUTPATIENT
Start: 2023-02-04 | End: 2024-02-27

## 2023-02-04 RX ADMIN — IOHEXOL 100 ML: 350 INJECTION, SOLUTION INTRAVENOUS at 14:19

## 2023-02-04 NOTE — PROGRESS NOTES
Subjective:     Alie Lowe is a 37 y.o. female who presents for Back Pain (Lower back pain,  swelling at the base of spine, hurts to have a bm )       Back Pain  This is a new problem. The problem has been gradually worsening since onset. Associated symptoms include abdominal pain. Pertinent negatives include no dysuria, fever or weakness.   Abdominal Pain  This is a new problem. The problem has been gradually worsening. The pain is located in the generalized abdominal region. Associated symptoms include diarrhea and nausea. Pertinent negatives include no dysuria, fever, frequency or vomiting.     Patient seen in urgent care 1/16/2023.  Was having right upper back pain.  Diagnosed with back spasm.  Treated with Flexeril and Voltaren.    Patient seen in urgent care 1/23/2023.  Was complaining of persistent symptoms.  Thoracic spine x-ray showed minimal degenerative spurring.  Otherwise, negative for acute findings.  Referred to the sports medicine.  Advised to start OTC ibuprofen/acetaminophen.    Patient seen in urgent care 1/30/2023 for the same.  Treated with Toradol injection and prescribed naproxen.    Patient comes into urgent care today with complaints of new onset low back pain, swelling, and tenderness.  Reports she usually has dimples are no longer visible now.  Has tenderness over her coccyx.  Painful to sit on.  Denies injury.  Reports pain and tenderness bands along her bilateral flanks into her abdomen.  Has general abdominal pain.  Reports diarrhea and nausea.  Denies vomiting.  No urinary symptoms.  Upper back pain improving.    Review of Systems   Constitutional: Negative.  Negative for fever.   Respiratory: Negative.     Cardiovascular: Negative.    Gastrointestinal:  Positive for abdominal pain, diarrhea and nausea. Negative for vomiting.   Genitourinary:  Positive for flank pain. Negative for dysuria, frequency and urgency.   Musculoskeletal:  Positive for back pain.   Neurological:  "Negative.  Negative for sensory change and weakness.   All other systems reviewed and are negative.    Refer to HPI for additional details.    During this visit, appropriate PPE was worn, hand hygiene was performed, and the patient and any visitors were masked.    PMH:  has a past medical history of Heart murmur.    She has no past medical history of Blood transfusion without reported diagnosis.    MEDS:   Current Outpatient Medications:     naproxen (NAPROSYN) 500 MG Tab, Take 1 Tablet by mouth 2 times a day with meals for 10 days., Disp: 20 Tablet, Rfl: 0    cyclobenzaprine (FLEXERIL) 10 mg Tab, Take 1 Tablet by mouth 3 times a day as needed for Moderate Pain., Disp: 30 Tablet, Rfl: 0    diclofenac sodium (VOLTAREN) 1 % Gel, Apply 4 g topically in the morning, at noon, and at bedtime., Disp: 50 g, Rfl: 0    etodolac (LODINE) 400 MG tablet, Take 400 mg by mouth 3 times a day with meals., Disp: , Rfl:     omeprazole (PRILOSEC) 20 MG delayed-release capsule, Take 1 Capsule by mouth every day., Disp: 90 Capsule, Rfl: 3    acetaminophen (TYLENOL) 500 MG Tab, Take 1-2 Tabs by mouth every 6 hours as needed for Moderate Pain., Disp: 20 Tab, Rfl: 0    ALLERGIES:   Allergies   Allergen Reactions    Penicillins Unspecified     Previous hospitalization after penicillin - does not remember reaction    Cannot tolerate any Cillin     SURGHX:   Past Surgical History:   Procedure Laterality Date    OTHER Left 2015    Left leg, debridement of wound with MRSA     SOCHX:  reports that she has quit smoking. Her smoking use included cigarettes. She has a 1.70 pack-year smoking history. She has never used smokeless tobacco. She reports current alcohol use of about 1.2 oz per week. She reports that she does not use drugs.    FH: Per HPI as applicable/pertinent.      Objective:     /68   Pulse (!) 103   Temp 36.3 °C (97.3 °F) (Temporal)   Resp 16   Ht 1.651 m (5' 5\")   Wt 114 kg (252 lb)   SpO2 98%   BMI 41.93 kg/m² "     Physical Exam  Nursing note reviewed.   Constitutional:       General: She is not in acute distress.     Appearance: She is well-developed. She is not ill-appearing or toxic-appearing.   Eyes:      General: Vision grossly intact.   Cardiovascular:      Rate and Rhythm: Normal rate.   Pulmonary:      Effort: Pulmonary effort is normal. No respiratory distress.   Abdominal:      General: Bowel sounds are normal. There is no distension.      Tenderness: There is generalized abdominal tenderness. There is left CVA tenderness.   Musculoskeletal:         General: No deformity. Normal range of motion.      Lumbar back: Swelling and tenderness (Midline, bilateral, and coccyx) present. No deformity or lacerations. Normal range of motion.   Skin:     General: Skin is warm and dry.      Coloration: Skin is not pale.      Findings: No bruising, erythema or rash.   Neurological:      Mental Status: She is alert and oriented to person, place, and time.      Motor: No weakness.      Gait: Gait normal.   Psychiatric:         Behavior: Behavior normal. Behavior is cooperative.     X-ray of lumbar spine:    Details    Reading Physician Reading Date Result Priority   Noah Castillo M.D.  [phone] 2/3/2023 Urgent Care     Narrative & Impression    HISTORY/REASON FOR EXAM:  Atraumatic Pain     TECHNIQUE/ EXAM DESCRIPTION AND NUMBER OF VIEWS:  3  views of the lumbar spine, 2/3/2023 6:14 PM.     COMPARISON: None.     FINDINGS:  The alignment of the lumbar spine is normal.  The vertebral body heights and intervertebral body disk spaces are maintained.  There is no significant facet arthropathy.  There is no acute abnormality.     IMPRESSION:     Unremarkable lumbar spine series.     INTERPRETING LOCATION:  [address] [place] [address], [id number]           Exam Ended: 02/03/23  6:23 PM Last Resulted: 02/03/23  6:27 PM           Radiology report and images reviewed by myself. Concur with findings.    UA: trace blood    POCT pregnancy:  negative      Assessment/Plan:     1. Acute bilateral low back pain without sciatica  - DX-LUMBAR SPINE-2 OR 3 VIEWS; Future    2. Coccyx pain  - DX-LUMBAR SPINE-2 OR 3 VIEWS; Future    3. Bilateral flank pain  - POCT Urinalysis    4. Generalized abdominal pain  - POCT Urinalysis  - POCT PREGNANCY  - CT-ABDOMEN-PELVIS WITH; Future    5. Nausea    6. Diarrhea, unspecified type    7. Hematuria, unspecified type  - URINE CULTURE(NEW); Future    CT pending to further evaluate symptoms.    Differential diagnosis, natural history, supportive care, over-the-counter symptom management per 's instructions, close monitoring, and indications for immediate follow-up discussed.     Vital signs stable, afebrile, no acute distress at this time. Warning signs reviewed. Strict return/ER precautions advised.     All questions answered. Patient agrees with the plan of care.    Discharge summary provided via [employer].

## 2023-02-05 NOTE — TELEPHONE ENCOUNTER
Phoned patient to discuss CT abdomen results. Unremarkable.    Patient with nausea and diarrhea as well as general abdominal pain. Likely related to new onset acute gastroenteritis. Rx sent for Zofran. Denies fever or blood in stool. May use OTC Imodium PRN for diarrhea.    Lower back pain and tenderness. Could be related to strain/spasm likely related to recent episode of upper back pain/spasm. Continue muscle relaxant and naproxen PRN. Apply heat.    Monitor. Warning signs reviewed. Return precautions discussed.

## 2023-02-06 LAB
BACTERIA UR CULT: NORMAL
SIGNIFICANT IND 70042: NORMAL
SITE SITE: NORMAL
SOURCE SOURCE: NORMAL

## 2023-02-21 ENCOUNTER — HOSPITAL ENCOUNTER (EMERGENCY)
Facility: MEDICAL CENTER | Age: 38
End: 2023-02-21
Attending: EMERGENCY MEDICINE
Payer: COMMERCIAL

## 2023-02-21 VITALS
HEART RATE: 118 BPM | DIASTOLIC BLOOD PRESSURE: 93 MMHG | RESPIRATION RATE: 18 BRPM | SYSTOLIC BLOOD PRESSURE: 170 MMHG | HEIGHT: 65 IN | WEIGHT: 293 LBS | TEMPERATURE: 98.4 F | BODY MASS INDEX: 48.82 KG/M2 | OXYGEN SATURATION: 97 %

## 2023-02-21 DIAGNOSIS — S16.1XXA STRAIN OF NECK MUSCLE, INITIAL ENCOUNTER: ICD-10-CM

## 2023-02-21 PROCEDURE — 700102 HCHG RX REV CODE 250 W/ 637 OVERRIDE(OP): Performed by: EMERGENCY MEDICINE

## 2023-02-21 PROCEDURE — A9270 NON-COVERED ITEM OR SERVICE: HCPCS | Performed by: EMERGENCY MEDICINE

## 2023-02-21 PROCEDURE — 99283 EMERGENCY DEPT VISIT LOW MDM: CPT

## 2023-02-21 RX ORDER — OXYCODONE HYDROCHLORIDE AND ACETAMINOPHEN 5; 325 MG/1; MG/1
1 TABLET ORAL EVERY 8 HOURS PRN
Qty: 10 TABLET | Refills: 0 | Status: SHIPPED | OUTPATIENT
Start: 2023-02-21 | End: 2023-02-24

## 2023-02-21 RX ORDER — PREDNISONE 20 MG/1
60 TABLET ORAL DAILY
Qty: 15 TABLET | Refills: 0 | Status: ON HOLD | OUTPATIENT
Start: 2023-02-21 | End: 2023-02-26

## 2023-02-21 RX ORDER — OXYCODONE HYDROCHLORIDE AND ACETAMINOPHEN 5; 325 MG/1; MG/1
1 TABLET ORAL ONCE
Status: COMPLETED | OUTPATIENT
Start: 2023-02-21 | End: 2023-02-21

## 2023-02-21 RX ORDER — PREDNISONE 20 MG/1
60 TABLET ORAL DAILY
Qty: 15 TABLET | Refills: 0 | Status: SHIPPED | OUTPATIENT
Start: 2023-02-21 | End: 2023-02-21 | Stop reason: SDUPTHER

## 2023-02-21 RX ADMIN — OXYCODONE AND ACETAMINOPHEN 1 TABLET: 5; 325 TABLET ORAL at 22:02

## 2023-02-21 ASSESSMENT — FIBROSIS 4 INDEX: FIB4 SCORE: 0.42

## 2023-02-22 NOTE — DISCHARGE INSTRUCTIONS
Follow-up with neurosurgery if you are not significantly better in 4 to 5 days.  Return for weakness on the right side.

## 2023-02-22 NOTE — ED PROVIDER NOTES
ED Provider Note    CHIEF COMPLAINT  Chief Complaint   Patient presents with    Back Pain     Started a month ago.  States it is a spasm pain that is intermittent.  Numbness and tingling to the R side of body on the arm and leg.  Has seen urgent care on 1/16, and prescribed naproxen which assisted, but patient ran out and the pain is now worse.           HPI/ROS    Alie Lowe is a 37 y.o. female who presents with neck pain and back pain.  The patient states this started on 16 January.  She states she has been to the [facility] urgent care a couple of times.  They placed the patient on naproxen which was effective but then the pain recurred.  Subsequently she is also been taking Flexeril.  She states she continues have pain in the paraspinal muscles of the lower cervical and upper thoracic region as well as the lower lumbar region.  She does not have any midline discomfort nor radicular pain.  She does have occasional tingling to her right arm and right leg.  She does not have any loss of function.  She has not had any change in bowel or bladder habits.  She does not have any associated fevers.  She states the pain is severe with certain movements and better with rest.  She is unaware of any direct trauma.    PAST MEDICAL HISTORY   has a past medical history of Heart murmur.    SURGICAL HISTORY   has a past surgical history that includes other (Left, 2015).    FAMILY HISTORY  Family History   Problem Relation Age of Onset    Cancer Mother         uterine and skin    Thyroid Mother         removed due to lump    Diabetes Father     Hypertension Father     Cancer Sister         ovarian    Alcohol abuse Brother        SOCIAL HISTORY  Social History     Tobacco Use    Smoking status: Former     Packs/day: 0.10     Years: 17.00     Pack years: 1.70     Types: Cigarettes    Smokeless tobacco: Never    Tobacco comments:     socially, only every few months   Vaping Use    Vaping Use: Never used   Substance and  "Sexual Activity    Alcohol use: Yes     Alcohol/week: 1.2 oz     Types: 2 Standard drinks or equivalent per week     Comment: socially - rare    Drug use: No    Sexual activity: Yes     Partners: Male       CURRENT MEDICATIONS  Home Medications       Reviewed by Sheryl Devine R.N. (Registered Nurse) on 02/21/23 at 2027  Med List Status: Partial     Medication Last Dose Status   acetaminophen (TYLENOL) 500 MG Tab  Active   cyclobenzaprine (FLEXERIL) 10 mg Tab  Active   diclofenac sodium (VOLTAREN) 1 % Gel  Active   etodolac (LODINE) 400 MG tablet  Active   omeprazole (PRILOSEC) 20 MG delayed-release capsule  Active   ondansetron (ZOFRAN ODT) 4 MG TABLET DISPERSIBLE  Active                    ALLERGIES  Allergies   Allergen Reactions    Penicillins Unspecified     Previous hospitalization after penicillin - does not remember reaction    Cannot tolerate any Cillin       PHYSICAL EXAM  VITAL SIGNS: BP (!) 167/91   Pulse (!) 121   Temp 36.8 °C (98.2 °F) (Temporal)   Resp 18   Ht 1.651 m (5' 5\")   Wt (!) 134 kg (294 lb 8.6 oz)   SpO2 96%   BMI 49.01 kg/m²    In general the patient does not appear toxic    Cervical, thoracic, lumbar spine does not have any midline tenderness nor step-offs.  The patient does have paraspinal muscle discomfort in the lower cervical and upper thoracic region.  The patient also has paraspinal muscle discomfort in the lumbar region.    Skin there is no surrounding erythema around the spine    Pulmonary chest clear to auscultation bilaterally    Cardiovascular S1-S2 with a tachycardic rate    GI abdomen soft    Extremities atraumatic    Neurologic examination motor is 5 out of 5 and symmetric throughout her extremities and sensation is intact      COURSE & MEDICAL DECISION MAKING  This a 37-year-old female who presents with paraspinal muscle discomfort in the cervical and lumbar region.  I suspect her presenting symptoms are from a myofascial strain.  However she has had some periodic " paresthesias to the right upper and lower extremity which could be concerning from a herniated disc.  She is neurologically intact at this time.  We will attempt treatment with a 5-day course of prednisone as well as Percocet for breakthrough pain.  A narcotic check will be performed and the patient will take the narcotics only as prescribed.  If she does not respond in 4 to 5 days I will have the patient follow-up with a neurosurgeon for further outpatient management.  If she is acutely worse with increased pain or develops dysfunction of her right upper or lower extremity she will return for repeat examination.  She is slightly tachycardic and I suspect this is secondary to the pain.  She will receive a dose of Percocet prior to discharge.    FINAL DIAGNOSIS  1.  Cervical myofascial strain  2.  Lumbar myofascial strain  3.  Right-sided paresthesias possible herniated disc    Disposition  Patient will be discharged in stable condition       Electronically signed by: Jimenez Paul M.D., 2/21/2023 9:53 PM

## 2023-02-22 NOTE — ED TRIAGE NOTES
"Chief Complaint   Patient presents with    Back Pain     Started a month ago.  States it is a spasm pain that is intermittent.  Numbness and tingling to the R side of body on the arm and leg.  Has seen urgent care on 1/16, and prescribed naproxen which assisted, but patient ran out and the pain is now worse.       Pt ambulated to triage with steady gait. Pt A&Ox4, came in for above complaint.     Pt to lobby . Pt educated on alerting staff in changes to condition. Pt verbalized understanding.     BP (!) 167/91   Pulse (!) 121   Temp 36.8 °C (98.2 °F) (Temporal)   Resp 18   Ht 1.651 m (5' 5\")   Wt (!) 134 kg (294 lb 8.6 oz)   SpO2 96%   BMI 49.01 kg/m²     "

## 2023-02-25 ENCOUNTER — HOSPITAL ENCOUNTER (OUTPATIENT)
Facility: MEDICAL CENTER | Age: 38
End: 2023-02-26
Attending: EMERGENCY MEDICINE | Admitting: INTERNAL MEDICINE
Payer: COMMERCIAL

## 2023-02-25 DIAGNOSIS — M54.9 ATYPICAL BACK PAIN: ICD-10-CM

## 2023-02-25 LAB
ANION GAP SERPL CALC-SCNC: 11 MMOL/L (ref 7–16)
BASOPHILS # BLD AUTO: 0.5 % (ref 0–1.8)
BASOPHILS # BLD: 0.08 K/UL (ref 0–0.12)
BUN SERPL-MCNC: 14 MG/DL (ref 8–22)
CALCIUM SERPL-MCNC: 9 MG/DL (ref 8.5–10.5)
CHLORIDE SERPL-SCNC: 108 MMOL/L (ref 96–112)
CO2 SERPL-SCNC: 24 MMOL/L (ref 20–33)
CREAT SERPL-MCNC: 0.69 MG/DL (ref 0.5–1.4)
CRP SERPL HS-MCNC: 0.49 MG/DL (ref 0–0.75)
EOSINOPHIL # BLD AUTO: 0.02 K/UL (ref 0–0.51)
EOSINOPHIL NFR BLD: 0.1 % (ref 0–6.9)
ERYTHROCYTE [DISTWIDTH] IN BLOOD BY AUTOMATED COUNT: 47.8 FL (ref 35.9–50)
ERYTHROCYTE [SEDIMENTATION RATE] IN BLOOD BY WESTERGREN METHOD: 18 MM/HOUR (ref 0–25)
GFR SERPLBLD CREATININE-BSD FMLA CKD-EPI: 114 ML/MIN/1.73 M 2
GLUCOSE SERPL-MCNC: 98 MG/DL (ref 65–99)
HCT VFR BLD AUTO: 39.8 % (ref 37–47)
HGB BLD-MCNC: 12.5 G/DL (ref 12–16)
IMM GRANULOCYTES # BLD AUTO: 0.08 K/UL (ref 0–0.11)
IMM GRANULOCYTES NFR BLD AUTO: 0.5 % (ref 0–0.9)
LYMPHOCYTES # BLD AUTO: 4.43 K/UL (ref 1–4.8)
LYMPHOCYTES NFR BLD: 26.4 % (ref 22–41)
MCH RBC QN AUTO: 25.1 PG (ref 27–33)
MCHC RBC AUTO-ENTMCNC: 31.4 G/DL (ref 33.6–35)
MCV RBC AUTO: 79.9 FL (ref 81.4–97.8)
MONOCYTES # BLD AUTO: 1.12 K/UL (ref 0–0.85)
MONOCYTES NFR BLD AUTO: 6.7 % (ref 0–13.4)
NEUTROPHILS # BLD AUTO: 11.06 K/UL (ref 2–7.15)
NEUTROPHILS NFR BLD: 65.8 % (ref 44–72)
NRBC # BLD AUTO: 0 K/UL
NRBC BLD-RTO: 0 /100 WBC
PLATELET # BLD AUTO: 366 K/UL (ref 164–446)
PMV BLD AUTO: 9.8 FL (ref 9–12.9)
POTASSIUM SERPL-SCNC: 3.9 MMOL/L (ref 3.6–5.5)
RBC # BLD AUTO: 4.98 M/UL (ref 4.2–5.4)
SODIUM SERPL-SCNC: 143 MMOL/L (ref 135–145)
WBC # BLD AUTO: 16.8 K/UL (ref 4.8–10.8)

## 2023-02-25 PROCEDURE — 96366 THER/PROPH/DIAG IV INF ADDON: CPT

## 2023-02-25 PROCEDURE — 700101 HCHG RX REV CODE 250: Performed by: EMERGENCY MEDICINE

## 2023-02-25 PROCEDURE — G0378 HOSPITAL OBSERVATION PER HR: HCPCS

## 2023-02-25 PROCEDURE — 96376 TX/PRO/DX INJ SAME DRUG ADON: CPT

## 2023-02-25 PROCEDURE — 700102 HCHG RX REV CODE 250 W/ 637 OVERRIDE(OP): Performed by: INTERNAL MEDICINE

## 2023-02-25 PROCEDURE — 99285 EMERGENCY DEPT VISIT HI MDM: CPT

## 2023-02-25 PROCEDURE — 85652 RBC SED RATE AUTOMATED: CPT

## 2023-02-25 PROCEDURE — 96367 TX/PROPH/DG ADDL SEQ IV INF: CPT

## 2023-02-25 PROCEDURE — 36415 COLL VENOUS BLD VENIPUNCTURE: CPT

## 2023-02-25 PROCEDURE — 86140 C-REACTIVE PROTEIN: CPT

## 2023-02-25 PROCEDURE — 96365 THER/PROPH/DIAG IV INF INIT: CPT

## 2023-02-25 PROCEDURE — 99222 1ST HOSP IP/OBS MODERATE 55: CPT | Performed by: INTERNAL MEDICINE

## 2023-02-25 PROCEDURE — 85025 COMPLETE CBC W/AUTO DIFF WBC: CPT

## 2023-02-25 PROCEDURE — 700111 HCHG RX REV CODE 636 W/ 250 OVERRIDE (IP): Performed by: EMERGENCY MEDICINE

## 2023-02-25 PROCEDURE — 700105 HCHG RX REV CODE 258: Performed by: EMERGENCY MEDICINE

## 2023-02-25 PROCEDURE — 80048 BASIC METABOLIC PNL TOTAL CA: CPT

## 2023-02-25 PROCEDURE — 700111 HCHG RX REV CODE 636 W/ 250 OVERRIDE (IP): Performed by: INTERNAL MEDICINE

## 2023-02-25 PROCEDURE — 87040 BLOOD CULTURE FOR BACTERIA: CPT | Mod: 91

## 2023-02-25 PROCEDURE — 96375 TX/PRO/DX INJ NEW DRUG ADDON: CPT

## 2023-02-25 PROCEDURE — A9270 NON-COVERED ITEM OR SERVICE: HCPCS | Performed by: INTERNAL MEDICINE

## 2023-02-25 RX ORDER — ACETAMINOPHEN 500 MG
1000 TABLET ORAL EVERY 6 HOURS
Status: DISCONTINUED | OUTPATIENT
Start: 2023-02-25 | End: 2023-02-26 | Stop reason: HOSPADM

## 2023-02-25 RX ORDER — PROMETHAZINE HYDROCHLORIDE 25 MG/1
12.5-25 TABLET ORAL EVERY 4 HOURS PRN
Status: DISCONTINUED | OUTPATIENT
Start: 2023-02-25 | End: 2023-02-26 | Stop reason: HOSPADM

## 2023-02-25 RX ORDER — OMEPRAZOLE 20 MG/1
20 CAPSULE, DELAYED RELEASE ORAL DAILY
Status: DISCONTINUED | OUTPATIENT
Start: 2023-02-25 | End: 2023-02-26 | Stop reason: HOSPADM

## 2023-02-25 RX ORDER — LABETALOL HYDROCHLORIDE 5 MG/ML
10 INJECTION, SOLUTION INTRAVENOUS EVERY 4 HOURS PRN
Status: DISCONTINUED | OUTPATIENT
Start: 2023-02-25 | End: 2023-02-26 | Stop reason: HOSPADM

## 2023-02-25 RX ORDER — NAPROXEN 500 MG/1
500 TABLET ORAL 2 TIMES DAILY
Status: DISCONTINUED | OUTPATIENT
Start: 2023-02-25 | End: 2023-02-26 | Stop reason: HOSPADM

## 2023-02-25 RX ORDER — POLYETHYLENE GLYCOL 3350 17 G/17G
1 POWDER, FOR SOLUTION ORAL
Status: DISCONTINUED | OUTPATIENT
Start: 2023-02-25 | End: 2023-02-26 | Stop reason: HOSPADM

## 2023-02-25 RX ORDER — SODIUM CHLORIDE 9 MG/ML
1000 INJECTION, SOLUTION INTRAVENOUS ONCE
Status: COMPLETED | OUTPATIENT
Start: 2023-02-25 | End: 2023-02-25

## 2023-02-25 RX ORDER — METRONIDAZOLE 500 MG/100ML
500 INJECTION, SOLUTION INTRAVENOUS ONCE
Status: COMPLETED | OUTPATIENT
Start: 2023-02-25 | End: 2023-02-25

## 2023-02-25 RX ORDER — DIAZEPAM 5 MG/ML
5 INJECTION, SOLUTION INTRAMUSCULAR; INTRAVENOUS ONCE
Status: COMPLETED | OUTPATIENT
Start: 2023-02-25 | End: 2023-02-25

## 2023-02-25 RX ORDER — MORPHINE SULFATE 4 MG/ML
4 INJECTION INTRAVENOUS ONCE
Status: COMPLETED | OUTPATIENT
Start: 2023-02-25 | End: 2023-02-25

## 2023-02-25 RX ORDER — AMOXICILLIN 250 MG
2 CAPSULE ORAL 2 TIMES DAILY
Status: DISCONTINUED | OUTPATIENT
Start: 2023-02-25 | End: 2023-02-26 | Stop reason: HOSPADM

## 2023-02-25 RX ORDER — KETOROLAC TROMETHAMINE 30 MG/ML
15 INJECTION, SOLUTION INTRAMUSCULAR; INTRAVENOUS ONCE
Status: COMPLETED | OUTPATIENT
Start: 2023-02-25 | End: 2023-02-25

## 2023-02-25 RX ORDER — CEFTRIAXONE 2 G/1
2000 INJECTION, POWDER, FOR SOLUTION INTRAMUSCULAR; INTRAVENOUS ONCE
Status: COMPLETED | OUTPATIENT
Start: 2023-02-25 | End: 2023-02-25

## 2023-02-25 RX ORDER — PREDNISONE 20 MG/1
60 TABLET ORAL DAILY
Status: DISCONTINUED | OUTPATIENT
Start: 2023-02-25 | End: 2023-02-26 | Stop reason: HOSPADM

## 2023-02-25 RX ORDER — NAPROXEN 500 MG/1
500 TABLET ORAL 2 TIMES DAILY PRN
Status: SHIPPED | COMMUNITY
End: 2023-03-30 | Stop reason: SDUPTHER

## 2023-02-25 RX ORDER — HYDROMORPHONE HYDROCHLORIDE 1 MG/ML
0.5 INJECTION, SOLUTION INTRAMUSCULAR; INTRAVENOUS; SUBCUTANEOUS ONCE
Status: COMPLETED | OUTPATIENT
Start: 2023-02-25 | End: 2023-02-25

## 2023-02-25 RX ORDER — ENOXAPARIN SODIUM 100 MG/ML
40 INJECTION SUBCUTANEOUS DAILY
Status: DISCONTINUED | OUTPATIENT
Start: 2023-02-25 | End: 2023-02-26 | Stop reason: HOSPADM

## 2023-02-25 RX ORDER — ONDANSETRON 2 MG/ML
4 INJECTION INTRAMUSCULAR; INTRAVENOUS EVERY 4 HOURS PRN
Status: DISCONTINUED | OUTPATIENT
Start: 2023-02-25 | End: 2023-02-26 | Stop reason: HOSPADM

## 2023-02-25 RX ORDER — PROMETHAZINE HYDROCHLORIDE 25 MG/1
12.5-25 SUPPOSITORY RECTAL EVERY 4 HOURS PRN
Status: DISCONTINUED | OUTPATIENT
Start: 2023-02-25 | End: 2023-02-26 | Stop reason: HOSPADM

## 2023-02-25 RX ORDER — BISACODYL 10 MG
10 SUPPOSITORY, RECTAL RECTAL
Status: DISCONTINUED | OUTPATIENT
Start: 2023-02-25 | End: 2023-02-26 | Stop reason: HOSPADM

## 2023-02-25 RX ORDER — ACETAMINOPHEN 325 MG/1
650 TABLET ORAL EVERY 6 HOURS PRN
Status: ON HOLD | COMMUNITY
End: 2024-02-27

## 2023-02-25 RX ORDER — PROCHLORPERAZINE EDISYLATE 5 MG/ML
5-10 INJECTION INTRAMUSCULAR; INTRAVENOUS EVERY 4 HOURS PRN
Status: DISCONTINUED | OUTPATIENT
Start: 2023-02-25 | End: 2023-02-26 | Stop reason: HOSPADM

## 2023-02-25 RX ORDER — CYCLOBENZAPRINE HCL 5 MG
10 TABLET ORAL 3 TIMES DAILY
Status: DISCONTINUED | OUTPATIENT
Start: 2023-02-25 | End: 2023-02-26 | Stop reason: HOSPADM

## 2023-02-25 RX ORDER — ONDANSETRON 4 MG/1
4 TABLET, ORALLY DISINTEGRATING ORAL EVERY 4 HOURS PRN
Status: DISCONTINUED | OUTPATIENT
Start: 2023-02-25 | End: 2023-02-26 | Stop reason: HOSPADM

## 2023-02-25 RX ADMIN — OMEPRAZOLE 20 MG: 20 CAPSULE, DELAYED RELEASE ORAL at 21:11

## 2023-02-25 RX ADMIN — DIAZEPAM 5 MG: 5 INJECTION, SOLUTION INTRAMUSCULAR; INTRAVENOUS at 12:35

## 2023-02-25 RX ADMIN — ACETAMINOPHEN 1000 MG: 500 TABLET, FILM COATED ORAL at 21:08

## 2023-02-25 RX ADMIN — PREDNISONE 60 MG: 20 TABLET ORAL at 21:13

## 2023-02-25 RX ADMIN — CEFTRIAXONE SODIUM 2000 MG: 2 INJECTION, POWDER, FOR SOLUTION INTRAMUSCULAR; INTRAVENOUS at 17:03

## 2023-02-25 RX ADMIN — HYDROMORPHONE HYDROCHLORIDE 0.5 MG: 1 INJECTION, SOLUTION INTRAMUSCULAR; INTRAVENOUS; SUBCUTANEOUS at 16:34

## 2023-02-25 RX ADMIN — CYCLOBENZAPRINE HYDROCHLORIDE 10 MG: 5 TABLET, FILM COATED ORAL at 21:11

## 2023-02-25 RX ADMIN — KETOROLAC TROMETHAMINE 15 MG: 30 INJECTION, SOLUTION INTRAMUSCULAR; INTRAVENOUS at 14:55

## 2023-02-25 RX ADMIN — METRONIDAZOLE 500 MG: 5 INJECTION, SOLUTION INTRAVENOUS at 17:03

## 2023-02-25 RX ADMIN — NAPROXEN 500 MG: 500 TABLET ORAL at 23:50

## 2023-02-25 RX ADMIN — SODIUM CHLORIDE 1000 ML: 9 INJECTION, SOLUTION INTRAVENOUS at 12:36

## 2023-02-25 RX ADMIN — VANCOMYCIN HYDROCHLORIDE 3 G: 500 INJECTION, POWDER, LYOPHILIZED, FOR SOLUTION INTRAVENOUS at 18:41

## 2023-02-25 RX ADMIN — MORPHINE SULFATE 4 MG: 4 INJECTION INTRAVENOUS at 14:54

## 2023-02-25 RX ADMIN — DIAZEPAM 5 MG: 5 INJECTION, SOLUTION INTRAMUSCULAR; INTRAVENOUS at 15:43

## 2023-02-25 ASSESSMENT — ENCOUNTER SYMPTOMS
HEADACHES: 0
CHILLS: 0
SPEECH CHANGE: 0
NERVOUS/ANXIOUS: 0
COUGH: 0
FLANK PAIN: 0
PALPITATIONS: 0
HEMOPTYSIS: 0
NECK PAIN: 0
WEIGHT LOSS: 0
PHOTOPHOBIA: 0
POLYDIPSIA: 0
TREMORS: 0
BACK PAIN: 1
FOCAL WEAKNESS: 0
SPUTUM PRODUCTION: 0
HALLUCINATIONS: 0
VOMITING: 0
HEARTBURN: 0
BLURRED VISION: 0
FEVER: 0
DOUBLE VISION: 0
ORTHOPNEA: 0
NAUSEA: 0
BRUISES/BLEEDS EASILY: 0
SENSORY CHANGE: 1

## 2023-02-25 ASSESSMENT — LIFESTYLE VARIABLES
AVERAGE NUMBER OF DAYS PER WEEK YOU HAVE A DRINK CONTAINING ALCOHOL: 0
HAVE PEOPLE ANNOYED YOU BY CRITICIZING YOUR DRINKING: NO
HAVE YOU EVER FELT YOU SHOULD CUT DOWN ON YOUR DRINKING: NO
DOES PATIENT WANT TO STOP DRINKING: NO
TOTAL SCORE: 0
EVER HAD A DRINK FIRST THING IN THE MORNING TO STEADY YOUR NERVES TO GET RID OF A HANGOVER: NO
TOTAL SCORE: 0
CONSUMPTION TOTAL: NEGATIVE
HOW MANY TIMES IN THE PAST YEAR HAVE YOU HAD 5 OR MORE DRINKS IN A DAY: 0
ALCOHOL_USE: NO
ON A TYPICAL DAY WHEN YOU DRINK ALCOHOL HOW MANY DRINKS DO YOU HAVE: 0
EVER FELT BAD OR GUILTY ABOUT YOUR DRINKING: NO
SUBSTANCE_ABUSE: 0
TOTAL SCORE: 0

## 2023-02-25 ASSESSMENT — PATIENT HEALTH QUESTIONNAIRE - PHQ9
2. FEELING DOWN, DEPRESSED, IRRITABLE, OR HOPELESS: NOT AT ALL
1. LITTLE INTEREST OR PLEASURE IN DOING THINGS: NOT AT ALL
SUM OF ALL RESPONSES TO PHQ9 QUESTIONS 1 AND 2: 0

## 2023-02-25 ASSESSMENT — PAIN DESCRIPTION - PAIN TYPE: TYPE: ACUTE PAIN

## 2023-02-25 ASSESSMENT — FIBROSIS 4 INDEX
FIB4 SCORE: 0.41
FIB4 SCORE: 0.42

## 2023-02-25 NOTE — ED NOTES
Chief Complaint   Patient presents with    Back Pain     Patient was seen on Tuesday and was prescribed percocet and prednisone but has not had relief. Previous XR showed bone spurs on T7    Numbness     On right side for past month with tingling in arm     Pt reports that she has tried PT without success.  Ambulatory with steady gait. Agree with triage RN.

## 2023-02-25 NOTE — ED TRIAGE NOTES
"Chief Complaint   Patient presents with    Back Pain     Patient was seen on Tuesday and was prescribed percocet and prednisone but has not had relief. Previous XR showed bone spurs on T7    Numbness     On right side for past month with tingling in arm       Patient to triage ambulatory with a steady gait, AAOx4, Appropriate precautions in place.     Explained wait time and triage process. Placed back in lobby. Told to notify ED tech or RN of any changes, verbalized understanding.    BP (!) 152/87   Pulse (!) 103   Temp 36.5 °C (97.7 °F) (Temporal)   Resp 18   Ht 1.651 m (5' 5\")   Wt (!) 135 kg (298 lb 11.6 oz)   SpO2 98%   BMI 49.71 kg/m²       "

## 2023-02-25 NOTE — ED PROVIDER NOTES
ED Provider Note    CHIEF COMPLAINT  Chief Complaint   Patient presents with    Back Pain     Patient was seen on Tuesday and was prescribed percocet and prednisone but has not had relief. Previous XR showed bone spurs on T7    Numbness     On right side for past month with tingling in arm       EXTERNAL RECORDS REVIEWED  Other -patient was most recently seen in this facility 2/21/2023 for neck and back pain which started on January 16.  She had been on naproxen which was initially effective but then pain recurred.  She was then prescribed Flexeril.  She reported tingling in her right arm and leg but no loss of function.  She was started on prednisone and Percocet.  She was instructed to follow-up with neurosurgery if symptoms persist.  During that visit she was tachycardic which was felt to be secondary to pain.    HPI/ROS  LIMITATION TO HISTORY   Select: : None  OUTSIDE HISTORIAN(S):  Significant other -reports that several days ago she had such a severe spasm that she was unable to stand up straight and required assistance straightening her back.    Alie Lowe is a 37 y.o. female who presents with severe and worsening neck and back pain that has been ongoing for approximately 1 month.  She denies any trauma to the area and reports that the pain initially started when she got out of bed.  Since that time she has had progressively worsening tingling in her right arm and right leg but denies any associated weakness.  She has had no fevers although she has felt hot, denies any recent back injections, saddle anesthesia, or loss of continence.    PAST MEDICAL HISTORY   has a past medical history of Heart murmur and MRSA (methicillin resistant Staphylococcus aureus).    SURGICAL HISTORY   has a past surgical history that includes other (Left, 2015).    FAMILY HISTORY  Family History   Problem Relation Age of Onset    Cancer Mother         uterine and skin    Thyroid Mother         removed due to lump     "Diabetes Father     Hypertension Father     Cancer Sister         ovarian    Alcohol abuse Brother        SOCIAL HISTORY  Social History     Tobacco Use    Smoking status: Former     Packs/day: 0.10     Years: 17.00     Pack years: 1.70     Types: Cigarettes    Smokeless tobacco: Never    Tobacco comments:     socially, only every few months   Vaping Use    Vaping Use: Some days    Substances: Nicotine   Substance and Sexual Activity    Alcohol use: Yes     Alcohol/week: 1.2 oz     Types: 2 Standard drinks or equivalent per week     Comment: socially - rare    Drug use: No    Sexual activity: Yes     Partners: Male       CURRENT MEDICATIONS  Home Medications       Reviewed by Love Jarquin R.N. (Registered Nurse) on 02/25/23 at 1006  Med List Status: Partial     Medication Last Dose Status   acetaminophen (TYLENOL) 500 MG Tab  Active   cyclobenzaprine (FLEXERIL) 10 mg Tab  Active   diclofenac sodium (VOLTAREN) 1 % Gel  Active   etodolac (LODINE) 400 MG tablet  Active   omeprazole (PRILOSEC) 20 MG delayed-release capsule  Active   ondansetron (ZOFRAN ODT) 4 MG TABLET DISPERSIBLE  Active   predniSONE (DELTASONE) 20 MG Tab  Active                    ALLERGIES  Allergies   Allergen Reactions    Penicillins Unspecified     Previous hospitalization after penicillin - does not remember reaction    Cannot tolerate any Cillin       PHYSICAL EXAM  VITAL SIGNS: /67   Pulse 93   Temp 36.5 °C (97.7 °F) (Temporal)   Resp 18   Ht 1.651 m (5' 5\")   Wt (!) 135 kg (298 lb 11.6 oz)   SpO2 98%   BMI 49.71 kg/m²    Physical Exam  Vitals and nursing note reviewed.   Constitutional:       Appearance: Normal appearance.   HENT:      Head: Normocephalic and atraumatic.      Right Ear: External ear normal.      Left Ear: External ear normal.      Nose: Nose normal.      Mouth/Throat:      Mouth: Mucous membranes are moist.   Eyes:      Extraocular Movements: Extraocular movements intact.      Conjunctiva/sclera: Conjunctivae " normal.      Pupils: Pupils are equal, round, and reactive to light.   Cardiovascular:      Rate and Rhythm: Regular rhythm. Tachycardia present.      Heart sounds: Normal heart sounds.   Pulmonary:      Effort: Pulmonary effort is normal.   Musculoskeletal:         General: Normal range of motion.      Cervical back: Normal range of motion and neck supple. Tenderness (Midline cervical spine) present.      Comments: Diffuse cervical, thoracic, and lumbar spine tenderness with overlying focal points of worse tenderness in the thoracic and lumbar spine.  There is no overlying erythema and no obvious overlying edema.   Skin:     General: Skin is warm and dry.   Neurological:      Mental Status: She is alert.      Motor: No weakness.      Comments: Decreased sensation of right upper and lower extremity.   Psychiatric:         Mood and Affect: Mood normal.         Behavior: Behavior normal.     DIAGNOSTIC STUDIES / PROCEDURES  LABS  Results for orders placed or performed during the hospital encounter of 02/25/23   CBC WITH DIFFERENTIAL   Result Value Ref Range    WBC 16.8 (H) 4.8 - 10.8 K/uL    RBC 4.98 4.20 - 5.40 M/uL    Hemoglobin 12.5 12.0 - 16.0 g/dL    Hematocrit 39.8 37.0 - 47.0 %    MCV 79.9 (L) 81.4 - 97.8 fL    MCH 25.1 (L) 27.0 - 33.0 pg    MCHC 31.4 (L) 33.6 - 35.0 g/dL    RDW 47.8 35.9 - 50.0 fL    Platelet Count 366 164 - 446 K/uL    MPV 9.8 9.0 - 12.9 fL    Neutrophils-Polys 65.80 44.00 - 72.00 %    Lymphocytes 26.40 22.00 - 41.00 %    Monocytes 6.70 0.00 - 13.40 %    Eosinophils 0.10 0.00 - 6.90 %    Basophils 0.50 0.00 - 1.80 %    Immature Granulocytes 0.50 0.00 - 0.90 %    Nucleated RBC 0.00 /100 WBC    Neutrophils (Absolute) 11.06 (H) 2.00 - 7.15 K/uL    Lymphs (Absolute) 4.43 1.00 - 4.80 K/uL    Monos (Absolute) 1.12 (H) 0.00 - 0.85 K/uL    Eos (Absolute) 0.02 0.00 - 0.51 K/uL    Baso (Absolute) 0.08 0.00 - 0.12 K/uL    Immature Granulocytes (abs) 0.08 0.00 - 0.11 K/uL    NRBC (Absolute) 0.00 K/uL    Basic Metabolic Panel   Result Value Ref Range    Sodium 143 135 - 145 mmol/L    Potassium 3.9 3.6 - 5.5 mmol/L    Chloride 108 96 - 112 mmol/L    Co2 24 20 - 33 mmol/L    Glucose 98 65 - 99 mg/dL    Bun 14 8 - 22 mg/dL    Creatinine 0.69 0.50 - 1.40 mg/dL    Calcium 9.0 8.5 - 10.5 mg/dL    Anion Gap 11.0 7.0 - 16.0   Sed Rate   Result Value Ref Range    Sed Rate Westergren 18 0 - 25 mm/hour   CRP QUANTITIVE (NON-CARDIAC)   Result Value Ref Range    Stat C-Reactive Protein 0.49 0.00 - 0.75 mg/dL   ESTIMATED GFR   Result Value Ref Range    GFR (CKD-EPI) 114 >60 mL/min/1.73 m 2   BLOOD CULTURE x2    Specimen: Peripheral; Blood   Result Value Ref Range    Significant Indicator NEG     Source BLD     Site PERIPHERAL     Culture Result       No Growth  Note: Blood cultures are incubated for 5 days and  are monitored continuously.Positive blood cultures  are called to the RN and reported as soon as  they are identified.     BLOOD CULTURE x2    Specimen: Peripheral; Blood   Result Value Ref Range    Significant Indicator NEG     Source BLD     Site PERIPHERAL     Culture Result       No Growth  Note: Blood cultures are incubated for 5 days and  are monitored continuously.Positive blood cultures  are called to the RN and reported as soon as  they are identified.     CBC with Differential   Result Value Ref Range    WBC 12.0 (H) 4.8 - 10.8 K/uL    RBC 4.88 4.20 - 5.40 M/uL    Hemoglobin 12.2 12.0 - 16.0 g/dL    Hematocrit 39.8 37.0 - 47.0 %    MCV 81.6 81.4 - 97.8 fL    MCH 25.0 (L) 27.0 - 33.0 pg    MCHC 30.7 (L) 33.6 - 35.0 g/dL    RDW 48.5 35.9 - 50.0 fL    Platelet Count 349 164 - 446 K/uL    MPV 9.8 9.0 - 12.9 fL    Neutrophils-Polys 89.90 (H) 44.00 - 72.00 %    Lymphocytes 7.90 (L) 22.00 - 41.00 %    Monocytes 1.20 0.00 - 13.40 %    Eosinophils 0.00 0.00 - 6.90 %    Basophils 0.40 0.00 - 1.80 %    Immature Granulocytes 0.60 0.00 - 0.90 %    Nucleated RBC 0.00 /100 WBC    Neutrophils (Absolute) 10.75 (H) 2.00 - 7.15  K/uL    Lymphs (Absolute) 0.95 (L) 1.00 - 4.80 K/uL    Monos (Absolute) 0.14 0.00 - 0.85 K/uL    Eos (Absolute) 0.00 0.00 - 0.51 K/uL    Baso (Absolute) 0.05 0.00 - 0.12 K/uL    Immature Granulocytes (abs) 0.07 0.00 - 0.11 K/uL    NRBC (Absolute) 0.00 K/uL   Comp Metabolic Panel (CMP)   Result Value Ref Range    Sodium 137 135 - 145 mmol/L    Potassium 4.8 3.6 - 5.5 mmol/L    Chloride 106 96 - 112 mmol/L    Co2 20 20 - 33 mmol/L    Anion Gap 11.0 7.0 - 16.0    Glucose 215 (H) 65 - 99 mg/dL    Bun 15 8 - 22 mg/dL    Creatinine 0.75 0.50 - 1.40 mg/dL    Calcium 8.9 8.5 - 10.5 mg/dL    AST(SGOT) 18 12 - 45 U/L    ALT(SGPT) 18 2 - 50 U/L    Alkaline Phosphatase 88 30 - 99 U/L    Total Bilirubin 0.2 0.1 - 1.5 mg/dL    Albumin 3.7 3.2 - 4.9 g/dL    Total Protein 7.2 6.0 - 8.2 g/dL    Globulin 3.5 1.9 - 3.5 g/dL    A-G Ratio 1.1 g/dL   CORRECTED CALCIUM   Result Value Ref Range    Correct Calcium 9.1 8.5 - 10.5 mg/dL   ESTIMATED GFR   Result Value Ref Range    GFR (CKD-EPI) 105 >60 mL/min/1.73 m 2   CRP QUANTITIVE (NON-CARDIAC)   Result Value Ref Range    Stat C-Reactive Protein 1.28 (H) 0.00 - 0.75 mg/dL     RADIOLOGY  MRI C/T/L spine    COURSE & MEDICAL DECISION MAKING    ED Observation Status? No; Patient does not meet criteria for ED Observation.     INITIAL ASSESSMENT, COURSE AND PLAN  Care Narrative: This is a 37 year old female here with diffuse back pain that has been ongoing and worsening despite multiple visits to healthcare providers with use of OTC medications, muscle relaxers, steroids, and opioids. No measured fevers but she reports tactile fevers and has worsening numbness/tingling in the right upper and lower extremities without weakness. She has good strength in all 4 extremities and an otherwise normal neurologic exam. No saddle anesthesia, incontinence, IV drug use, recent back injections. Has diffuse tenderness in the bony C/T/L spine with some areas of significantly worse focal tenderness. There is no  "overlying erythema or obvious edema although she and her partner think she does have swelling particularly in her low back which I cannot appreciate. She is tachycardic and uncomfortable appearing but AF with otherwise normal VS.     Started on IV fluids for tachycardia and given a dose of valium and toradol with some improvement in discomfort.    CBC demonstrates leukocytosis to 16.8. Unclear if this is related to underlying infection or possibly demargination from recent steroid use. Metabolic panel is grossly normal. CRP and ESR are both normal.    Patient was reevaluated multiple times and continued to have severe discomfort requiring multiple additional doses of pain medication and muscle relaxers without significant improvement. She was started on antibiotics given 2/4 SIRS criteria with possible source - including, but not limited to, osteomyelitis, discitis, epidural abscess.    MRI of the C/T/L spine was ordered but the machine is currently \"down\" so patient will require hospitalization for continued pain management as well as additional workup. Discussed with hospitalist and admitted in guarded condition.    HYDRATION: Based on the patient's presentation of Tachycardia the patient was given IV fluids. IV Hydration was used because oral hydration was not adequate alone. Upon recheck following hydration, the patient was improved.    ADDITIONAL PROBLEM LIST  None    DISPOSITION AND DISCUSSIONS  I have discussed management of the patient with the following physicians and HO's:  Dr. Davila, hospitalist    Discussion of management with other [facility] or appropriate source(s): None     Escalation of care considered, and ultimately not performed: N/A    Barriers to care at this time, including but not limited to:  None .     Decision tools and prescription drugs considered including, but not limited to:  None .    FINAL DIAGNOSIS  Intractable back pain  Positive SIRS  Leukocytosis  Tachycardia     Electronically " signed by: Ilan Bundy M.D., 2/25/2023 12:15 PM

## 2023-02-26 ENCOUNTER — APPOINTMENT (OUTPATIENT)
Dept: RADIOLOGY | Facility: MEDICAL CENTER | Age: 38
End: 2023-02-26
Attending: EMERGENCY MEDICINE
Payer: COMMERCIAL

## 2023-02-26 VITALS
WEIGHT: 249.12 LBS | HEIGHT: 65 IN | HEART RATE: 77 BPM | OXYGEN SATURATION: 94 % | BODY MASS INDEX: 41.51 KG/M2 | TEMPERATURE: 97.3 F | DIASTOLIC BLOOD PRESSURE: 63 MMHG | RESPIRATION RATE: 18 BRPM | SYSTOLIC BLOOD PRESSURE: 119 MMHG

## 2023-02-26 PROBLEM — M54.9 ATYPICAL BACK PAIN: Status: RESOLVED | Noted: 2023-02-25 | Resolved: 2023-02-26

## 2023-02-26 LAB
ALBUMIN SERPL BCP-MCNC: 3.7 G/DL (ref 3.2–4.9)
ALBUMIN/GLOB SERPL: 1.1 G/DL
ALP SERPL-CCNC: 88 U/L (ref 30–99)
ALT SERPL-CCNC: 18 U/L (ref 2–50)
ANION GAP SERPL CALC-SCNC: 11 MMOL/L (ref 7–16)
AST SERPL-CCNC: 18 U/L (ref 12–45)
BASOPHILS # BLD AUTO: 0.4 % (ref 0–1.8)
BASOPHILS # BLD: 0.05 K/UL (ref 0–0.12)
BILIRUB SERPL-MCNC: 0.2 MG/DL (ref 0.1–1.5)
BUN SERPL-MCNC: 15 MG/DL (ref 8–22)
CALCIUM ALBUM COR SERPL-MCNC: 9.1 MG/DL (ref 8.5–10.5)
CALCIUM SERPL-MCNC: 8.9 MG/DL (ref 8.5–10.5)
CHLORIDE SERPL-SCNC: 106 MMOL/L (ref 96–112)
CO2 SERPL-SCNC: 20 MMOL/L (ref 20–33)
CREAT SERPL-MCNC: 0.75 MG/DL (ref 0.5–1.4)
CRP SERPL HS-MCNC: 1.28 MG/DL (ref 0–0.75)
EOSINOPHIL # BLD AUTO: 0 K/UL (ref 0–0.51)
EOSINOPHIL NFR BLD: 0 % (ref 0–6.9)
ERYTHROCYTE [DISTWIDTH] IN BLOOD BY AUTOMATED COUNT: 48.5 FL (ref 35.9–50)
GFR SERPLBLD CREATININE-BSD FMLA CKD-EPI: 105 ML/MIN/1.73 M 2
GLOBULIN SER CALC-MCNC: 3.5 G/DL (ref 1.9–3.5)
GLUCOSE SERPL-MCNC: 215 MG/DL (ref 65–99)
HCT VFR BLD AUTO: 39.8 % (ref 37–47)
HGB BLD-MCNC: 12.2 G/DL (ref 12–16)
IMM GRANULOCYTES # BLD AUTO: 0.07 K/UL (ref 0–0.11)
IMM GRANULOCYTES NFR BLD AUTO: 0.6 % (ref 0–0.9)
LYMPHOCYTES # BLD AUTO: 0.95 K/UL (ref 1–4.8)
LYMPHOCYTES NFR BLD: 7.9 % (ref 22–41)
MCH RBC QN AUTO: 25 PG (ref 27–33)
MCHC RBC AUTO-ENTMCNC: 30.7 G/DL (ref 33.6–35)
MCV RBC AUTO: 81.6 FL (ref 81.4–97.8)
MONOCYTES # BLD AUTO: 0.14 K/UL (ref 0–0.85)
MONOCYTES NFR BLD AUTO: 1.2 % (ref 0–13.4)
NEUTROPHILS # BLD AUTO: 10.75 K/UL (ref 2–7.15)
NEUTROPHILS NFR BLD: 89.9 % (ref 44–72)
NRBC # BLD AUTO: 0 K/UL
NRBC BLD-RTO: 0 /100 WBC
PLATELET # BLD AUTO: 349 K/UL (ref 164–446)
PMV BLD AUTO: 9.8 FL (ref 9–12.9)
POTASSIUM SERPL-SCNC: 4.8 MMOL/L (ref 3.6–5.5)
PROT SERPL-MCNC: 7.2 G/DL (ref 6–8.2)
RBC # BLD AUTO: 4.88 M/UL (ref 4.2–5.4)
SODIUM SERPL-SCNC: 137 MMOL/L (ref 135–145)
WBC # BLD AUTO: 12 K/UL (ref 4.8–10.8)

## 2023-02-26 PROCEDURE — G0378 HOSPITAL OBSERVATION PER HR: HCPCS

## 2023-02-26 PROCEDURE — 97535 SELF CARE MNGMENT TRAINING: CPT

## 2023-02-26 PROCEDURE — A9270 NON-COVERED ITEM OR SERVICE: HCPCS | Performed by: INTERNAL MEDICINE

## 2023-02-26 PROCEDURE — 700117 HCHG RX CONTRAST REV CODE 255: Performed by: EMERGENCY MEDICINE

## 2023-02-26 PROCEDURE — 72156 MRI NECK SPINE W/O & W/DYE: CPT

## 2023-02-26 PROCEDURE — 36415 COLL VENOUS BLD VENIPUNCTURE: CPT

## 2023-02-26 PROCEDURE — 700111 HCHG RX REV CODE 636 W/ 250 OVERRIDE (IP): Performed by: INTERNAL MEDICINE

## 2023-02-26 PROCEDURE — 80053 COMPREHEN METABOLIC PANEL: CPT

## 2023-02-26 PROCEDURE — 97165 OT EVAL LOW COMPLEX 30 MIN: CPT

## 2023-02-26 PROCEDURE — 72157 MRI CHEST SPINE W/O & W/DYE: CPT

## 2023-02-26 PROCEDURE — 85025 COMPLETE CBC W/AUTO DIFF WBC: CPT

## 2023-02-26 PROCEDURE — 700102 HCHG RX REV CODE 250 W/ 637 OVERRIDE(OP): Performed by: INTERNAL MEDICINE

## 2023-02-26 PROCEDURE — 97162 PT EVAL MOD COMPLEX 30 MIN: CPT

## 2023-02-26 PROCEDURE — 99239 HOSP IP/OBS DSCHRG MGMT >30: CPT | Performed by: NURSE PRACTITIONER

## 2023-02-26 PROCEDURE — 86140 C-REACTIVE PROTEIN: CPT

## 2023-02-26 PROCEDURE — A9579 GAD-BASE MR CONTRAST NOS,1ML: HCPCS | Performed by: EMERGENCY MEDICINE

## 2023-02-26 PROCEDURE — 72158 MRI LUMBAR SPINE W/O & W/DYE: CPT

## 2023-02-26 RX ORDER — METHYLPREDNISOLONE 4 MG/1
TABLET ORAL
Qty: 21 TABLET | Refills: 0 | Status: SHIPPED | OUTPATIENT
Start: 2023-02-26 | End: 2023-03-30

## 2023-02-26 RX ORDER — GABAPENTIN 300 MG/1
300 CAPSULE ORAL 3 TIMES DAILY
Qty: 90 CAPSULE | Refills: 0 | Status: SHIPPED | OUTPATIENT
Start: 2023-02-26 | End: 2023-03-23

## 2023-02-26 RX ADMIN — ACETAMINOPHEN 1000 MG: 500 TABLET, FILM COATED ORAL at 05:55

## 2023-02-26 RX ADMIN — NAPROXEN 500 MG: 500 TABLET ORAL at 05:55

## 2023-02-26 RX ADMIN — GADOTERIDOL 20 ML: 279.3 INJECTION, SOLUTION INTRAVENOUS at 05:08

## 2023-02-26 RX ADMIN — ACETAMINOPHEN 1000 MG: 500 TABLET, FILM COATED ORAL at 01:07

## 2023-02-26 RX ADMIN — CYCLOBENZAPRINE HYDROCHLORIDE 10 MG: 5 TABLET, FILM COATED ORAL at 05:57

## 2023-02-26 RX ADMIN — OMEPRAZOLE 20 MG: 20 CAPSULE, DELAYED RELEASE ORAL at 05:56

## 2023-02-26 RX ADMIN — PREDNISONE 60 MG: 20 TABLET ORAL at 06:23

## 2023-02-26 ASSESSMENT — COGNITIVE AND FUNCTIONAL STATUS - GENERAL
DRESSING REGULAR LOWER BODY CLOTHING: A LITTLE
DAILY ACTIVITIY SCORE: 23
MOBILITY SCORE: 24
SUGGESTED CMS G CODE MODIFIER DAILY ACTIVITY: CI
SUGGESTED CMS G CODE MODIFIER MOBILITY: CH

## 2023-02-26 ASSESSMENT — ACTIVITIES OF DAILY LIVING (ADL): TOILETING: INDEPENDENT

## 2023-02-26 ASSESSMENT — GAIT ASSESSMENTS
GAIT LEVEL OF ASSIST: SUPERVISED
DISTANCE (FEET): 200

## 2023-02-26 ASSESSMENT — PAIN DESCRIPTION - PAIN TYPE: TYPE: ACUTE PAIN

## 2023-02-26 NOTE — DISCHARGE SUMMARY
"    Discharge Summary    Please see Attending's comment at the bottom of this note.     CHIEF COMPLAINT ON ADMISSION  Chief Complaint   Patient presents with    Back Pain     Patient was seen on Tuesday and was prescribed percocet and prednisone but has not had relief. Previous XR showed bone spurs on T7    Numbness     On right side for past month with tingling in arm       Reason for Admission  Back pain      Admission Date  2/25/2023    CODE STATUS  Full Code    HPI & HOSPITAL COURSE    Alie Lowe is a 37 y.o. female with past medical history of morbid obesity, GERD, carpal tunnel syndrome, H. pylori infection, panic attacks, chronic back pain, who presented 2/25/2023 with complaints of back and neck pain over 1 month.  Described pain all over the spine from the neck down to the tailbone, constant, worsening with movements, appears to be worsening despite taking naproxen, Tylenol, and most recently prescribed prednisone, Flexeril since last Tuesday when she presented to this emergency department.    Reported associated numbness and tingling in the right arm and leg.  Denies definite weakness, but prefers using left hand while driving.  Ambulates with \"limping\".  She had some subjective chills and fever in the last week.  Patient return to ER as her symptoms has not been improving.  Due to SIRS with mild tachycardia 103, WBC 16.8, concern for infection right and ERP  requested admission, covered with antibiotics vancomycin ceftriaxone and Flagyl and ordered MRI of the pan spine.    Completed MRI of cervical, thoracic and lumbar spine.  Lumbar MRI showed mild multilevel degenerative changes, no evidence of swelling or abscess.  MRI of the thoracic spine showed no significant abnormality.  MRI of the cervical spine showed mild degenerative changes without foraminal stenosis and no evidence of abscess or infection.  Discussed findings with patient, her pain was better controlled now down to a 3 " or 4 out of 10.  Unable to work with physical therapy, will be discharged with a prescription for 1 month of physical therapy.  She is encouraged to follow-up with her primary care provider for referrals and ongoing management for her likely chronic back pain.  Also discharged with a steroid Dosepak to help with symptoms, likely right arm tingling related to chronic cervical degenerative changes.  Patient will also be discharged with gabapentin and encouraged to continue naproxen for pain management.  Repeat labs showed resolution of her white blood cell count, and no elevated sed rate, with slightly elevated CRP, low suspicion for acute spinal infection causing pain.  Will not discharge on antibiotics at this time.    I have performed a physical exam and reviewed and updated ROS and Plan today (2/26/2023). In review of yesterday's note (2/25/2023), there are no changes except as documented above.     Therefore, she is discharged in fair and stable condition to home with close outpatient follow-up.    The patient recovered much more quickly than anticipated on admission.    Discharge Date  2/26/23    FOLLOW UP ITEMS POST DISCHARGE  Continue naproxen, trial gabapentin  Complete steroid Dosepak  Follow-up with primary care, will likely need ongoing management for likely chronic back pain     DISCHARGE DIAGNOSES  Principal Problem (Resolved):    Intractable upper, mid and lower back pain and neck pain and SIRS POA: Yes  Active Problems:    Gastroesophageal reflux disease without esophagitis POA: Yes    Morbid obesity with BMI of 40.0-44.9, adult (HCC) POA: Yes      FOLLOW UP    Storm Miles M.D.  [address] [place] [id number] [phone]    Follow up in 1 week(s)        MEDICATIONS ON DISCHARGE     Medication List        START taking these medications        Instructions   gabapentin 300 MG Caps  Commonly known as: NEURONTIN   Take 1 Capsule by mouth 3 times a day for 30 days.  Dose: 300 mg      methylPREDNISolone 4 MG Tbpk  Commonly known as: MEDROL DOSEPAK   Follow schedule on package instructions.            CONTINUE taking these medications        Instructions   acetaminophen 325 MG Tabs  Commonly known as: Tylenol   Take 650 mg by mouth every 6 hours as needed for Mild Pain.  Dose: 650 mg     cyclobenzaprine 10 mg Tabs  Commonly known as: Flexeril   Take 1 Tablet by mouth 3 times a day as needed for Moderate Pain.  Dose: 10 mg     diclofenac sodium 1 % Gel  Commonly known as: Voltaren   Apply 4 g topically 3 times a day as needed (Joint or Muscle Pain).  Dose: 4 g     naproxen 500 MG Tabs  Commonly known as: NAPROSYN   Take 500 mg by mouth 2 times a day as needed (Moderate Pain). Take with food.  Dose: 500 mg     omeprazole 20 MG delayed-release capsule  Commonly known as: PRILOSEC   Take 1 Capsule by mouth every day.  Dose: 20 mg     ondansetron 4 MG Tbdp  Commonly known as: ZOFRAN ODT   Take 1 Tablet by mouth every 6 hours as needed for Nausea/Vomiting. Dissolve in mouth.  Dose: 4 mg            STOP taking these medications      predniSONE 20 MG Tabs  Commonly known as: DELTASONE              Allergies  Allergies   Allergen Reactions    Penicillins Unspecified     Previous hospitalization after penicillin - does not remember reaction    Cannot tolerate any Cillin       DIET  Orders Placed This Encounter   Procedures    Diet Order Diet: Regular     Standing Status:   Standing     Number of Occurrences:   1     Order Specific Question:   Diet:     Answer:   Regular [1]       ACTIVITY  As tolerated.  Weight bearing as tolerated    CONSULTATIONS  None    PROCEDURES  MR of cervical, thoracic and lumbar spine     LABORATORY  Lab Results   Component Value Date    SODIUM 137 02/26/2023    POTASSIUM 4.8 02/26/2023    CHLORIDE 106 02/26/2023    CO2 20 02/26/2023    GLUCOSE 215 (H) 02/26/2023    BUN 15 02/26/2023    CREATININE 0.75 02/26/2023        Lab Results   Component Value Date    WBC 12.0 (H)  02/26/2023    HEMOGLOBIN 12.2 02/26/2023    HEMATOCRIT 39.8 02/26/2023    PLATELETCT 349 02/26/2023      MR-LUMBAR SPINE-WITH & W/O   Final Result      1.  No acute abnormality or abnormal enhancement in the lumbar spine.   2.  Mild multilevel degenerative changes of the lumbar spine as described above.      MR-THORACIC SPINE-WITH & W/O   Final Result      No significant abnormality is seen in the MR scan of the thoracic spine.      MR-CERVICAL SPINE-WITH & W/O   Final Result      1.  No acute abnormality or abnormal enhancement in the cervical spine.   2.  Mild degenerative changes of the cervical spine without central canal or neural foraminal stenosis.           Total time of the discharge process took 37 minutes.      I have independently seen and examined the patient. I personally reviewed the Medications, Laboratory, Radiology and Imaging results. I fully discussed the case with the HOWIE Lee and agree with the findings and plan of care as documented.      Presented with intractable back and neck with acute on chronic nature - recent trial of acetaminophen, ibuprofen and prednisone with limited improvement.     MRI of CS, TS and LS were grossly unremarkable except mild DJD.     Reported some improvement in symptoms overnight. At this point, deemed stable for DC with a close outpatient follow up.     Continued conservative management and outpatient PT/OT is recommended.     Michael Florez M.D.

## 2023-02-26 NOTE — H&P
"Hospital Medicine History & Physical Note    Date of Service  2/25/2023    Primary Care Physician  Storm Miles M.D.        Code Status  Full Code    Chief Complaint  Chief Complaint   Patient presents with    Back Pain     Patient was seen on Tuesday and was prescribed percocet and prednisone but has not had relief. Previous XR showed bone spurs on T7    Numbness     On right side for past month with tingling in arm       History of Presenting Illness  Alie Lowe is a 37 y.o. female with past medical history of morbid obesity, GERD, carpal tunnel syndrome, H. pylori infection, panic attacks, chronic back pain, who presented 2/25/2023 with complaints of back and neck pain over 1 months.  Described pain all over the spine from the neck down to the tailbone, constant, worsening with movements, appears to be worsening despite taking naproxen, Tylenol, and most recently prescribed prednisone, Flexeril since last Tuesday when she presented to this emergency department.  Reported associated numbness and tingling in the right arm and leg.  Denies definite weakness, but prefers using left hand while driving.  Ambulates with \"limping\".  She had some subjective chills and fever in the last week.  Patient return to ER as her symptoms has not been improving.  Due to SIRS with mild tachycardia 103, WBC 16.8, concern for infection right and ERP  requested admission, covered with antibiotics vancomycin ceftriaxone and Flagyl and ordered MRI of the pan spine.  Patient received IV morphine, Dilaudid,, Toradol, Valium 10 mg and still rates pain 8 out of 10      I discussed the plan of care with patient.    Review of Systems  Review of Systems   Constitutional:  Negative for chills, fever and weight loss.   HENT:  Negative for ear pain, hearing loss and tinnitus.    Eyes:  Negative for blurred vision, double vision and photophobia.   Respiratory:  Negative for cough, hemoptysis and sputum production.  "   Cardiovascular:  Negative for chest pain, palpitations and orthopnea.   Gastrointestinal:  Negative for heartburn, nausea and vomiting.   Genitourinary:  Negative for dysuria, flank pain, frequency and hematuria.   Musculoskeletal:  Positive for back pain. Negative for joint pain and neck pain.   Skin:  Negative for itching and rash.   Neurological:  Positive for sensory change. Negative for tremors, speech change, focal weakness and headaches.   Endo/Heme/Allergies:  Negative for environmental allergies and polydipsia. Does not bruise/bleed easily.   Psychiatric/Behavioral:  Negative for hallucinations and substance abuse. The patient is not nervous/anxious.      Past Medical History   has a past medical history of Heart murmur and MRSA (methicillin resistant Staphylococcus aureus).    Surgical History   has a past surgical history that includes other (Left, 2015).     Family History  family history includes Alcohol abuse in her brother; Cancer in her mother and sister; Diabetes in her father; Hypertension in her father; Thyroid in her mother.   Family history reviewed with patient. There is no family history that is pertinent to the chief complaint.     Social History   reports that she has quit smoking. Her smoking use included cigarettes. She has a 1.70 pack-year smoking history. She has never used smokeless tobacco. She reports current alcohol use of about 1.2 oz per week. She reports that she does not use drugs.    Allergies  Allergies   Allergen Reactions    Penicillins Unspecified     Previous hospitalization after penicillin - does not remember reaction    Cannot tolerate any Cillin       Medications  Prior to Admission Medications   Prescriptions Last Dose Informant Patient Reported? Taking?   acetaminophen (TYLENOL) 325 MG Tab 2/19/2023 at PRN Patient Yes Yes   Sig: Take 650 mg by mouth every 6 hours as needed for Mild Pain.   cyclobenzaprine (FLEXERIL) 10 mg Tab ABOUT 3 WEEKS AGO at PRN Patient No No    Sig: Take 1 Tablet by mouth 3 times a day as needed for Moderate Pain.   diclofenac sodium (VOLTAREN) 1 % Gel 2/22/2023 at PRN Patient Yes Yes   Sig: Apply 4 g topically 3 times a day as needed (Joint or Muscle Pain).   naproxen (NAPROSYN) 500 MG Tab 2/25/2023 at 0010 Patient Yes Yes   Sig: Take 500 mg by mouth 2 times a day as needed (Moderate Pain). Take with food.   omeprazole (PRILOSEC) 20 MG delayed-release capsule 2/24/2023 at 0800 Patient No No   Sig: Take 1 Capsule by mouth every day.   ondansetron (ZOFRAN ODT) 4 MG TABLET DISPERSIBLE ABOUT 1 MONTH AGO at PRN Patient No No   Sig: Take 1 Tablet by mouth every 6 hours as needed for Nausea/Vomiting. Dissolve in mouth.   predniSONE (DELTASONE) 20 MG Tab 2/25/2023 at 0010 Patient No No   Sig: Take 3 Tablets by mouth every day for 5 days. Take with food   Patient taking differently: Take 20 mg by mouth 3 times a day. Take with food      Facility-Administered Medications: None       Physical Exam  Temp:  [36.5 °C (97.7 °F)] 36.5 °C (97.7 °F)  Pulse:  [] 80  Resp:  [18] 18  BP: (114-152)/(58-87) 140/65  SpO2:  [95 %-99 %] 99 %  Blood Pressure: (!) 140/65   Temperature: 36.5 °C (97.7 °F)   Pulse: 80   Respiration: 18   Pulse Oximetry: 99 %       Physical Exam  Vitals and nursing note reviewed.   Constitutional:       General: She is not in acute distress.     Appearance: Normal appearance. She is morbidly obese.   HENT:      Head: Normocephalic and atraumatic.      Nose: Nose normal.      Mouth/Throat:      Mouth: Mucous membranes are moist.   Eyes:      Extraocular Movements: Extraocular movements intact.      Pupils: Pupils are equal, round, and reactive to light.   Cardiovascular:      Rate and Rhythm: Normal rate and regular rhythm.   Pulmonary:      Effort: Pulmonary effort is normal.      Breath sounds: Normal breath sounds.   Abdominal:      General: Abdomen is flat. There is no distension.      Tenderness: There is no abdominal tenderness.  "  Musculoskeletal:         General: No swelling or deformity. Normal range of motion.      Cervical back: Normal range of motion and neck supple. Tenderness present.      Thoracic back: Tenderness present.      Lumbar back: Tenderness present.   Skin:     General: Skin is warm and dry.   Neurological:      General: No focal deficit present.      Mental Status: She is alert and oriented to person, place, and time.      Deep Tendon Reflexes:      Reflex Scores:       Patellar reflexes are 2+ on the right side and 2+ on the left side.  Psychiatric:         Mood and Affect: Mood normal.         Behavior: Behavior normal.       Laboratory:  Recent Labs     02/25/23  1254   WBC 16.8*   RBC 4.98   HEMOGLOBIN 12.5   HEMATOCRIT 39.8   MCV 79.9*   MCH 25.1*   MCHC 31.4*   RDW 47.8   PLATELETCT 366   MPV 9.8     Recent Labs     02/25/23  1254   SODIUM 143   POTASSIUM 3.9   CHLORIDE 108   CO2 24   GLUCOSE 98   BUN 14   CREATININE 0.69   CALCIUM 9.0     Recent Labs     02/25/23  1254   GLUCOSE 98         No results for input(s): NTPROBNP in the last 72 hours.      No results for input(s): TROPONINT in the last 72 hours.    Imaging:  MR-CERVICAL SPINE-WITH & W/O    (Results Pending)   MR-THORACIC SPINE-WITH & W/O    (Results Pending)   MR-LUMBAR SPINE-WITH & W/O    (Results Pending)       Assessment/Plan:  Justification for Admission Status  I anticipate this patient is appropriate for observation status at this time because intractable back pain    Patient will need a Med/Surg bed on MEDICAL service .  The need is secondary to intractable back pain.    * Intractable upper, mid and lower back pain and neck pain and SIRS- (present on admission)  Assessment & Plan  Patient complains of pain \"all over the back\" for 1-1/2 months, with associated numbness and tingling in the right arm and right leg  No definite motor deficit on exam  Admitted for pain control and MRI, to rule out infection given SIRS  Continue naproxen, prednisone, " Flexeril, scheduled Tylenol  PT OT    Morbid obesity with BMI of 40.0-44.9, adult ([facility])- (present on admission)  Assessment & Plan  Increased care burden  Follow-up with PCP    Gastroesophageal reflux disease without esophagitis- (present on admission)  Assessment & Plan  Continue Prilosec        VTE prophylaxis: enoxaparin ppx

## 2023-02-26 NOTE — PROGRESS NOTES
4 Eyes Skin Assessment Completed by EARLE Chow and EAREL Escamilla.    Head WDL  Ears WDL  Nose WDL  Mouth WDL  Neck WDL  Breast/Chest WDL  Shoulder Blades WDL  Spine WDL  (R) Arm/Elbow/Hand WDL  (L) Arm/Elbow/Hand WDL  Abdomen WDL  Groin WDL  Scrotum/Coccyx/Buttocks WDL  (R) Leg WDL  (L) Leg Scar  (R) Heel/Foot/Toe WDL  (L) Heel/Foot/Toe WDL          Devices In Places Pulse Ox      Interventions In Place N/A    Possible Skin Injury No    Pictures Uploaded Into Epic N/A  Wound Consult Placed N/A  RN Wound Prevention Protocol Ordered No

## 2023-02-26 NOTE — ASSESSMENT & PLAN NOTE
"Patient complains of pain \"all over the back\" for 1-1/2 months, with associated numbness and tingling in the right arm and right leg  No definite motor deficit on exam  Admitted for pain control and MRI, to rule out infection given SIRS  Continue naproxen, prednisone, Flexeril, scheduled Tylenol  PT OT  "

## 2023-02-26 NOTE — THERAPY
Occupational Therapy   Initial Evaluation     Patient Name: Alie Lowe  Age:  37 y.o., Sex:  female  Medical Record #: [id number]  Today's Date: 2/26/2023     Precautions: Fall Risk    Assessment    Patient is 37 y.o. female with h/o obesity, GERD, CTS, admitted for worsening back pain. MRI of C/T/L negative except for mild degenerative changes. Pt seen for OT eval and treatment. Spouses present and supportive. See grid below for details. Pt able to complete BADL and transfers with no more than supv in this setting with the exception of LB dressing. Treatment included training on use of AE for compensatory LB dressing and education on purchase options. Also recommend supv with standing bathing. No further acute OT needs at this time.     Plan     Eval only   DC Equipment Recommendations: Reacher, Sock Aide  Discharge Recommendations: Anticipate that the patient will have no further occupational therapy needs after discharge from the hospital     Objective       02/26/23 0923   Prior Living Situation   Housing / Facility 1 Story House   Steps Into Home 5   Steps In Home 0   Rail Both Rail (Steps into Home)   Bathroom Set up Bathtub / Shower Combination   Equipment Owned Single Point Cane   Comments Pt lives with spouses and kids (3, 12, 16)   Prior Level of ADL Function   Comments Pt was independent with BADL PTA with the exception of needing assist for LB dressing   Prior Level of IADL Function   Prior Level Of Mobility Independent Without Device in Community;Independent Without Device in Home;Independent With Device in Community  (Very occasional use of SPC in community)   Driving / Transportation Driving Independent   Occupation (Pre-Hospital Vocational) Employed Full Time  ()   Comments Pt was independent with I-ADL and functional mobility PTA   Cognition    Cognition / Consciousness WDL   Active ROM Upper Body   Active ROM Upper Body  WDL   Strength Upper Body   Comments WNL BUE  except limited tolerance for proximal resistance due to back pain   Sensation Upper Body   Comments Pt endorses intermittent numbness to ulnar distribution of R hand   Balance Assessment   Sitting Balance (Static) Good   Sitting Balance (Dynamic) Good   Standing Balance (Static) Good   Standing Balance (Dynamic) Fair +   Weight Shift Sitting Good   Weight Shift Standing Good   Comments no AD, no LOB   Bed Mobility    Supine to Sit Supervised   Sit to Supine Supervised   Scooting Supervised   Rolling Supervised   Comments trained on log roll   ADL Assessment   Lower Body Dressing Supervision  (doff/don B socks using AE)   Toileting   (NT; declined need)   Functional Mobility   Sit to Stand Supervised   Bed, Chair, Wheelchair Transfer Supervised   Transfer Method Stand Step  (no AD)   Mobility Supine > < EOB, gait and transfers without AD

## 2023-02-26 NOTE — PROGRESS NOTES
Received pt from ED in [facility],pt is one person assist to [facility] to bed,a&ox4,c/o pain on moving.

## 2023-02-26 NOTE — ED NOTES
Med rec completed per patient at bedside.  Allergies reviewed with patient.  Patient's preferred pharmacy: [facility] in [place].    Patient is on a 5 day course of prednisone prescribed 2/22/2023.  No outpatient antibiotics within the last 30 days.

## 2023-02-26 NOTE — HOSPITAL COURSE
"Alie Lowe is a 37 y.o. female with past medical history of morbid obesity, GERD, carpal tunnel syndrome, H. pylori infection, panic attacks, chronic back pain, who presented 2/25/2023 with complaints of back and neck pain over 1 month.  Described pain all over the spine from the neck down to the tailbone, constant, worsening with movements, appears to be worsening despite taking naproxen, Tylenol, and most recently prescribed prednisone, Flexeril since last Tuesday when she presented to this emergency department.    Reported associated numbness and tingling in the right arm and leg.  Denies definite weakness, but prefers using left hand while driving.  Ambulates with \"limping\".  She had some subjective chills and fever in the last week.  Patient return to ER as her symptoms has not been improving.  Due to SIRS with mild tachycardia 103, WBC 16.8, concern for infection right and ERP  requested admission, covered with antibiotics vancomycin ceftriaxone and Flagyl and ordered MRI of the pan spine.    Completed MRI of cervical, thoracic and lumbar spine.  Lumbar MRI showed mild multilevel degenerative changes, no evidence of swelling or abscess.  MRI of the thoracic spine showed no significant abnormality.  MRI of the cervical spine showed mild degenerative changes without foraminal stenosis and no evidence of abscess or infection.  Discussed findings with patient, her pain was better controlled now down to a 3 or 4 out of 10.  Unable to work with physical therapy, will be discharged with a prescription for 1 month of physical therapy.  She is encouraged to follow-up with her primary care provider for referrals and ongoing management for her likely chronic back pain.  Also discharged with a steroid Dosepak to help with symptoms, likely right arm tingling related to chronic cervical degenerative changes.  Patient will also be discharged with gabapentin and encouraged to continue naproxen for pain " management.  Repeat labs showed resolution of her white blood cell count, and no elevated sed rate, with slightly elevated CRP, low suspicion for acute spinal infection causing pain.  Will not discharge on antibiotics at this time.

## 2023-02-26 NOTE — DISCHARGE INSTRUCTIONS
Discharge Instructions    Discharged to home by car with relative. Discharged via wheelchair, hospital escort: Yes.  Special equipment needed: Not Applicable    Be sure to schedule a follow-up appointment with your primary care doctor or any specialists as instructed.     Discharge Plan:   Diet Plan: Discussed  Activity Level: Discussed  Confirmed Follow up Appointment: Patient to Call and Schedule Appointment  Confirmed Symptoms Management: Discussed  Medication Reconciliation Updated: Yes  Influenza Vaccine Indication: Indicated: 9 to 64 years of age    I understand that a diet low in cholesterol, fat, and sodium is recommended for good health. Unless I have been given specific instructions below for another diet, I accept this instruction as my diet prescription.   Other diet: Regular    Special Instructions: None    -Is this patient being discharged with medication to prevent blood clots?  No    Is patient discharged on Warfarin / Coumadin?   No

## 2023-02-26 NOTE — CARE PLAN
The patient is Stable - Low risk of patient condition declining or worsening         Progress made toward(s) clinical / shift goals:  patient verbalizes pain level during shift.  Patient asks appropriate questions about care and verbalizes understanding.     Patient is not progressing towards the following goals:

## 2023-03-03 ENCOUNTER — APPOINTMENT (OUTPATIENT)
Dept: RADIOLOGY | Facility: MEDICAL CENTER | Age: 38
End: 2023-03-03
Attending: EMERGENCY MEDICINE
Payer: COMMERCIAL

## 2023-03-03 ENCOUNTER — HOSPITAL ENCOUNTER (EMERGENCY)
Facility: MEDICAL CENTER | Age: 38
End: 2023-03-04
Attending: EMERGENCY MEDICINE
Payer: COMMERCIAL

## 2023-03-03 DIAGNOSIS — R41.0 CONFUSION: ICD-10-CM

## 2023-03-03 DIAGNOSIS — G89.29 CHRONIC LOW BACK PAIN WITHOUT SCIATICA, UNSPECIFIED BACK PAIN LATERALITY: ICD-10-CM

## 2023-03-03 DIAGNOSIS — M54.50 CHRONIC LOW BACK PAIN WITHOUT SCIATICA, UNSPECIFIED BACK PAIN LATERALITY: ICD-10-CM

## 2023-03-03 DIAGNOSIS — R55 SYNCOPE, UNSPECIFIED SYNCOPE TYPE: ICD-10-CM

## 2023-03-03 LAB
ANION GAP SERPL CALC-SCNC: 13 MMOL/L (ref 7–16)
BASOPHILS # BLD AUTO: 0.4 % (ref 0–1.8)
BASOPHILS # BLD: 0.07 K/UL (ref 0–0.12)
BUN SERPL-MCNC: 15 MG/DL (ref 8–22)
CALCIUM SERPL-MCNC: 9.4 MG/DL (ref 8.5–10.5)
CHLORIDE SERPL-SCNC: 102 MMOL/L (ref 96–112)
CO2 SERPL-SCNC: 21 MMOL/L (ref 20–33)
CREAT SERPL-MCNC: 0.67 MG/DL (ref 0.5–1.4)
EKG IMPRESSION: NORMAL
EOSINOPHIL # BLD AUTO: 0.04 K/UL (ref 0–0.51)
EOSINOPHIL NFR BLD: 0.2 % (ref 0–6.9)
ERYTHROCYTE [DISTWIDTH] IN BLOOD BY AUTOMATED COUNT: 46.3 FL (ref 35.9–50)
GFR SERPLBLD CREATININE-BSD FMLA CKD-EPI: 115 ML/MIN/1.73 M 2
GLUCOSE SERPL-MCNC: 175 MG/DL (ref 65–99)
HCT VFR BLD AUTO: 43.6 % (ref 37–47)
HGB BLD-MCNC: 13.7 G/DL (ref 12–16)
IMM GRANULOCYTES # BLD AUTO: 0.12 K/UL (ref 0–0.11)
IMM GRANULOCYTES NFR BLD AUTO: 0.7 % (ref 0–0.9)
LYMPHOCYTES # BLD AUTO: 4.92 K/UL (ref 1–4.8)
LYMPHOCYTES NFR BLD: 30.2 % (ref 22–41)
MCH RBC QN AUTO: 24.9 PG (ref 27–33)
MCHC RBC AUTO-ENTMCNC: 31.4 G/DL (ref 33.6–35)
MCV RBC AUTO: 79.3 FL (ref 81.4–97.8)
MONOCYTES # BLD AUTO: 0.96 K/UL (ref 0–0.85)
MONOCYTES NFR BLD AUTO: 5.9 % (ref 0–13.4)
NEUTROPHILS # BLD AUTO: 10.17 K/UL (ref 2–7.15)
NEUTROPHILS NFR BLD: 62.6 % (ref 44–72)
NRBC # BLD AUTO: 0 K/UL
NRBC BLD-RTO: 0 /100 WBC
PLATELET # BLD AUTO: 537 K/UL (ref 164–446)
PMV BLD AUTO: 10 FL (ref 9–12.9)
POTASSIUM SERPL-SCNC: 4.2 MMOL/L (ref 3.6–5.5)
PROLACTIN SERPL-MCNC: 10.3 NG/ML (ref 2.8–26)
RBC # BLD AUTO: 5.5 M/UL (ref 4.2–5.4)
SODIUM SERPL-SCNC: 136 MMOL/L (ref 135–145)
WBC # BLD AUTO: 16.3 K/UL (ref 4.8–10.8)

## 2023-03-03 PROCEDURE — 84146 ASSAY OF PROLACTIN: CPT

## 2023-03-03 PROCEDURE — 70450 CT HEAD/BRAIN W/O DYE: CPT

## 2023-03-03 PROCEDURE — 85025 COMPLETE CBC W/AUTO DIFF WBC: CPT

## 2023-03-03 PROCEDURE — 87150 DNA/RNA AMPLIFIED PROBE: CPT | Mod: 91

## 2023-03-03 PROCEDURE — 93005 ELECTROCARDIOGRAM TRACING: CPT

## 2023-03-03 PROCEDURE — 93005 ELECTROCARDIOGRAM TRACING: CPT | Performed by: EMERGENCY MEDICINE

## 2023-03-03 PROCEDURE — 80048 BASIC METABOLIC PNL TOTAL CA: CPT

## 2023-03-03 PROCEDURE — 87040 BLOOD CULTURE FOR BACTERIA: CPT

## 2023-03-03 PROCEDURE — 99285 EMERGENCY DEPT VISIT HI MDM: CPT

## 2023-03-03 PROCEDURE — 87077 CULTURE AEROBIC IDENTIFY: CPT

## 2023-03-03 PROCEDURE — 36415 COLL VENOUS BLD VENIPUNCTURE: CPT

## 2023-03-03 ASSESSMENT — FIBROSIS 4 INDEX: FIB4 SCORE: 0.45

## 2023-03-03 NOTE — LETTER
3/5/2023               Alie Lowe  [address]  [address]  [place] [address]        Dear Alie (MR#[id number])    As we have been unable to contact you by phone, this letter is sent in regards to your recent visit to the [facility] Emergency Department on 3/3/2023. During the visit, tests were performed to assist the physician in a medical diagnosis. A review of those tests requires that we notify you of the following:    Your blood culture and sensitivity was POSITIVE for a bacteria which may or may not be a contaminant. IF YOU ARE NOT FEELING BETTER PLEASE CONTACT ME AS SOON AS POSSIBLE AT THE NUMBER BELOW.       Thank you for your cooperation in the matter.    Sincerely,  ED Culture Follow-Up Staff  Patric Zambrano, AlessandroD  [phone]    [facility] Emergency Department  [address] [place] [id number] [phone] (ED Culture Line)

## 2023-03-04 VITALS
OXYGEN SATURATION: 94 % | SYSTOLIC BLOOD PRESSURE: 119 MMHG | HEART RATE: 94 BPM | TEMPERATURE: 97.5 F | RESPIRATION RATE: 18 BRPM | BODY MASS INDEX: 40.65 KG/M2 | HEIGHT: 65 IN | WEIGHT: 244 LBS | DIASTOLIC BLOOD PRESSURE: 77 MMHG

## 2023-03-04 NOTE — ED NOTES
PATIENT STATES THAT SHE WOKE UP ON THE FLOOR AFTER TAKING HE NIGHT MEDS, SHE DOES NOT REMEMBER FALLING ASLEEP AND  FOUND HER ON THE FLOOR MINUTES AFTER FALLING ASLEEP. PT ALSO WITH DECREASED MOVEMENT AND SENSATION TO BLE L>R.  PATIENT PUPILS ARE PINPOINT AND SHE STATES THAT SHE FEELS LIKE SHE IS MISSING PARTS OF HER VISION INTERMITTENTLY.

## 2023-03-04 NOTE — ED TRIAGE NOTES
Alie Lowe  37 y.o.  female  Chief Complaint   Patient presents with    Syncope     Brought in by friends/ family. Per report patient was seen and admitted here last week for back spasms, placed on steroid and gabapentin. Today patient was in bed and found by girlfriend on the floor unresponsive and flaccid. Upon arrival to ED patient was assisted to wheelchair by staff. States having weakness on both legs. + back spasms. Pins and needles on both legs. No slurred speed. Sensation intact.

## 2023-03-04 NOTE — ED PROVIDER NOTES
ED Provider Note    CHIEF COMPLAINT  Chief Complaint   Patient presents with    Syncope     Brought in by friends/ family. Per report patient was seen and admitted here last week for back spasms, placed on steroid and gabapentin. Today patient was in bed and found by girlfriend on the floor unresponsive and flaccid. Upon arrival to ED patient was assisted to wheelchair by staff. States having weakness on both legs. + back spasms. Pins and needles on both legs. No slurred speed. Sensation intact.       EXTERNAL RECORDS REVIEWED  Inpatient Notes recent admission for weakness back spasm and elevated wbc  FULL MRI's c t and l spine performed 2/25 wnl without infection inflammation, central cord or any nerve compression, mild degenerative changes only    Was started on gabapentin for nerve pain last week     HPI/ROS  LIMITATION TO HISTORY   Select: : None  OUTSIDE HISTORIAN(S):  Partners at the bedside gave most of the history     Alie Lowe is a 37 y.o. female who presents to the emergency department with an episode of likely syncope or altered mental status.  Patient was recently hospitalized for back pain with some subjective weakness numbness tingling with pretty much essentially normal MRIs beyond some mild degenerative changes.  She was sent home on some steroids oxycodone as needed as well as started on gabapentin this last week.  She states she has been doing pretty good she does get a lot of muscle spasms and she had 1 so she was lying in bed around 6 PM and falling asleep partner say they came back and found her on the ground kind of crumpled over.  She was not really responsive in count of confused speech for several minutes and it took her a long time before she really came back to normal.  She states she does not really remember it does not actually remember falling asleep.  She did not bite her tongue she did not urinate on herself.  Currently she states she does still feels generally weak in  her legs and is continue to have back spasms and spasms that go up into her arms    PAST MEDICAL HISTORY   has a past medical history of Heart murmur and MRSA (methicillin resistant Staphylococcus aureus).    SURGICAL HISTORY   has a past surgical history that includes other (Left, 2015).    FAMILY HISTORY  Family History   Problem Relation Age of Onset    Cancer Mother         uterine and skin    Thyroid Mother         removed due to lump    Diabetes Father     Hypertension Father     Cancer Sister         ovarian    Alcohol abuse Brother        SOCIAL HISTORY  Social History     Tobacco Use    Smoking status: Former     Packs/day: 0.10     Years: 17.00     Pack years: 1.70     Types: Cigarettes    Smokeless tobacco: Never    Tobacco comments:     socially, only every few months   Vaping Use    Vaping Use: Some days    Substances: Nicotine   Substance and Sexual Activity    Alcohol use: Yes     Alcohol/week: 1.2 oz     Types: 2 Standard drinks or equivalent per week     Comment: socially - rare    Drug use: No    Sexual activity: Yes     Partners: Male       CURRENT MEDICATIONS  Home Medications       Reviewed by Reymundo Phoenix R.N. (Registered Nurse) on 03/03/23 at 2121  Med List Status: Partial     Medication Last Dose Status   acetaminophen (TYLENOL) 325 MG Tab  Active   cyclobenzaprine (FLEXERIL) 10 mg Tab  Active   diclofenac sodium (VOLTAREN) 1 % Gel  Active   gabapentin (NEURONTIN) 300 MG Cap  Active   methylPREDNISolone (MEDROL DOSEPAK) 4 MG Tablet Therapy Pack  Active   naproxen (NAPROSYN) 500 MG Tab  Active   omeprazole (PRILOSEC) 20 MG delayed-release capsule  Active   ondansetron (ZOFRAN ODT) 4 MG TABLET DISPERSIBLE  Active                    ALLERGIES  Allergies   Allergen Reactions    Penicillins Unspecified     Previous hospitalization after penicillin - does not remember reaction    Cannot tolerate any Cillin       PHYSICAL EXAM  VITAL SIGNS: /64   Pulse 98   Temp 36.3 °C (97.4 °F) (Temporal)   " Resp 17   Ht 1.651 m (5' 5\")   Wt 111 kg (244 lb)   LMP 2023   SpO2 96%   BMI 40.60 kg/m²    Pulse Ox Interpretation:   Pulse Ox is normal   Constitutional: Alert in no apparent distress.  HENT: Normocephalic atraumatic, MMM  Eyes: PER, Conjunctiva normal, Non-icteric.   Neck: Normal range of motion, No tenderness, Supple, No stridor.   Cardiovascular: Regular rate and rhythm, no murmurs.   Thorax & Lungs: Normal breath sounds, No respiratory distress, No wheezing, No chest tenderness.   Abdomen: Bowel sounds normal, Soft, No tenderness, No pulsatile masses. No peritoneal signs.  Skin: Warm, Dry, No erythema, No rash.   Back: No bony tenderness, No CVA tenderness.   Extremities/MSK: Intact equal distal pulses, No edema, No tenderness, No cyanosis, no major deformities noted  Neurologic: Alert and oriented x3, Symmetric smile, eyes shut tight bilaterally, forehead wrinkles bilaterally, sensation intact to light touch bilateral face, tongue midline, head turn and shoulder shrug with full strength. Hearing intact grossly bilaterally. 5/5 strength shoulder, elbow  b/l. 3/5 strength hip, knee, ankle b/l 2/2 to pain  SILT biceps, forearms, hands, SILT thighs, shins mid foot   NO pronator drift, no aphasia, no dysarthria, no gaze preference or visual deficit         DIAGNOSTIC STUDIES / PROCEDURES  EKG  I have independently interpreted this EKG  Results for orders placed or performed during the hospital encounter of 23   EKG (NOW)   Result Value Ref Range    Report       [facility] Emergency Dept.    Test Date:  2023  Pt Name:    MERCEDES MEAD            Department: ER  MRN:        [id number]                      Room:  Gender:     Female                       Technician: [id number]  :        1985                   Requested By:ER TRIAGE PROTOCOL  Order #:    [id number]                    Reading MD: Emmie Ortiz MD    Measurements  Intervals                           " "     Axis  Rate:       100                          P:          55  AZ:         131                          QRS:        21  QRSD:       92                           T:          25  QT:         347  QTc:        448    Interpretive Statements  Sinus tachycardia rate of 100 no ST elevations or ST depressions no abnormal  T  wave inversions or Q waves normal intervals normal axis  Compared to ECG 08/07/2022 20:47:01  Sinus rhythm no longer present  ST (T wave) deviation no longer present  Electronically Signed On 3-3-2023 22:38:38 PST by Emmie Ortiz MD           LABS  Labs Reviewed   CBC WITH DIFFERENTIAL - Abnormal; Notable for the following components:       Result Value    WBC 16.3 (*)     RBC 5.50 (*)     MCV 79.3 (*)     MCH 24.9 (*)     MCHC 31.4 (*)     Platelet Count 537 (*)     Neutrophils (Absolute) 10.17 (*)     Lymphs (Absolute) 4.92 (*)     Monos (Absolute) 0.96 (*)     Immature Granulocytes (abs) 0.12 (*)     All other components within normal limits   BASIC METABOLIC PANEL - Abnormal; Notable for the following components:    Glucose 175 (*)     All other components within normal limits   PROLACTIN   ESTIMATED GFR   BLOOD CULTURE   BLOOD CULTURE    Narrative:     Per Hospital Policy: Only change Specimen Src: to \"Line\" if  specified by physician order.         RADIOLOGY  I have independently interpreted the diagnostic imaging associated with this visit and am waiting the final reading from the radiologist.   My preliminary interpretation is as follows:     Ct head - no bleed no fx no mass     Radiologist interpretation:     CT-HEAD W/O   Final Result         NO ACUTE ABNORMALITIES ARE NOTED ON CT SCAN OF THE HEAD.                     COURSE & MEDICAL DECISION MAKING    ED Observation Status? Yes; I am placing the patient in to an observation status due to a diagnostic uncertainty as well as therapeutic intensity. Patient placed in observation status at 10:02 PM, 3/3/2023.     Observation plan is as " follows: imaging, labs ekg     Upon Reevaluation, the patient's condition has: Improved; and will be discharged.    Patient discharged from ED Observation status at 12:04 AM / (Time) 03/04/23  (Date).     INITIAL ASSESSMENT, COURSE AND PLAN  Care Narrative: Patient presents with a nonfocal neurologic examination.  A little bit of bilateral lower extremity weakness secondary to pain.  History is most likely Consistent with a seizure-like activity versus just a syncope secondary to overdose of medications.  However she has been started on gabapentin this last week and it can lower the seizure threshold.  She should have hypertensive tachycardic bowel that is improved with time.  She is alert she is oriented the plan is for laboratory analysis including cultures because when she was here last she had an elevated white count but never sent cultures were with a concern for infection but she has not had any fevers or chills at home since the discharge last week.  We will also do a head CT as this would be a first-time seizure as well as a prolactin.  But otherwise she is well-appearing and does not meet criteria for TNK or stroke is an NIH is 0      12:01 AM  Reassessed patient bedside she resting comfortably neurologic exam remains unchanged.  We discussed her laboratory findings prolactin is normal so questionably not a seizure but just could be secondary to the recent gabapentin use they are no longer going to take it at home follow-up with primary care.    HTN/IDDM FOLLOW UP:  The patient is referred to a primary physician for blood pressure management, diabetic screening, and for all other preventive health concerns      ADDITIONAL PROBLEM LIST  Chronic back pain  Syncope  Muscle spasms  Hypertension resolved  Confusion resolved  DISPOSITION AND DISCUSSIONS    Syncopal episode without evidence of severe anemia without evidence of arrhythmia or evidence of infection.  She has a mildly low white blood cell count which  was present prior on her last hospitalization Aller-[facility]lor chills today because they were concerned for infection last time but were never actually drawn.  But she has not had any fevers or any other signs of infection.  Her prolactin is normal so unsure if she actually had a seizure it is unlikely but just possible syncopal event the setting of all of her medications.  We discussed cessation of her gabapentin and discussions with primary care for new medications.      Escalation of care considered, and ultimately not performed:acute inpatient care management, however at this time, the patient is most appropriate for outpatient management      Decision tools and prescription drugs considered including, but not limited to: Pain Medications no new medications prescribed at this time .    The patient will return for new or worsening symptoms and is stable at the time of discharge.    The patient is referred to a primary physician for blood pressure management, diabetic screening, and for all other preventative health concerns.    DISPOSITION:  Patient will be discharged home in stable condition.    FOLLOW UP:  Storm Miles M.D.  [address] [place] [address] [phone]    Schedule an appointment as soon as possible for a visit       [facility], Emergency Dept  [address] [address] [phone]    If symptoms worsen      OUTPATIENT MEDICATIONS:  New Prescriptions    No medications on file         FINAL DIAGNOSIS  1. Syncope, unspecified syncope type    2. Confusion    3. Chronic low back pain without sciatica, unspecified back pain laterality           Electronically signed by: Emmie Ortiz M.D., 3/3/2023 10:02 PM

## 2023-03-05 LAB
BACTERIA BLD CULT: ABNORMAL
BACTERIA BLD CULT: ABNORMAL
SIGNIFICANT IND 70042: ABNORMAL
SITE SITE: ABNORMAL
SOURCE SOURCE: ABNORMAL

## 2023-03-05 NOTE — ED NOTES
"ED Positive Culture Follow-up/Notification Note:    Date: 3/5/2023     Patient seen in the ED on 3/3/2023 for syncope, bilateral lower extremity weakness. Recently hospitalized with back pain, MRI was unremarkable. Afebrile, leukocytosis is present, no indwelling lines or ports. No concerns for infection as explicitly stated by ERP, patient was discharged without antimicrobials.  1. Syncope, unspecified syncope type    2. Confusion    3. Chronic low back pain without sciatica, unspecified back pain laterality       Discharge Medication List as of 3/4/2023 12:03 AM          Allergies: Penicillins     Vitals:    03/03/23 2230 03/03/23 2300 03/03/23 2330 03/04/23 0000   BP: 126/63 129/67 119/67 119/77   Pulse: 92 97 96 94   Resp: 20 20 18 18   Temp:    36.4 °C (97.5 °F)   TempSrc:    Temporal   SpO2: 93% 93% 93% 94%   Weight:       Height:           Final cultures:   Results       Procedure Component Value Units Date/Time    BLOOD CULTURE [[id number]] Collected: 03/03/23 2257    Order Status: Completed Specimen: Blood from Peripheral Updated: 03/05/23 0953     Significant Indicator NEG     Source BLD     Site PERIPHERAL     Culture Result No Growth  Note: Blood cultures are incubated for 5 days and  are monitored continuously.Positive blood cultures  are called to the RN and reported as soon as  they are identified.      Narrative:      Per Hospital Policy: Only change Specimen Src: to \"Line\" if  specified by physician order.  Left AC    BLOOD CULTURE [[id number]]  (Abnormal) Collected: 03/03/23 2234    Order Status: Completed Specimen: Blood from Peripheral Updated: 03/04/23 2332     Significant Indicator POS     Source BLD     Site PERIPHERAL     Culture Result Growth detected by Bactec instrument. 03/04/2023  23:30  Gram Stain: Gram positive cocci: Possible Staphylococcus sp.  Negative for Staphylococcus aureus and MRSA by PCR. Correlate  ongoing need for antibiotics with clinical condition.  Further report to " "follow.      Narrative:      CALL  [place]  ER tel. ,  CALLED  ER tel.  03/04/2023, 23:30, RB PERF. RESULTS CALLED TO:[phone]  Per Hospital Policy: Only change Specimen Src: to \"Line\" if  specified by physician order.  No site indicated            Plan:   Blood culture is growing Staphylococcus species, not aureus, in 1 of 2 sets. Given lack of lines, ports, or other obvious means of entry into the blood and no concern for infection from ERP's point of view this likely represents a contaminant. Called patient to discuss result and ask about any signs or symptoms of systemic infection. Left a voice mail. Will send a letter to patient's MyChart.    Patric Zambrano, PharmD   ADDENDUM: Patient returned call. She is speaking clearly, not febrile, no vomiting or diarrhea. No signs or symptoms of systemic infection. Patient has not yet found relief for her spasms nor for her dizziness. I assured her these would not be part of a typical presentation for coagulase-negative Staph bacteremia. This culture result represents a contaminant and no changes are required.  "

## 2023-03-07 NOTE — DOCUMENTATION QUERY
[facility]                                                                       Query Response Note      PATIENT:               MERCEDES MEAD  ACCT #:                  [id number]  MRN:                     [id number]  :                      1985  ADMIT DATE:       2023 11:19 AM  DISCH DATE:        2023 10:54 AM  RESPONDING  PROVIDER #:        [id number]           QUERY TEXT:    Discharge diagnoses documented Intractable upper, mid and lower back pain and neck pain.  However, MRI of the cervical spine showed mild degenerative changes and  Lumbar MRI showed mild multilevel degenerative changes.  Please confirm  if the pain is due to the the diagnoses from MRI findings;    The patient's clinical indicators include:  *Clinical indicators  Upper, mid and lower back pain and neck pain     *Treatment or Monitoring  Admitted for pain control and MRI  Mild multilevel degenerative changes of the lumbar spine.  Mild degenerative changes of the cervical spine without central canal or neural foraminal stenosis.        *Risk Factors  Back and neck pain     *Coders contact information  [email or web address]  Options provided:   -- Agree   -- Disagree   -- Unspecified      Query created by: Araceli Parks on 3/3/2023 11:15 AM    RESPONSE TEXT:    Agree          Electronically signed by:  MARY BURGER RN 3/7/2023 7:36 AM

## 2023-03-08 ENCOUNTER — HOSPITAL ENCOUNTER (EMERGENCY)
Facility: MEDICAL CENTER | Age: 38
End: 2023-03-08
Attending: EMERGENCY MEDICINE
Payer: COMMERCIAL

## 2023-03-08 VITALS
DIASTOLIC BLOOD PRESSURE: 76 MMHG | RESPIRATION RATE: 16 BRPM | HEART RATE: 108 BPM | BODY MASS INDEX: 41.91 KG/M2 | WEIGHT: 251.54 LBS | HEIGHT: 65 IN | TEMPERATURE: 98.2 F | OXYGEN SATURATION: 96 % | SYSTOLIC BLOOD PRESSURE: 108 MMHG

## 2023-03-08 DIAGNOSIS — M54.50 ACUTE BILATERAL LOW BACK PAIN WITHOUT SCIATICA: ICD-10-CM

## 2023-03-08 PROCEDURE — 20552 NJX 1/MLT TRIGGER POINT 1/2: CPT

## 2023-03-08 PROCEDURE — 99282 EMERGENCY DEPT VISIT SF MDM: CPT

## 2023-03-08 RX ORDER — LIDOCAINE 50 MG/G
1 PATCH TOPICAL EVERY 24 HOURS
Qty: 30 PATCH | Refills: 0 | Status: SHIPPED | OUTPATIENT
Start: 2023-03-08 | End: 2023-03-30 | Stop reason: SDUPTHER

## 2023-03-08 ASSESSMENT — PAIN DESCRIPTION - PAIN TYPE: TYPE: ACUTE PAIN

## 2023-03-08 ASSESSMENT — FIBROSIS 4 INDEX: FIB4 SCORE: 0.29

## 2023-03-09 LAB
BACTERIA BLD CULT: NORMAL
SIGNIFICANT IND 70042: NORMAL
SITE SITE: NORMAL
SOURCE SOURCE: NORMAL

## 2023-03-09 NOTE — ED PROVIDER NOTES
ER Provider Note    Scribed for Gabe Machuca M.d. by Adryan Fowler. 3/8/2023  5:20 PM    Primary Care Provider: Storm Miles M.D.    CHIEF COMPLAINT  Chief Complaint   Patient presents with    Low Back Pain     X 2 months, seen here for the same 'two weeks ago and was admitted overnight for muscle spasms', pt states pain is worse today, pt denies any trauma in the last two months, pt is able to ambulate with pain when standing     LIMITATION TO HISTORY   None    HPI/ROS  OUTSIDE HISTORIAN(S):  Family Gave additional history.    EXTERNAL RECORDS REVIEWED  Inpatient Notes The patient was admitted on 2/25/23 for back pain. She had an  MRI of the entire CTL spine during this encounter which showed mild degenerative changes.    Alie Lowe is a 37 y.o. female who presents to the ED for evaluation of lower back pain onset approximately 2 months ago. The patient states that she has been evaluated in the ED multiple times for identical complaints undergoing extensive blood and imaging studies. She reports being last admitted in late February where she had an MRI of her entire spine and was ultimately discharged on a trial course of gabapentin, steroids, and antibiotics. She notes completing the steroids and antibiotics, but discontinued the gabapentin after an episode of seizure-like activity for which she was promptly evaluated in the ED in early March 2023. The patient was ultimately prompted to return to the ED over complaints of worsening lower back pain. She describes the back pain as a shocking sensation that travels from her lower back in between her shoulder blades. Associated symptoms include reduced ambulatory capacity, back swelling, and back muscle spasms. The patient denies any genital numbness, loss of bowel control, loss of urinary control, or loss of sensation to bilateral lower extremities. She has been medicating with Tylenol, steroids, and antibiotics with minimal alleviation. Her back  pain is exacerbated with minimal movements such as standing, sitting, and laying down.     PAST MEDICAL HISTORY  Past Medical History:   Diagnosis Date    Heart murmur     MRSA (methicillin resistant Staphylococcus aureus)     2016       SURGICAL HISTORY  Past Surgical History:   Procedure Laterality Date    OTHER Left 2015    Left leg, debridement of wound with MRSA       FAMILY HISTORY  Family History   Problem Relation Age of Onset    Cancer Mother         uterine and skin    Thyroid Mother         removed due to lump    Diabetes Father     Hypertension Father     Cancer Sister         ovarian    Alcohol abuse Brother        SOCIAL HISTORY   reports that she has quit smoking. Her smoking use included cigarettes. She has a 1.70 pack-year smoking history. She has never used smokeless tobacco. She reports current alcohol use of about 1.2 oz per week. She reports that she does not use drugs.    CURRENT MEDICATIONS  Previous Medications    ACETAMINOPHEN (TYLENOL) 325 MG TAB    Take 650 mg by mouth every 6 hours as needed for Mild Pain.    CYCLOBENZAPRINE (FLEXERIL) 10 MG TAB    Take 1 Tablet by mouth 3 times a day as needed for Moderate Pain.    DICLOFENAC SODIUM (VOLTAREN) 1 % GEL    Apply 4 g topically 3 times a day as needed (Joint or Muscle Pain).    GABAPENTIN (NEURONTIN) 300 MG CAP    Take 1 Capsule by mouth 3 times a day for 30 days.    METHYLPREDNISOLONE (MEDROL DOSEPAK) 4 MG TABLET THERAPY PACK    Follow schedule on package instructions.    NAPROXEN (NAPROSYN) 500 MG TAB    Take 500 mg by mouth 2 times a day as needed (Moderate Pain). Take with food.    OMEPRAZOLE (PRILOSEC) 20 MG DELAYED-RELEASE CAPSULE    Take 1 Capsule by mouth every day.    ONDANSETRON (ZOFRAN ODT) 4 MG TABLET DISPERSIBLE    Take 1 Tablet by mouth every 6 hours as needed for Nausea/Vomiting. Dissolve in mouth.       ALLERGIES  Penicillins    PHYSICAL EXAM  BP (!) 160/96   Pulse (!) 125   Temp 37.1 °C (98.7 °F)   Resp 18   Ht 1.651 m  "(5' 5\")   Wt 114 kg (251 lb 8.7 oz)   LMP 02/09/2023   SpO2 97%   BMI 41.86 kg/m²   Gen: Alert, Moderate distress secondary to pain.  HEENT: ATNC  Neck: trachea midline  Resp: no respiratory distress  CV: No JVD  Abd: non-distended, soft, nontender  Ext: No deformities  Psych: normal mood  Neuro: speech fluent, moves all extremities  Back: Diffuse tenderness to light palpation across lower lumbar and sacral region.  No rash.    DIAGNOSTIC STUDIES & PROCEDURES    Procedure: Trigger point injection  Indication: Musculoskeletal pain  Informed consent was obtained verbally. This included risks, benefits and alternatives to the procedure. The patient was placed in appropriate position.  The region of maximal tenderness was identified on either side of the lumbosacral spine..  The skin was cleansed with chlorhexidine.  Approximately 5 mL of 0.5% bupivacaine with epinephrine was injected on either side of the lumbosacral spine.  EBL: 0 mL  The patient tolerated the procedure without any immediate complications    COURSE & MEDICAL DECISION MAKING    ED Observation Status? No; Patient does not meet criteria for ED Observation.     INITIAL ASSESSMENT AND PLAN  Care Narrative: Patient presents with ongoing acute low back pain.  This is now bordering on chronic low back pain.  Reviewing her record, she has had extensive work-ups, including MRI of the entire spine.  This was reassuring, there is no signs of epidural abscess, epidural hematoma, cauda equina syndrome.  She did more recently have blood cultures sent, which had 1 set grew positive for coag negative staph.  This is likely contaminant.  Patient demonstrates no systemic signs or symptoms of infection.  The patient has been trialed on gabapentin, steroids, however there is limited Evidence of benefit for these medications.  The patient was trialed on a trigger point injection, and had marked improvement in her pain and mobility.  She is referred to pain management " as she will likely require further injections in the future.  Will prescribe lidocaine patches for home relief until she is able to follow-up with pain management.  No evidence for cauda equina syndrome, epidural abscess, epidural hematoma.  Patient is ambulatory with a steady gait at the time of discharge.      5:20 PM - Patient seen and evaluated at bedside. Alie Lowe is a 37 y.o. female who presents with lower back pain onset 2 months ago. After considerable chart review and her intractable pain, I suggested administering trigger point injections to the muscle fascia of her lower back and the patient agreed.    5:50 PM - Trigger point injections administered by me.     6:25 PM - The patient was able to independently ambulate back and forth from the restroom with a steady gait.     6:30 PM - Patient was reevaluated at bedside, she reports feeling markedly improved following the trigger point injections and has felt less muscle spasms. Advised the patient to expect her back pain to return after the injections begin to wear off, but to try and only perform moderate activities/avoid heavy lifting. I have also included a referral to pain management for further care. She will have a prescription for Lidoderm patches. While she follows up I have advised she medicate with Tylenol/Ibuprofen as needed for pain. Discussed plan for discharge; I advised the patient to follow-up with Dr. Miles (PCP) and establish herself with Pain Management [facility] as soon as possible, and [facility] with any new or worsening symptoms, including numbness, tingling, lower extremity weakness, loss of bowel or bladder control, or other concerns. Patient was given the opportunity for questions. I addressed all questions or concerns at this time and they verbalize agreement to the plan of care.                  DISPOSITION AND DISCUSSIONS  I have discussed management of the patient with the following physicians and  HO's: None    Discussion of management with other \[facility]\"" or appropriate source(s): None     Escalation of care considered, and ultimately not performed: diagnostic imaging.    Barriers to care at this time, including but not limited to: None.     Decision tools and prescription drugs considered including, but not limited to: Pain Medications patient improved with trigger point injection, I do not believe opioid pain medications would benefit the patient given anticipated prolonged course of pain and alternatives that appear to be effective. .    The patient will return for new or worsening symptoms and is stable at the time of discharge.    The patient is referred to a primary physician for blood pressure management, diabetic screening, and for all other preventative health concerns.    Problems:  1.  Low back pain: Improved with trigger point injection, likely musculoskeletal in nature.  Prior imaging reassuring.  Referred to pain management  2.  Obesity: Likely contributing to the patient's presentation.  Unfortunately, unlikely able to participate in exercise routine until her symptoms of this episode of back pain improved.  3.  Positive blood culture: Likely contaminant given only 1 bottle, coagulase-negative staph, no indwelling lines, no systemic signs of infection.    DISPOSITION:  Patient will be discharged home in stable condition.    FOLLOW UP:  PAIN MANAGEMENT   [address] [address] [phone]        Storm Miles M.D.  [address] [place] [address] [phone]    Schedule an appointment as soon as possible for a visit       [facility], Emergency Dept  [address] [address] [phone]    If symptoms worsen      OUTPATIENT MEDICATIONS:  New Prescriptions    LIDOCAINE (LIDODERM) 5 % PATCH    Place 1 Patch on the skin every 24 hours. Apply to site of pain. Wear for 12 hours, then remove for 12 hours     FINAL IMPRESSION   1.  Acute bilateral low back pain without sciatica    2.      Trigger point injection procedure performed by me      Adryan SHINE (Scribe), am scribing for, and in the presence of, Gabe Machuca M.D..    Electronically signed by: Adryan Fowler (Scribe), 3/8/2023    Gabe SHINE M.D. personally performed the services described in this documentation, as scribed by Adryan Fowler in my presence, and it is both accurate and complete.    The note accurately reflects work and decisions made by me.  Gabe Machuca M.D.  3/8/2023  6:43 PM

## 2023-03-09 NOTE — ED NOTES
Pt ambulated to PUR 71 with a steady gait. C/C is Lower Back Pain. Pt is in a gown, on the monitor and call light within reach. Chart up for ERP. Family at bedside.

## 2023-03-09 NOTE — ED NOTES
Assumed care of patient. C/O LBP.  Placed on NIBP, pulse ox, and cardiac monitor. Mildly tachy.  No needs at this time.

## 2023-03-09 NOTE — ED TRIAGE NOTES
"Chief Complaint   Patient presents with    Low Back Pain     X 2 months, seen here for the same 'two weeks ago and was admitted overnight for muscle spasms', pt states pain is worse today, pt denies any trauma in the last two months, pt is able to ambulate with pain when standing     Pt ambulatory to triage for above complaints, VSS on RA, GCS 15, NAD.    Pt returned to [facility]. Educated on triage process and to inform staff of any changes.     BP (!) 160/96   Pulse (!) 125   Temp 37.1 °C (98.7 °F)   Resp 18   Ht 1.651 m (5' 5\")   Wt 114 kg (251 lb 8.7 oz)   LMP 02/09/2023   SpO2 97%   BMI 41.86 kg/m²     " PATIENT CALLED TO SCHEDULE FOLLOW UP FROM MRI OF HER BACK, TRANSFERRED PATIENT TO [facility] IN NEUROSURGERY.

## 2023-03-09 NOTE — DISCHARGE INSTRUCTIONS
You were seen in the emergency department for back pain.  Your physical exam was reassuring.  You were treated with medications in the emergency department, which helped improved your symptoms.  Your back pain will probably last for several more weeks.  Please tried to do moderate activities.  Avoid any heavy lifting or strenuous activities.    For pain you can take acetaminophen (Tylenol), 1000mg every 8 hours as needed for pain. Do not take more than 3000mg of acetaminophen in any 24 hour period. You can also take  ibuprofen (Motrin), 600mg every 6 hours as needed for pain (take with food to avoid GI upset).  Taking these medications regularly during the day can be very effective in controlling pain.    You will be referred to pain management as your symptoms appear to improve with a trigger point injection.  Please contact the number to schedule an appointment.    If your pain becomes worse, or you develop numbness, tingling, weakness in your legs, or you have difficulty controlling your bowel or bladder movements, please seek immediate medical attention.

## 2023-03-10 ENCOUNTER — OFFICE VISIT (OUTPATIENT)
Dept: URGENT CARE | Facility: PHYSICIAN GROUP | Age: 38
End: 2023-03-10
Payer: COMMERCIAL

## 2023-03-10 VITALS
WEIGHT: 251 LBS | HEART RATE: 121 BPM | TEMPERATURE: 98.3 F | OXYGEN SATURATION: 95 % | BODY MASS INDEX: 41.82 KG/M2 | HEIGHT: 65 IN | RESPIRATION RATE: 16 BRPM | SYSTOLIC BLOOD PRESSURE: 100 MMHG | DIASTOLIC BLOOD PRESSURE: 76 MMHG

## 2023-03-10 DIAGNOSIS — M54.50 ACUTE BILATERAL LOW BACK PAIN WITHOUT SCIATICA: ICD-10-CM

## 2023-03-10 PROCEDURE — 99213 OFFICE O/P EST LOW 20 MIN: CPT | Performed by: PHYSICIAN ASSISTANT

## 2023-03-10 ASSESSMENT — ENCOUNTER SYMPTOMS
DIARRHEA: 0
SENSORY CHANGE: 0
FEVER: 0
TINGLING: 0
WHEEZING: 0
MYALGIAS: 1
FOCAL WEAKNESS: 0
NAUSEA: 0
WEIGHT LOSS: 0
CHILLS: 0
SHORTNESS OF BREATH: 0
BACK PAIN: 1
WEAKNESS: 0
ABDOMINAL PAIN: 0
COUGH: 0
VOMITING: 0
HEADACHES: 0

## 2023-03-10 ASSESSMENT — FIBROSIS 4 INDEX: FIB4 SCORE: 0.29

## 2023-03-10 NOTE — PROGRESS NOTES
Subjective     Alie Lowe is a 37 y.o. female who presents with Follow-Up (Muscle strain )            The patient is here for ER follow-ups. The patient has been in the ED 4 different times in the past 4 weeks. She originally was evaluated for muscle spasms and acute exacerbation of chronic back pain. She was given Gabapentin in which she had an abnormal reaction to the medication and was suspected to have a seizure. She had normal prolactin levels and it was thought that a syncopal episode was more likely. She returned to the ED in severe pain. She was given trigger point injections 2 days ago and she reports her symptoms have improved. She continues to have lumbar  pressure but is tolerating her pain with Lidocaine patches. She is worried about returning to work next week because she still has moderate pressure. She is required to engage in regular heavy lifting and is concerned she may go backwards if she starts work again too soon. She has mild lower back pain at this time. No urinary or bowel incontinence. No lower extremity numbness or weakness.    Past Medical History:   Diagnosis Date    Heart murmur     MRSA (methicillin resistant Staphylococcus aureus)     2016           Past Surgical History:   Procedure Laterality Date    OTHER Left 2015    Left leg, debridement of wound with MRSA         Family History   Problem Relation Age of Onset    Cancer Mother         uterine and skin    Thyroid Mother         removed due to lump    Diabetes Father     Hypertension Father     Cancer Sister         ovarian    Alcohol abuse Brother        Allergies:  Penicillins      Medications, Allergies, and current problem list reviewed today in Epic      Review of Systems   Constitutional:  Negative for chills, fever, malaise/fatigue and weight loss.   Respiratory:  Negative for cough, shortness of breath and wheezing.    Cardiovascular:  Negative for chest pain and leg swelling.   Gastrointestinal:  Negative  "for abdominal pain, diarrhea, nausea and vomiting.   Genitourinary:         No urinary or bowel issues   Musculoskeletal:  Positive for back pain and myalgias.   Skin:  Negative for rash.   Neurological:  Negative for tingling, sensory change, focal weakness, weakness and headaches.      All other systems reviewed and are negative.         Objective     /76   Pulse (!) 121   Temp 36.8 °C (98.3 °F) (Temporal)   Resp 16   Ht 1.651 m (5' 5\")   Wt 114 kg (251 lb)   LMP 02/09/2023   SpO2 95%   BMI 41.77 kg/m²      Physical Exam  Constitutional:       General: She is not in acute distress.     Appearance: Normal appearance. She is not ill-appearing.   HENT:      Head: Normocephalic and atraumatic.   Eyes:      Conjunctiva/sclera: Conjunctivae normal.   Cardiovascular:      Rate and Rhythm: Regular rhythm. Tachycardia present.   Pulmonary:      Effort: Pulmonary effort is normal.      Breath sounds: No stridor. No wheezing.   Musculoskeletal:        Back:    Skin:     General: Skin is warm and dry.   Neurological:      General: No focal deficit present.      Mental Status: She is alert and oriented to person, place, and time.   Psychiatric:         Mood and Affect: Mood normal.         Behavior: Behavior normal.         Thought Content: Thought content normal.         Judgment: Judgment normal.                           Assessment & Plan        1. Acute bilateral low back pain without sciatica    Continue current treatment.  ED notes and tests reviewed.  Work note provided. Follow- up with PCP and Pain management.    Differential diagnoses, Supportive care, and indications for immediate follow-up discussed with patient.   Pathogenesis of diagnosis discussed including typical length and natural progression.   Instructed to return to clinic or nearest emergency department for any change in condition, further concerns, or worsening of symptoms.      The patient demonstrated a good understanding and agreed with " the treatment plan.      Nidhi Arellano P.A.-C.

## 2023-03-10 NOTE — LETTER
March 10, 2023         Patient: Alie Lowe   YOB: 1985   Date of Visit: 3/10/2023           To Whom it May Concern:    Alie Lowe was seen in my clinic on 3/10/2023. She should be off work 3/13/2023-3/16/2023 due to medical reasons.     If you have any questions or concerns, please don't hesitate to call.        Sincerely,           Nidhi Arellano P.A.-C.  Electronically Signed

## 2023-03-23 ENCOUNTER — OFFICE VISIT (OUTPATIENT)
Dept: URGENT CARE | Facility: PHYSICIAN GROUP | Age: 38
End: 2023-03-23
Payer: COMMERCIAL

## 2023-03-23 VITALS
TEMPERATURE: 97.2 F | OXYGEN SATURATION: 95 % | SYSTOLIC BLOOD PRESSURE: 122 MMHG | RESPIRATION RATE: 16 BRPM | HEIGHT: 65 IN | BODY MASS INDEX: 41.45 KG/M2 | WEIGHT: 248.8 LBS | HEART RATE: 114 BPM | DIASTOLIC BLOOD PRESSURE: 78 MMHG

## 2023-03-23 DIAGNOSIS — M62.830 SPASM OF MUSCLE OF LOWER BACK: ICD-10-CM

## 2023-03-23 DIAGNOSIS — M54.42 ACUTE BILATERAL LOW BACK PAIN WITH BILATERAL SCIATICA: ICD-10-CM

## 2023-03-23 DIAGNOSIS — M54.41 ACUTE BILATERAL LOW BACK PAIN WITH BILATERAL SCIATICA: ICD-10-CM

## 2023-03-23 PROCEDURE — 99213 OFFICE O/P EST LOW 20 MIN: CPT

## 2023-03-23 RX ORDER — METHOCARBAMOL 500 MG/1
500-1000 TABLET, FILM COATED ORAL 3 TIMES DAILY PRN
Qty: 12 TABLET | Refills: 0 | Status: SHIPPED | OUTPATIENT
Start: 2023-03-23 | End: 2023-03-27

## 2023-03-23 ASSESSMENT — FIBROSIS 4 INDEX: FIB4 SCORE: 0.29

## 2023-03-23 ASSESSMENT — ENCOUNTER SYMPTOMS
FEVER: 0
FALLS: 0
MYALGIAS: 0
NECK PAIN: 0
BACK PAIN: 1
CHILLS: 0

## 2023-03-23 NOTE — LETTER
March 23, 2023    To Whom It May Concern:         This is confirmation that Alie GOMEZ attended her scheduled appointment with RASTA Olivier on 3/23/23. Please excuse her from work from 3/27/23 to 3/29/23 due to a back injury.         If you have any questions please do not hesitate to call me at the phone number listed below.    Sincerely,          Jessica Oliveira A.P.R.N.  [phone]

## 2023-03-23 NOTE — PROGRESS NOTES
"Subjective:     Alie GOMEZ is a 37 y.o. female who presents for Follow-Up and Back Pain (Pt states she has been dealing with this for the past couple months. Pt states the swelling when she stands, her toes tingle. Pt states the pain makes its very difficult to work )      Back Pain  This is a chronic problem. Episode onset: Symptoms began 2 months ago. Pertinent negatives include no fever. Pt She reports that she has been to the ED twice for back spasms and inflammation. She received trigger point injections at the ED, which she reports were helpful. She reports that she started work on 3/20/23 at the [employer]. After 2.5 hours on her feet, the back pain returned. She endorses that she can \"feel the swelling\" in the low back. The pain is described as burning and radiates a \"shock\" down both legs and into her feet. She reports that the pain keeps her awake at night. She has been taking ibuprofen and tylenol for her pain. She reports intermittent spasms. She has a PCP and pain management provider. She has an appointment with her pain management provider on 3/27/23. She presents to urgent care to obtain a note for work. Unfortunately, she reports that her job does not offer light duty and her current job description involves standing for > 8 hours.    Review of Systems   Constitutional:  Negative for chills and fever.   Musculoskeletal:  Positive for back pain. Negative for falls, joint pain, myalgias and neck pain.     PMH:   Past Medical History:   Diagnosis Date    Heart murmur     MRSA (methicillin resistant Staphylococcus aureus)     2016     ALLERGIES:   Allergies   Allergen Reactions    Gabapentin Unspecified     Seizure    Penicillins Unspecified     Previous hospitalization after penicillin - does not remember reaction    Cannot tolerate any Cillin     SURGHX:   Past Surgical History:   Procedure Laterality Date    OTHER Left 2015    Left leg, debridement of wound with MRSA " "    SOCHX:   Social History     Socioeconomic History    Marital status:    Tobacco Use    Smoking status: Former     Packs/day: 0.10     Years: 17.00     Pack years: 1.70     Types: Cigarettes    Smokeless tobacco: Never    Tobacco comments:     socially, only every few months   Vaping Use    Vaping Use: Some days    Substances: Nicotine   Substance and Sexual Activity    Alcohol use: Yes     Alcohol/week: 1.2 oz     Types: 2 Standard drinks or equivalent per week     Comment: socially - rare    Drug use: No    Sexual activity: Yes     Partners: Male     FH:   Family History   Problem Relation Age of Onset    Cancer Mother         uterine and skin    Thyroid Mother         removed due to lump    Diabetes Father     Hypertension Father     Cancer Sister         ovarian    Alcohol abuse Brother          Objective:   /78   Pulse (!) 114   Temp 36.2 °C (97.2 °F) (Temporal)   Resp 16   Ht 1.651 m (5' 5\")   Wt 113 kg (248 lb 12.8 oz)   LMP 02/09/2023   SpO2 95%   BMI 41.40 kg/m²     Physical Exam  Vitals reviewed.   Constitutional:       Appearance: Normal appearance.   HENT:      Head: Normocephalic and atraumatic.      Nose: Nose normal.      Mouth/Throat:      Mouth: Mucous membranes are moist.   Eyes:      Extraocular Movements: Extraocular movements intact.      Conjunctiva/sclera: Conjunctivae normal.      Pupils: Pupils are equal, round, and reactive to light.   Cardiovascular:      Rate and Rhythm: Regular rhythm. Tachycardia present.   Pulmonary:      Effort: Pulmonary effort is normal.   Musculoskeletal:      Cervical back: Normal range of motion.      Lumbar back: Swelling, tenderness and bony tenderness present. Decreased range of motion. No scoliosis.        Back:    Skin:     General: Skin is warm and dry.   Neurological:      Mental Status: She is alert.   Psychiatric:         Mood and Affect: Mood normal.         Behavior: Behavior normal.         Thought Content: Thought content " normal.       Assessment/Plan:   Assessment      1. Acute bilateral low back pain with bilateral sciatica  - methocarbamol (ROBAXIN) 500 MG Tab; Take 1-2 Tablets by mouth 3 times a day as needed (Muscle spasms) for up to 4 days.  Dispense: 12 Tablet; Refill: 0    2. Spasm of muscle of lower back  - methocarbamol (ROBAXIN) 500 MG Tab; Take 1-2 Tablets by mouth 3 times a day as needed (Muscle spasms) for up to 4 days.  Dispense: 12 Tablet; Refill: 0    Patient endorses that she has a follow-up appointment with her pain management doctor on 3/27/2023.  She was given a prescription for methocarbamol as needed for muscle spasms.  She was educated to not take this medication with Flexeril nor drive while taking this medication.  The patient verbalized understanding.  She is to continue her current pain management supportive care.  Encouraged an increase in physical activity, including core strengthening and water aerobics.  Patient verbalized understanding and is in agreement with the plan of care.  All questions answered prior to her departure.    Differential diagnosis, natural history, and supportive care discussed. AVS handout given and reviewed with patient. Patient educated on red flags and when to seek treatment back in ED or UC.     I personally reviewed prior external notes and test results pertinent to today's visit.  I have independently reviewed and interpreted all diagnostics ordered during this urgent care visit.     This dictation has been created using voice recognition software. The accuracy of the dictation is limited by the abilities of the software. I expect there may be some errors of grammar and possibly content. I made every attempt to manually correct the errors within my dictation. However, errors related to voice recognition software may still exist and should be interpreted within the appropriate context.    This note was electronically signed by RASTA Mendez

## 2023-03-30 ENCOUNTER — OFFICE VISIT (OUTPATIENT)
Dept: MEDICAL GROUP | Facility: PHYSICIAN GROUP | Age: 38
End: 2023-03-30
Payer: COMMERCIAL

## 2023-03-30 VITALS
BODY MASS INDEX: 40.32 KG/M2 | SYSTOLIC BLOOD PRESSURE: 130 MMHG | HEIGHT: 65 IN | RESPIRATION RATE: 16 BRPM | HEART RATE: 104 BPM | WEIGHT: 242 LBS | TEMPERATURE: 98.6 F | OXYGEN SATURATION: 97 % | DIASTOLIC BLOOD PRESSURE: 78 MMHG

## 2023-03-30 DIAGNOSIS — M62.830 BACK SPASM: ICD-10-CM

## 2023-03-30 DIAGNOSIS — Z23 NEED FOR VACCINATION: ICD-10-CM

## 2023-03-30 DIAGNOSIS — D72.821 MONOCYTOSIS: ICD-10-CM

## 2023-03-30 DIAGNOSIS — E78.5 DYSLIPIDEMIA: ICD-10-CM

## 2023-03-30 DIAGNOSIS — M54.50 ACUTE BILATERAL LOW BACK PAIN WITHOUT SCIATICA: ICD-10-CM

## 2023-03-30 DIAGNOSIS — M54.41 ACUTE MIDLINE LOW BACK PAIN WITH BILATERAL SCIATICA: ICD-10-CM

## 2023-03-30 DIAGNOSIS — Z86.19 HISTORY OF HELICOBACTER PYLORI INFECTION: ICD-10-CM

## 2023-03-30 DIAGNOSIS — M54.42 ACUTE MIDLINE LOW BACK PAIN WITH BILATERAL SCIATICA: ICD-10-CM

## 2023-03-30 DIAGNOSIS — R73.01 ELEVATED FASTING GLUCOSE: ICD-10-CM

## 2023-03-30 PROBLEM — A04.8 H. PYLORI INFECTION: Status: RESOLVED | Noted: 2020-07-27 | Resolved: 2023-03-30

## 2023-03-30 PROBLEM — D72.829 LEUKOCYTOSIS: Status: ACTIVE | Noted: 2023-03-30

## 2023-03-30 PROCEDURE — 90746 HEPB VACCINE 3 DOSE ADULT IM: CPT | Performed by: FAMILY MEDICINE

## 2023-03-30 PROCEDURE — 90471 IMMUNIZATION ADMIN: CPT | Performed by: FAMILY MEDICINE

## 2023-03-30 PROCEDURE — 90686 IIV4 VACC NO PRSV 0.5 ML IM: CPT | Performed by: FAMILY MEDICINE

## 2023-03-30 PROCEDURE — 90472 IMMUNIZATION ADMIN EACH ADD: CPT | Performed by: FAMILY MEDICINE

## 2023-03-30 PROCEDURE — 99214 OFFICE O/P EST MOD 30 MIN: CPT | Mod: 25 | Performed by: FAMILY MEDICINE

## 2023-03-30 RX ORDER — CYCLOBENZAPRINE HCL 10 MG
10 TABLET ORAL 3 TIMES DAILY PRN
Qty: 30 TABLET | Refills: 0 | Status: SHIPPED | OUTPATIENT
Start: 2023-03-30 | End: 2023-04-06

## 2023-03-30 RX ORDER — NAPROXEN 500 MG/1
500 TABLET ORAL 2 TIMES DAILY PRN
Qty: 60 TABLET | Refills: 3 | Status: SHIPPED | OUTPATIENT
Start: 2023-03-30 | End: 2023-04-06

## 2023-03-30 RX ORDER — LIDOCAINE 50 MG/G
1 PATCH TOPICAL EVERY 24 HOURS
Qty: 30 PATCH | Refills: 0 | Status: ON HOLD | OUTPATIENT
Start: 2023-03-30 | End: 2024-02-27

## 2023-03-30 ASSESSMENT — FIBROSIS 4 INDEX: FIB4 SCORE: 0.29

## 2023-03-30 NOTE — LETTER
March 30, 2023    To whom it may concern:       Alie GOMEZ is my patient and was seen by me in clinic today. Please excuse her from work from 3/30/23 to 5/31/23.     Thank you,         Storm Miles M.D.

## 2023-03-30 NOTE — ASSESSMENT & PLAN NOTE
2 months of continuous low back pain  MRI shows annular disc tear, no stenosis  She has muscle spasms and pain from tailbone up and now has more pain and swelling up to 8 inches up from her tailbone and from the outside into the center of her back.   And it feels like a pressure in her back like someone is blowing up a ballooon in her back.   She has developed new urinary incontinence of small amount occassionally.   She was seen in ER, has referrral to pain management in a week.   She had a seizure on gabapentin  Refill for naproxen and flexeril and tylenol, voltaren gel and lidocaine patches provided.   [place]   Has less pain with sitting up when sleeping.   Trigger point injections also helped. She had a back spasm with last injection so I'll leave to pain specialist when they see her in a week.   She needs a letter for work stating off work for 2 months while getting started with pain management.

## 2023-03-31 ENCOUNTER — HOSPITAL ENCOUNTER (OUTPATIENT)
Dept: LAB | Facility: MEDICAL CENTER | Age: 38
End: 2023-03-31
Attending: FAMILY MEDICINE
Payer: COMMERCIAL

## 2023-03-31 DIAGNOSIS — R73.01 ELEVATED FASTING GLUCOSE: ICD-10-CM

## 2023-03-31 DIAGNOSIS — E78.5 DYSLIPIDEMIA: ICD-10-CM

## 2023-03-31 DIAGNOSIS — D72.821 MONOCYTOSIS: ICD-10-CM

## 2023-03-31 DIAGNOSIS — Z11.59 ENCOUNTER FOR HEPATITIS C SCREENING TEST FOR LOW RISK PATIENT: ICD-10-CM

## 2023-03-31 LAB
ALBUMIN SERPL BCP-MCNC: 3.9 G/DL (ref 3.2–4.9)
ALBUMIN/GLOB SERPL: 1.3 G/DL
ALP SERPL-CCNC: 74 U/L (ref 30–99)
ALT SERPL-CCNC: 12 U/L (ref 2–50)
ANION GAP SERPL CALC-SCNC: 12 MMOL/L (ref 7–16)
AST SERPL-CCNC: 11 U/L (ref 12–45)
BASOPHILS # BLD AUTO: 0.7 % (ref 0–1.8)
BASOPHILS # BLD: 0.1 K/UL (ref 0–0.12)
BILIRUB SERPL-MCNC: 0.2 MG/DL (ref 0.1–1.5)
BUN SERPL-MCNC: 14 MG/DL (ref 8–22)
CALCIUM ALBUM COR SERPL-MCNC: 9.3 MG/DL (ref 8.5–10.5)
CALCIUM SERPL-MCNC: 9.2 MG/DL (ref 8.5–10.5)
CHLORIDE SERPL-SCNC: 108 MMOL/L (ref 96–112)
CHOLEST SERPL-MCNC: 147 MG/DL (ref 100–199)
CO2 SERPL-SCNC: 19 MMOL/L (ref 20–33)
CREAT SERPL-MCNC: 0.69 MG/DL (ref 0.5–1.4)
EOSINOPHIL # BLD AUTO: 0.05 K/UL (ref 0–0.51)
EOSINOPHIL NFR BLD: 0.4 % (ref 0–6.9)
ERYTHROCYTE [DISTWIDTH] IN BLOOD BY AUTOMATED COUNT: 46 FL (ref 35.9–50)
EST. AVERAGE GLUCOSE BLD GHB EST-MCNC: 140 MG/DL
FASTING STATUS PATIENT QL REPORTED: NORMAL
GFR SERPLBLD CREATININE-BSD FMLA CKD-EPI: 114 ML/MIN/1.73 M 2
GLOBULIN SER CALC-MCNC: 3 G/DL (ref 1.9–3.5)
GLUCOSE SERPL-MCNC: 136 MG/DL (ref 65–99)
HBA1C MFR BLD: 6.5 % (ref 4–5.6)
HCT VFR BLD AUTO: 40.2 % (ref 37–47)
HDLC SERPL-MCNC: 33 MG/DL
HGB BLD-MCNC: 12.5 G/DL (ref 12–16)
IMM GRANULOCYTES # BLD AUTO: 0.03 K/UL (ref 0–0.11)
IMM GRANULOCYTES NFR BLD AUTO: 0.2 % (ref 0–0.9)
LDLC SERPL CALC-MCNC: 78 MG/DL
LYMPHOCYTES # BLD AUTO: 4.04 K/UL (ref 1–4.8)
LYMPHOCYTES NFR BLD: 30.2 % (ref 22–41)
MCH RBC QN AUTO: 24.9 PG (ref 27–33)
MCHC RBC AUTO-ENTMCNC: 31.1 G/DL (ref 33.6–35)
MCV RBC AUTO: 80.1 FL (ref 81.4–97.8)
MONOCYTES # BLD AUTO: 0.63 K/UL (ref 0–0.85)
MONOCYTES NFR BLD AUTO: 4.7 % (ref 0–13.4)
NEUTROPHILS # BLD AUTO: 8.54 K/UL (ref 2–7.15)
NEUTROPHILS NFR BLD: 63.8 % (ref 44–72)
NRBC # BLD AUTO: 0 K/UL
NRBC BLD-RTO: 0 /100 WBC
PLATELET # BLD AUTO: 409 K/UL (ref 164–446)
PMV BLD AUTO: 10.2 FL (ref 9–12.9)
POTASSIUM SERPL-SCNC: 4.1 MMOL/L (ref 3.6–5.5)
PROT SERPL-MCNC: 6.9 G/DL (ref 6–8.2)
RBC # BLD AUTO: 5.02 M/UL (ref 4.2–5.4)
SODIUM SERPL-SCNC: 139 MMOL/L (ref 135–145)
TRIGL SERPL-MCNC: 178 MG/DL (ref 0–149)
WBC # BLD AUTO: 13.4 K/UL (ref 4.8–10.8)

## 2023-03-31 PROCEDURE — 80053 COMPREHEN METABOLIC PANEL: CPT

## 2023-03-31 PROCEDURE — 85025 COMPLETE CBC W/AUTO DIFF WBC: CPT

## 2023-03-31 PROCEDURE — 86803 HEPATITIS C AB TEST: CPT

## 2023-03-31 PROCEDURE — 83036 HEMOGLOBIN GLYCOSYLATED A1C: CPT

## 2023-03-31 PROCEDURE — 36415 COLL VENOUS BLD VENIPUNCTURE: CPT

## 2023-03-31 PROCEDURE — 80061 LIPID PANEL: CPT

## 2023-03-31 NOTE — PROGRESS NOTES
Subjective:   Alie GOMEZ is a 37 y.o. female here today for evaluation and management of:     Acute midline low back pain with bilateral sciatica  2 months of continuous low back pain  MRI shows annular disc tear, no stenosis  She has muscle spasms and pain from tailbone up and now has more pain and swelling up to 8 inches up from her tailbone and from the outside into the center of her back.   And it feels like a pressure in her back like someone is blowing up a ballooon in her back.   She has developed new urinary incontinence of small amount occassionally.   She was seen in ER, has referrral to pain management in a week.   She had a seizure on gabapentin  Refill for naproxen and flexeril and tylenol, voltaren gel and lidocaine patches provided.   She [place]   Has less pain with sitting up when sleeping.   Trigger point injections also helped. She had a back spasm with last injection so I'll leave to pain specialist when they see her in a week.   She needs a letter for work stating off work for 2 months while getting started with pain management.       Leukocytosis  Elevated leukocytes, thrombocytosis,   She had medrol dosepak for low back pain    History of Helicobacter pylori infection  Completed antibiotic treatment.             Current medicines (including changes today)  Current Outpatient Medications   Medication Sig Dispense Refill    cyclobenzaprine (FLEXERIL) 10 mg Tab Take 1 Tablet by mouth 3 times a day as needed for Moderate Pain. 30 Tablet 0    naproxen (NAPROSYN) 500 MG Tab Take 1 Tablet by mouth 2 times a day as needed (Moderate Pain). Take with food. 60 Tablet 3    lidocaine (LIDODERM) 5 % Patch Place 1 Patch on the skin every 24 hours. Apply to site of pain. Wear for 12 hours, then remove for 12 hours 30 Patch 0    acetaminophen (TYLENOL) 325 MG Tab Take 650 mg by mouth every 6 hours as needed for Mild Pain.      diclofenac sodium (VOLTAREN) 1  "% Gel Apply 4 g topically 3 times a day as needed (Joint or Muscle Pain).      ondansetron (ZOFRAN ODT) 4 MG TABLET DISPERSIBLE Take 1 Tablet by mouth every 6 hours as needed for Nausea/Vomiting. Dissolve in mouth. 10 Tablet 0    omeprazole (PRILOSEC) 20 MG delayed-release capsule Take 1 Capsule by mouth every day. 90 Capsule 3     No current facility-administered medications for this visit.     She  has a past medical history of Heart murmur and MRSA (methicillin resistant Staphylococcus aureus).    She has no past medical history of Blood transfusion without reported diagnosis.    ROS  No chest pain, no shortness of breath, no abdominal pain       Objective:     /78 (BP Location: Left arm, Patient Position: Sitting, BP Cuff Size: Adult long)   Pulse (!) 104   Temp 37 °C (98.6 °F) (Temporal)   Resp 16   Ht 1.651 m (5' 5\")   Wt 110 kg (242 lb)   SpO2 97%  Body mass index is 40.27 kg/m².   Physical Exam:  Constitutional: Alert, no distress.  Skin: Warm, dry, good turgor, no rashes in visible areas.  Eye: Equal, round and reactive, conjunctiva clear, lids normal.  ENMT: Lips without lesions, good dentition, oropharynx clear.  Neck: Trachea midline, no masses, no thyromegaly. No cervical or supraclavicular lymphadenopathy  Respiratory: Unlabored respiratory effort, lungs clear to auscultation, no wheezes, no ronchi.  Cardiovascular: Normal S1, S2, no murmur, no edema.  Abdomen: Soft, non-tender, no masses, no hepatosplenomegaly.  Psych: Alert and oriented x3, normal affect and mood.        Assessment and Plan:   The following treatment plan was discussed    1. Acute midline low back pain with bilateral sciatica    2. Back spasm  - cyclobenzaprine (FLEXERIL) 10 mg Tab; Take 1 Tablet by mouth 3 times a day as needed for Moderate Pain.  Dispense: 30 Tablet; Refill: 0    3. Acute bilateral low back pain without sciatica  - lidocaine (LIDODERM) 5 % Patch; Place 1 Patch on the skin every 24 hours. Apply to site " of pain. Wear for 12 hours, then remove for 12 hours  Dispense: 30 Patch; Refill: 0    4. Monocytosis  - CBC WITH DIFFERENTIAL; Future    5. Elevated fasting glucose  - Comp Metabolic Panel; Future  - HEMOGLOBIN A1C; Future    6. Dyslipidemia  - Lipid Profile; Future    7. History of Helicobacter pylori infection    8. Need for vaccination  - Hepatitis B Vaccine Adult 20+  - INFLUENZA VACCINE QUAD INJ (PF)    Other orders  - naproxen (NAPROSYN) 500 MG Tab; Take 1 Tablet by mouth 2 times a day as needed (Moderate Pain). Take with food.  Dispense: 60 Tablet; Refill: 3      Followup: Return in about 3 months (around 6/30/2023) for Lab Review.

## 2023-04-01 LAB — HCV AB SER QL: NORMAL

## 2023-04-04 ENCOUNTER — TELEPHONE (OUTPATIENT)
Dept: URGENT CARE | Facility: PHYSICIAN GROUP | Age: 38
End: 2023-04-04
Payer: COMMERCIAL

## 2023-04-06 ENCOUNTER — OFFICE VISIT (OUTPATIENT)
Dept: PHYSICAL MEDICINE AND REHAB | Facility: MEDICAL CENTER | Age: 38
End: 2023-04-06
Payer: COMMERCIAL

## 2023-04-06 VITALS
OXYGEN SATURATION: 96 % | HEIGHT: 65 IN | TEMPERATURE: 97.4 F | BODY MASS INDEX: 41.92 KG/M2 | SYSTOLIC BLOOD PRESSURE: 122 MMHG | HEART RATE: 95 BPM | DIASTOLIC BLOOD PRESSURE: 60 MMHG | WEIGHT: 251.6 LBS

## 2023-04-06 DIAGNOSIS — M53.3 SACROILIAC JOINT DYSFUNCTION OF BOTH SIDES: ICD-10-CM

## 2023-04-06 DIAGNOSIS — E66.01 MORBID OBESITY WITH BMI OF 40.0-44.9, ADULT (HCC): ICD-10-CM

## 2023-04-06 DIAGNOSIS — M62.838 MUSCLE SPASM: ICD-10-CM

## 2023-04-06 DIAGNOSIS — M54.50 LUMBOSACRAL PAIN: ICD-10-CM

## 2023-04-06 DIAGNOSIS — M51.36 DDD (DEGENERATIVE DISC DISEASE), LUMBAR: ICD-10-CM

## 2023-04-06 DIAGNOSIS — M51.36 ANNULAR TEAR OF LUMBAR DISC: ICD-10-CM

## 2023-04-06 PROCEDURE — 99205 OFFICE O/P NEW HI 60 MIN: CPT | Performed by: PHYSICAL MEDICINE & REHABILITATION

## 2023-04-06 RX ORDER — MELOXICAM 15 MG/1
15 TABLET ORAL
Qty: 30 TABLET | Refills: 1 | Status: SHIPPED | OUTPATIENT
Start: 2023-04-06 | End: 2023-05-06

## 2023-04-06 RX ORDER — TIZANIDINE 4 MG/1
4 TABLET ORAL EVERY 6 HOURS PRN
Qty: 56 TABLET | Refills: 1 | Status: SHIPPED | OUTPATIENT
Start: 2023-04-06 | End: 2023-04-20

## 2023-04-06 ASSESSMENT — PATIENT HEALTH QUESTIONNAIRE - PHQ9
5. POOR APPETITE OR OVEREATING: 3 - NEARLY EVERY DAY
SUM OF ALL RESPONSES TO PHQ QUESTIONS 1-9: 24
CLINICAL INTERPRETATION OF PHQ2 SCORE: 6

## 2023-04-06 ASSESSMENT — FIBROSIS 4 INDEX: FIB4 SCORE: 0.29

## 2023-04-06 ASSESSMENT — PAIN SCALES - GENERAL: PAINLEVEL: 9=SEVERE PAIN

## 2023-04-06 NOTE — H&P (VIEW-ONLY)
New patient note    Physiatry (physical medicine and  Rehabilitation), interventional spine and sports medicine    Date of Service: 4/6/2023    Chief complaint:   Chief Complaint   Patient presents with    Establish Care     Bilateral low back pain        HISTORY    HPI: Alie Gardner 37 y.o. female who presents today for evaluation of low back pain.  She reports that she has been having pain that started after stepping over a baby gate.  This started around mid-January 2023.     From the way she reports that injury, she reports that she was jumping over a baby gate, leading with her right leg and landing awkwardly.  She has since had pain over the lumbosacral region on the right.  This has pain that includes aching, sharp pain, burning all localized in this region. She does have some pain across the lumbosacral region bilaterally, but greatest on the right.  No radicular symptoms.    She has been off work.  She works as a .  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  She has not been able to tolerate working since sometime in February, although she is not sure about this.    She reports that she cannot walk for more than 5-10 minutes.  Pain is worse with sitting, standing, walking, bending forward or backward, walking up and down stairs.  Worsens with coughing and sneezing.      She has been to the ER a few times.  Trigger point injections did help for a few days, but then symptoms got worse.  Pain is 8-9/10 on the NRS.       Medical records review:  I reviewed the note from the referring provider Gabe Machuca M.D. dated 03/08/2023.    Previous treatments:    Physical Therapy: No    Medications the patient is tried: cylcobenzaprine, not helpful, naprosyn some help.  Tylenol    Previous interventions: Trigger point injections    Previous surgeries to relieve the above pain:  none      ROS:   Red Flags ROS:   Fever, Chills, Sweats: Denies  Involuntary Weight Loss: Denies  Bladder  Incontinence: Denies  Bowel Incontinence: Denies  Saddle Anesthesia: Denies    All other systems reviewed and negative for acute symptoms      PMHx:   Past Medical History:   Diagnosis Date    Heart murmur     MRSA (methicillin resistant Staphylococcus aureus)     2016       PSHx:   Past Surgical History:   Procedure Laterality Date    OTHER Left 2015    Left leg, debridement of wound with MRSA       Family history   Family History   Problem Relation Age of Onset    Cancer Mother         uterine and skin    Thyroid Mother         removed due to lump    Diabetes Father     Hypertension Father     Cancer Sister         ovarian    Alcohol abuse Brother          Medications:   Current Outpatient Medications   Medication    tizanidine (ZANAFLEX) 4 MG Tab    meloxicam (MOBIC) 15 MG tablet    lidocaine (LIDODERM) 5 % Patch    acetaminophen (TYLENOL) 325 MG Tab    diclofenac sodium (VOLTAREN) 1 % Gel    ondansetron (ZOFRAN ODT) 4 MG TABLET DISPERSIBLE    omeprazole (PRILOSEC) 20 MG delayed-release capsule     No current facility-administered medications for this visit.       Allergies:   Allergies   Allergen Reactions    Gabapentin Unspecified     Seizure    Penicillins Unspecified     Previous hospitalization after penicillin - does not remember reaction    Cannot tolerate any Cillin       Social Hx:   Social History     Socioeconomic History    Marital status:      Spouse name: Not on file    Number of children: Not on file    Years of education: Not on file    Highest education level: Not on file   Occupational History    Not on file   Tobacco Use    Smoking status: Former     Packs/day: 0.10     Years: 17.00     Pack years: 1.70     Types: Cigarettes    Smokeless tobacco: Never    Tobacco comments:     socially, only every few months   Vaping Use    Vaping Use: Some days    Substances: Nicotine   Substance and Sexual Activity    Alcohol use: Yes     Alcohol/week: 1.2 oz     Types: 2 Standard drinks or equivalent  "per week     Comment: socially - rare    Drug use: No    Sexual activity: Yes     Partners: Male   Other Topics Concern    Not on file   Social History Narrative    Not on file     Social Determinants of Health     Financial Resource Strain: Not on file   Food Insecurity: Not on file   Transportation Needs: Not on file   Physical Activity: Not on file   Stress: Not on file   Social Connections: Not on file   Intimate Partner Violence: Not on file   Housing Stability: Not on file         EXAMINATION     Physical Exam:   Vitals: /60 (BP Location: Right arm, Patient Position: Sitting, BP Cuff Size: Large adult)   Pulse 95   Temp 36.3 °C (97.4 °F) (Temporal)   Ht 1.651 m (5' 5\")   Wt 114 kg (251 lb 9.6 oz)   SpO2 96%     Constitutional:   Body Habitus: Body mass index is 41.87 kg/m².  Cooperation: Fully cooperates with exam  Appearance: Well-groomed, well-nourished, not disheveled, in no acute distress    Eyes: No scleral icterus, no proptosis     ENT -no obvious auditory deficits,  no obvious tongue lesions, tongue midline, no facial droop     Skin -no rashes or lesions noted     Respiratory-  breathing comfortable on room air, no audible wheezing    Cardiovascular- capillary refills less than 2 seconds. No lower extremity edema is noted.    Psychiatric- alert and oriented ×3. Normal affect.     Gait - normal gait, using a single point cane, antalgic.  Heel and toe walking intact, but painful    Musculoskeletal -   Cervical spine   Inspection: No deformities of the skin over the cervical spine. No rashes or lesions.    Thoracic/Lumbar Spine/Sacral Spine/Hips   Inspection: No evidence of atrophy in bilateral lower extremities throughout     ROM: full  AROM with flexion, extension, lateral flexion, and rotation bilaterally, with pain on the right    Palpation:   No tenderness to palpation in midline at T1-T12 levels. Tenderness to palpation over the lateral border of quadratus lumborum with muscle spasms " greater on the right   palpation over SI joint: positive right, negative left    palpation over buttock: positive bilaterally    palpation in hip or over the greater trochanters: negative bilaterally      Lumbar spine Special tests  Neuro tension  Straight leg test negative bilaterally    Slump test negative bilaterally      HIP  Range of motion in the hips is within normal limits in the internal and external rotation bilaterally    SI joint tests  Observation patient sits on one buttocks: Negative  SI joint compression positive right, negative left    Gaenslen's test is positive on the right  LAUREN test positive right, positive on left      Neuro       Key points for the international standards for neurological classification of spinal cord injury (ISNCSCI) to light touch.     Dermatome R L   C4 2  2   C5 2 2   C6 2 2   C7 2 2   C8 2 2   T1 2 2   T2 2 2   L2 2 2   L3 2 2   L4 2 2   L5 2 2   S1 2 2   S2 2 2       Motor Exam Upper Extremities   ? Myotome R L   Shoulder flexion C5  5 5   Elbow flexion C5 5 5   Wrist extension C6 5 5   Elbow extension C7 5 5   Finger flexion C8 5 5   Finger abduction T1 5 5       Motor Exam Lower Extremities    ? Myotome R L   Hip flexion L2 5 5   Knee extension L3 5 5   Ankle dorsiflexion L4 5 5   Toe extension L5 5 5   Ankle plantarflexion S1 5 5       Kohli’s sign negative bilaterally   Babinski sign negative bilaterally   Clonus of the ankle negative bilaterally     Reflexes  ?  R L   Biceps  2+ 2+   Brachioradialis  2+ 2+   Patella  2+ 2+   Achilles   2+ 2+       MEDICAL DECISION MAKING    Medical records review: see under HPI section.     DATA    Labs:   Lab Results   Component Value Date/Time    SODIUM 139 03/31/2023 06:51 AM    POTASSIUM 4.1 03/31/2023 06:51 AM    CHLORIDE 108 03/31/2023 06:51 AM    CO2 19 (L) 03/31/2023 06:51 AM    ANION 12.0 03/31/2023 06:51 AM    GLUCOSE 136 (H) 03/31/2023 06:51 AM    BUN 14 03/31/2023 06:51 AM    CREATININE 0.69 03/31/2023 06:51 AM     CALCIUM 9.2 03/31/2023 06:51 AM    ASTSGOT 11 (L) 03/31/2023 06:51 AM    ALTSGPT 12 03/31/2023 06:51 AM    TBILIRUBIN 0.2 03/31/2023 06:51 AM    ALBUMIN 3.9 03/31/2023 06:51 AM    TOTPROTEIN 6.9 03/31/2023 06:51 AM    GLOBULIN 3.0 03/31/2023 06:51 AM    AGRATIO 1.3 03/31/2023 06:51 AM       No results found for: PROTHROMBTM, INR     Lab Results   Component Value Date/Time    WBC 13.4 (H) 03/31/2023 06:51 AM    RBC 5.02 03/31/2023 06:51 AM    HEMOGLOBIN 12.5 03/31/2023 06:51 AM    HEMATOCRIT 40.2 03/31/2023 06:51 AM    MCV 80.1 (L) 03/31/2023 06:51 AM    MCH 24.9 (L) 03/31/2023 06:51 AM    MCHC 31.1 (L) 03/31/2023 06:51 AM    MPV 10.2 03/31/2023 06:51 AM    NEUTSPOLYS 63.80 03/31/2023 06:51 AM    LYMPHOCYTES 30.20 03/31/2023 06:51 AM    MONOCYTES 4.70 03/31/2023 06:51 AM    EOSINOPHILS 0.40 03/31/2023 06:51 AM    BASOPHILS 0.70 03/31/2023 06:51 AM        Lab Results   Component Value Date/Time    HBA1C 6.5 (H) 03/31/2023 06:51 AM        Imaging: I personally reviewed following images, these are my reads  MRI lumbar spine with and without 02/25/2023  At L1-2, no central or foraminal stenosis  At L2-3, no central or foraminal stenosis  At L3-4, no central or foraminal stenosis  At L4-5, small annular tear, no significant disc herniation, mild facet arthropathy, no central or foraminal stenosis  At L5-S1, small annular tear without significant disc herniation, mild facet arthropathy, no central or foraminal stenosis      IMAGING radiology reads. I reviewed the following radiology reads   Results for orders placed during the hospital encounter of 02/25/23    MR-CERVICAL SPINE-WITH & W/O    Impression  1.  No acute abnormality or abnormal enhancement in the cervical spine.  2.  Mild degenerative changes of the cervical spine without central canal or neural foraminal stenosis.     Results for orders placed during the hospital encounter of 02/25/23    MR-LUMBAR SPINE-WITH & W/O    Impression  1.  No acute abnormality or  abnormal enhancement in the lumbar spine.  2.  Mild multilevel degenerative changes of the lumbar spine as described above.      Results for orders placed during the hospital encounter of 02/25/23    MR-THORACIC SPINE-WITH & W/O    Impression  No significant abnormality is seen in the MR scan of the thoracic spine.                            Diagnosis   Visit Diagnoses     ICD-10-CM   1. Sacroiliac joint dysfunction of both sides  M53.3   2. Lumbosacral pain  M54.50   3. Annular tear of lumbar disc  M51.36   4. Morbid obesity with BMI of 40.0-44.9, adult ([facility])  E66.01    Z68.41   5. DDD (degenerative disc disease), lumbar  M51.36   6. Muscle spasm  M62.838           ASSESSMENT:  Alie Gardner 37 y.o. female seen for above     Alie was seen today for [facility] care.    Diagnoses and all orders for this visit:    Sacroiliac joint dysfunction of both sides  -     Referral to Physical Therapy  -     Referral to Pain Clinic  -     DX-SACROILIAC JOINTS 3+; Future  -     meloxicam (MOBIC) 15 MG tablet; Take 1 Tablet by mouth 1 time a day as needed for Moderate Pain or Severe Pain for up to 30 days. Take with food. Do not take with other NSAIDs    Lumbosacral pain  -     Referral to Physical Therapy  -     Referral to Pain Clinic  -     DX-SACROILIAC JOINTS 3+; Future  -     meloxicam (MOBIC) 15 MG tablet; Take 1 Tablet by mouth 1 time a day as needed for Moderate Pain or Severe Pain for up to 30 days. Take with food. Do not take with other NSAIDs    Annular tear of lumbar disc  -     meloxicam (MOBIC) 15 MG tablet; Take 1 Tablet by mouth 1 time a day as needed for Moderate Pain or Severe Pain for up to 30 days. Take with food. Do not take with other NSAIDs    Morbid obesity with BMI of 40.0-44.9, adult ([facility])  -     Patient identified as having weight management issue.  Appropriate orders and counseling given.    DDD (degenerative disc disease), lumbar    Muscle spasm  -     tizanidine (ZANAFLEX) 4 MG Tab;  Take 1 Tablet by mouth every 6 hours as needed (muscle spasm) for up to 14 days.        Discussed mechanism of injury and symptoms and findings on exam.  Discussed findings on MRI lumbar spine.  She does have small annular tears at L5-S1 and smaller at L4-5 without herniation.  These could contribute, but exam suggest sacroiliac joint dysfunction.   Plan to aggressively treat this first.  Discussed getting films of sacroiliac joints.   Referral to physical therapy ordered.  Discussed right and left sacroiliac joint injections.  The risks benefits and alternatives to this procedure were discussed and the patient wishes to proceed with the procedure. Risks include but are not limited to damage to surrounding structures, infection, bleeding, worsening of pain which can be permanent, weakness which can be permanent. Benefits include pain relief, improved function. Alternatives includes not doing the procedure.    Trial meloxicam 15mg daily.  Take with food and avoid taking other NSAIDs.  Trial tizanidine to assist with muscle spasms in quadratus lumborum, right greater than left.  Discussed short term use, side effects and to avoid drinking alcohol, caution with driving equipment.    Follow-up: Return for Hospital injection.    My total time spent caring for the patient on the day of the encounter was 64 minutes.   This does not include time spent on separately billable procedures/tests.    Thank you very much for asking me to participate in Alie Gardner's care.  Please contact me with any questions or concerns.    Please note that this dictation was created using voice recognition software. I have made every reasonable attempt to correct obvious errors but there may be errors of grammar and content that I may have overlooked prior to finalization of this note.      Andrea Francois MD  Physical Medicine and Rehabilitation  Interventional Spine and Sports Physiatry  [facility]           CC [place],  Gabe GIBSON M.D. ; Storm Miles MD

## 2023-04-06 NOTE — PROGRESS NOTES
New patient note    Physiatry (physical medicine and  Rehabilitation), interventional spine and sports medicine    Date of Service: 4/6/2023    Chief complaint:   Chief Complaint   Patient presents with    Establish Care     Bilateral low back pain        HISTORY    HPI: Alie Gardner 37 y.o. female who presents today for evaluation of low back pain.  She reports that she has been having pain that started after stepping over a baby gate.  This started around mid-January 2023.     From the way she reports that injury, she reports that she was jumping over a baby gate, leading with her right leg and landing awkwardly.  She has since had pain over the lumbosacral region on the right.  This has pain that includes aching, sharp pain, burning all localized in this region. She does have some pain across the lumbosacral region bilaterally, but greatest on the right.  No radicular symptoms.    She has been off work.  She works as a .  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  She has not been able to tolerate working since sometime in February, although she is not sure about this.    She reports that she cannot walk for more than 5-10 minutes.  Pain is worse with sitting, standing, walking, bending forward or backward, walking up and down stairs.  Worsens with coughing and sneezing.      She has been to the ER a few times.  Trigger point injections did help for a few days, but then symptoms got worse.  Pain is 8-9/10 on the NRS.       Medical records review:  I reviewed the note from the referring provider Gabe Machuca M.D. dated 03/08/2023.    Previous treatments:    Physical Therapy: No    Medications the patient is tried: cylcobenzaprine, not helpful, naprosyn some help.  Tylenol    Previous interventions: Trigger point injections    Previous surgeries to relieve the above pain:  none      ROS:   Red Flags ROS:   Fever, Chills, Sweats: Denies  Involuntary Weight Loss: Denies  Bladder  Incontinence: Denies  Bowel Incontinence: Denies  Saddle Anesthesia: Denies    All other systems reviewed and negative for acute symptoms      PMHx:   Past Medical History:   Diagnosis Date    Heart murmur     MRSA (methicillin resistant Staphylococcus aureus)     2016       PSHx:   Past Surgical History:   Procedure Laterality Date    OTHER Left 2015    Left leg, debridement of wound with MRSA       Family history   Family History   Problem Relation Age of Onset    Cancer Mother         uterine and skin    Thyroid Mother         removed due to lump    Diabetes Father     Hypertension Father     Cancer Sister         ovarian    Alcohol abuse Brother          Medications:   Current Outpatient Medications   Medication    tizanidine (ZANAFLEX) 4 MG Tab    meloxicam (MOBIC) 15 MG tablet    lidocaine (LIDODERM) 5 % Patch    acetaminophen (TYLENOL) 325 MG Tab    diclofenac sodium (VOLTAREN) 1 % Gel    ondansetron (ZOFRAN ODT) 4 MG TABLET DISPERSIBLE    omeprazole (PRILOSEC) 20 MG delayed-release capsule     No current facility-administered medications for this visit.       Allergies:   Allergies   Allergen Reactions    Gabapentin Unspecified     Seizure    Penicillins Unspecified     Previous hospitalization after penicillin - does not remember reaction    Cannot tolerate any Cillin       Social Hx:   Social History     Socioeconomic History    Marital status:      Spouse name: Not on file    Number of children: Not on file    Years of education: Not on file    Highest education level: Not on file   Occupational History    Not on file   Tobacco Use    Smoking status: Former     Packs/day: 0.10     Years: 17.00     Pack years: 1.70     Types: Cigarettes    Smokeless tobacco: Never    Tobacco comments:     socially, only every few months   Vaping Use    Vaping Use: Some days    Substances: Nicotine   Substance and Sexual Activity    Alcohol use: Yes     Alcohol/week: 1.2 oz     Types: 2 Standard drinks or equivalent  "per week     Comment: socially - rare    Drug use: No    Sexual activity: Yes     Partners: Male   Other Topics Concern    Not on file   Social History Narrative    Not on file     Social Determinants of Health     Financial Resource Strain: Not on file   Food Insecurity: Not on file   Transportation Needs: Not on file   Physical Activity: Not on file   Stress: Not on file   Social Connections: Not on file   Intimate Partner Violence: Not on file   Housing Stability: Not on file         EXAMINATION     Physical Exam:   Vitals: /60 (BP Location: Right arm, Patient Position: Sitting, BP Cuff Size: Large adult)   Pulse 95   Temp 36.3 °C (97.4 °F) (Temporal)   Ht 1.651 m (5' 5\")   Wt 114 kg (251 lb 9.6 oz)   SpO2 96%     Constitutional:   Body Habitus: Body mass index is 41.87 kg/m².  Cooperation: Fully cooperates with exam  Appearance: Well-groomed, well-nourished, not disheveled, in no acute distress    Eyes: No scleral icterus, no proptosis     ENT -no obvious auditory deficits,  no obvious tongue lesions, tongue midline, no facial droop     Skin -no rashes or lesions noted     Respiratory-  breathing comfortable on room air, no audible wheezing    Cardiovascular- capillary refills less than 2 seconds. No lower extremity edema is noted.    Psychiatric- alert and oriented ×3. Normal affect.     Gait - normal gait, using a single point cane, antalgic.  Heel and toe walking intact, but painful    Musculoskeletal -   Cervical spine   Inspection: No deformities of the skin over the cervical spine. No rashes or lesions.    Thoracic/Lumbar Spine/Sacral Spine/Hips   Inspection: No evidence of atrophy in bilateral lower extremities throughout     ROM: full  AROM with flexion, extension, lateral flexion, and rotation bilaterally, with pain on the right    Palpation:   No tenderness to palpation in midline at T1-T12 levels. Tenderness to palpation over the lateral border of quadratus lumborum with muscle spasms " greater on the right   palpation over SI joint: positive right, negative left    palpation over buttock: positive bilaterally    palpation in hip or over the greater trochanters: negative bilaterally      Lumbar spine Special tests  Neuro tension  Straight leg test negative bilaterally    Slump test negative bilaterally      HIP  Range of motion in the hips is within normal limits in the internal and external rotation bilaterally    SI joint tests  Observation patient sits on one buttocks: Negative  SI joint compression positive right, negative left    Gaenslen's test is positive on the right  LAUREN test positive right, positive on left      Neuro       Key points for the international standards for neurological classification of spinal cord injury (ISNCSCI) to light touch.     Dermatome R L   C4 2  2   C5 2 2   C6 2 2   C7 2 2   C8 2 2   T1 2 2   T2 2 2   L2 2 2   L3 2 2   L4 2 2   L5 2 2   S1 2 2   S2 2 2       Motor Exam Upper Extremities   ? Myotome R L   Shoulder flexion C5  5 5   Elbow flexion C5 5 5   Wrist extension C6 5 5   Elbow extension C7 5 5   Finger flexion C8 5 5   Finger abduction T1 5 5       Motor Exam Lower Extremities    ? Myotome R L   Hip flexion L2 5 5   Knee extension L3 5 5   Ankle dorsiflexion L4 5 5   Toe extension L5 5 5   Ankle plantarflexion S1 5 5       Kohli’s sign negative bilaterally   Babinski sign negative bilaterally   Clonus of the ankle negative bilaterally     Reflexes  ?  R L   Biceps  2+ 2+   Brachioradialis  2+ 2+   Patella  2+ 2+   Achilles   2+ 2+       MEDICAL DECISION MAKING    Medical records review: see under HPI section.     DATA    Labs:   Lab Results   Component Value Date/Time    SODIUM 139 03/31/2023 06:51 AM    POTASSIUM 4.1 03/31/2023 06:51 AM    CHLORIDE 108 03/31/2023 06:51 AM    CO2 19 (L) 03/31/2023 06:51 AM    ANION 12.0 03/31/2023 06:51 AM    GLUCOSE 136 (H) 03/31/2023 06:51 AM    BUN 14 03/31/2023 06:51 AM    CREATININE 0.69 03/31/2023 06:51 AM     CALCIUM 9.2 03/31/2023 06:51 AM    ASTSGOT 11 (L) 03/31/2023 06:51 AM    ALTSGPT 12 03/31/2023 06:51 AM    TBILIRUBIN 0.2 03/31/2023 06:51 AM    ALBUMIN 3.9 03/31/2023 06:51 AM    TOTPROTEIN 6.9 03/31/2023 06:51 AM    GLOBULIN 3.0 03/31/2023 06:51 AM    AGRATIO 1.3 03/31/2023 06:51 AM       No results found for: PROTHROMBTM, INR     Lab Results   Component Value Date/Time    WBC 13.4 (H) 03/31/2023 06:51 AM    RBC 5.02 03/31/2023 06:51 AM    HEMOGLOBIN 12.5 03/31/2023 06:51 AM    HEMATOCRIT 40.2 03/31/2023 06:51 AM    MCV 80.1 (L) 03/31/2023 06:51 AM    MCH 24.9 (L) 03/31/2023 06:51 AM    MCHC 31.1 (L) 03/31/2023 06:51 AM    MPV 10.2 03/31/2023 06:51 AM    NEUTSPOLYS 63.80 03/31/2023 06:51 AM    LYMPHOCYTES 30.20 03/31/2023 06:51 AM    MONOCYTES 4.70 03/31/2023 06:51 AM    EOSINOPHILS 0.40 03/31/2023 06:51 AM    BASOPHILS 0.70 03/31/2023 06:51 AM        Lab Results   Component Value Date/Time    HBA1C 6.5 (H) 03/31/2023 06:51 AM        Imaging: I personally reviewed following images, these are my reads  MRI lumbar spine with and without 02/25/2023  At L1-2, no central or foraminal stenosis  At L2-3, no central or foraminal stenosis  At L3-4, no central or foraminal stenosis  At L4-5, small annular tear, no significant disc herniation, mild facet arthropathy, no central or foraminal stenosis  At L5-S1, small annular tear without significant disc herniation, mild facet arthropathy, no central or foraminal stenosis      IMAGING radiology reads. I reviewed the following radiology reads   Results for orders placed during the hospital encounter of 02/25/23    MR-CERVICAL SPINE-WITH & W/O    Impression  1.  No acute abnormality or abnormal enhancement in the cervical spine.  2.  Mild degenerative changes of the cervical spine without central canal or neural foraminal stenosis.     Results for orders placed during the hospital encounter of 02/25/23    MR-LUMBAR SPINE-WITH & W/O    Impression  1.  No acute abnormality or  abnormal enhancement in the lumbar spine.  2.  Mild multilevel degenerative changes of the lumbar spine as described above.      Results for orders placed during the hospital encounter of 02/25/23    MR-THORACIC SPINE-WITH & W/O    Impression  No significant abnormality is seen in the MR scan of the thoracic spine.                            Diagnosis   Visit Diagnoses     ICD-10-CM   1. Sacroiliac joint dysfunction of both sides  M53.3   2. Lumbosacral pain  M54.50   3. Annular tear of lumbar disc  M51.36   4. Morbid obesity with BMI of 40.0-44.9, adult (Formerly [facility])  E66.01    Z68.41   5. DDD (degenerative disc disease), lumbar  M51.36   6. Muscle spasm  M62.838           ASSESSMENT:  Alie Gardner 37 y.o. female seen for above     Alie was seen today for [facility] care.    Diagnoses and all orders for this visit:    Sacroiliac joint dysfunction of both sides  -     Referral to Physical Therapy  -     Referral to Pain Clinic  -     DX-SACROILIAC JOINTS 3+; Future  -     meloxicam (MOBIC) 15 MG tablet; Take 1 Tablet by mouth 1 time a day as needed for Moderate Pain or Severe Pain for up to 30 days. Take with food. Do not take with other NSAIDs    Lumbosacral pain  -     Referral to Physical Therapy  -     Referral to Pain Clinic  -     DX-SACROILIAC JOINTS 3+; Future  -     meloxicam (MOBIC) 15 MG tablet; Take 1 Tablet by mouth 1 time a day as needed for Moderate Pain or Severe Pain for up to 30 days. Take with food. Do not take with other NSAIDs    Annular tear of lumbar disc  -     meloxicam (MOBIC) 15 MG tablet; Take 1 Tablet by mouth 1 time a day as needed for Moderate Pain or Severe Pain for up to 30 days. Take with food. Do not take with other NSAIDs    Morbid obesity with BMI of 40.0-44.9, adult (Formerly [facility])  -     Patient identified as having weight management issue.  Appropriate orders and counseling given.    DDD (degenerative disc disease), lumbar    Muscle spasm  -     tizanidine (ZANAFLEX) 4 MG Tab;  Take 1 Tablet by mouth every 6 hours as needed (muscle spasm) for up to 14 days.        Discussed mechanism of injury and symptoms and findings on exam.  Discussed findings on MRI lumbar spine.  She does have small annular tears at L5-S1 and smaller at L4-5 without herniation.  These could contribute, but exam suggest sacroiliac joint dysfunction.   Plan to aggressively treat this first.  Discussed getting films of sacroiliac joints.   Referral to physical therapy ordered.  Discussed right and left sacroiliac joint injections.  The risks benefits and alternatives to this procedure were discussed and the patient wishes to proceed with the procedure. Risks include but are not limited to damage to surrounding structures, infection, bleeding, worsening of pain which can be permanent, weakness which can be permanent. Benefits include pain relief, improved function. Alternatives includes not doing the procedure.    Trial meloxicam 15mg daily.  Take with food and avoid taking other NSAIDs.  Trial tizanidine to assist with muscle spasms in quadratus lumborum, right greater than left.  Discussed short term use, side effects and to avoid drinking alcohol, caution with driving equipment.    Follow-up: Return for Hospital injection.    My total time spent caring for the patient on the day of the encounter was 64 minutes.   This does not include time spent on separately billable procedures/tests.    Thank you very much for asking me to participate in Alie Gardner's care.  Please contact me with any questions or concerns.    Please note that this dictation was created using voice recognition software. I have made every reasonable attempt to correct obvious errors but there may be errors of grammar and content that I may have overlooked prior to finalization of this note.      Andrea Francois MD  Physical Medicine and Rehabilitation  Interventional Spine and Sports Physiatry  [facility]           CC Hill,  Gabe GIBSON M.D. ; Storm Miles MD

## 2023-04-10 ENCOUNTER — HOSPITAL ENCOUNTER (OUTPATIENT)
Facility: REHABILITATION | Age: 38
End: 2023-04-10
Attending: PHYSICAL MEDICINE & REHABILITATION | Admitting: PHYSICAL MEDICINE & REHABILITATION
Payer: COMMERCIAL

## 2023-04-19 ENCOUNTER — OFFICE VISIT (OUTPATIENT)
Dept: MEDICAL GROUP | Facility: PHYSICIAN GROUP | Age: 38
End: 2023-04-19
Payer: COMMERCIAL

## 2023-04-19 VITALS
BODY MASS INDEX: 41.82 KG/M2 | SYSTOLIC BLOOD PRESSURE: 120 MMHG | HEIGHT: 65 IN | OXYGEN SATURATION: 94 % | DIASTOLIC BLOOD PRESSURE: 70 MMHG | WEIGHT: 251 LBS | RESPIRATION RATE: 16 BRPM | HEART RATE: 103 BPM | TEMPERATURE: 98.5 F

## 2023-04-19 DIAGNOSIS — M54.42 ACUTE MIDLINE LOW BACK PAIN WITH BILATERAL SCIATICA: ICD-10-CM

## 2023-04-19 DIAGNOSIS — E11.65 CONTROLLED TYPE 2 DIABETES MELLITUS WITH HYPERGLYCEMIA, WITHOUT LONG-TERM CURRENT USE OF INSULIN (HCC): ICD-10-CM

## 2023-04-19 DIAGNOSIS — M54.41 ACUTE MIDLINE LOW BACK PAIN WITH BILATERAL SCIATICA: ICD-10-CM

## 2023-04-19 DIAGNOSIS — E11.65 UNCONTROLLED TYPE 2 DIABETES MELLITUS WITH HYPERGLYCEMIA (HCC): ICD-10-CM

## 2023-04-19 PROCEDURE — 99214 OFFICE O/P EST MOD 30 MIN: CPT | Performed by: FAMILY MEDICINE

## 2023-04-19 RX ORDER — LANCETS 30 GAUGE
EACH MISCELLANEOUS
Qty: 100 EACH | Refills: 11 | Status: SHIPPED | OUTPATIENT
Start: 2023-04-19

## 2023-04-19 ASSESSMENT — FIBROSIS 4 INDEX: FIB4 SCORE: 0.29

## 2023-04-19 NOTE — ASSESSMENT & PLAN NOTE
Now followed by pain management  Has procedure injection for spinal injection likely epidural or block tomorrow.

## 2023-04-19 NOTE — PATIENT INSTRUCTIONS
Please get fasting labs, fasting for 8-10 hours before next visit. You can make an appointment for the lab or walk in.   Lab hours [place] location: Monday to Friday 6 am - 4 pm, Saturday 7 am -noon  Even if you lose your lab paperwork, you can still come in to get your lab done.        FASTING BLOOD SUGAR GOAL   AFTER MEALS 140 OR BELOW    IF ABOVE 140 TAKE A WALK IF POSSIBLE    IF BELOW 70 EAT A SANDWICH  IF BELOW 40 DRINK JUICE, EAT CANDY, CALL [phone]

## 2023-04-19 NOTE — ASSESSMENT & PLAN NOTE
She has chronic pain, BMI 41  A1c just into 6.5  Encouraged diet changes: less portions of carbohydrates.   Encouraged 5 servings of fruit and vegetables a day.   Encouraged to stay as active as pain will allow.   Discussed benefit of adding metformin to diet and weight loss.   rx for metformin and glucometer provided.

## 2023-04-19 NOTE — PROGRESS NOTES
Subjective:   Alie Gardner is a 37 y.o. female here today for evaluation and management of:     Controlled type 2 diabetes mellitus with hyperglycemia, without long-term current use of insulin (HCC)  She has chronic pain, BMI 41  A1c just into 6.5  Encouraged diet changes: less portions of carbohydrates.   Encouraged 5 servings of fruit and vegetables a day.   Encouraged to stay as active as pain will allow.   Discussed benefit of adding metformin to diet and weight loss.   rx for metformin and glucometer provided.              Current medicines (including changes today)  Current Outpatient Medications   Medication Sig Dispense Refill    metFORMIN (GLUCOPHAGE) 500 MG Tab Take 1 Tablet by mouth 2 times a day with meals. 180 Tablet 3    Blood Glucose Test Strips Use one strip to test blood sugar three times daily before meals. 100 Strip 0    Blood Glucose Meter Kit Test blood sugar as recommended by provider. blood glucose monitoring kit. 1 Kit 0    Lancets Sig: use TID and prn ssx high or low sugar. #100 RF x 3 100 Each 11    tizanidine (ZANAFLEX) 4 MG Tab Take 1 Tablet by mouth every 6 hours as needed (muscle spasm) for up to 14 days. 56 Tablet 1    meloxicam (MOBIC) 15 MG tablet Take 1 Tablet by mouth 1 time a day as needed for Moderate Pain or Severe Pain for up to 30 days. Take with food. Do not take with other NSAIDs 30 Tablet 1    lidocaine (LIDODERM) 5 % Patch Place 1 Patch on the skin every 24 hours. Apply to site of pain. Wear for 12 hours, then remove for 12 hours 30 Patch 0    acetaminophen (TYLENOL) 325 MG Tab Take 650 mg by mouth every 6 hours as needed for Mild Pain.      diclofenac sodium (VOLTAREN) 1 % Gel Apply 4 g topically 3 times a day as needed (Joint or Muscle Pain).      ondansetron (ZOFRAN ODT) 4 MG TABLET DISPERSIBLE Take 1 Tablet by mouth every 6 hours as needed for Nausea/Vomiting. Dissolve in mouth. 10 Tablet 0    omeprazole (PRILOSEC) 20 MG delayed-release capsule Take 1  "Capsule by mouth every day. 90 Capsule 3     No current facility-administered medications for this visit.     She  has a past medical history of Heart murmur and MRSA (methicillin resistant Staphylococcus aureus).    She has no past medical history of Blood transfusion without reported diagnosis.    ROS  No chest pain, no shortness of breath, no abdominal pain       Objective:     /70 (BP Location: Left arm, Patient Position: Sitting, BP Cuff Size: Adult long)   Pulse (!) 103   Temp 36.9 °C (98.5 °F) (Temporal)   Resp 16   Ht 1.651 m (5' 5\")   Wt 114 kg (251 lb)   SpO2 94%  Body mass index is 41.77 kg/m².   Physical Exam:  Constitutional: Alert, no distress.  Skin: Warm, dry, good turgor, no rashes in visible areas.  Eye: Equal, round and reactive, conjunctiva clear, lids normal.  ENMT: Lips without lesions, good dentition, oropharynx clear.  Neck: Trachea midline, no masses, no thyromegaly. No cervical or supraclavicular lymphadenopathy  Respiratory: Unlabored respiratory effort, lungs clear to auscultation, no wheezes, no ronchi.  Cardiovascular: Normal S1, S2, no murmur, no edema.  Abdomen: Soft, non-tender, no masses, no hepatosplenomegaly.  Psych: Alert and oriented x3, normal affect and mood.        Assessment and Plan:   The following treatment plan was discussed    1. Controlled type 2 diabetes mellitus with hyperglycemia, without long-term current use of insulin (HCC)    2. Uncontrolled type 2 diabetes mellitus with hyperglycemia (HCC)  - Blood Glucose Test Strips; Use one strip to test blood sugar three times daily before meals.  Dispense: 100 Strip; Refill: 0  - Blood Glucose Meter Kit; Test blood sugar as recommended by provider. blood glucose monitoring kit.  Dispense: 1 Kit; Refill: 0  - Lancets; Sig: use TID and prn ssx high or low sugar. #100 RF x 3  Dispense: 100 Each; Refill: 11    Other orders  - metFORMIN (GLUCOPHAGE) 500 MG Tab; Take 1 Tablet by mouth 2 times a day with meals.  " Dispense: 180 Tablet; Refill: 3      Followup: Return in about 3 months (around 7/19/2023) for Diabetes.

## 2023-04-20 ENCOUNTER — HOSPITAL ENCOUNTER (OUTPATIENT)
Facility: REHABILITATION | Age: 38
End: 2023-04-20
Attending: PHYSICAL MEDICINE & REHABILITATION | Admitting: PHYSICAL MEDICINE & REHABILITATION
Payer: COMMERCIAL

## 2023-04-20 ENCOUNTER — APPOINTMENT (OUTPATIENT)
Dept: RADIOLOGY | Facility: REHABILITATION | Age: 38
End: 2023-04-20
Attending: PHYSICAL MEDICINE & REHABILITATION
Payer: COMMERCIAL

## 2023-04-20 VITALS
DIASTOLIC BLOOD PRESSURE: 74 MMHG | WEIGHT: 253.09 LBS | OXYGEN SATURATION: 98 % | HEIGHT: 65 IN | RESPIRATION RATE: 16 BRPM | SYSTOLIC BLOOD PRESSURE: 125 MMHG | BODY MASS INDEX: 42.17 KG/M2 | TEMPERATURE: 97.3 F | HEART RATE: 83 BPM

## 2023-04-20 LAB — GLUCOSE BLD STRIP.AUTO-MCNC: 116 MG/DL (ref 65–99)

## 2023-04-20 PROCEDURE — 700111 HCHG RX REV CODE 636 W/ 250 OVERRIDE (IP)

## 2023-04-20 PROCEDURE — G0260 INJ FOR SACROILIAC JT ANESTH: HCPCS

## 2023-04-20 PROCEDURE — 700101 HCHG RX REV CODE 250

## 2023-04-20 PROCEDURE — 82962 GLUCOSE BLOOD TEST: CPT

## 2023-04-20 PROCEDURE — 700117 HCHG RX CONTRAST REV CODE 255

## 2023-04-20 RX ORDER — METHYLPREDNISOLONE ACETATE 40 MG/ML
INJECTION, SUSPENSION INTRA-ARTICULAR; INTRALESIONAL; INTRAMUSCULAR; SOFT TISSUE
Status: COMPLETED
Start: 2023-04-20 | End: 2023-04-20

## 2023-04-20 RX ORDER — LIDOCAINE HYDROCHLORIDE 20 MG/ML
INJECTION, SOLUTION EPIDURAL; INFILTRATION; INTRACAUDAL; PERINEURAL
Status: COMPLETED
Start: 2023-04-20 | End: 2023-04-20

## 2023-04-20 RX ADMIN — METHYLPREDNISOLONE ACETATE 80 MG: 40 INJECTION, SUSPENSION INTRA-ARTICULAR; INTRALESIONAL; INTRAMUSCULAR; SOFT TISSUE at 11:55

## 2023-04-20 RX ADMIN — IOHEXOL 10 ML: 240 INJECTION, SOLUTION INTRATHECAL; INTRAVASCULAR; INTRAVENOUS; ORAL at 11:55

## 2023-04-20 RX ADMIN — LIDOCAINE HYDROCHLORIDE 5 ML: 20 INJECTION, SOLUTION EPIDURAL; INFILTRATION; INTRACAUDAL at 11:55

## 2023-04-20 ASSESSMENT — PAIN DESCRIPTION - PAIN TYPE
TYPE: ACUTE PAIN
TYPE: CHRONIC PAIN

## 2023-04-20 ASSESSMENT — FIBROSIS 4 INDEX: FIB4 SCORE: 0.29

## 2023-04-20 NOTE — PROGRESS NOTES
1115  Pt admitted to Pre Procedure area.  Consent signed and post op instructions reviewed with pt, all questions answered.   1137 Blood sugar taken by RN,  results 116.  1121 Dr. Francois in to see pt.      1208 Pt received to Post Procedure area with updates from procedure RN.  Snack and drink tolerated without C/O N/V.  Dressing clear, dry, no swelling noted.    1218 Pt seen by Dr. Francois for post procedure evaluation.     1226 Pt ambulated without difficulty & meets criteria for DC, accompanied to car and placed in passenger seat.  Discharged to designated .

## 2023-04-20 NOTE — OP REPORT
Date of Service: 4/20/2023     Physician/s: Andrea Francois MD     Pre-operative Diagnosis:   M53.3 (ICD-10-CM) - Sacroiliac joint dysfunction of both sides   M54.50 (ICD-10-CM) - Lumbosacral pain      Post-operative Diagnosis:    M53.3 (ICD-10-CM) - Sacroiliac joint dysfunction of both sides   M54.50 (ICD-10-CM) - Lumbosacral pain     Type of anesthesia: Local anesthesia     Procedure: Left and right sacroiliac joint injection     Description of procedure:     The risks, benefits, and alternatives of the procedure were reviewed and discussed with the patient.  Written informed consent was freely obtained. A pre-procedural time-out was conducted by the physician verifying patient’s identity, procedure to be performed, procedure site and side, and allergy verification. Appropriate equipment was determined to be in place for the procedure.     In the fluoroscopy suite the patient was placed in a prone position and the skin was prepped and draped in the usual sterile fashion. The fluoroscope was placed over the left sacroiliac joint at the appropriate angle for joint entrance, and the target for injection was marked. A 27g needle was used to inject approx 1cc of 1% Lidocaine was injected subcutaneously into the epidermal and dermal layers. The needle was removed. A 25 gauge 3.5 inch needle was then placed down to the level of bone at the inferior/distal aspect of the joint. The needle was then retracted and carefully advanced into the joint capsule. The needle tip was then verified by a lateral view. In the AP view, contrast dye was used to highlight the entire joint line while the fluoroscope was running live. Following negative aspiration, approx 1.0mL of 2% lidocaine without epinephrine with 1.0mL of 80mg/ml depomedrol was then injected. The needle was removed intact after restyleted.     The fluoroscope was placed over the right sacroiliac joint at the appropriate angle for joint entrance, and the target for  injection was marked. A 27g needle was used to inject approx 1cc of 1% Lidocaine was injected subcutaneously into the epidermal and dermal layers. The needle was removed. A 25 gauge 3.5 inch needle was then placed down to the level of bone at the inferior/distal aspect of the joint. The needle was then retracted and carefully advanced into the joint capsule. The needle tip was then verified by a lateral view. In the AP view, contrast dye was used to highlight the entire joint line while the fluoroscope was running live. Following negative aspiration, approx 1.0mL of 2% lidocaine without epinephrine with 1.0mL of 80mg/ml depomedrol was then injected. The needle was removed intact after restyleted.     The patient's low back was covered with a 4x4 gauze, the area was cleansed with sterile normal saline, and a dressing was applied. There were no complications noted.      The patient was discharged to the recovery area in good condition.     Andrea Francois MD  Physical Medicine and Rehabilitation  Interventional Spine and Sports Physiatry  [facility]      CPT: [id number]

## 2023-04-20 NOTE — INTERVAL H&P NOTE
H&P reviewed. The patient was examined and there are no changes to the H&P      There were no vitals taken for this visit.    CV: RRR, II/VI KYMBERLY  RESP: Clear to auscultation bilaterally    Continue with procedure as planned.    Andrea Francois MD  Physical Medicine and Rehabilitation  Interventional Spine and Sports Physiatry  [facility]

## 2023-05-11 ENCOUNTER — OFFICE VISIT (OUTPATIENT)
Dept: PHYSICAL MEDICINE AND REHAB | Facility: MEDICAL CENTER | Age: 38
End: 2023-05-11
Payer: COMMERCIAL

## 2023-05-11 VITALS
OXYGEN SATURATION: 96 % | BODY MASS INDEX: 42.42 KG/M2 | SYSTOLIC BLOOD PRESSURE: 124 MMHG | WEIGHT: 254.6 LBS | DIASTOLIC BLOOD PRESSURE: 62 MMHG | HEIGHT: 65 IN | TEMPERATURE: 97.8 F | HEART RATE: 117 BPM

## 2023-05-11 DIAGNOSIS — M53.3 SACROILIAC JOINT DYSFUNCTION OF BOTH SIDES: ICD-10-CM

## 2023-05-11 DIAGNOSIS — M54.50 LUMBOSACRAL PAIN: ICD-10-CM

## 2023-05-11 DIAGNOSIS — M51.36 ANNULAR TEAR OF LUMBAR DISC: ICD-10-CM

## 2023-05-11 PROCEDURE — 3074F SYST BP LT 130 MM HG: CPT | Performed by: PHYSICAL MEDICINE & REHABILITATION

## 2023-05-11 PROCEDURE — 99214 OFFICE O/P EST MOD 30 MIN: CPT | Performed by: PHYSICAL MEDICINE & REHABILITATION

## 2023-05-11 PROCEDURE — 1125F AMNT PAIN NOTED PAIN PRSNT: CPT | Performed by: PHYSICAL MEDICINE & REHABILITATION

## 2023-05-11 PROCEDURE — 3078F DIAST BP <80 MM HG: CPT | Performed by: PHYSICAL MEDICINE & REHABILITATION

## 2023-05-11 ASSESSMENT — FIBROSIS 4 INDEX: FIB4 SCORE: 0.3

## 2023-05-11 ASSESSMENT — PATIENT HEALTH QUESTIONNAIRE - PHQ9
5. POOR APPETITE OR OVEREATING: 0 - NOT AT ALL
SUM OF ALL RESPONSES TO PHQ QUESTIONS 1-9: 9
CLINICAL INTERPRETATION OF PHQ2 SCORE: 4

## 2023-05-11 NOTE — PROGRESS NOTES
Follow-up patient note    Physiatry (physical medicine and  Rehabilitation), interventional spine and sports medicine    Date of Service: 05/11/2023    Chief complaint:   Chief Complaint   Patient presents with    Follow-Up       HISTORY    HPI: Alie Gardner 38 y.o. female who presents today for evaluation of low back pain.  She reports that she has been having pain that started after stepping over a baby gate.  From the way she reports that injury, she reports that she was jumping over a baby gate, leading with her right leg and landing awkwardly.  This started around mid-January 2023.     Alie returns after left and right sacroiliac joint injections on 04/20/2023.  She reports that she got some improvement after the injections.  Her leg pain is gone, but she has ongoing low back pain.  She can lift her legs and move them easily and standing tolerance has improved.  She still has trouble with pain when she rotates her trunk.  No bowel or bladder changes.    No bowel or bladder changes.    Meloxicam is minimally helpful.        Medical records review:  I reviewed the note from the referring provider Gabe Machuca M.D. dated 03/08/2023.    Previous treatments:    Physical Therapy: No    Medications the patient is tried: cylcobenzaprine, not helpful, naprosyn some help.  Tylenol    Previous interventions: Trigger point injections    Previous surgeries to relieve the above pain:  none      ROS:   Red Flags ROS:   Fever, Chills, Sweats: Denies  Involuntary Weight Loss: Denies  Bladder Incontinence: Denies  Bowel Incontinence: Denies  Saddle Anesthesia: Denies    All other systems reviewed and negative for acute symptoms      PMHx:   Past Medical History:   Diagnosis Date    Heart murmur     MRSA (methicillin resistant Staphylococcus aureus)     2016       PSHx:   Past Surgical History:   Procedure Laterality Date    [place] INJECTION,SACROILIAC JOINT Bilateral 4/20/2023    Procedure: RIGHT and LEFT  sacroiliac joint injections;  Surgeon: Andrea Francois M.D.;  [facility];  Service: Pain Management    OTHER Left 2015    Left leg, debridement of wound with MRSA       Family history   Family History   Problem Relation Age of Onset    Cancer Mother         uterine and skin    Thyroid Mother         removed due to lump    Diabetes Father     Hypertension Father     Cancer Sister         ovarian    Alcohol abuse Brother          Medications:   Current Outpatient Medications   Medication    metFORMIN (GLUCOPHAGE) 500 MG Tab    Blood Glucose Test Strips    Blood Glucose Meter Kit    Lancets    lidocaine (LIDODERM) 5 % Patch    acetaminophen (TYLENOL) 325 MG Tab    diclofenac sodium (VOLTAREN) 1 % Gel    ondansetron (ZOFRAN ODT) 4 MG TABLET DISPERSIBLE    omeprazole (PRILOSEC) 20 MG delayed-release capsule     No current facility-administered medications for this visit.       Allergies:   Allergies   Allergen Reactions    Gabapentin Unspecified     Seizure    Penicillins Unspecified     Previous hospitalization after penicillin - does not remember reaction    Cannot tolerate any Cillin       Social Hx:   Social History     Socioeconomic History    Marital status:      Spouse name: Not on file    Number of children: Not on file    Years of education: Not on file    Highest education level: Not on file   Occupational History    Not on file   Tobacco Use    Smoking status: Former     Packs/day: 0.10     Years: 17.00     Pack years: 1.70     Types: Cigarettes    Smokeless tobacco: Never    Tobacco comments:     socially, only every few months   Vaping Use    Vaping Use: Former    Substances: Nicotine   Substance and Sexual Activity    Alcohol use: Yes     Alcohol/week: 1.2 oz     Types: 2 Standard drinks or equivalent per week     Comment: socially - rare    Drug use: No    Sexual activity: Yes     Partners: Male   Other Topics Concern    Not on file   Social History Narrative    Not on  "file     Social Determinants of Health     Financial Resource Strain: Not on file   Food Insecurity: No Food Insecurity (12/17/2019)    Hunger Vital Sign     Worried About Running Out of Food in the Last Year: Never true     Ran Out of Food in the Last Year: Never true   Transportation Needs: Unmet Transportation Needs (12/17/2019)    PRAPARE - Transportation     Lack of Transportation (Medical): Yes     Lack of Transportation (Non-Medical): No   Physical Activity: Not on file   Stress: Not on file   Social Connections: Not on file   Intimate Partner Violence: Not on file   Housing Stability: Not on file         EXAMINATION     Physical Exam:   Vitals: /62 (BP Location: Right arm, Patient Position: Sitting, BP Cuff Size: Large adult)   Pulse (!) 117   Temp 36.6 °C (97.8 °F) (Temporal)   Ht 1.651 m (5' 5\")   Wt 115 kg (254 lb 9.6 oz)   SpO2 96%     Constitutional:   Body Habitus: Body mass index is 42.37 kg/m².  Cooperation: Fully cooperates with exam  Appearance: Well-groomed, well-nourished, not disheveled, in no acute distress    Eyes: No scleral icterus, no proptosis     ENT -no obvious auditory deficits, no facial droop     Skin -no rashes or lesions noted, no warmth or erythema was noted at the injection sites    Respiratory-  breathing comfortable on room air, no audible wheezing    Cardiovascular- No lower extremity edema is noted.    Psychiatric- alert and oriented ×3. Normal affect.     Gait - normal gait, slow without assist device.  Heel and toe walking intact, but painful    Musculoskeletal -     Thoracic/Lumbar Spine/Sacral Spine/Hips   Inspection: No evidence of atrophy in bilateral lower extremities throughout     Palpation:   No tenderness to palpation in midline at T1-T12 levels. Tenderness to palpation over the lateral border of quadratus lumborum with muscle spasms greater on the right   palpation over SI joint: negative right, negative left    palpation over buttock: positive " bilaterally    palpation in hip or over the greater trochanters: negative bilaterally      Lumbar spine Special tests  Neuro tension  Straight leg test negative bilaterally      HIP  Range of motion in the hips is within normal limits in the internal and external rotation bilaterally    SI joint tests  Observation patient sits on one buttocks: Negative  SI joint compression negative bilaterally  Gaenslen's test is negative bilaterally   LAUREN test negative bilaterally      Neuro       Key points for the international standards for neurological classification of spinal cord injury (ISNCSCI) to light touch.     Dermatome R L   L2 2 2   L3 2 2   L4 2 2   L5 2 2   S1 2 2   S2 2 2       Motor Exam Lower Extremities    ? Myotome R L   Hip flexion L2 5 5   Knee extension L3 5 5   Ankle dorsiflexion L4 5 5   Toe extension L5 5 5   Ankle plantarflexion S1 5 5     Clonus of the ankle negative bilaterally     Reflexes  ?  R L   Patella  2+ 2+   Achilles   2+ 2+       MEDICAL DECISION MAKING    Medical records review: see under HPI section.     DATA    Labs:   Lab Results   Component Value Date/Time    SODIUM 139 03/31/2023 06:51 AM    POTASSIUM 4.1 03/31/2023 06:51 AM    CHLORIDE 108 03/31/2023 06:51 AM    CO2 19 (L) 03/31/2023 06:51 AM    ANION 12.0 03/31/2023 06:51 AM    GLUCOSE 136 (H) 03/31/2023 06:51 AM    BUN 14 03/31/2023 06:51 AM    CREATININE 0.69 03/31/2023 06:51 AM    CALCIUM 9.2 03/31/2023 06:51 AM    ASTSGOT 11 (L) 03/31/2023 06:51 AM    ALTSGPT 12 03/31/2023 06:51 AM    TBILIRUBIN 0.2 03/31/2023 06:51 AM    ALBUMIN 3.9 03/31/2023 06:51 AM    TOTPROTEIN 6.9 03/31/2023 06:51 AM    GLOBULIN 3.0 03/31/2023 06:51 AM    AGRATIO 1.3 03/31/2023 06:51 AM       No results found for: PROTHROMBTM, INR     Lab Results   Component Value Date/Time    WBC 13.4 (H) 03/31/2023 06:51 AM    RBC 5.02 03/31/2023 06:51 AM    HEMOGLOBIN 12.5 03/31/2023 06:51 AM    HEMATOCRIT 40.2 03/31/2023 06:51 AM    MCV 80.1 (L) 03/31/2023 06:51 AM     MCH 24.9 (L) 03/31/2023 06:51 AM    MCHC 31.1 (L) 03/31/2023 06:51 AM    MPV 10.2 03/31/2023 06:51 AM    NEUTSPOLYS 63.80 03/31/2023 06:51 AM    LYMPHOCYTES 30.20 03/31/2023 06:51 AM    MONOCYTES 4.70 03/31/2023 06:51 AM    EOSINOPHILS 0.40 03/31/2023 06:51 AM    BASOPHILS 0.70 03/31/2023 06:51 AM        Lab Results   Component Value Date/Time    HBA1C 6.5 (H) 03/31/2023 06:51 AM        Imaging: I personally reviewed following images, these are my reads  MRI lumbar spine with and without 02/25/2023  At L1-2, no central or foraminal stenosis  At L2-3, no central or foraminal stenosis  At L3-4, no central or foraminal stenosis  At L4-5, small annular tear, no significant disc herniation, mild facet arthropathy, no central or foraminal stenosis  At L5-S1, small annular tear without significant disc herniation, mild facet arthropathy, no central or foraminal stenosis      IMAGING radiology reads. I reviewed the following radiology reads   Results for orders placed during the hospital encounter of 02/25/23    MR-CERVICAL SPINE-WITH & W/O    Impression  1.  No acute abnormality or abnormal enhancement in the cervical spine.  2.  Mild degenerative changes of the cervical spine without central canal or neural foraminal stenosis.     Results for orders placed during the hospital encounter of 02/25/23    MR-LUMBAR SPINE-WITH & W/O    Impression  1.  No acute abnormality or abnormal enhancement in the lumbar spine.  2.  Mild multilevel degenerative changes of the lumbar spine as described above.      Results for orders placed during the hospital encounter of 02/25/23    MR-THORACIC SPINE-WITH & W/O    Impression  No significant abnormality is seen in the MR scan of the thoracic spine.                            Diagnosis   Visit Diagnoses     ICD-10-CM   1. Annular tear of lumbar disc  M51.36   2. Sacroiliac joint dysfunction of both sides  M53.3   3. Lumbosacral pain  M54.50             ASSESSMENT:  Alie Santos  Jj 38 y.o. female seen for above     Alie was seen today for follow-up.    Diagnoses and all orders for this visit:    Annular tear of lumbar disc  -     Referral to Physical Therapy  -     Referral to Pain Clinic    Sacroiliac joint dysfunction of both sides  -     Referral to Physical Therapy    Lumbosacral pain  -     Referral to Physical Therapy        Discussed that exam abnormalities associated with the sacroiliac joint are improved after left and right sacroiliac joint injections.  She does note improvement in her lumbosacral region and leg pain.  She does continue to have pain in the lumbar spine.  Okay to discontinue meloxicam  She is not able to start physical therapy and I have reordered physical therapy.  Reviewed current symptoms and plan to trial left L4-5 interlaminar epidural steroid injection. The risks benefits and alternatives to this procedure were discussed and the patient wishes to proceed with the procedure. Risks include but are not limited to damage to surrounding structures, infection, bleeding, worsening of pain which can be permanent, weakness which can be permanent. Benefits include pain relief, improved function. Alternatives includes not doing the procedure.    Discussed trial of conservative care for annular tears and will consider surgical referral if conservative trial is unsuccessful.    Follow-up: Return for Hospital injection.      Thank you very much for asking me to participate in Alie Gardner's care.  Please contact me with any questions or concerns.    Please note that this dictation was created using voice recognition software. I have made every reasonable attempt to correct obvious errors but there may be errors of grammar and content that I may have overlooked prior to finalization of this note.      Andrea Francois MD  Physical Medicine and Rehabilitation  Interventional Spine and Sports Physiatry  [facility]           ALY Miles  MD Storm

## 2023-05-11 NOTE — H&P (VIEW-ONLY)
Follow-up patient note    Physiatry (physical medicine and  Rehabilitation), interventional spine and sports medicine    Date of Service: 05/11/2023    Chief complaint:   Chief Complaint   Patient presents with    Follow-Up       HISTORY    HPI: Alie Gardner 38 y.o. female who presents today for evaluation of low back pain.  She reports that she has been having pain that started after stepping over a baby gate.  From the way she reports that injury, she reports that she was jumping over a baby gate, leading with her right leg and landing awkwardly.  This started around mid-January 2023.     Alie returns after left and right sacroiliac joint injections on 04/20/2023.  She reports that she got some improvement after the injections.  Her leg pain is gone, but she has ongoing low back pain.  She can lift her legs and move them easily and standing tolerance has improved.  She still has trouble with pain when she rotates her trunk.  No bowel or bladder changes.    No bowel or bladder changes.    Meloxicam is minimally helpful.        Medical records review:  I reviewed the note from the referring provider Gabe Machuca M.D. dated 03/08/2023.    Previous treatments:    Physical Therapy: No    Medications the patient is tried: cylcobenzaprine, not helpful, naprosyn some help.  Tylenol    Previous interventions: Trigger point injections    Previous surgeries to relieve the above pain:  none      ROS:   Red Flags ROS:   Fever, Chills, Sweats: Denies  Involuntary Weight Loss: Denies  Bladder Incontinence: Denies  Bowel Incontinence: Denies  Saddle Anesthesia: Denies    All other systems reviewed and negative for acute symptoms      PMHx:   Past Medical History:   Diagnosis Date    Heart murmur     MRSA (methicillin resistant Staphylococcus aureus)     2016       PSHx:   Past Surgical History:   Procedure Laterality Date    [place] INJECTION,SACROILIAC JOINT Bilateral 4/20/2023    Procedure: RIGHT and LEFT  sacroiliac joint injections;  Surgeon: Andrea Francois M.D.;  [facility];  Service: Pain Management    OTHER Left 2015    Left leg, debridement of wound with MRSA       Family history   Family History   Problem Relation Age of Onset    Cancer Mother         uterine and skin    Thyroid Mother         removed due to lump    Diabetes Father     Hypertension Father     Cancer Sister         ovarian    Alcohol abuse Brother          Medications:   Current Outpatient Medications   Medication    metFORMIN (GLUCOPHAGE) 500 MG Tab    Blood Glucose Test Strips    Blood Glucose Meter Kit    Lancets    lidocaine (LIDODERM) 5 % Patch    acetaminophen (TYLENOL) 325 MG Tab    diclofenac sodium (VOLTAREN) 1 % Gel    ondansetron (ZOFRAN ODT) 4 MG TABLET DISPERSIBLE    omeprazole (PRILOSEC) 20 MG delayed-release capsule     No current facility-administered medications for this visit.       Allergies:   Allergies   Allergen Reactions    Gabapentin Unspecified     Seizure    Penicillins Unspecified     Previous hospitalization after penicillin - does not remember reaction    Cannot tolerate any Cillin       Social Hx:   Social History     Socioeconomic History    Marital status:      Spouse name: Not on file    Number of children: Not on file    Years of education: Not on file    Highest education level: Not on file   Occupational History    Not on file   Tobacco Use    Smoking status: Former     Packs/day: 0.10     Years: 17.00     Pack years: 1.70     Types: Cigarettes    Smokeless tobacco: Never    Tobacco comments:     socially, only every few months   Vaping Use    Vaping Use: Former    Substances: Nicotine   Substance and Sexual Activity    Alcohol use: Yes     Alcohol/week: 1.2 oz     Types: 2 Standard drinks or equivalent per week     Comment: socially - rare    Drug use: No    Sexual activity: Yes     Partners: Male   Other Topics Concern    Not on file   Social History Narrative    Not on  "file     Social Determinants of Health     Financial Resource Strain: Not on file   Food Insecurity: No Food Insecurity (12/17/2019)    Hunger Vital Sign     Worried About Running Out of Food in the Last Year: Never true     Ran Out of Food in the Last Year: Never true   Transportation Needs: Unmet Transportation Needs (12/17/2019)    PRAPARE - Transportation     Lack of Transportation (Medical): Yes     Lack of Transportation (Non-Medical): No   Physical Activity: Not on file   Stress: Not on file   Social Connections: Not on file   Intimate Partner Violence: Not on file   Housing Stability: Not on file         EXAMINATION     Physical Exam:   Vitals: /62 (BP Location: Right arm, Patient Position: Sitting, BP Cuff Size: Large adult)   Pulse (!) 117   Temp 36.6 °C (97.8 °F) (Temporal)   Ht 1.651 m (5' 5\")   Wt 115 kg (254 lb 9.6 oz)   SpO2 96%     Constitutional:   Body Habitus: Body mass index is 42.37 kg/m².  Cooperation: Fully cooperates with exam  Appearance: Well-groomed, well-nourished, not disheveled, in no acute distress    Eyes: No scleral icterus, no proptosis     ENT -no obvious auditory deficits, no facial droop     Skin -no rashes or lesions noted, no warmth or erythema was noted at the injection sites    Respiratory-  breathing comfortable on room air, no audible wheezing    Cardiovascular- No lower extremity edema is noted.    Psychiatric- alert and oriented ×3. Normal affect.     Gait - normal gait, slow without assist device.  Heel and toe walking intact, but painful    Musculoskeletal -     Thoracic/Lumbar Spine/Sacral Spine/Hips   Inspection: No evidence of atrophy in bilateral lower extremities throughout     Palpation:   No tenderness to palpation in midline at T1-T12 levels. Tenderness to palpation over the lateral border of quadratus lumborum with muscle spasms greater on the right   palpation over SI joint: negative right, negative left    palpation over buttock: positive " bilaterally    palpation in hip or over the greater trochanters: negative bilaterally      Lumbar spine Special tests  Neuro tension  Straight leg test negative bilaterally      HIP  Range of motion in the hips is within normal limits in the internal and external rotation bilaterally    SI joint tests  Observation patient sits on one buttocks: Negative  SI joint compression negative bilaterally  Gaenslen's test is negative bilaterally   LAUREN test negative bilaterally      Neuro       Key points for the international standards for neurological classification of spinal cord injury (ISNCSCI) to light touch.     Dermatome R L   L2 2 2   L3 2 2   L4 2 2   L5 2 2   S1 2 2   S2 2 2       Motor Exam Lower Extremities    ? Myotome R L   Hip flexion L2 5 5   Knee extension L3 5 5   Ankle dorsiflexion L4 5 5   Toe extension L5 5 5   Ankle plantarflexion S1 5 5     Clonus of the ankle negative bilaterally     Reflexes  ?  R L   Patella  2+ 2+   Achilles   2+ 2+       MEDICAL DECISION MAKING    Medical records review: see under HPI section.     DATA    Labs:   Lab Results   Component Value Date/Time    SODIUM 139 03/31/2023 06:51 AM    POTASSIUM 4.1 03/31/2023 06:51 AM    CHLORIDE 108 03/31/2023 06:51 AM    CO2 19 (L) 03/31/2023 06:51 AM    ANION 12.0 03/31/2023 06:51 AM    GLUCOSE 136 (H) 03/31/2023 06:51 AM    BUN 14 03/31/2023 06:51 AM    CREATININE 0.69 03/31/2023 06:51 AM    CALCIUM 9.2 03/31/2023 06:51 AM    ASTSGOT 11 (L) 03/31/2023 06:51 AM    ALTSGPT 12 03/31/2023 06:51 AM    TBILIRUBIN 0.2 03/31/2023 06:51 AM    ALBUMIN 3.9 03/31/2023 06:51 AM    TOTPROTEIN 6.9 03/31/2023 06:51 AM    GLOBULIN 3.0 03/31/2023 06:51 AM    AGRATIO 1.3 03/31/2023 06:51 AM       No results found for: PROTHROMBTM, INR     Lab Results   Component Value Date/Time    WBC 13.4 (H) 03/31/2023 06:51 AM    RBC 5.02 03/31/2023 06:51 AM    HEMOGLOBIN 12.5 03/31/2023 06:51 AM    HEMATOCRIT 40.2 03/31/2023 06:51 AM    MCV 80.1 (L) 03/31/2023 06:51 AM     MCH 24.9 (L) 03/31/2023 06:51 AM    MCHC 31.1 (L) 03/31/2023 06:51 AM    MPV 10.2 03/31/2023 06:51 AM    NEUTSPOLYS 63.80 03/31/2023 06:51 AM    LYMPHOCYTES 30.20 03/31/2023 06:51 AM    MONOCYTES 4.70 03/31/2023 06:51 AM    EOSINOPHILS 0.40 03/31/2023 06:51 AM    BASOPHILS 0.70 03/31/2023 06:51 AM        Lab Results   Component Value Date/Time    HBA1C 6.5 (H) 03/31/2023 06:51 AM        Imaging: I personally reviewed following images, these are my reads  MRI lumbar spine with and without 02/25/2023  At L1-2, no central or foraminal stenosis  At L2-3, no central or foraminal stenosis  At L3-4, no central or foraminal stenosis  At L4-5, small annular tear, no significant disc herniation, mild facet arthropathy, no central or foraminal stenosis  At L5-S1, small annular tear without significant disc herniation, mild facet arthropathy, no central or foraminal stenosis      IMAGING radiology reads. I reviewed the following radiology reads   Results for orders placed during the hospital encounter of 02/25/23    MR-CERVICAL SPINE-WITH & W/O    Impression  1.  No acute abnormality or abnormal enhancement in the cervical spine.  2.  Mild degenerative changes of the cervical spine without central canal or neural foraminal stenosis.     Results for orders placed during the hospital encounter of 02/25/23    MR-LUMBAR SPINE-WITH & W/O    Impression  1.  No acute abnormality or abnormal enhancement in the lumbar spine.  2.  Mild multilevel degenerative changes of the lumbar spine as described above.      Results for orders placed during the hospital encounter of 02/25/23    MR-THORACIC SPINE-WITH & W/O    Impression  No significant abnormality is seen in the MR scan of the thoracic spine.                            Diagnosis   Visit Diagnoses     ICD-10-CM   1. Annular tear of lumbar disc  M51.36   2. Sacroiliac joint dysfunction of both sides  M53.3   3. Lumbosacral pain  M54.50             ASSESSMENT:  Alie Santos  Jj 38 y.o. female seen for above     Alie was seen today for follow-up.    Diagnoses and all orders for this visit:    Annular tear of lumbar disc  -     Referral to Physical Therapy  -     Referral to Pain Clinic    Sacroiliac joint dysfunction of both sides  -     Referral to Physical Therapy    Lumbosacral pain  -     Referral to Physical Therapy        Discussed that exam abnormalities associated with the sacroiliac joint are improved after left and right sacroiliac joint injections.  She does note improvement in her lumbosacral region and leg pain.  She does continue to have pain in the lumbar spine.  Okay to discontinue meloxicam  She is not able to start physical therapy and I have reordered physical therapy.  Reviewed current symptoms and plan to trial left L4-5 interlaminar epidural steroid injection. The risks benefits and alternatives to this procedure were discussed and the patient wishes to proceed with the procedure. Risks include but are not limited to damage to surrounding structures, infection, bleeding, worsening of pain which can be permanent, weakness which can be permanent. Benefits include pain relief, improved function. Alternatives includes not doing the procedure.    Discussed trial of conservative care for annular tears and will consider surgical referral if conservative trial is unsuccessful.    Follow-up: Return for Hospital injection.      Thank you very much for asking me to participate in Alie Gardner's care.  Please contact me with any questions or concerns.    Please note that this dictation was created using voice recognition software. I have made every reasonable attempt to correct obvious errors but there may be errors of grammar and content that I may have overlooked prior to finalization of this note.      Andrea Francois MD  Physical Medicine and Rehabilitation  Interventional Spine and Sports Physiatry  [facility]           ALY Miles  MD Storm

## 2023-05-11 NOTE — PATIENT INSTRUCTIONS
Your procedure will be at the [facility] special procedure suite.    [facility] [place] [address]       PRE-PROCEDURE INSTRUCTIONS  You may take your regular medications except:   No Anti-inflammatories 5 days prior to your procedure. Anti-inflammatories include medicines such as  ibuprofen (Motrin, Advil), Excedrin, Naproxen (Aleve, Anaprox, Naprelan, Naprosyn), Celecoxib (Celebrex), Diclofenac (Voltaren-XR tab), and Meloxicam (Mobic).   No Glucophage or Metformin 24 hours before your procedure. You may resume next day after your procedure.  Call the physiatry office if you are taking or prescribed anti-biotics within five days of procedure.  Please ask provider if you are taking any new diabetes medication.  CONTINUE TAKING BLOOD PRESSURE MEDICATIONS AS PRESCRIBED.  Pain medications will not be prescribed on the procedure day. Procedural pain medication may be used by your provider   Call your doctor's office performing the procedure if you have a fever, chills, rash or new illness prior to your procedure    Anticoagulation/antiplatelet medications  No Blood thinning medications such as Coumadin or Plavix 5 days prior to procedure unless your doctor said to continue these medications. Call your doctor if a new medication is prescribed in this class.     Restrictions for eating before procedure:   If you are getting procedural sedation, then do not eat to for 8 hours prior to procedure appointment time. Do not drink fluids for four hours prior to your procedure time.   If you are not having procedural sedation, then Skip the meal prior to your procedure. If you have a morning procedure then skip breakfast. If you have an afternoon procedure then skip lunch.   You may drink clear liquids up to 2 hours prior to your procedure  You must have a  the day of procedure to accompany you home.      POST PROCEDURE INSTRUCTIONS   No heavy lifting, strenuous bending or strenuous exercise for 3 days  after your procedure.  No hot tubs, baths, swimming for 3 days after your procedure  You can remove the bandage the day after the procedure.  IF YOU RECEIVED A STEROID INJECTION. PLEASE NOTE THAT THERE MAY BE A DELAY FOR THE INJECTION TO START WORKING, THE DELAY MAY BE UP TO TWO WEEKS. IF YOU HAVE DIABETES, PLEASE NOTE THAT YOUR SUGAR LEVELS MAY BE ELEVATED FOR 1-2 DAYS AFTER A STEROID INJECTION.  THE STEROID MAY CAUSE TEMPORARY SYMPTOMS WHICH USUALLY RESOLVE ON THEIR OWN WITHIN 1 TO 2 DAYS INCLUDING FACIAL FLUSHING OR A FEELING OF WARMTH ON THE FACE, TEMPORARY INCREASES IN BLOOD SUGAR, INSOMNIA, INCREASED HUNGER  IF YOU RECEIVED A DIAGNOSTIC PROCEDURE (SUCH AS A MEDIAL BRANCH BLOCK), PLEASE NOTE THAT WE DO EXPECT THIS INJECTION TO WEAR OFF.  IT IS IMPORTANT TO COMPLETE THE PAIN DIARY AND LIST THE PAIN SCORE ONLY FOR THE REGION WHERE THE PROCEDURE WAS AND BRING THIS TO YOUR FOLLOW UP VISIT.  IF YOU RECEIVED A RADIOFREQUENCY PROCEDURE, THERE MAY BE SOME SORENESS AFTER THE PROCEDURE.  THIS IS NORMAL.  IF YOU EXPERIENCE PROLONGED WEAKNESS LONGER THAN ONE DAY, BOWEL OR BLADDER INCONTINENCE THEN PLEASE CALL THE PHYSIATRY OFFICE.  Your leg may feel heavy, weak and numb for up to 1-2 days. Be very careful walking.    You may resume normal activities 3 days after procedure.

## 2023-06-02 ENCOUNTER — HOSPITAL ENCOUNTER (OUTPATIENT)
Facility: REHABILITATION | Age: 38
End: 2023-06-02
Attending: PHYSICAL MEDICINE & REHABILITATION | Admitting: PHYSICAL MEDICINE & REHABILITATION
Payer: COMMERCIAL

## 2023-06-02 ENCOUNTER — APPOINTMENT (OUTPATIENT)
Dept: RADIOLOGY | Facility: REHABILITATION | Age: 38
End: 2023-06-02
Attending: PHYSICAL MEDICINE & REHABILITATION
Payer: COMMERCIAL

## 2023-06-02 VITALS
HEIGHT: 65 IN | SYSTOLIC BLOOD PRESSURE: 123 MMHG | DIASTOLIC BLOOD PRESSURE: 81 MMHG | OXYGEN SATURATION: 100 % | TEMPERATURE: 97.5 F | HEART RATE: 73 BPM | RESPIRATION RATE: 15 BRPM | BODY MASS INDEX: 42.24 KG/M2 | WEIGHT: 253.53 LBS

## 2023-06-02 LAB — GLUCOSE BLD STRIP.AUTO-MCNC: 148 MG/DL (ref 65–99)

## 2023-06-02 PROCEDURE — 700111 HCHG RX REV CODE 636 W/ 250 OVERRIDE (IP)

## 2023-06-02 PROCEDURE — 700117 HCHG RX CONTRAST REV CODE 255

## 2023-06-02 PROCEDURE — 82962 GLUCOSE BLOOD TEST: CPT

## 2023-06-02 PROCEDURE — 700101 HCHG RX REV CODE 250

## 2023-06-02 PROCEDURE — 62323 NJX INTERLAMINAR LMBR/SAC: CPT

## 2023-06-02 RX ORDER — DEXAMETHASONE SODIUM PHOSPHATE 10 MG/ML
INJECTION, SOLUTION INTRAMUSCULAR; INTRAVENOUS
Status: COMPLETED
Start: 2023-06-02 | End: 2023-06-02

## 2023-06-02 RX ORDER — LIDOCAINE HYDROCHLORIDE 20 MG/ML
INJECTION, SOLUTION EPIDURAL; INFILTRATION; INTRACAUDAL; PERINEURAL
Status: COMPLETED
Start: 2023-06-02 | End: 2023-06-02

## 2023-06-02 RX ADMIN — LIDOCAINE HYDROCHLORIDE 5 ML: 20 INJECTION, SOLUTION EPIDURAL; INFILTRATION; INTRACAUDAL at 08:14

## 2023-06-02 RX ADMIN — IOHEXOL 10 ML: 240 INJECTION, SOLUTION INTRATHECAL; INTRAVASCULAR; INTRAVENOUS; ORAL at 08:14

## 2023-06-02 RX ADMIN — DEXAMETHASONE SODIUM PHOSPHATE 10 MG: 10 INJECTION, SOLUTION INTRAMUSCULAR; INTRAVENOUS at 08:14

## 2023-06-02 ASSESSMENT — FIBROSIS 4 INDEX: FIB4 SCORE: 0.3

## 2023-06-02 ASSESSMENT — PAIN DESCRIPTION - PAIN TYPE: TYPE: CHRONIC PAIN

## 2023-06-02 NOTE — PROGRESS NOTES
0825-Pt into recovery area.  Her left leg feels numb, Dr. Francois at bedside reassuring pt this is normal and will wear off.  Will continue to observe.    0950 Pt up and walking, she has feeling in her left leg and foot again.  Dr. Francois in to observe and states pt is ok to be discharged.  Pt discharged by wheelchair to friend.

## 2023-06-02 NOTE — OP REPORT
"Type of procedure: Left L4-5 interlaminar epidural steroid injection     Pre-Operative Diagnosis: M51.36 (ICD-10-CM) - Annular tear of lumbar disc    Post-Operative Diagnoses: Same    Type of Anesthesia: Local anesthesia     Physician: Andrea Francois MD     Blood Loss: None     Method of Procedure:     The patient signed an informed consent in the pre-op area after all risks and complications were explained and all questions were answered.  Blood pressure, heart rate, pulse oximetry, and EKG monitoring were performed before and after the procedure.      The patient was prepped and draped in a sterile fashion in the prone position.   The patient's spine was surveyed under fluoroscopic visualization and anatomical landmarks were identified.     Left L4-5 Lumbar Interlaminar Epidural Steroid Injection:     The region overlying the left sacrum lamina was localized and the soft tissues overlying this structure were infiltrated with 1% Lidocaine without Epinephrine.  A 20G 6 inch, Tuohy needle was inserted through the anesthetized tract of tissue to the left sacrum base.  The needle was \"walked off\" the lamina and then loss of resistance was obtained.  After negative aspiration for blood, Omnipaque was used to confirm needle location and AP and lateral films were obtained confirming needle placement in the epidural space.  Then, an injection was performed using 1cc dexamethasone (10mg/cc), 1.4cc of 1% lidocaine without epinephrine and 1.5cc of preservative-free normal saline.  The patient tolerated the procedure well.     Complications: None     Disposition:     1. The patient was discharged to the recovery area in good condition.  2. Discharge instructions were provided.  3. Call with any questions or problems  4. Apply ice to injection site and keep clean and dry for 24 hours.     Andrea Francois MD  Physical Medicine and Rehabilitation  Interventional Spine and Sports Physiatry  [facility]    CPT: [id number]  "

## 2023-06-02 NOTE — INTERVAL H&P NOTE
"H&P reviewed. The patient was examined and there are no changes to the H&P      /76   Pulse 84   Temp 36.4 °C (97.5 °F) (Temporal)   Resp 16   Ht 1.651 m (5' 5\")   Wt 115 kg (253 lb 8.5 oz)   SpO2 98%     CV: RRR, Normal S1, S2  RESP: Clear to auscultation bilaterally    Continue with procedure as planned.    Andrea Francois MD  Physical Medicine and Rehabilitation  Interventional Spine and Sports Physiatry  [facility]      "

## 2023-06-16 ENCOUNTER — OFFICE VISIT (OUTPATIENT)
Dept: PHYSICAL MEDICINE AND REHAB | Facility: MEDICAL CENTER | Age: 38
End: 2023-06-16
Payer: COMMERCIAL

## 2023-06-16 VITALS
HEIGHT: 65 IN | OXYGEN SATURATION: 95 % | BODY MASS INDEX: 42.32 KG/M2 | SYSTOLIC BLOOD PRESSURE: 130 MMHG | HEART RATE: 102 BPM | TEMPERATURE: 96.6 F | DIASTOLIC BLOOD PRESSURE: 72 MMHG | WEIGHT: 254 LBS

## 2023-06-16 DIAGNOSIS — E66.01 MORBID OBESITY WITH BMI OF 40.0-44.9, ADULT (HCC): ICD-10-CM

## 2023-06-16 DIAGNOSIS — M51.36 ANNULAR TEAR OF LUMBAR DISC: ICD-10-CM

## 2023-06-16 DIAGNOSIS — M54.50 LUMBOSACRAL PAIN: ICD-10-CM

## 2023-06-16 DIAGNOSIS — M62.838 MUSCLE SPASM: ICD-10-CM

## 2023-06-16 PROCEDURE — 3078F DIAST BP <80 MM HG: CPT | Performed by: PHYSICAL MEDICINE & REHABILITATION

## 2023-06-16 PROCEDURE — 3075F SYST BP GE 130 - 139MM HG: CPT | Performed by: PHYSICAL MEDICINE & REHABILITATION

## 2023-06-16 PROCEDURE — 99214 OFFICE O/P EST MOD 30 MIN: CPT | Performed by: PHYSICAL MEDICINE & REHABILITATION

## 2023-06-16 RX ORDER — TIZANIDINE 4 MG/1
4 TABLET ORAL EVERY 6 HOURS PRN
Qty: 56 TABLET | Refills: 2 | Status: SHIPPED | OUTPATIENT
Start: 2023-06-16 | End: 2023-07-26 | Stop reason: SDUPTHER

## 2023-06-16 RX ORDER — MELOXICAM 15 MG/1
15 TABLET ORAL
Qty: 30 TABLET | Refills: 1 | Status: SHIPPED | OUTPATIENT
Start: 2023-06-16 | End: 2023-07-16

## 2023-06-16 ASSESSMENT — PAIN SCALES - GENERAL: PAINLEVEL: 10=SEVERE PAIN

## 2023-06-16 ASSESSMENT — PATIENT HEALTH QUESTIONNAIRE - PHQ9
5. POOR APPETITE OR OVEREATING: 2 - MORE THAN HALF THE DAYS
CLINICAL INTERPRETATION OF PHQ2 SCORE: 4
SUM OF ALL RESPONSES TO PHQ QUESTIONS 1-9: 13

## 2023-06-16 ASSESSMENT — FIBROSIS 4 INDEX: FIB4 SCORE: 0.3

## 2023-06-16 NOTE — PROGRESS NOTES
Follow-up patient note    Physiatry (physical medicine and  Rehabilitation), interventional spine and sports medicine    Date of Service: 06/16/2023    Chief complaint:   Chief Complaint   Patient presents with    Follow-Up       HISTORY    HPI: Alie Gardner 38 y.o. female who presents today for evaluation of low back pain.  She reports that she has been having pain that started after stepping over a baby gate.  From the way she reports that injury, she reports that she was jumping over a baby gate, leading with her right leg and landing awkwardly.  This started around mid-January 2023.     Alie returns after left L4-5 interlaminar epidural steroid injection on 06/02/2023.  She did not have significant relief of symptoms.    She has been trying to go to PT, pain has made it difficult for her to attend a few visits.  Reports they are also working on aquatic therapy.    She has not been able to return to work due to symptoms.  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  Her oldest child is having to help out more.      Reports pain is 10/10 on the NRS.  Difficulty with axial low back pain, difficult to stand up straight and difficult to find a comfortable position.      Taking meloxicam once a day.  Some limited improvement.  No side effects.  No bowel or bladder incontinence.      Medical records review:  I reviewed the note from the referring provider Gabe Machuca M.D. dated 03/08/2023.    Previous treatments:    Physical Therapy: No    Medications the patient is tried: cylcobenzaprine, not helpful, naprosyn some help.  Tylenol    Previous interventions: Trigger point injections, sacroiliac joint injections bilaterally, left L4-5 LESI    Previous surgeries to relieve the above pain:  none      ROS:   Red Flags ROS:   Fever, Chills, Sweats: Denies  Involuntary Weight Loss: Denies  Bladder Incontinence: Denies  Bowel Incontinence: Denies  Saddle Anesthesia:  Denies    All other systems reviewed and negative for acute symptoms      PMHx:   Past Medical History:   Diagnosis Date    Heart murmur     MRSA (methicillin resistant Staphylococcus aureus)     2016       PSHx:   Past Surgical History:   Procedure Laterality Date    [place] INJ LUMBAR/SACRAL,W/ IMAGING Left 6/2/2023    Procedure: LEFT L4-5 interlaminar epidural steroid injection;  Surgeon: Andrea Francois M.D.;  [facility];  Service: Pain Management    [place] INJECTION,SACROILIAC JOINT Bilateral 4/20/2023    Procedure: RIGHT and LEFT sacroiliac joint injections;  Surgeon: Andrea Francois M.D.;  Location: [facility];  Service: Pain Management    OTHER Left 2015    Left leg, debridement of wound with MRSA       Family history   Family History   Problem Relation Age of Onset    Cancer Mother         uterine and skin    Thyroid Mother         removed due to lump    Diabetes Father     Hypertension Father     Cancer Sister         ovarian    Alcohol abuse Brother          Medications:   Current Outpatient Medications   Medication    meloxicam (MOBIC) 15 MG tablet    tizanidine (ZANAFLEX) 4 MG Tab    metFORMIN (GLUCOPHAGE) 500 MG Tab    Blood Glucose Test Strips    Blood Glucose Meter Kit    Lancets    lidocaine (LIDODERM) 5 % Patch    acetaminophen (TYLENOL) 325 MG Tab    diclofenac sodium (VOLTAREN) 1 % Gel    ondansetron (ZOFRAN ODT) 4 MG TABLET DISPERSIBLE    omeprazole (PRILOSEC) 20 MG delayed-release capsule     No current facility-administered medications for this visit.       Allergies:   Allergies   Allergen Reactions    Gabapentin Unspecified     Seizure    Penicillins Unspecified     Previous hospitalization after penicillin - does not remember reaction    Cannot tolerate any Cillin       Social Hx:   Social History     Socioeconomic History    Marital status:      Spouse name: Not on file    Number of children: Not on file    Years of education: Not on file     "Highest education level: Not on file   Occupational History    Not on file   Tobacco Use    Smoking status: Former     Packs/day: 0.10     Years: 17.00     Pack years: 1.70     Types: Cigarettes    Smokeless tobacco: Never    Tobacco comments:     socially, only every few months   Vaping Use    Vaping Use: Former    Substances: Nicotine   Substance and Sexual Activity    Alcohol use: Yes     Alcohol/week: 1.2 oz     Types: 2 Standard drinks or equivalent per week     Comment: socially - rare    Drug use: No    Sexual activity: Yes     Partners: Male   Other Topics Concern    Not on file   Social History Narrative    Not on file     Social Determinants of Health     Financial Resource Strain: Not on file   Food Insecurity: No Food Insecurity (12/17/2019)    Hunger Vital Sign     Worried About Running Out of Food in the Last Year: Never true     Ran Out of Food in the Last Year: Never true   Transportation Needs: Unmet Transportation Needs (12/17/2019)    PRAPARE - Transportation     Lack of Transportation (Medical): Yes     Lack of Transportation (Non-Medical): No   Physical Activity: Not on file   Stress: Not on file   Social Connections: Not on file   Intimate Partner Violence: Not on file   Housing Stability: Not on file         EXAMINATION     Physical Exam:   Vitals: /72 (BP Location: Right arm, Patient Position: Sitting, BP Cuff Size: Large adult)   Pulse (!) 102   Temp 35.9 °C (96.6 °F) (Temporal)   Ht 1.651 m (5' 5\")   Wt 115 kg (254 lb)   SpO2 95%     Constitutional:   Body Habitus: Body mass index is 42.27 kg/m².  Cooperation: Fully cooperates with exam  Appearance: Well-groomed, well-nourished, not disheveled, in no acute distress    Eyes: No scleral icterus, no proptosis     ENT -no obvious auditory deficits, no facial droop     Skin -no rashes or lesions noted, no warmth or erythema was noted at the injection site    Respiratory-  breathing comfortable on room air, no audible " wheezing    Cardiovascular- No lower extremity edema is noted.    Psychiatric- alert and oriented ×3. Normal affect.     Gait - normal gait, slow.  Using crutches today.    Musculoskeletal -     Thoracic/Lumbar Spine/Sacral Spine/Hips   Inspection: No evidence of atrophy in bilateral lower extremities throughout     Palpation:   No tenderness to palpation in midline at T1-T12 levels. Tenderness to palpation over the lateral border of quadratus lumborum with muscle spasms greater on the right   palpation over SI joint: negative right, negative left    palpation over buttock: negative bilaterally    palpation in hip or over the greater trochanters: negative bilaterally      Lumbar spine Special tests  Neuro tension  Seated straight leg test negative bilaterally        Neuro       Key points for the international standards for neurological classification of spinal cord injury (ISNCSCI) to light touch.     Dermatome R L   L2 2 2   L3 2 2   L4 2 2   L5 2 2   S1 2 2   S2 2 2       Motor Exam Lower Extremities    ? Myotome R L   Hip flexion L2 5 5   Knee extension L3 5 5   Ankle dorsiflexion L4 5 5   Toe extension L5 5 5   Ankle plantarflexion S1 5 5     Clonus of the ankle negative bilaterally     Reflexes  ?  R L   Patella  2+ 2+   Achilles   2+ 2+       MEDICAL DECISION MAKING    Medical records review: see under HPI section.     DATA    Labs:   Lab Results   Component Value Date/Time    SODIUM 139 03/31/2023 06:51 AM    POTASSIUM 4.1 03/31/2023 06:51 AM    CHLORIDE 108 03/31/2023 06:51 AM    CO2 19 (L) 03/31/2023 06:51 AM    ANION 12.0 03/31/2023 06:51 AM    GLUCOSE 136 (H) 03/31/2023 06:51 AM    BUN 14 03/31/2023 06:51 AM    CREATININE 0.69 03/31/2023 06:51 AM    CALCIUM 9.2 03/31/2023 06:51 AM    ASTSGOT 11 (L) 03/31/2023 06:51 AM    ALTSGPT 12 03/31/2023 06:51 AM    TBILIRUBIN 0.2 03/31/2023 06:51 AM    ALBUMIN 3.9 03/31/2023 06:51 AM    TOTPROTEIN 6.9 03/31/2023 06:51 AM    GLOBULIN 3.0 03/31/2023 06:51 AM     AGRATIO 1.3 03/31/2023 06:51 AM       No results found for: PROTHROMBTM, INR     Lab Results   Component Value Date/Time    WBC 13.4 (H) 03/31/2023 06:51 AM    RBC 5.02 03/31/2023 06:51 AM    HEMOGLOBIN 12.5 03/31/2023 06:51 AM    HEMATOCRIT 40.2 03/31/2023 06:51 AM    MCV 80.1 (L) 03/31/2023 06:51 AM    MCH 24.9 (L) 03/31/2023 06:51 AM    MCHC 31.1 (L) 03/31/2023 06:51 AM    MPV 10.2 03/31/2023 06:51 AM    NEUTSPOLYS 63.80 03/31/2023 06:51 AM    LYMPHOCYTES 30.20 03/31/2023 06:51 AM    MONOCYTES 4.70 03/31/2023 06:51 AM    EOSINOPHILS 0.40 03/31/2023 06:51 AM    BASOPHILS 0.70 03/31/2023 06:51 AM        Lab Results   Component Value Date/Time    HBA1C 6.5 (H) 03/31/2023 06:51 AM        Imaging: I personally reviewed following images, these are my reads  MRI lumbar spine with and without 02/25/2023  At L1-2, no central or foraminal stenosis  At L2-3, no central or foraminal stenosis  At L3-4, no central or foraminal stenosis  At L4-5, small annular tear, no significant disc herniation, mild facet arthropathy, no central or foraminal stenosis  At L5-S1, small annular tear without significant disc herniation, mild facet arthropathy, no central or foraminal stenosis      IMAGING radiology reads. I reviewed the following radiology reads   Results for orders placed during the hospital encounter of 02/25/23    MR-CERVICAL SPINE-WITH & W/O    Impression  1.  No acute abnormality or abnormal enhancement in the cervical spine.  2.  Mild degenerative changes of the cervical spine without central canal or neural foraminal stenosis.     Results for orders placed during the hospital encounter of 02/25/23    MR-LUMBAR SPINE-WITH & W/O    Impression  1.  No acute abnormality or abnormal enhancement in the lumbar spine.  2.  Mild multilevel degenerative changes of the lumbar spine as described above.      Results for orders placed during the hospital encounter of 02/25/23    MR-THORACIC SPINE-WITH & W/O    Impression  No  significant abnormality is seen in the MR scan of the thoracic spine.                            Diagnosis   Visit Diagnoses     ICD-10-CM   1. Annular tear of lumbar disc  M51.36   2. Lumbosacral pain  M54.50   3. Morbid obesity with BMI of 40.0-44.9, adult (HCC)  E66.01    Z68.41   4. Muscle spasm  M62.838           ASSESSMENT:  Alie Gardner 38 y.o. female seen for above     Alie was seen today for follow-up.    Diagnoses and all orders for this visit:    Annular tear of lumbar disc  -     Referral to Neurosurgery  -     meloxicam (MOBIC) 15 MG tablet; Take 1 Tablet by mouth 1 time a day as needed for Moderate Pain or Severe Pain for up to 30 days. Take with food. Do not take with other NSAIDs    Lumbosacral pain    Morbid obesity with BMI of 40.0-44.9, adult (HCC)    Muscle spasm  -     tizanidine (ZANAFLEX) 4 MG Tab; Take 1 Tablet by mouth every 6 hours as needed (muscle spasms) for up to 42 days.        Discussed trial of tizanidine to help with muscle spasms.  This was somewhat helpful in the past.  Script given.  Discussed side effects.  Trial taking up to four times a day for a few days and then reduce to twice a day as tolerated.  Continue meloxicam.  Discussed continuing with aquatic therapy.  Continue off work.  Discussed referral to Neurosurgery for surgical consultation.      Follow-up: Return in about 4 weeks (around 7/14/2023).      Thank you very much for asking me to participate in Alie Gardner's care.  Please contact me with any questions or concerns.    Please note that this dictation was created using voice recognition software. I have made every reasonable attempt to correct obvious errors but there may be errors of grammar and content that I may have overlooked prior to finalization of this note.      Andrea Francois MD  Physical Medicine and Rehabilitation  Interventional Spine and Sports Physiatry  [facility]           CC  Storm Miles MD

## 2023-07-14 ENCOUNTER — APPOINTMENT (OUTPATIENT)
Dept: PHYSICAL MEDICINE AND REHAB | Facility: MEDICAL CENTER | Age: 38
End: 2023-07-14
Payer: COMMERCIAL

## 2023-07-20 ENCOUNTER — E-CONSULT (OUTPATIENT)
Dept: CARDIOLOGY | Facility: MEDICAL CENTER | Age: 38
End: 2023-07-20

## 2023-07-20 ENCOUNTER — OFFICE VISIT (OUTPATIENT)
Dept: MEDICAL GROUP | Facility: PHYSICIAN GROUP | Age: 38
End: 2023-07-20
Payer: COMMERCIAL

## 2023-07-20 ENCOUNTER — HOSPITAL ENCOUNTER (OUTPATIENT)
Facility: MEDICAL CENTER | Age: 38
End: 2023-07-20
Attending: FAMILY MEDICINE
Payer: COMMERCIAL

## 2023-07-20 VITALS
HEIGHT: 65 IN | SYSTOLIC BLOOD PRESSURE: 110 MMHG | WEIGHT: 248 LBS | OXYGEN SATURATION: 98 % | HEART RATE: 85 BPM | TEMPERATURE: 97.6 F | BODY MASS INDEX: 41.32 KG/M2 | DIASTOLIC BLOOD PRESSURE: 78 MMHG | RESPIRATION RATE: 16 BRPM

## 2023-07-20 DIAGNOSIS — E11.65 CONTROLLED TYPE 2 DIABETES MELLITUS WITH HYPERGLYCEMIA, WITHOUT LONG-TERM CURRENT USE OF INSULIN (HCC): ICD-10-CM

## 2023-07-20 DIAGNOSIS — R94.31 ABNORMAL EKG: ICD-10-CM

## 2023-07-20 DIAGNOSIS — M51.36 LUMBAR DEGENERATIVE DISC DISEASE: ICD-10-CM

## 2023-07-20 DIAGNOSIS — Z01.818 PREOP EXAMINATION: ICD-10-CM

## 2023-07-20 DIAGNOSIS — K21.9 GASTROESOPHAGEAL REFLUX DISEASE WITHOUT ESOPHAGITIS: ICD-10-CM

## 2023-07-20 DIAGNOSIS — Z71.9 ENCOUNTER FOR CONSULTATION: ICD-10-CM

## 2023-07-20 DIAGNOSIS — D72.821 MONOCYTOSIS: ICD-10-CM

## 2023-07-20 DIAGNOSIS — Z01.818 PREOPERATIVE CLEARANCE: ICD-10-CM

## 2023-07-20 PROBLEM — R63.5 WEIGHT GAIN: Status: RESOLVED | Noted: 2020-06-08 | Resolved: 2023-07-20

## 2023-07-20 PROBLEM — M51.369 LUMBAR DEGENERATIVE DISC DISEASE: Status: ACTIVE | Noted: 2023-07-20

## 2023-07-20 LAB
CREAT UR-MCNC: 134.42 MG/DL
MICROALBUMIN UR-MCNC: <1.2 MG/DL
MICROALBUMIN/CREAT UR: NORMAL MG/G (ref 0–30)

## 2023-07-20 PROCEDURE — 93000 ELECTROCARDIOGRAM COMPLETE: CPT | Performed by: FAMILY MEDICINE

## 2023-07-20 PROCEDURE — 82570 ASSAY OF URINE CREATININE: CPT

## 2023-07-20 PROCEDURE — 99446 NTRPROF PH1/NTRNET/EHR 5-10: CPT | Performed by: INTERNAL MEDICINE

## 2023-07-20 PROCEDURE — 3078F DIAST BP <80 MM HG: CPT | Performed by: FAMILY MEDICINE

## 2023-07-20 PROCEDURE — 99214 OFFICE O/P EST MOD 30 MIN: CPT | Performed by: FAMILY MEDICINE

## 2023-07-20 PROCEDURE — 82043 UR ALBUMIN QUANTITATIVE: CPT

## 2023-07-20 PROCEDURE — 3074F SYST BP LT 130 MM HG: CPT | Performed by: FAMILY MEDICINE

## 2023-07-20 ASSESSMENT — FIBROSIS 4 INDEX: FIB4 SCORE: 0.3

## 2023-07-20 NOTE — PROGRESS NOTES
Subjective:   Alie Gardner is a 38 y.o. female here today for evaluation and management of:     Lumbar degenerative disc disease  Patient is followed by neurosurgeon and going to have disc surgery.   She is also scheduled for bariatric surgery and working on weight loss.   Has a lot of pain with standing and sitting. Not so much pain with laying down.     Gastroesophageal reflux disease without esophagitis  Pt has egd scheduled  Her surgeon recommended gastric bypass instead of band or sleeve.       Preop examination  Pt is going to have bariatric surgery gastric bypass  She had multiple ER visits for back pain and had a systemic reaction to gabapentin with sedation, respiratory depression and has completely recovered from this.   Cbc, cmp reviewed with no anemia, normal renal function, normal platelets.     EKG in clinic today abnormal computer read, but no st elevations, depression, q waves, I will ask cardiology for input on the ekg read.   Cardiology consulted reg abnormal ekg, recommendation due to changes in ekg to check echo, if normal proceed with surgery.   Echo ordered urgent. Pt notified.     She does not smoke, has normal ldl, is on metformin for DM2 well controlled, does not have HTN.                Current medicines (including changes today)  Current Outpatient Medications   Medication Sig Dispense Refill    tizanidine (ZANAFLEX) 4 MG Tab Take 1 Tablet by mouth every 6 hours as needed (muscle spasms) for up to 42 days. 56 Tablet 2    metFORMIN (GLUCOPHAGE) 500 MG Tab Take 1 Tablet by mouth 2 times a day with meals. 180 Tablet 3    Blood Glucose Test Strips Use one strip to test blood sugar three times daily before meals. 100 Strip 0    Blood Glucose Meter Kit Test blood sugar as recommended by provider. blood glucose monitoring kit. 1 Kit 0    Lancets Sig: use TID and prn ssx high or low sugar. #100 RF x 3 100 Each 11    lidocaine (LIDODERM) 5 % Patch Place 1 Patch on the skin every 24  "hours. Apply to site of pain. Wear for 12 hours, then remove for 12 hours 30 Patch 0    acetaminophen (TYLENOL) 325 MG Tab Take 650 mg by mouth every 6 hours as needed for Mild Pain.      diclofenac sodium (VOLTAREN) 1 % Gel Apply 4 g topically 3 times a day as needed (Joint or Muscle Pain).      ondansetron (ZOFRAN ODT) 4 MG TABLET DISPERSIBLE Take 1 Tablet by mouth every 6 hours as needed for Nausea/Vomiting. Dissolve in mouth. 10 Tablet 0    omeprazole (PRILOSEC) 20 MG delayed-release capsule Take 1 Capsule by mouth every day. 90 Capsule 3    metFORMIN (GLUCOPHAGE) 500 MG Tab Take 1 Tablet by mouth 2 times a day with meals.       No current facility-administered medications for this visit.     She  has a past medical history of Heart murmur and MRSA (methicillin resistant Staphylococcus aureus).    She has no past medical history of Blood transfusion without reported diagnosis.    ROS  No chest pain, no shortness of breath, no abdominal pain       Objective:     /78 (BP Location: Left arm, Patient Position: Sitting, BP Cuff Size: Adult long)   Pulse 85   Temp 36.4 °C (97.6 °F) (Temporal)   Resp 16   Ht 1.651 m (5' 5\")   Wt 112 kg (248 lb)   SpO2 98%  Body mass index is 41.27 kg/m².   Physical Exam:  Constitutional: Alert, no distress.  Skin: Warm, dry, good turgor, no rashes in visible areas.  Eye: Equal, round and reactive, conjunctiva clear, lids normal.  ENMT: Lips without lesions, good dentition, oropharynx clear.  Neck: Trachea midline, no masses, no thyromegaly. No cervical or supraclavicular lymphadenopathy  Respiratory: Unlabored respiratory effort, lungs clear to auscultation, no wheezes, no ronchi.  Cardiovascular: Normal S1, S2, no murmur, no edema.  Abdomen: Soft, non-tender, no masses, no hepatosplenomegaly.  Psych: Alert and oriented x3, normal affect and mood.        Assessment and Plan:   The following treatment plan was discussed    1. Controlled type 2 diabetes mellitus with " hyperglycemia, without long-term current use of insulin (HCC)  - Microalbumin Creat Ratio Urine (Clinic Collect); Future  - Diabetic Monofilament LE Exam  - HEMOGLOBIN A1C; Future  - Comp Metabolic Panel; Future    2. Lumbar degenerative disc disease    3. Monocytosis  - CBC WITH DIFFERENTIAL; Future    4. Gastroesophageal reflux disease without esophagitis    5. Preoperative clearance  - EKG - Clinic Performed    6. Preop examination    7. Abnormal EKG  - E-Consult to Cardiology  - EC-ECHOCARDIOGRAM COMPLETE W/O CONT; Future    Other orders  - metFORMIN (GLUCOPHAGE) 500 MG Tab; Take 1 Tablet by mouth 2 times a day with meals.  - Obtain Results: Retinal screening; Obtain Results From: Other (see comment) ([facility], [place])      Followup: Return for As Scheduled.

## 2023-07-20 NOTE — LETTER
July 20, 2023    To whom it may concern:     Alie Gardner was evaluated by me in clinic today. She is medically clear for gastric bypass surgery.     Thank you,     Electronically signed    Storm Miles M.D.

## 2023-07-20 NOTE — LETTER
[facility]  Storm Mlies M.D.  [address] [place] [address]  Fax: [phone]   Authorization for Release/Disclosure of   Protected Health Information   Name: MERCEDES GOMEZ : 1985 SSN: [id number]   Address: [address] [place] [address]  [facility] [address] Phone:    [phone] (home)    I authorize the entity listed below to release/disclose the PHI below to:   [facility]/Storm Miles M.D. and Storm Miles M.D.   Provider or Entity Name:   [facility]    Address   City, [facility], [facility]   Phone:  [phone]    Fax:     Reason for request: continuity of care   Information to be released:    [  ] LAST COLONOSCOPY,  including any PATH REPORT and follow-up  [  ] LAST FIT/COLOGUARD RESULT [  ] LAST DEXA  [  ] LAST MAMMOGRAM  [  ] LAST PAP  [  ] LAST LABS [x] RETINA EXAM REPORT  [  ] IMMUNIZATION RECORDS  [  ] Release all info      [  ] Check here and initial the line next to each item to release ALL health information INCLUDING  _____ Care and treatment for drug and / or alcohol abuse  _____ HIV testing, infection status, or AIDS  _____ Genetic Testing    DATES OF SERVICE OR TIME PERIOD TO BE DISCLOSED: _____________  I understand and acknowledge that:  * This Authorization may be revoked at any time by you in writing, except if your health information has already been used or disclosed.  * Your health information that will be used or disclosed as a result of you signing this authorization could be re-disclosed by the recipient. If this occurs, your re-disclosed health information may no longer be protected by State or Federal laws.  * You may refuse to sign this Authorization. Your refusal will not affect your ability to obtain treatment.  * This Authorization becomes effective upon signing and will  on (date) __________.      If no date is indicated, this Authorization will  one (1) year from the signature date.    Name: Mercedes Santos  Jj  Signature: Date:   7/20/2023     PLEASE FAX REQUESTED RECORDS BACK TO: [phone]

## 2023-07-20 NOTE — LETTER
July 20, 2023    To whom it may concern:    Aliemary Gardner was evaluated by me in clinic today. Please excuse her from work from 7/20/23 to 01/01/24.       Thank you        Storm Miles M.D.

## 2023-07-20 NOTE — ASSESSMENT & PLAN NOTE
Patient is followed by neurosurgeon and going to have disc surgery.   She is also scheduled for bariatric surgery and working on weight loss.   Has a lot of pain with standing and sitting. Not so much pain with laying down.

## 2023-07-20 NOTE — ASSESSMENT & PLAN NOTE
Pt is going to have bariatric surgery gastric bypass  She had multiple ER visits for back pain and had a systemic reaction to gabapentin with sedation, respiratory depression and has completely recovered from this.   Cbc, cmp reviewed with no anemia, normal renal function, normal platelets.     EKG in clinic today abnormal computer read, but no st elevations, depression, q waves, I will ask cardiology for input on the ekg read.   Cardiology consulted reg abnormal ekg, recommendation due to changes in ekg to check echo, if normal proceed with surgery.   Echo ordered urgent. Pt notified.     She does not smoke, has normal ldl, is on metformin for DM2 well controlled, does not have HTN.

## 2023-07-20 NOTE — PROGRESS NOTES
E-Consult Response     After careful review of the patient's information available in the medical record, the following are my findings and recommendations:    Reason for consult: Abnormal EKG    Summary of data reviewed: Chart review, problem list, EKG    Recommendations: Her EKG has changed compared to prior.  I would recommend getting an echocardiogram to make sure she does not have inferior MI.  If echocardiogram is normal she can proceed with surgery in my opinion    E-Consult Time: 10 minutes were spent with >50% of the total time spent reviewing items outlined in the summary of data reviewed (Use code [id number])    Ronn Blair M.D.

## 2023-07-26 ENCOUNTER — OFFICE VISIT (OUTPATIENT)
Dept: PHYSICAL MEDICINE AND REHAB | Facility: MEDICAL CENTER | Age: 38
End: 2023-07-26
Payer: COMMERCIAL

## 2023-07-26 VITALS
HEART RATE: 94 BPM | SYSTOLIC BLOOD PRESSURE: 114 MMHG | WEIGHT: 246.91 LBS | TEMPERATURE: 96.9 F | HEIGHT: 65 IN | BODY MASS INDEX: 41.14 KG/M2 | DIASTOLIC BLOOD PRESSURE: 80 MMHG | OXYGEN SATURATION: 97 %

## 2023-07-26 DIAGNOSIS — M62.838 MUSCLE SPASM: ICD-10-CM

## 2023-07-26 DIAGNOSIS — M51.36 DDD (DEGENERATIVE DISC DISEASE), LUMBAR: ICD-10-CM

## 2023-07-26 DIAGNOSIS — M51.36 ANNULAR TEAR OF LUMBAR DISC: ICD-10-CM

## 2023-07-26 DIAGNOSIS — M54.50 LUMBOSACRAL PAIN: ICD-10-CM

## 2023-07-26 DIAGNOSIS — E66.01 MORBID OBESITY WITH BMI OF 40.0-44.9, ADULT (HCC): ICD-10-CM

## 2023-07-26 PROCEDURE — 99214 OFFICE O/P EST MOD 30 MIN: CPT | Performed by: PHYSICAL MEDICINE & REHABILITATION

## 2023-07-26 PROCEDURE — 3079F DIAST BP 80-89 MM HG: CPT | Performed by: PHYSICAL MEDICINE & REHABILITATION

## 2023-07-26 PROCEDURE — 3074F SYST BP LT 130 MM HG: CPT | Performed by: PHYSICAL MEDICINE & REHABILITATION

## 2023-07-26 PROCEDURE — 1125F AMNT PAIN NOTED PAIN PRSNT: CPT | Performed by: PHYSICAL MEDICINE & REHABILITATION

## 2023-07-26 RX ORDER — MELOXICAM 15 MG/1
15 TABLET ORAL
Qty: 30 TABLET | Refills: 1 | Status: SHIPPED | OUTPATIENT
Start: 2023-07-26 | End: 2023-08-17

## 2023-07-26 RX ORDER — TIZANIDINE 4 MG/1
4 TABLET ORAL EVERY 6 HOURS PRN
Qty: 40 TABLET | Refills: 2 | Status: SHIPPED | OUTPATIENT
Start: 2023-07-26 | End: 2023-08-17

## 2023-07-26 ASSESSMENT — PATIENT HEALTH QUESTIONNAIRE - PHQ9
CLINICAL INTERPRETATION OF PHQ2 SCORE: 3
5. POOR APPETITE OR OVEREATING: 1 - SEVERAL DAYS
SUM OF ALL RESPONSES TO PHQ QUESTIONS 1-9: 13

## 2023-07-26 ASSESSMENT — PAIN SCALES - GENERAL: PAINLEVEL: 8=MODERATE-SEVERE PAIN

## 2023-07-26 ASSESSMENT — FIBROSIS 4 INDEX: FIB4 SCORE: 0.3

## 2023-07-26 NOTE — PROGRESS NOTES
Follow-up patient note    Physiatry (physical medicine and  Rehabilitation), interventional spine and sports medicine    Date of Service: 07/26/2023    Chief complaint:   Chief Complaint   Patient presents with    Follow-Up     Annular tear of lumbar disc       HISTORY    HPI: Alie Gardner 38 y.o. female who presents today for evaluation of low back pain.  She reports that she has been having pain that started after stepping over a baby gate.  From the way she reports that injury, she reports that she was jumping over a baby gate, leading with her right leg and landing awkwardly.  This started around mid-January 2023.     Alie returns for follow-up.  She reports that she has seen Dr. Bunn, who suggested that she had weight loss surgery prior to having lumbar spine surgery.      She has not been able to return to work.  Her PCP has taken her off work until 1/2024.    No significant change in symptoms.  Pain is 8/10 on the NRS.  She does not have radicular symptoms.  No weakness or symptoms in the legs.  No bowel or bladder changes.    Reports that she gets some relief from meloxicam and takes this every other day.  Taking tizanidine once a day.      Completed aquatic therapy.        Medical records review:  I reviewed the note from the referring provider Gabe Machuca M.D. dated 03/08/2023.    Previous treatments:    Physical Therapy: No    Medications the patient is tried: cylcobenzaprine, not helpful, naprosyn some help.  Tylenol    Previous interventions: Trigger point injections, sacroiliac joint injections bilaterally, left L4-5 LESI    Previous surgeries to relieve the above pain:  none      ROS:   Red Flags ROS:   Fever, Chills, Sweats: Denies  Involuntary Weight Loss: Denies  Bladder Incontinence: Denies  Bowel Incontinence: Denies  Saddle Anesthesia: Denies    All other systems reviewed and negative for acute symptoms      PMHx:   Past Medical History:   Diagnosis Date    Heart murmur      MRSA (methicillin resistant Staphylococcus aureus)     2016       PSHx:   Past Surgical History:   Procedure Laterality Date    NJ INJ LUMBAR/SACRAL,W/ IMAGING Left 6/2/2023    Procedure: LEFT L4-5 interlaminar epidural steroid injection;  Surgeon: Andrea Francois M.D.;  [facility];  Service: Pain Management    NJ INJECTION,SACROILIAC JOINT Bilateral 4/20/2023    Procedure: RIGHT and LEFT sacroiliac joint injections;  Surgeon: Andrea Francois M.D.;  Location[facility];  Service: Pain Management    OTHER Left 2015    Left leg, debridement of wound with MRSA       Family history   Family History   Problem Relation Age of Onset    Cancer Mother         uterine and skin    Thyroid Mother         removed due to lump    Diabetes Father     Hypertension Father     Cancer Sister         ovarian    Alcohol abuse Brother          Medications:   Current Outpatient Medications   Medication    tizanidine (ZANAFLEX) 4 MG Tab    meloxicam (MOBIC) 15 MG tablet    metFORMIN (GLUCOPHAGE) 500 MG Tab    metFORMIN (GLUCOPHAGE) 500 MG Tab    Blood Glucose Test Strips    Blood Glucose Meter Kit    Lancets    lidocaine (LIDODERM) 5 % Patch    acetaminophen (TYLENOL) 325 MG Tab    diclofenac sodium (VOLTAREN) 1 % Gel    ondansetron (ZOFRAN ODT) 4 MG TABLET DISPERSIBLE    omeprazole (PRILOSEC) 20 MG delayed-release capsule     No current facility-administered medications for this visit.       Allergies:   Allergies   Allergen Reactions    Gabapentin Unspecified     Seizure    Penicillins Unspecified     Previous hospitalization after penicillin - does not remember reaction    Cannot tolerate any Cillin    Penicillin G        Social Hx:   Social History     Socioeconomic History    Marital status:      Spouse name: Not on file    Number of children: Not on file    Years of education: Not on file    Highest education level: Not on file   Occupational History    Not on file   Tobacco  "Use    Smoking status: Former     Packs/day: 0.10     Years: 17.00     Pack years: 1.70     Types: Cigarettes    Smokeless tobacco: Never    Tobacco comments:     socially, only every few months   Vaping Use    Vaping Use: Former    Substances: Nicotine   Substance and Sexual Activity    Alcohol use: Yes     Alcohol/week: 1.2 oz     Types: 2 Standard drinks or equivalent per week     Comment: socially - rare    Drug use: No    Sexual activity: Yes     Partners: Male   Other Topics Concern    Not on file   Social History Narrative    Not on file     Social Determinants of Health     Financial Resource Strain: Not on file   Food Insecurity: No Food Insecurity (12/17/2019)    Hunger Vital Sign     Worried About Running Out of Food in the Last Year: Never true     Ran Out of Food in the Last Year: Never true   Transportation Needs: Unmet Transportation Needs (12/17/2019)    PRAPARE - Transportation     Lack of Transportation (Medical): Yes     Lack of Transportation (Non-Medical): No   Physical Activity: Not on file   Stress: Not on file   Social Connections: Not on file   Intimate Partner Violence: Not on file   Housing Stability: Not on file         EXAMINATION     Physical Exam:   Vitals: /80 (BP Location: Right arm, Patient Position: Sitting, BP Cuff Size: Adult)   Pulse 94   Temp 36.1 °C (96.9 °F) (Temporal)   Ht 1.651 m (5' 5\")   Wt 112 kg (246 lb 14.6 oz)   SpO2 97%     Constitutional:   Body Habitus: Body mass index is 41.09 kg/m².  Cooperation: Fully cooperates with exam  Appearance: Well-groomed, well-nourished, not disheveled, in no acute distress    Eyes: No scleral icterus, no proptosis     ENT -no obvious auditory deficits, no facial droop     Skin -no rashes or lesions noted    Respiratory-  breathing comfortable on room air, no audible wheezing    Cardiovascular- No lower extremity edema is noted.    Psychiatric- alert and oriented ×3. Normal affect.     Gait - normal gait, slow.  Using a quad " cane     Musculoskeletal -     Thoracic/Lumbar Spine/Sacral Spine/Hips   Inspection: No evidence of atrophy in bilateral lower extremities throughout     Palpation:   Tenderness to palpation over the lateral border of quadratus lumborum with muscle spasms greater on the right     Lumbar spine Special tests  Neuro tension  Seated straight leg test negative bilaterally        Neuro       Key points for the international standards for neurological classification of spinal cord injury (ISNCSCI) to light touch.     Dermatome R L   L2 2 2   L3 2 2   L4 2 2   L5 2 2   S1 2 2   S2 2 2       Motor Exam Lower Extremities    ? Myotome R L   Hip flexion L2 5 5   Knee extension L3 5 5   Ankle dorsiflexion L4 5 5   Toe extension L5 5 5   Ankle plantarflexion S1 5 5     Clonus of the ankle negative bilaterally     Reflexes  ?  R L   Patella  2+ 2+   Achilles   2+ 2+       MEDICAL DECISION MAKING    Medical records review: see under HPI section.     DATA    Labs:   Lab Results   Component Value Date/Time    SODIUM 139 03/31/2023 06:51 AM    POTASSIUM 4.1 03/31/2023 06:51 AM    CHLORIDE 108 03/31/2023 06:51 AM    CO2 19 (L) 03/31/2023 06:51 AM    ANION 12.0 03/31/2023 06:51 AM    GLUCOSE 136 (H) 03/31/2023 06:51 AM    BUN 14 03/31/2023 06:51 AM    CREATININE 0.69 03/31/2023 06:51 AM    CALCIUM 9.2 03/31/2023 06:51 AM    ASTSGOT 11 (L) 03/31/2023 06:51 AM    ALTSGPT 12 03/31/2023 06:51 AM    TBILIRUBIN 0.2 03/31/2023 06:51 AM    ALBUMIN 3.9 03/31/2023 06:51 AM    TOTPROTEIN 6.9 03/31/2023 06:51 AM    GLOBULIN 3.0 03/31/2023 06:51 AM    AGRATIO 1.3 03/31/2023 06:51 AM       No results found for: PROTHROMBTM, INR     Lab Results   Component Value Date/Time    WBC 13.4 (H) 03/31/2023 06:51 AM    RBC 5.02 03/31/2023 06:51 AM    HEMOGLOBIN 12.5 03/31/2023 06:51 AM    HEMATOCRIT 40.2 03/31/2023 06:51 AM    MCV 80.1 (L) 03/31/2023 06:51 AM    MCH 24.9 (L) 03/31/2023 06:51 AM    MCHC 31.1 (L) 03/31/2023 06:51 AM    MPV 10.2 03/31/2023 06:51  AM    NEUTSPOLYS 63.80 03/31/2023 06:51 AM    LYMPHOCYTES 30.20 03/31/2023 06:51 AM    MONOCYTES 4.70 03/31/2023 06:51 AM    EOSINOPHILS 0.40 03/31/2023 06:51 AM    BASOPHILS 0.70 03/31/2023 06:51 AM        Lab Results   Component Value Date/Time    HBA1C 6.5 (H) 03/31/2023 06:51 AM        Imaging: I personally reviewed following images, these are my reads  MRI lumbar spine with and without 02/25/2023  At L1-2, no central or foraminal stenosis  At L2-3, no central or foraminal stenosis  At L3-4, no central or foraminal stenosis  At L4-5, small annular tear, no significant disc herniation, mild facet arthropathy, no central or foraminal stenosis  At L5-S1, small annular tear without significant disc herniation, mild facet arthropathy, no central or foraminal stenosis      IMAGING radiology reads. I reviewed the following radiology reads   Results for orders placed during the hospital encounter of 02/25/23    MR-CERVICAL SPINE-WITH & W/O    Impression  1.  No acute abnormality or abnormal enhancement in the cervical spine.  2.  Mild degenerative changes of the cervical spine without central canal or neural foraminal stenosis.     Results for orders placed during the hospital encounter of 02/25/23    MR-LUMBAR SPINE-WITH & W/O    Impression  1.  No acute abnormality or abnormal enhancement in the lumbar spine.  2.  Mild multilevel degenerative changes of the lumbar spine as described above.      Results for orders placed during the hospital encounter of 02/25/23    MR-THORACIC SPINE-WITH & W/O    Impression  No significant abnormality is seen in the MR scan of the thoracic spine.                            Diagnosis   Visit Diagnoses     ICD-10-CM   1. Annular tear of lumbar disc  M51.36   2. DDD (degenerative disc disease), lumbar  M51.36   3. Lumbosacral pain  M54.50   4. Morbid obesity with BMI of 40.0-44.9, adult (HCC)  E66.01    Z68.41   5. Muscle spasm  M62.838             ASSESSMENT:  Alie Gardner  38 y.o. female seen for above     Alie was seen today for follow-up.    Diagnoses and all orders for this visit:    Annular tear of lumbar disc  -     meloxicam (MOBIC) 15 MG tablet; Take 1 Tablet by mouth 1 time a day as needed for Severe Pain for up to 30 days. For 30 days then stop. Do not take other NSAIDs. No refills.    DDD (degenerative disc disease), lumbar  -     meloxicam (MOBIC) 15 MG tablet; Take 1 Tablet by mouth 1 time a day as needed for Severe Pain for up to 30 days. For 30 days then stop. Do not take other NSAIDs. No refills.    Lumbosacral pain  -     meloxicam (MOBIC) 15 MG tablet; Take 1 Tablet by mouth 1 time a day as needed for Severe Pain for up to 30 days. For 30 days then stop. Do not take other NSAIDs. No refills.    Morbid obesity with BMI of 40.0-44.9, adult (HCC)    Muscle spasm  -     tizanidine (ZANAFLEX) 4 MG Tab; Take 1 Tablet by mouth every 6 hours as needed (muscle spasms) for up to 90 days.      Discussed continuing with plan for weight loss surgery evaluation and eventually, plans for follow-up with Dr. Bunn.  Continue home exercise program.  Continue activity as tolerated.  Continue meloxicam every other day.  Continue tizandiine 4mg once a day.  Discussed that she will follow-up with another provider in the practice since I am relocating.  Hold on repeat epidural steroid injection at this time, we can consider repeat depending on symptoms.    Follow-up: Return in about 3 months (around 10/26/2023), or if symptoms worsen or fail to improve.      Thank you very much for asking me to participate in Alie Gardner's care.  Please contact me with any questions or concerns.    Please note that this dictation was created using voice recognition software. I have made every reasonable attempt to correct obvious errors but there may be errors of grammar and content that I may have overlooked prior to finalization of this note.      Andrea Francois MD  Physical Medicine  and Rehabilitation  Interventional Spine and Sports Physiatry  [facility]           Storm Christianson MD

## 2023-08-08 ENCOUNTER — HOSPITAL ENCOUNTER (OUTPATIENT)
Dept: LAB | Facility: MEDICAL CENTER | Age: 38
End: 2023-08-08
Attending: FAMILY MEDICINE
Payer: COMMERCIAL

## 2023-08-08 ENCOUNTER — HOSPITAL ENCOUNTER (OUTPATIENT)
Dept: RADIOLOGY | Facility: MEDICAL CENTER | Age: 38
End: 2023-08-08
Attending: CLINICAL NURSE SPECIALIST
Payer: COMMERCIAL

## 2023-08-08 DIAGNOSIS — D72.821 MONOCYTOSIS: ICD-10-CM

## 2023-08-08 DIAGNOSIS — K21.9 GASTROESOPHAGEAL REFLUX DISEASE WITHOUT ESOPHAGITIS: ICD-10-CM

## 2023-08-08 DIAGNOSIS — E11.65 CONTROLLED TYPE 2 DIABETES MELLITUS WITH HYPERGLYCEMIA, WITHOUT LONG-TERM CURRENT USE OF INSULIN (HCC): ICD-10-CM

## 2023-08-08 LAB
ALBUMIN SERPL BCP-MCNC: 3.8 G/DL (ref 3.2–4.9)
ALBUMIN/GLOB SERPL: 1.2 G/DL
ALP SERPL-CCNC: 61 U/L (ref 30–99)
ALT SERPL-CCNC: 15 U/L (ref 2–50)
ANION GAP SERPL CALC-SCNC: 11 MMOL/L (ref 7–16)
AST SERPL-CCNC: 13 U/L (ref 12–45)
BASOPHILS # BLD AUTO: 0.4 % (ref 0–1.8)
BASOPHILS # BLD: 0.04 K/UL (ref 0–0.12)
BILIRUB SERPL-MCNC: 0.5 MG/DL (ref 0.1–1.5)
BUN SERPL-MCNC: 9 MG/DL (ref 8–22)
CALCIUM ALBUM COR SERPL-MCNC: 9 MG/DL (ref 8.5–10.5)
CALCIUM SERPL-MCNC: 8.8 MG/DL (ref 8.4–10.2)
CHLORIDE SERPL-SCNC: 108 MMOL/L (ref 96–112)
CO2 SERPL-SCNC: 21 MMOL/L (ref 20–33)
CREAT SERPL-MCNC: 0.67 MG/DL (ref 0.5–1.4)
EOSINOPHIL # BLD AUTO: 0.06 K/UL (ref 0–0.51)
EOSINOPHIL NFR BLD: 0.7 % (ref 0–6.9)
ERYTHROCYTE [DISTWIDTH] IN BLOOD BY AUTOMATED COUNT: 45.4 FL (ref 35.9–50)
EST. AVERAGE GLUCOSE BLD GHB EST-MCNC: 137 MG/DL
FASTING STATUS PATIENT QL REPORTED: NORMAL
GFR SERPLBLD CREATININE-BSD FMLA CKD-EPI: 114 ML/MIN/1.73 M 2
GLOBULIN SER CALC-MCNC: 3.3 G/DL (ref 1.9–3.5)
GLUCOSE SERPL-MCNC: 111 MG/DL (ref 65–99)
HBA1C MFR BLD: 6.4 % (ref 4–5.6)
HCT VFR BLD AUTO: 38 % (ref 37–47)
HGB BLD-MCNC: 12 G/DL (ref 12–16)
IMM GRANULOCYTES # BLD AUTO: 0.03 K/UL (ref 0–0.11)
IMM GRANULOCYTES NFR BLD AUTO: 0.3 % (ref 0–0.9)
LYMPHOCYTES # BLD AUTO: 2.89 K/UL (ref 1–4.8)
LYMPHOCYTES NFR BLD: 32.5 % (ref 22–41)
MCH RBC QN AUTO: 25.7 PG (ref 27–33)
MCHC RBC AUTO-ENTMCNC: 31.6 G/DL (ref 32.2–35.5)
MCV RBC AUTO: 81.4 FL (ref 81.4–97.8)
MONOCYTES # BLD AUTO: 0.44 K/UL (ref 0–0.85)
MONOCYTES NFR BLD AUTO: 4.9 % (ref 0–13.4)
NEUTROPHILS # BLD AUTO: 5.43 K/UL (ref 1.82–7.42)
NEUTROPHILS NFR BLD: 61.2 % (ref 44–72)
NRBC # BLD AUTO: 0 K/UL
NRBC BLD-RTO: 0 /100 WBC (ref 0–0.2)
PLATELET # BLD AUTO: 367 K/UL (ref 164–446)
PMV BLD AUTO: 9.7 FL (ref 9–12.9)
POTASSIUM SERPL-SCNC: 3.7 MMOL/L (ref 3.6–5.5)
PROT SERPL-MCNC: 7.1 G/DL (ref 6–8.2)
RBC # BLD AUTO: 4.67 M/UL (ref 4.2–5.4)
SODIUM SERPL-SCNC: 140 MMOL/L (ref 135–145)
WBC # BLD AUTO: 8.9 K/UL (ref 4.8–10.8)

## 2023-08-08 PROCEDURE — 80053 COMPREHEN METABOLIC PANEL: CPT

## 2023-08-08 PROCEDURE — 36415 COLL VENOUS BLD VENIPUNCTURE: CPT

## 2023-08-08 PROCEDURE — 85025 COMPLETE CBC W/AUTO DIFF WBC: CPT

## 2023-08-08 PROCEDURE — 74240 X-RAY XM UPR GI TRC 1CNTRST: CPT

## 2023-08-08 PROCEDURE — 83036 HEMOGLOBIN GLYCOSYLATED A1C: CPT

## 2023-08-14 ENCOUNTER — ANCILLARY PROCEDURE (OUTPATIENT)
Dept: CARDIOLOGY | Facility: MEDICAL CENTER | Age: 38
End: 2023-08-14
Attending: FAMILY MEDICINE
Payer: COMMERCIAL

## 2023-08-14 DIAGNOSIS — R94.31 ABNORMAL EKG: ICD-10-CM

## 2023-08-14 LAB
LV EJECT FRACT MOD 2C 99903: 50.73
LV EJECT FRACT MOD 4C 99902: 60.47
LV EJECT FRACT MOD BP 99901: 51.49

## 2023-08-14 PROCEDURE — 93306 TTE W/DOPPLER COMPLETE: CPT | Mod: 26 | Performed by: STUDENT IN AN ORGANIZED HEALTH CARE EDUCATION/TRAINING PROGRAM

## 2023-08-14 PROCEDURE — 93306 TTE W/DOPPLER COMPLETE: CPT

## 2023-08-15 ENCOUNTER — TELEPHONE (OUTPATIENT)
Dept: PHYSICAL MEDICINE AND REHAB | Facility: MEDICAL CENTER | Age: 38
End: 2023-08-15
Payer: COMMERCIAL

## 2023-08-15 NOTE — TELEPHONE ENCOUNTER
Caller Name: davida Alie  Call Back Number:     How would the patient prefer to be contacted with a response: Phone call OK to leave a detailed message    This Dr. Francois pt said that an epidural injection had been recommended for her. She has decided she would like to have it done if possible. No  available.

## 2023-08-17 ENCOUNTER — OFFICE VISIT (OUTPATIENT)
Dept: PHYSICAL MEDICINE AND REHAB | Facility: MEDICAL CENTER | Age: 38
End: 2023-08-17
Payer: COMMERCIAL

## 2023-08-17 VITALS
SYSTOLIC BLOOD PRESSURE: 118 MMHG | TEMPERATURE: 98 F | OXYGEN SATURATION: 96 % | DIASTOLIC BLOOD PRESSURE: 82 MMHG | HEART RATE: 80 BPM | HEIGHT: 66 IN | WEIGHT: 248.68 LBS | BODY MASS INDEX: 39.97 KG/M2

## 2023-08-17 DIAGNOSIS — M51.36 DDD (DEGENERATIVE DISC DISEASE), LUMBAR: ICD-10-CM

## 2023-08-17 DIAGNOSIS — M47.816 LUMBAR SPONDYLOSIS: ICD-10-CM

## 2023-08-17 DIAGNOSIS — E66.01 MORBID OBESITY WITH BMI OF 40.0-44.9, ADULT (HCC): ICD-10-CM

## 2023-08-17 DIAGNOSIS — M54.50 LUMBOSACRAL PAIN: ICD-10-CM

## 2023-08-17 DIAGNOSIS — M51.36 ANNULAR TEAR OF LUMBAR DISC: ICD-10-CM

## 2023-08-17 DIAGNOSIS — Z71.82 EXERCISE COUNSELING: ICD-10-CM

## 2023-08-17 PROCEDURE — 3079F DIAST BP 80-89 MM HG: CPT | Performed by: PHYSICAL MEDICINE & REHABILITATION

## 2023-08-17 PROCEDURE — 3074F SYST BP LT 130 MM HG: CPT | Performed by: PHYSICAL MEDICINE & REHABILITATION

## 2023-08-17 PROCEDURE — 1125F AMNT PAIN NOTED PAIN PRSNT: CPT | Performed by: PHYSICAL MEDICINE & REHABILITATION

## 2023-08-17 PROCEDURE — 99214 OFFICE O/P EST MOD 30 MIN: CPT | Performed by: PHYSICAL MEDICINE & REHABILITATION

## 2023-08-17 RX ORDER — MELOXICAM 15 MG/1
15 TABLET ORAL
Qty: 30 TABLET | Refills: 2 | Status: SHIPPED | OUTPATIENT
Start: 2023-08-17 | End: 2023-09-16

## 2023-08-17 RX ORDER — TIZANIDINE 4 MG/1
4 TABLET ORAL NIGHTLY PRN
Qty: 30 TABLET | Refills: 2 | Status: SHIPPED | OUTPATIENT
Start: 2023-08-17

## 2023-08-17 ASSESSMENT — PATIENT HEALTH QUESTIONNAIRE - PHQ9
5. POOR APPETITE OR OVEREATING: 1 - SEVERAL DAYS
CLINICAL INTERPRETATION OF PHQ2 SCORE: 2
SUM OF ALL RESPONSES TO PHQ QUESTIONS 1-9: 14

## 2023-08-17 ASSESSMENT — FIBROSIS 4 INDEX: FIB4 SCORE: 0.35

## 2023-08-17 ASSESSMENT — PAIN SCALES - GENERAL: PAINLEVEL: 10=SEVERE PAIN

## 2023-08-17 NOTE — PROGRESS NOTES
Follow up patient note  Interventional spine and Pain  Physiatry (physical medicine and  Rehabilitation)       Chief complaint:   Chief Complaint   Patient presents with    Follow-Up     Annular tear of lumbar disc          HISTORY    Please see new patient note by Dr Benítez,  for more details.     HPI  Patient identification: Alie Gardner ,  1985,   With Diagnoses of Lumbar spondylosis, Annular tear of lumbar disc, DDD (degenerative disc disease), lumbar, Lumbosacral pain, Morbid obesity with BMI of 40.0-44.9, adult ([facility]), and Exercise counseling were pertinent to this visit.         Chronic bilateral axial low back pain 10 out of 10 intensity.  This is gradually been worsening.  Present since 2023.  Nonradiating. Rarely gets leg symptoms. She does get spasm. With decreased ROM. She has difficulty with ADLs including lower body dressing.     Previous procedures include SI joint injection and epidural steroid injection with improvement in leg pain.     In the past patient has had physical therapy with both land based therapy and aquatics therapy.     Medication management includes mobic, acetaminophen, diclofenac, zanaflex.        ROS Red Flags :   Fever, Chills, Sweats: Denies  Involuntary Weight Loss: Denies  Bowel/Bladder Incontinence: Denies  Saddle Anesthesia: Denies        PMHx:   Past Medical History:   Diagnosis Date    Heart murmur     IBD (inflammatory bowel disease)     MRSA (methicillin resistant Staphylococcus aureus)            PSHx:   Past Surgical History:   Procedure Laterality Date    CO INJ LUMBAR/SACRAL,W/ IMAGING Left 2023    Procedure: LEFT L4-5 interlaminar epidural steroid injection;  Surgeon: Andrea Francois M.D.;  [facility];  Service: Pain Management    CO INJECTION,SACROILIAC JOINT Bilateral 2023    Procedure: RIGHT and LEFT sacroiliac joint injections;  Surgeon: Andrea Francois M.D.;  [facility];  Service: Pain Management    OTHER Left 2015    Left leg, debridement of wound with MRSA       Family history   Family History   Problem Relation Age of Onset    Cancer Mother         uterine and skin    Thyroid Mother         removed due to lump    Diabetes Father     Hypertension Father     Cancer Sister         ovarian    Alcohol abuse Brother          Medications:   Outpatient Medications Marked as Taking for the 8/17/23 encounter (Office Visit) with Bud Benítez M.D.   Medication Sig Dispense Refill    tizanidine (ZANAFLEX) 4 MG Tab Take 1 Tablet by mouth at bedtime as needed (muscle spasms). 30 Tablet 2    meloxicam (MOBIC) 15 MG tablet Take 1 Tablet by mouth 1 time a day as needed for Severe Pain for up to 30 days. Do not take other NSAIDs. 30 Tablet 2    metFORMIN (GLUCOPHAGE) 500 MG Tab Take 1 Tablet by mouth 2 times a day with meals.      metFORMIN (GLUCOPHAGE) 500 MG Tab Take 1 Tablet by mouth 2 times a day with meals. 180 Tablet 3    Blood Glucose Test Strips Use one strip to test blood sugar three times daily before meals. 100 Strip 0    Blood Glucose Meter Kit Test blood sugar as recommended by provider. blood glucose monitoring kit. 1 Kit 0    Lancets Sig: use TID and prn ssx high or low sugar. #100 RF x 3 100 Each 11    lidocaine (LIDODERM) 5 % Patch Place 1 Patch on the skin every 24 hours. Apply to site of pain. Wear for 12 hours, then remove for 12 hours 30 Patch 0    acetaminophen (TYLENOL) 325 MG Tab Take 650 mg by mouth every 6 hours as needed for Mild Pain.      diclofenac sodium (VOLTAREN) 1 % Gel Apply 4 g topically 3 times a day as needed (Joint or Muscle Pain).      ondansetron (ZOFRAN ODT) 4 MG TABLET DISPERSIBLE Take 1 Tablet by mouth every 6 hours as needed for Nausea/Vomiting. Dissolve in mouth. 10 Tablet 0    omeprazole (PRILOSEC) 20 MG delayed-release capsule Take 1 Capsule by mouth every day. 90 Capsule 3        Current Outpatient Medications on File Prior to Visit    Medication Sig Dispense Refill    metFORMIN (GLUCOPHAGE) 500 MG Tab Take 1 Tablet by mouth 2 times a day with meals.      metFORMIN (GLUCOPHAGE) 500 MG Tab Take 1 Tablet by mouth 2 times a day with meals. 180 Tablet 3    Blood Glucose Test Strips Use one strip to test blood sugar three times daily before meals. 100 Strip 0    Blood Glucose Meter Kit Test blood sugar as recommended by provider. blood glucose monitoring kit. 1 Kit 0    Lancets Sig: use TID and prn ssx high or low sugar. #100 RF x 3 100 Each 11    lidocaine (LIDODERM) 5 % Patch Place 1 Patch on the skin every 24 hours. Apply to site of pain. Wear for 12 hours, then remove for 12 hours 30 Patch 0    acetaminophen (TYLENOL) 325 MG Tab Take 650 mg by mouth every 6 hours as needed for Mild Pain.      diclofenac sodium (VOLTAREN) 1 % Gel Apply 4 g topically 3 times a day as needed (Joint or Muscle Pain).      ondansetron (ZOFRAN ODT) 4 MG TABLET DISPERSIBLE Take 1 Tablet by mouth every 6 hours as needed for Nausea/Vomiting. Dissolve in mouth. 10 Tablet 0    omeprazole (PRILOSEC) 20 MG delayed-release capsule Take 1 Capsule by mouth every day. 90 Capsule 3     No current facility-administered medications on file prior to visit.         Allergies:   Allergies   Allergen Reactions    Gabapentin Unspecified     Seizure    Penicillins Unspecified     Previous hospitalization after penicillin - does not remember reaction    Cannot tolerate any Cillin    Penicillin G        Social Hx:   Social History     Socioeconomic History    Marital status:      Spouse name: Not on file    Number of children: Not on file    Years of education: Not on file    Highest education level: Not on file   Occupational History    Not on file   Tobacco Use    Smoking status: Former     Packs/day: 0.10     Years: 17.00     Additional pack years: 0.00     Total pack years: 1.70     Types: Cigarettes    Smokeless tobacco: Never    Tobacco comments:     socially, only every few  "months   Vaping Use    Vaping Use: Former    Substances: Nicotine   Substance and Sexual Activity    Alcohol use: Yes     Alcohol/week: 1.2 oz     Types: 2 Standard drinks or equivalent per week     Comment: socially - rare    Drug use: No    Sexual activity: Yes     Partners: Male   Other Topics Concern    Not on file   Social History Narrative    Not on file     Social Determinants of Health     Financial Resource Strain: Not on file   Food Insecurity: No Food Insecurity (12/17/2019)    Hunger Vital Sign     Worried About Running Out of Food in the Last Year: Never true     Ran Out of Food in the Last Year: Never true   Transportation Needs: Unmet Transportation Needs (12/17/2019)    PRAPARE - Transportation     Lack of Transportation (Medical): Yes     Lack of Transportation (Non-Medical): No   Physical Activity: Not on file   Stress: Not on file   Social Connections: Not on file   Intimate Partner Violence: Not on file   Housing Stability: Not on file         EXAMINATION     Physical Exam:   Vitals: /82 (BP Location: Left arm, Patient Position: Sitting, BP Cuff Size: Adult)   Pulse 80   Temp 36.7 °C (98 °F) (Temporal)   Ht 1.676 m (5' 6\")   Wt 113 kg (248 lb 10.9 oz)   SpO2 96%     Constitutional:   Body Habitus: Body mass index is 40.14 kg/m².  Cooperation: Fully cooperates with exam  Appearance: Well-groomed no disheveled    Respiratory-  breathing comfortable on room air, no audible wheezing  Cardiovascular- capillary refills less than 2 seconds. No lower extremity edema is noted.   Psychiatric- alert and oriented ×3. Normal affect.    MSK and Neuro: -      Thoracic/Lumbar Spine/Sacral Spine/Hips   decreased active range of motion with flexion, lateral flexion, and rotation bilaterally.   There is decreased active range of motion with lumbar extension.    There is  pain with lumbar extension.   There is pain with facet loading maneuver (extension rotation) with axial low back pain on the BILATERAL " "side(s) L4-5 and L5-S1      Palpation:   No tenderness to palpation in midline at T1-T12 levels. No tenderness to palpation in the left and right of the midline T1-L5, NEGATIVE for tenderness to palpation to the para-midline region in the lower lumbar levels.  palpation over SI joint: negative bilaterally    palpation in hip or over the gluteus medius tendon insertion: negative bilaterally      Lumbar spine Special tests  Neuro tension  Straight leg test negative bilaterally    Slump test negative bilaterally      Key points for the international standards for neurological classification of spinal cord injury (ISNCSCI) to light touch.     Dermatome R L                                      L2 2 2   L3 2 2   L4 2 2   L5 2 2   S1 2 2   S2 2 2         Motor Exam Lower Extremities    ? Myotome R L   Hip flexion L2 5 5   Knee extension L3 5 5   Ankle dorsiflexion L4 5 5   Toe extension L5 5 5   Ankle plantarflexion S1 5 5               MEDICAL DECISION MAKING    DATA    Labs:   Lab Results   Component Value Date/Time    SODIUM 140 08/08/2023 08:49 AM    POTASSIUM 3.7 08/08/2023 08:49 AM    CHLORIDE 108 08/08/2023 08:49 AM    CO2 21 08/08/2023 08:49 AM    GLUCOSE 111 (H) 08/08/2023 08:49 AM    BUN 9 08/08/2023 08:49 AM    CREATININE 0.67 08/08/2023 08:49 AM        No results found for: \"PROTHROMBTM\", \"INR\"     Lab Results   Component Value Date/Time    WBC 8.9 08/08/2023 08:49 AM    RBC 4.67 08/08/2023 08:49 AM    HEMOGLOBIN 12.0 08/08/2023 08:49 AM    HEMATOCRIT 38.0 08/08/2023 08:49 AM    MCV 81.4 08/08/2023 08:49 AM    MCH 25.7 (L) 08/08/2023 08:49 AM    MCHC 31.6 (L) 08/08/2023 08:49 AM    MPV 9.7 08/08/2023 08:49 AM    NEUTSPOLYS 61.20 08/08/2023 08:49 AM    LYMPHOCYTES 32.50 08/08/2023 08:49 AM    MONOCYTES 4.90 08/08/2023 08:49 AM    EOSINOPHILS 0.70 08/08/2023 08:49 AM    BASOPHILS 0.40 08/08/2023 08:49 AM        Lab Results   Component Value Date/Time    HBA1C 6.4 (H) 08/08/2023 08:49 AM          Imaging:   I " personally reviewed following images      MRI lumbar spine 2/26/2023  No areas of high-grade central canal or neuroforaminal stenosis.  Annular tear at L5-S1.  Some degenerative disc disease is seen at L5-S1 and L4-5.  There is facet arthropathy bilaterally at L4-5 and L5-S1..      I reviewed the following radiology reports                       Results for orders placed during the hospital encounter of 02/25/23    MR-CERVICAL SPINE-WITH & W/O    Impression  1.  No acute abnormality or abnormal enhancement in the cervical spine.  2.  Mild degenerative changes of the cervical spine without central canal or neural foraminal stenosis.      Results for orders placed during the hospital encounter of 02/25/23    MR-LUMBAR SPINE-WITH & W/O    Impression  1.  No acute abnormality or abnormal enhancement in the lumbar spine.  2.  Mild multilevel degenerative changes of the lumbar spine as described above.       Results for orders placed during the hospital encounter of 02/25/23    MR-THORACIC SPINE-WITH & W/O    Impression  No significant abnormality is seen in the MR scan of the thoracic spine.                                                                      Results for orders placed during the hospital encounter of 02/04/23    CT-ABDOMEN-PELVIS WITH    Impression  1.  There is no acute inflammatory process within the abdomen or pelvis.  2.  There is no evidence of nephrolithiasis, urolithiasis, or hydronephrosis.                                Results for orders placed in visit on 08/31/22    DX-HAND 2- LEFT    Impression  1.  There is mild osteoarthritis of the left 1st CMC joint.   Results for orders placed in visit on 01/25/22    DX-HAND 3+ LEFT    Impression  1.  There is no fracture of the left hand.            Results for orders placed in visit on 02/03/23    DX-LUMBAR SPINE-2 OR 3 VIEWS    Impression  Unremarkable lumbar spine series.            INTERPRETING LOCATION:  [address] [place] [address], [id number]              Results for orders placed in visit on 22    DX-SHOULDER 2+ RIGHT    Impression  Normal radiographs of the right shoulder    Results for orders placed in visit on 23    DX-THORACIC SPINE-2 VIEWS    Impression  1.  There is minimal degenerative endplate spurring in the thoracic spine.            DIAGNOSIS   Visit Diagnoses     ICD-10-CM   1. Lumbar spondylosis  M47.816   2. Annular tear of lumbar disc  M51.36   3. DDD (degenerative disc disease), lumbar  M51.36   4. Lumbosacral pain  M54.50   5. Morbid obesity with BMI of 40.0-44.9, adult ([facility])  E66.01    Z68.41   6. Exercise counseling  Z71.82         ASSESSMENT and PLAN:     Alie Gardner  1985 female      Alie was seen today for follow-up.    Diagnoses and all orders for this visit:    Lumbar spondylosis  -     Referral to Physical Medicine Rehab  -     Referral to Physical Medicine Rehab  -     tizanidine (ZANAFLEX) 4 MG Tab; Take 1 Tablet by mouth at bedtime as needed (muscle spasms).  -     meloxicam (MOBIC) 15 MG tablet; Take 1 Tablet by mouth 1 time a day as needed for Severe Pain for up to 30 days. Do not take other NSAIDs.    Annular tear of lumbar disc  -     tizanidine (ZANAFLEX) 4 MG Tab; Take 1 Tablet by mouth at bedtime as needed (muscle spasms).  -     meloxicam (MOBIC) 15 MG tablet; Take 1 Tablet by mouth 1 time a day as needed for Severe Pain for up to 30 days. Do not take other NSAIDs.    DDD (degenerative disc disease), lumbar  -     tizanidine (ZANAFLEX) 4 MG Tab; Take 1 Tablet by mouth at bedtime as needed (muscle spasms).  -     meloxicam (MOBIC) 15 MG tablet; Take 1 Tablet by mouth 1 time a day as needed for Severe Pain for up to 30 days. Do not take other NSAIDs.    Lumbosacral pain  -     tizanidine (ZANAFLEX) 4 MG Tab; Take 1 Tablet by mouth at bedtime as needed (muscle spasms).  -     meloxicam (MOBIC) 15 MG tablet; Take 1 Tablet by mouth 1 time a day as needed for Severe Pain for up to 30 days. Do not  take other NSAIDs.    Morbid obesity with BMI of 40.0-44.9, adult ([facility])    Exercise counseling           The patient is failed conservative treatments with medication management, home exercise program from the direction of physical therapy/physician including the past 6 months .  I have ordered a BILATERAL diagnostic medial branch block targeting the L4-5 and L5-S1 facet joints #1 and #2.  Procedure #2 is only to be done if #1 is a positive block.    The risks benefits and alternatives to this procedure were discussed and the patient wishes to proceed with the procedure. Risks include but are not limited to damage to surrounding structures, infection, bleeding, worsening of pain which can be permanent, weakness which can be permanent. Benefits include pain relief, improved function. Alternatives includes not doing the procedure.   If there are 2 positive blocks I would consider the patient for radiofrequency neurotomy of the previously blocked nerves.    Follow up: after the above diagnostic procedure     Thank you for allowing me to participate in the care of this patient. If you have any questions please not hesitate to contact me.             Please note that this dictation was created using voice recognition software. I have made every reasonable attempt to correct obvious errors but there may be errors of grammar and content that I may have overlooked prior to finalization of this note.      Bud Benítez MD  Interventional Spine and Sports Physiatry  Physical Medicine and Rehabilitation  [facility]

## 2023-08-17 NOTE — H&P (VIEW-ONLY)
Follow up patient note  Interventional spine and Pain  Physiatry (physical medicine and  Rehabilitation)       Chief complaint:   Chief Complaint   Patient presents with    Follow-Up     Annular tear of lumbar disc          HISTORY    Please see new patient note by Dr Benítez,  for more details.     HPI  Patient identification: Alie Gardnre ,  1985,   With Diagnoses of Lumbar spondylosis, Annular tear of lumbar disc, DDD (degenerative disc disease), lumbar, Lumbosacral pain, Morbid obesity with BMI of 40.0-44.9, adult ([facility]), and Exercise counseling were pertinent to this visit.         Chronic bilateral axial low back pain 10 out of 10 intensity.  This is gradually been worsening.  Present since 2023.  Nonradiating. Rarely gets leg symptoms. She does get spasm. With decreased ROM. She has difficulty with ADLs including lower body dressing.     Previous procedures include SI joint injection and epidural steroid injection with improvement in leg pain.     In the past patient has had physical therapy with both land based therapy and aquatics therapy.     Medication management includes mobic, acetaminophen, diclofenac, zanaflex.        ROS Red Flags :   Fever, Chills, Sweats: Denies  Involuntary Weight Loss: Denies  Bowel/Bladder Incontinence: Denies  Saddle Anesthesia: Denies        PMHx:   Past Medical History:   Diagnosis Date    Heart murmur     IBD (inflammatory bowel disease)     MRSA (methicillin resistant Staphylococcus aureus)            PSHx:   Past Surgical History:   Procedure Laterality Date    FL INJ LUMBAR/SACRAL,W/ IMAGING Left 2023    Procedure: LEFT L4-5 interlaminar epidural steroid injection;  Surgeon: Andrea Francois M.D.;  [facility];  Service: Pain Management    FL INJECTION,SACROILIAC JOINT Bilateral 2023    Procedure: RIGHT and LEFT sacroiliac joint injections;  Surgeon: Andrea Francois M.D.;  Location[facility];  Service: Pain Management    OTHER Left 2015    Left leg, debridement of wound with MRSA       Family history   Family History   Problem Relation Age of Onset    Cancer Mother         uterine and skin    Thyroid Mother         removed due to lump    Diabetes Father     Hypertension Father     Cancer Sister         ovarian    Alcohol abuse Brother          Medications:   Outpatient Medications Marked as Taking for the 8/17/23 encounter (Office Visit) with Bud Benítez M.D.   Medication Sig Dispense Refill    tizanidine (ZANAFLEX) 4 MG Tab Take 1 Tablet by mouth at bedtime as needed (muscle spasms). 30 Tablet 2    meloxicam (MOBIC) 15 MG tablet Take 1 Tablet by mouth 1 time a day as needed for Severe Pain for up to 30 days. Do not take other NSAIDs. 30 Tablet 2    metFORMIN (GLUCOPHAGE) 500 MG Tab Take 1 Tablet by mouth 2 times a day with meals.      metFORMIN (GLUCOPHAGE) 500 MG Tab Take 1 Tablet by mouth 2 times a day with meals. 180 Tablet 3    Blood Glucose Test Strips Use one strip to test blood sugar three times daily before meals. 100 Strip 0    Blood Glucose Meter Kit Test blood sugar as recommended by provider. blood glucose monitoring kit. 1 Kit 0    Lancets Sig: use TID and prn ssx high or low sugar. #100 RF x 3 100 Each 11    lidocaine (LIDODERM) 5 % Patch Place 1 Patch on the skin every 24 hours. Apply to site of pain. Wear for 12 hours, then remove for 12 hours 30 Patch 0    acetaminophen (TYLENOL) 325 MG Tab Take 650 mg by mouth every 6 hours as needed for Mild Pain.      diclofenac sodium (VOLTAREN) 1 % Gel Apply 4 g topically 3 times a day as needed (Joint or Muscle Pain).      ondansetron (ZOFRAN ODT) 4 MG TABLET DISPERSIBLE Take 1 Tablet by mouth every 6 hours as needed for Nausea/Vomiting. Dissolve in mouth. 10 Tablet 0    omeprazole (PRILOSEC) 20 MG delayed-release capsule Take 1 Capsule by mouth every day. 90 Capsule 3        Current Outpatient Medications on File Prior to Visit    Medication Sig Dispense Refill    metFORMIN (GLUCOPHAGE) 500 MG Tab Take 1 Tablet by mouth 2 times a day with meals.      metFORMIN (GLUCOPHAGE) 500 MG Tab Take 1 Tablet by mouth 2 times a day with meals. 180 Tablet 3    Blood Glucose Test Strips Use one strip to test blood sugar three times daily before meals. 100 Strip 0    Blood Glucose Meter Kit Test blood sugar as recommended by provider. blood glucose monitoring kit. 1 Kit 0    Lancets Sig: use TID and prn ssx high or low sugar. #100 RF x 3 100 Each 11    lidocaine (LIDODERM) 5 % Patch Place 1 Patch on the skin every 24 hours. Apply to site of pain. Wear for 12 hours, then remove for 12 hours 30 Patch 0    acetaminophen (TYLENOL) 325 MG Tab Take 650 mg by mouth every 6 hours as needed for Mild Pain.      diclofenac sodium (VOLTAREN) 1 % Gel Apply 4 g topically 3 times a day as needed (Joint or Muscle Pain).      ondansetron (ZOFRAN ODT) 4 MG TABLET DISPERSIBLE Take 1 Tablet by mouth every 6 hours as needed for Nausea/Vomiting. Dissolve in mouth. 10 Tablet 0    omeprazole (PRILOSEC) 20 MG delayed-release capsule Take 1 Capsule by mouth every day. 90 Capsule 3     No current facility-administered medications on file prior to visit.         Allergies:   Allergies   Allergen Reactions    Gabapentin Unspecified     Seizure    Penicillins Unspecified     Previous hospitalization after penicillin - does not remember reaction    Cannot tolerate any Cillin    Penicillin G        Social Hx:   Social History     Socioeconomic History    Marital status:      Spouse name: Not on file    Number of children: Not on file    Years of education: Not on file    Highest education level: Not on file   Occupational History    Not on file   Tobacco Use    Smoking status: Former     Packs/day: 0.10     Years: 17.00     Additional pack years: 0.00     Total pack years: 1.70     Types: Cigarettes    Smokeless tobacco: Never    Tobacco comments:     socially, only every few  "months   Vaping Use    Vaping Use: Former    Substances: Nicotine   Substance and Sexual Activity    Alcohol use: Yes     Alcohol/week: 1.2 oz     Types: 2 Standard drinks or equivalent per week     Comment: socially - rare    Drug use: No    Sexual activity: Yes     Partners: Male   Other Topics Concern    Not on file   Social History Narrative    Not on file     Social Determinants of Health     Financial Resource Strain: Not on file   Food Insecurity: No Food Insecurity (12/17/2019)    Hunger Vital Sign     Worried About Running Out of Food in the Last Year: Never true     Ran Out of Food in the Last Year: Never true   Transportation Needs: Unmet Transportation Needs (12/17/2019)    PRAPARE - Transportation     Lack of Transportation (Medical): Yes     Lack of Transportation (Non-Medical): No   Physical Activity: Not on file   Stress: Not on file   Social Connections: Not on file   Intimate Partner Violence: Not on file   Housing Stability: Not on file         EXAMINATION     Physical Exam:   Vitals: /82 (BP Location: Left arm, Patient Position: Sitting, BP Cuff Size: Adult)   Pulse 80   Temp 36.7 °C (98 °F) (Temporal)   Ht 1.676 m (5' 6\")   Wt 113 kg (248 lb 10.9 oz)   SpO2 96%     Constitutional:   Body Habitus: Body mass index is 40.14 kg/m².  Cooperation: Fully cooperates with exam  Appearance: Well-groomed no disheveled    Respiratory-  breathing comfortable on room air, no audible wheezing  Cardiovascular- capillary refills less than 2 seconds. No lower extremity edema is noted.   Psychiatric- alert and oriented ×3. Normal affect.    MSK and Neuro: -      Thoracic/Lumbar Spine/Sacral Spine/Hips   decreased active range of motion with flexion, lateral flexion, and rotation bilaterally.   There is decreased active range of motion with lumbar extension.    There is  pain with lumbar extension.   There is pain with facet loading maneuver (extension rotation) with axial low back pain on the BILATERAL " "side(s) L4-5 and L5-S1      Palpation:   No tenderness to palpation in midline at T1-T12 levels. No tenderness to palpation in the left and right of the midline T1-L5, NEGATIVE for tenderness to palpation to the para-midline region in the lower lumbar levels.  palpation over SI joint: negative bilaterally    palpation in hip or over the gluteus medius tendon insertion: negative bilaterally      Lumbar spine Special tests  Neuro tension  Straight leg test negative bilaterally    Slump test negative bilaterally      Key points for the international standards for neurological classification of spinal cord injury (ISNCSCI) to light touch.     Dermatome R L                                      L2 2 2   L3 2 2   L4 2 2   L5 2 2   S1 2 2   S2 2 2         Motor Exam Lower Extremities    ? Myotome R L   Hip flexion L2 5 5   Knee extension L3 5 5   Ankle dorsiflexion L4 5 5   Toe extension L5 5 5   Ankle plantarflexion S1 5 5               MEDICAL DECISION MAKING    DATA    Labs:   Lab Results   Component Value Date/Time    SODIUM 140 08/08/2023 08:49 AM    POTASSIUM 3.7 08/08/2023 08:49 AM    CHLORIDE 108 08/08/2023 08:49 AM    CO2 21 08/08/2023 08:49 AM    GLUCOSE 111 (H) 08/08/2023 08:49 AM    BUN 9 08/08/2023 08:49 AM    CREATININE 0.67 08/08/2023 08:49 AM        No results found for: \"PROTHROMBTM\", \"INR\"     Lab Results   Component Value Date/Time    WBC 8.9 08/08/2023 08:49 AM    RBC 4.67 08/08/2023 08:49 AM    HEMOGLOBIN 12.0 08/08/2023 08:49 AM    HEMATOCRIT 38.0 08/08/2023 08:49 AM    MCV 81.4 08/08/2023 08:49 AM    MCH 25.7 (L) 08/08/2023 08:49 AM    MCHC 31.6 (L) 08/08/2023 08:49 AM    MPV 9.7 08/08/2023 08:49 AM    NEUTSPOLYS 61.20 08/08/2023 08:49 AM    LYMPHOCYTES 32.50 08/08/2023 08:49 AM    MONOCYTES 4.90 08/08/2023 08:49 AM    EOSINOPHILS 0.70 08/08/2023 08:49 AM    BASOPHILS 0.40 08/08/2023 08:49 AM        Lab Results   Component Value Date/Time    HBA1C 6.4 (H) 08/08/2023 08:49 AM          Imaging:   I " personally reviewed following images      MRI lumbar spine 2/26/2023  No areas of high-grade central canal or neuroforaminal stenosis.  Annular tear at L5-S1.  Some degenerative disc disease is seen at L5-S1 and L4-5.  There is facet arthropathy bilaterally at L4-5 and L5-S1..      I reviewed the following radiology reports                       Results for orders placed during the hospital encounter of 02/25/23    MR-CERVICAL SPINE-WITH & W/O    Impression  1.  No acute abnormality or abnormal enhancement in the cervical spine.  2.  Mild degenerative changes of the cervical spine without central canal or neural foraminal stenosis.      Results for orders placed during the hospital encounter of 02/25/23    MR-LUMBAR SPINE-WITH & W/O    Impression  1.  No acute abnormality or abnormal enhancement in the lumbar spine.  2.  Mild multilevel degenerative changes of the lumbar spine as described above.       Results for orders placed during the hospital encounter of 02/25/23    MR-THORACIC SPINE-WITH & W/O    Impression  No significant abnormality is seen in the MR scan of the thoracic spine.                                                                      Results for orders placed during the hospital encounter of 02/04/23    CT-ABDOMEN-PELVIS WITH    Impression  1.  There is no acute inflammatory process within the abdomen or pelvis.  2.  There is no evidence of nephrolithiasis, urolithiasis, or hydronephrosis.                                Results for orders placed in visit on 08/31/22    DX-HAND 2- LEFT    Impression  1.  There is mild osteoarthritis of the left 1st CMC joint.   Results for orders placed in visit on 01/25/22    DX-HAND 3+ LEFT    Impression  1.  There is no fracture of the left hand.            Results for orders placed in visit on 02/03/23    DX-LUMBAR SPINE-2 OR 3 VIEWS    Impression  Unremarkable lumbar spine series.            INTERPRETING LOCATION:  [address] [place] [address], [id number]              Results for orders placed in visit on 22    DX-SHOULDER 2+ RIGHT    Impression  Normal radiographs of the right shoulder    Results for orders placed in visit on 23    DX-THORACIC SPINE-2 VIEWS    Impression  1.  There is minimal degenerative endplate spurring in the thoracic spine.            DIAGNOSIS   Visit Diagnoses     ICD-10-CM   1. Lumbar spondylosis  M47.816   2. Annular tear of lumbar disc  M51.36   3. DDD (degenerative disc disease), lumbar  M51.36   4. Lumbosacral pain  M54.50   5. Morbid obesity with BMI of 40.0-44.9, adult ([facility])  E66.01    Z68.41   6. Exercise counseling  Z71.82         ASSESSMENT and PLAN:     Alie Gardner  1985 female      Alie was seen today for follow-up.    Diagnoses and all orders for this visit:    Lumbar spondylosis  -     Referral to Physical Medicine Rehab  -     Referral to Physical Medicine Rehab  -     tizanidine (ZANAFLEX) 4 MG Tab; Take 1 Tablet by mouth at bedtime as needed (muscle spasms).  -     meloxicam (MOBIC) 15 MG tablet; Take 1 Tablet by mouth 1 time a day as needed for Severe Pain for up to 30 days. Do not take other NSAIDs.    Annular tear of lumbar disc  -     tizanidine (ZANAFLEX) 4 MG Tab; Take 1 Tablet by mouth at bedtime as needed (muscle spasms).  -     meloxicam (MOBIC) 15 MG tablet; Take 1 Tablet by mouth 1 time a day as needed for Severe Pain for up to 30 days. Do not take other NSAIDs.    DDD (degenerative disc disease), lumbar  -     tizanidine (ZANAFLEX) 4 MG Tab; Take 1 Tablet by mouth at bedtime as needed (muscle spasms).  -     meloxicam (MOBIC) 15 MG tablet; Take 1 Tablet by mouth 1 time a day as needed for Severe Pain for up to 30 days. Do not take other NSAIDs.    Lumbosacral pain  -     tizanidine (ZANAFLEX) 4 MG Tab; Take 1 Tablet by mouth at bedtime as needed (muscle spasms).  -     meloxicam (MOBIC) 15 MG tablet; Take 1 Tablet by mouth 1 time a day as needed for Severe Pain for up to 30 days. Do not  take other NSAIDs.    Morbid obesity with BMI of 40.0-44.9, adult ([facility])    Exercise counseling           The patient is failed conservative treatments with medication management, home exercise program from the direction of physical therapy/physician including the past 6 months .  I have ordered a BILATERAL diagnostic medial branch block targeting the L4-5 and L5-S1 facet joints #1 and #2.  Procedure #2 is only to be done if #1 is a positive block.    The risks benefits and alternatives to this procedure were discussed and the patient wishes to proceed with the procedure. Risks include but are not limited to damage to surrounding structures, infection, bleeding, worsening of pain which can be permanent, weakness which can be permanent. Benefits include pain relief, improved function. Alternatives includes not doing the procedure.   If there are 2 positive blocks I would consider the patient for radiofrequency neurotomy of the previously blocked nerves.    Follow up: after the above diagnostic procedure     Thank you for allowing me to participate in the care of this patient. If you have any questions please not hesitate to contact me.             Please note that this dictation was created using voice recognition software. I have made every reasonable attempt to correct obvious errors but there may be errors of grammar and content that I may have overlooked prior to finalization of this note.      Bud Benítez MD  Interventional Spine and Sports Physiatry  Physical Medicine and Rehabilitation  [facility]

## 2023-08-17 NOTE — H&P (VIEW-ONLY)
Follow up patient note  Interventional spine and Pain  Physiatry (physical medicine and  Rehabilitation)       Chief complaint:   Chief Complaint   Patient presents with    Follow-Up     Annular tear of lumbar disc          HISTORY    Please see new patient note by Dr Benítez,  for more details.     HPI  Patient identification: Alie Gardner ,  1985,   With Diagnoses of Lumbar spondylosis, Annular tear of lumbar disc, DDD (degenerative disc disease), lumbar, Lumbosacral pain, Morbid obesity with BMI of 40.0-44.9, adult ([facility]), and Exercise counseling were pertinent to this visit.         Chronic bilateral axial low back pain 10 out of 10 intensity.  This is gradually been worsening.  Present since 2023.  Nonradiating. Rarely gets leg symptoms. She does get spasm. With decreased ROM. She has difficulty with ADLs including lower body dressing.     Previous procedures include SI joint injection and epidural steroid injection with improvement in leg pain.     In the past patient has had physical therapy with both land based therapy and aquatics therapy.     Medication management includes mobic, acetaminophen, diclofenac, zanaflex.        ROS Red Flags :   Fever, Chills, Sweats: Denies  Involuntary Weight Loss: Denies  Bowel/Bladder Incontinence: Denies  Saddle Anesthesia: Denies        PMHx:   Past Medical History:   Diagnosis Date    Heart murmur     IBD (inflammatory bowel disease)     MRSA (methicillin resistant Staphylococcus aureus)            PSHx:   Past Surgical History:   Procedure Laterality Date    TX INJ LUMBAR/SACRAL,W/ IMAGING Left 2023    Procedure: LEFT L4-5 interlaminar epidural steroid injection;  Surgeon: Andrea Francois M.D.;  [facility];  Service: Pain Management    TX INJECTION,SACROILIAC JOINT Bilateral 2023    Procedure: RIGHT and LEFT sacroiliac joint injections;  Surgeon: Andrea Francois M.D.;  Location[facility];  Service: Pain Management    OTHER Left 2015    Left leg, debridement of wound with MRSA       Family history   Family History   Problem Relation Age of Onset    Cancer Mother         uterine and skin    Thyroid Mother         removed due to lump    Diabetes Father     Hypertension Father     Cancer Sister         ovarian    Alcohol abuse Brother          Medications:   Outpatient Medications Marked as Taking for the 8/17/23 encounter (Office Visit) with Bud Benítez M.D.   Medication Sig Dispense Refill    tizanidine (ZANAFLEX) 4 MG Tab Take 1 Tablet by mouth at bedtime as needed (muscle spasms). 30 Tablet 2    meloxicam (MOBIC) 15 MG tablet Take 1 Tablet by mouth 1 time a day as needed for Severe Pain for up to 30 days. Do not take other NSAIDs. 30 Tablet 2    metFORMIN (GLUCOPHAGE) 500 MG Tab Take 1 Tablet by mouth 2 times a day with meals.      metFORMIN (GLUCOPHAGE) 500 MG Tab Take 1 Tablet by mouth 2 times a day with meals. 180 Tablet 3    Blood Glucose Test Strips Use one strip to test blood sugar three times daily before meals. 100 Strip 0    Blood Glucose Meter Kit Test blood sugar as recommended by provider. blood glucose monitoring kit. 1 Kit 0    Lancets Sig: use TID and prn ssx high or low sugar. #100 RF x 3 100 Each 11    lidocaine (LIDODERM) 5 % Patch Place 1 Patch on the skin every 24 hours. Apply to site of pain. Wear for 12 hours, then remove for 12 hours 30 Patch 0    acetaminophen (TYLENOL) 325 MG Tab Take 650 mg by mouth every 6 hours as needed for Mild Pain.      diclofenac sodium (VOLTAREN) 1 % Gel Apply 4 g topically 3 times a day as needed (Joint or Muscle Pain).      ondansetron (ZOFRAN ODT) 4 MG TABLET DISPERSIBLE Take 1 Tablet by mouth every 6 hours as needed for Nausea/Vomiting. Dissolve in mouth. 10 Tablet 0    omeprazole (PRILOSEC) 20 MG delayed-release capsule Take 1 Capsule by mouth every day. 90 Capsule 3        Current Outpatient Medications on File Prior to Visit    Medication Sig Dispense Refill    metFORMIN (GLUCOPHAGE) 500 MG Tab Take 1 Tablet by mouth 2 times a day with meals.      metFORMIN (GLUCOPHAGE) 500 MG Tab Take 1 Tablet by mouth 2 times a day with meals. 180 Tablet 3    Blood Glucose Test Strips Use one strip to test blood sugar three times daily before meals. 100 Strip 0    Blood Glucose Meter Kit Test blood sugar as recommended by provider. blood glucose monitoring kit. 1 Kit 0    Lancets Sig: use TID and prn ssx high or low sugar. #100 RF x 3 100 Each 11    lidocaine (LIDODERM) 5 % Patch Place 1 Patch on the skin every 24 hours. Apply to site of pain. Wear for 12 hours, then remove for 12 hours 30 Patch 0    acetaminophen (TYLENOL) 325 MG Tab Take 650 mg by mouth every 6 hours as needed for Mild Pain.      diclofenac sodium (VOLTAREN) 1 % Gel Apply 4 g topically 3 times a day as needed (Joint or Muscle Pain).      ondansetron (ZOFRAN ODT) 4 MG TABLET DISPERSIBLE Take 1 Tablet by mouth every 6 hours as needed for Nausea/Vomiting. Dissolve in mouth. 10 Tablet 0    omeprazole (PRILOSEC) 20 MG delayed-release capsule Take 1 Capsule by mouth every day. 90 Capsule 3     No current facility-administered medications on file prior to visit.         Allergies:   Allergies   Allergen Reactions    Gabapentin Unspecified     Seizure    Penicillins Unspecified     Previous hospitalization after penicillin - does not remember reaction    Cannot tolerate any Cillin    Penicillin G        Social Hx:   Social History     Socioeconomic History    Marital status:      Spouse name: Not on file    Number of children: Not on file    Years of education: Not on file    Highest education level: Not on file   Occupational History    Not on file   Tobacco Use    Smoking status: Former     Packs/day: 0.10     Years: 17.00     Additional pack years: 0.00     Total pack years: 1.70     Types: Cigarettes    Smokeless tobacco: Never    Tobacco comments:     socially, only every few  "months   Vaping Use    Vaping Use: Former    Substances: Nicotine   Substance and Sexual Activity    Alcohol use: Yes     Alcohol/week: 1.2 oz     Types: 2 Standard drinks or equivalent per week     Comment: socially - rare    Drug use: No    Sexual activity: Yes     Partners: Male   Other Topics Concern    Not on file   Social History Narrative    Not on file     Social Determinants of Health     Financial Resource Strain: Not on file   Food Insecurity: No Food Insecurity (12/17/2019)    Hunger Vital Sign     Worried About Running Out of Food in the Last Year: Never true     Ran Out of Food in the Last Year: Never true   Transportation Needs: Unmet Transportation Needs (12/17/2019)    PRAPARE - Transportation     Lack of Transportation (Medical): Yes     Lack of Transportation (Non-Medical): No   Physical Activity: Not on file   Stress: Not on file   Social Connections: Not on file   Intimate Partner Violence: Not on file   Housing Stability: Not on file         EXAMINATION     Physical Exam:   Vitals: /82 (BP Location: Left arm, Patient Position: Sitting, BP Cuff Size: Adult)   Pulse 80   Temp 36.7 °C (98 °F) (Temporal)   Ht 1.676 m (5' 6\")   Wt 113 kg (248 lb 10.9 oz)   SpO2 96%     Constitutional:   Body Habitus: Body mass index is 40.14 kg/m².  Cooperation: Fully cooperates with exam  Appearance: Well-groomed no disheveled    Respiratory-  breathing comfortable on room air, no audible wheezing  Cardiovascular- capillary refills less than 2 seconds. No lower extremity edema is noted.   Psychiatric- alert and oriented ×3. Normal affect.    MSK and Neuro: -      Thoracic/Lumbar Spine/Sacral Spine/Hips   decreased active range of motion with flexion, lateral flexion, and rotation bilaterally.   There is decreased active range of motion with lumbar extension.    There is  pain with lumbar extension.   There is pain with facet loading maneuver (extension rotation) with axial low back pain on the BILATERAL " "side(s) L4-5 and L5-S1      Palpation:   No tenderness to palpation in midline at T1-T12 levels. No tenderness to palpation in the left and right of the midline T1-L5, NEGATIVE for tenderness to palpation to the para-midline region in the lower lumbar levels.  palpation over SI joint: negative bilaterally    palpation in hip or over the gluteus medius tendon insertion: negative bilaterally      Lumbar spine Special tests  Neuro tension  Straight leg test negative bilaterally    Slump test negative bilaterally      Key points for the international standards for neurological classification of spinal cord injury (ISNCSCI) to light touch.     Dermatome R L                                      L2 2 2   L3 2 2   L4 2 2   L5 2 2   S1 2 2   S2 2 2         Motor Exam Lower Extremities    ? Myotome R L   Hip flexion L2 5 5   Knee extension L3 5 5   Ankle dorsiflexion L4 5 5   Toe extension L5 5 5   Ankle plantarflexion S1 5 5               MEDICAL DECISION MAKING    DATA    Labs:   Lab Results   Component Value Date/Time    SODIUM 140 08/08/2023 08:49 AM    POTASSIUM 3.7 08/08/2023 08:49 AM    CHLORIDE 108 08/08/2023 08:49 AM    CO2 21 08/08/2023 08:49 AM    GLUCOSE 111 (H) 08/08/2023 08:49 AM    BUN 9 08/08/2023 08:49 AM    CREATININE 0.67 08/08/2023 08:49 AM        No results found for: \"PROTHROMBTM\", \"INR\"     Lab Results   Component Value Date/Time    WBC 8.9 08/08/2023 08:49 AM    RBC 4.67 08/08/2023 08:49 AM    HEMOGLOBIN 12.0 08/08/2023 08:49 AM    HEMATOCRIT 38.0 08/08/2023 08:49 AM    MCV 81.4 08/08/2023 08:49 AM    MCH 25.7 (L) 08/08/2023 08:49 AM    MCHC 31.6 (L) 08/08/2023 08:49 AM    MPV 9.7 08/08/2023 08:49 AM    NEUTSPOLYS 61.20 08/08/2023 08:49 AM    LYMPHOCYTES 32.50 08/08/2023 08:49 AM    MONOCYTES 4.90 08/08/2023 08:49 AM    EOSINOPHILS 0.70 08/08/2023 08:49 AM    BASOPHILS 0.40 08/08/2023 08:49 AM        Lab Results   Component Value Date/Time    HBA1C 6.4 (H) 08/08/2023 08:49 AM          Imaging:   I " personally reviewed following images      MRI lumbar spine 2/26/2023  No areas of high-grade central canal or neuroforaminal stenosis.  Annular tear at L5-S1.  Some degenerative disc disease is seen at L5-S1 and L4-5.  There is facet arthropathy bilaterally at L4-5 and L5-S1..      I reviewed the following radiology reports                       Results for orders placed during the hospital encounter of 02/25/23    MR-CERVICAL SPINE-WITH & W/O    Impression  1.  No acute abnormality or abnormal enhancement in the cervical spine.  2.  Mild degenerative changes of the cervical spine without central canal or neural foraminal stenosis.      Results for orders placed during the hospital encounter of 02/25/23    MR-LUMBAR SPINE-WITH & W/O    Impression  1.  No acute abnormality or abnormal enhancement in the lumbar spine.  2.  Mild multilevel degenerative changes of the lumbar spine as described above.       Results for orders placed during the hospital encounter of 02/25/23    MR-THORACIC SPINE-WITH & W/O    Impression  No significant abnormality is seen in the MR scan of the thoracic spine.                                                                      Results for orders placed during the hospital encounter of 02/04/23    CT-ABDOMEN-PELVIS WITH    Impression  1.  There is no acute inflammatory process within the abdomen or pelvis.  2.  There is no evidence of nephrolithiasis, urolithiasis, or hydronephrosis.                                Results for orders placed in visit on 08/31/22    DX-HAND 2- LEFT    Impression  1.  There is mild osteoarthritis of the left 1st CMC joint.   Results for orders placed in visit on 01/25/22    DX-HAND 3+ LEFT    Impression  1.  There is no fracture of the left hand.            Results for orders placed in visit on 02/03/23    DX-LUMBAR SPINE-2 OR 3 VIEWS    Impression  Unremarkable lumbar spine series.            INTERPRETING LOCATION:  [address] [place] [address], [id number]              Results for orders placed in visit on 22    DX-SHOULDER 2+ RIGHT    Impression  Normal radiographs of the right shoulder    Results for orders placed in visit on 23    DX-THORACIC SPINE-2 VIEWS    Impression  1.  There is minimal degenerative endplate spurring in the thoracic spine.            DIAGNOSIS   Visit Diagnoses     ICD-10-CM   1. Lumbar spondylosis  M47.816   2. Annular tear of lumbar disc  M51.36   3. DDD (degenerative disc disease), lumbar  M51.36   4. Lumbosacral pain  M54.50   5. Morbid obesity with BMI of 40.0-44.9, adult ([facility])  E66.01    Z68.41   6. Exercise counseling  Z71.82         ASSESSMENT and PLAN:     Alie Gardner  1985 female      Alie was seen today for follow-up.    Diagnoses and all orders for this visit:    Lumbar spondylosis  -     Referral to Physical Medicine Rehab  -     Referral to Physical Medicine Rehab  -     tizanidine (ZANAFLEX) 4 MG Tab; Take 1 Tablet by mouth at bedtime as needed (muscle spasms).  -     meloxicam (MOBIC) 15 MG tablet; Take 1 Tablet by mouth 1 time a day as needed for Severe Pain for up to 30 days. Do not take other NSAIDs.    Annular tear of lumbar disc  -     tizanidine (ZANAFLEX) 4 MG Tab; Take 1 Tablet by mouth at bedtime as needed (muscle spasms).  -     meloxicam (MOBIC) 15 MG tablet; Take 1 Tablet by mouth 1 time a day as needed for Severe Pain for up to 30 days. Do not take other NSAIDs.    DDD (degenerative disc disease), lumbar  -     tizanidine (ZANAFLEX) 4 MG Tab; Take 1 Tablet by mouth at bedtime as needed (muscle spasms).  -     meloxicam (MOBIC) 15 MG tablet; Take 1 Tablet by mouth 1 time a day as needed for Severe Pain for up to 30 days. Do not take other NSAIDs.    Lumbosacral pain  -     tizanidine (ZANAFLEX) 4 MG Tab; Take 1 Tablet by mouth at bedtime as needed (muscle spasms).  -     meloxicam (MOBIC) 15 MG tablet; Take 1 Tablet by mouth 1 time a day as needed for Severe Pain for up to 30 days. Do not  take other NSAIDs.    Morbid obesity with BMI of 40.0-44.9, adult ([facility])    Exercise counseling           The patient is failed conservative treatments with medication management, home exercise program from the direction of physical therapy/physician including the past 6 months .  I have ordered a BILATERAL diagnostic medial branch block targeting the L4-5 and L5-S1 facet joints #1 and #2.  Procedure #2 is only to be done if #1 is a positive block.    The risks benefits and alternatives to this procedure were discussed and the patient wishes to proceed with the procedure. Risks include but are not limited to damage to surrounding structures, infection, bleeding, worsening of pain which can be permanent, weakness which can be permanent. Benefits include pain relief, improved function. Alternatives includes not doing the procedure.   If there are 2 positive blocks I would consider the patient for radiofrequency neurotomy of the previously blocked nerves.    Follow up: after the above diagnostic procedure     Thank you for allowing me to participate in the care of this patient. If you have any questions please not hesitate to contact me.             Please note that this dictation was created using voice recognition software. I have made every reasonable attempt to correct obvious errors but there may be errors of grammar and content that I may have overlooked prior to finalization of this note.      Bud Benítez MD  Interventional Spine and Sports Physiatry  Physical Medicine and Rehabilitation  [facility]

## 2023-08-21 ENCOUNTER — HOSPITAL ENCOUNTER (OUTPATIENT)
Dept: LAB | Facility: MEDICAL CENTER | Age: 38
End: 2023-08-21
Attending: CLINICAL NURSE SPECIALIST
Payer: COMMERCIAL

## 2023-08-21 PROCEDURE — 83013 H PYLORI (C-13) BREATH: CPT

## 2023-08-22 ENCOUNTER — HOSPITAL ENCOUNTER (OUTPATIENT)
Facility: REHABILITATION | Age: 38
End: 2023-08-22
Attending: PHYSICAL MEDICINE & REHABILITATION | Admitting: PHYSICAL MEDICINE & REHABILITATION
Payer: COMMERCIAL

## 2023-08-22 ENCOUNTER — APPOINTMENT (OUTPATIENT)
Dept: RADIOLOGY | Facility: REHABILITATION | Age: 38
End: 2023-08-22
Attending: PHYSICAL MEDICINE & REHABILITATION
Payer: COMMERCIAL

## 2023-08-22 VITALS
HEART RATE: 64 BPM | WEIGHT: 250 LBS | TEMPERATURE: 97.9 F | SYSTOLIC BLOOD PRESSURE: 128 MMHG | DIASTOLIC BLOOD PRESSURE: 80 MMHG | HEIGHT: 66 IN | BODY MASS INDEX: 40.18 KG/M2 | OXYGEN SATURATION: 98 % | RESPIRATION RATE: 18 BRPM

## 2023-08-22 PROCEDURE — 700111 HCHG RX REV CODE 636 W/ 250 OVERRIDE (IP)

## 2023-08-22 PROCEDURE — 700117 HCHG RX CONTRAST REV CODE 255

## 2023-08-22 PROCEDURE — 700101 HCHG RX REV CODE 250

## 2023-08-22 PROCEDURE — 64493 INJ PARAVERT F JNT L/S 1 LEV: CPT

## 2023-08-22 PROCEDURE — 64494 INJ PARAVERT F JNT L/S 2 LEV: CPT

## 2023-08-22 RX ORDER — LIDOCAINE HYDROCHLORIDE 10 MG/ML
INJECTION, SOLUTION EPIDURAL; INFILTRATION; INTRACAUDAL; PERINEURAL
Status: COMPLETED
Start: 2023-08-22 | End: 2023-08-22

## 2023-08-22 RX ORDER — LIDOCAINE HYDROCHLORIDE 20 MG/ML
INJECTION, SOLUTION EPIDURAL; INFILTRATION; INTRACAUDAL; PERINEURAL
Status: COMPLETED
Start: 2023-08-22 | End: 2023-08-22

## 2023-08-22 RX ADMIN — IOHEXOL 3 ML: 240 INJECTION, SOLUTION INTRATHECAL; INTRAVASCULAR; INTRAVENOUS; ORAL at 08:42

## 2023-08-22 RX ADMIN — LIDOCAINE HYDROCHLORIDE 10 ML: 10 INJECTION, SOLUTION EPIDURAL; INFILTRATION; INTRACAUDAL at 08:41

## 2023-08-22 RX ADMIN — LIDOCAINE HYDROCHLORIDE 10 ML: 20 INJECTION, SOLUTION EPIDURAL; INFILTRATION; INTRACAUDAL at 08:42

## 2023-08-22 ASSESSMENT — PAIN DESCRIPTION - PAIN TYPE
TYPE: CHRONIC PAIN
TYPE: CHRONIC PAIN

## 2023-08-22 ASSESSMENT — FIBROSIS 4 INDEX: FIB4 SCORE: 0.35

## 2023-08-22 NOTE — OR SURGEON
Immediate Post OP Note    Pre-Op Diagnosis Codes:     * Lumbar spondylosis [M47.816]      Post-Op Diagnosis Codes:     * Lumbar spondylosis [M47.816]      Procedure(s):  Diagnostic medial branch blocks targeting the BILATERAL L4-5 and L5-S1 facet joints with fluoroscopic guidance #1.  Note: 22-5. - Wound Class: Clean    Surgeon(s):  Bud Benítez M.D.    Anesthesiologist/Type of Anesthesia:  No anesthesia staff entered./Local    Surgical Staff:  Circulator: Deborah Joaquin R.N.  Scrub Person: Nicci Severino C.N.A.  Radiology Technologist: Bebeto Meza    Specimens removed if any:  * No specimens in log *    Estimated Blood Loss: None    Findings: None    Complications: None        8/22/2023 8:53 AM Bud Benítez M.D.

## 2023-08-22 NOTE — OP REPORT
Date of Service: 8/22/2023     Patient: Alie Gardner 38 y.o. female     MRN: [id number]     Physician/s: Bud Benítez MD    Pre-operative Diagnosis: Lumbosacral spondylosis, facet arthropathy. The patient was NOT seen for lumbar radiculopathy today.     Post-operative Diagnosis: Lumbosacral spondylosis, facet arthropathy. The patient was NOT seen for lumbar radiculopathy today.     Procedure: Medial Branch Blocks targeting the bilateral  L4-5 and L5-S1  facet joints #1    Description of procedure:    The risks, benefits, and alternatives of the procedure were reviewed and discussed with the patient.  Written informed consent was freely obtained. A pre-procedural time-out was conducted by the physician verifying patient’s identity, procedure to be performed, procedure site and side, and allergy verification. Appropriate equipment was determined to be in place for the procedure.         In the fluoroscopy suite the patient was placed in a prone position and the skin was prepped and draped in the usual sterile fashion. The fluoroscope was placed over the lower back at the appropriate angles, and the targets for injection were marked. A 27g needle was placed into each of the markings at the levels below, and approx 1mL of 1% Lidocaine was injected subcutaneously into the epidermal and dermal layers. The needle was removed intact.     Using an oblique view, A 22g 5 inch needle was then placed at the intersection of the transverse process and superior articular process at the L4-5 facet joint where the L3 medial branch runs, L5-S1 facet joint where the L4 medial branch runs, and S1 facet where the L5 dorsal ramus runs on the left side. The needle tips were then verified by AP, oblique, and lateral views.     Using an oblique view, A 22g 5 inch needle was then placed at the intersection of the transverse process and superior articular process at the L4-5 facet joint where the L3 medial branch runs, L5-S1  facet joint where the L4 medial branch runs, and S1 facet where the L5 dorsal ramus runs  on the right side. The needle tips were then verified by AP, oblique, and lateral views.     In the AP view, less than 1mL of contrast dye was used to highlight the medial branch while the fluoroscope was running live at the levels above. Following negative aspiration, approximately 1mL of 2% lidocaine  was then injected at the above levels, and the needles were removed intact after restyleted. The patient's back was covered with a 4x4 gauze, the area was cleansed with sterile normal saline, and a dressing was applied.     There were no complications noted, the patient was hemodynamically stable, and tolerated the procedure well.     This was a challenging procedure given the patients Body mass index is 40.35 kg/m².. This required longer needles.  A typical procedure such as this would require 25g 3.5 inch needles.  This procedure required 22g 5 inch needles.  This added to the mental effort for complexity this procedure.  This also made acquiring fluoroscopic images more time-consuming and challenging.  Final fluoroscopic images were obtained and saved.     The patient had 100% pain relief of the index pain minutes post procedure.      Preprocedure pain 8/10 NRS  Postprocedure pain 0 /10 NRS      Follow-up as scheduled      Bud Benítez MD  Physical Medicine and Rehabilitation  Interventional Spine and Sports Physiatry  [facility]            CPT  Intraarticular joint or medial branch block (MBB) - lumbar or sacral (1st level):  [id number]  Intraarticular joint or medial branch block (MBB) - lumbar or sacral (2nd level):  [id number]  Intraarticular joint or medial branch block (MBB) - lumbar or sacral (3rd level):  [id number]

## 2023-08-22 NOTE — INTERVAL H&P NOTE
H&P reviewed. The patient was examined and there are no changes to the H&P     Lungs clear to auscultation bilaterally.  No abdominal tenderness.  Heart regular rate and rhythm.  Vitals reviewed.    Proceed with the originally scheduled procedure.  The risks, benefits and alternatives were discussed.  The patient understands these.    Pre-Op Diagnosis Codes:     * Lumbar spondylosis [M47.816]  Procedure(s):  Diagnostic medial branch blocks targeting the BILATERAL L4-5 and L5-S1 facet joints with fluoroscopic guidance #1.        Bud Benítez MD  Physical Medicine and Rehabilitation  Interventional Spine and Sports Physiatry  [facility]

## 2023-08-22 NOTE — PROGRESS NOTES
Dr. Benítez in to see patient, questions answered    [id number] Dr. Benítez in to see patient, discharge orders received

## 2023-08-23 LAB — UREA BREATH TEST QL: NEGATIVE

## 2023-08-30 ENCOUNTER — APPOINTMENT (OUTPATIENT)
Dept: RADIOLOGY | Facility: REHABILITATION | Age: 38
End: 2023-08-30
Attending: PHYSICAL MEDICINE & REHABILITATION
Payer: COMMERCIAL

## 2023-08-30 ENCOUNTER — HOSPITAL ENCOUNTER (OUTPATIENT)
Facility: REHABILITATION | Age: 38
End: 2023-08-30
Attending: PHYSICAL MEDICINE & REHABILITATION | Admitting: PHYSICAL MEDICINE & REHABILITATION
Payer: COMMERCIAL

## 2023-08-30 VITALS
TEMPERATURE: 97.5 F | HEART RATE: 72 BPM | BODY MASS INDEX: 39.93 KG/M2 | WEIGHT: 248.46 LBS | SYSTOLIC BLOOD PRESSURE: 132 MMHG | DIASTOLIC BLOOD PRESSURE: 78 MMHG | RESPIRATION RATE: 18 BRPM | OXYGEN SATURATION: 95 % | HEIGHT: 66 IN

## 2023-08-30 PROCEDURE — 700117 HCHG RX CONTRAST REV CODE 255

## 2023-08-30 PROCEDURE — 700101 HCHG RX REV CODE 250

## 2023-08-30 PROCEDURE — 64493 INJ PARAVERT F JNT L/S 1 LEV: CPT

## 2023-08-30 PROCEDURE — 700111 HCHG RX REV CODE 636 W/ 250 OVERRIDE (IP)

## 2023-08-30 PROCEDURE — 64494 INJ PARAVERT F JNT L/S 2 LEV: CPT

## 2023-08-30 RX ORDER — LIDOCAINE HYDROCHLORIDE 10 MG/ML
INJECTION, SOLUTION EPIDURAL; INFILTRATION; INTRACAUDAL; PERINEURAL
Status: COMPLETED
Start: 2023-08-30 | End: 2023-08-30

## 2023-08-30 RX ORDER — LIDOCAINE HYDROCHLORIDE 20 MG/ML
INJECTION, SOLUTION EPIDURAL; INFILTRATION; INTRACAUDAL; PERINEURAL
Status: COMPLETED
Start: 2023-08-30 | End: 2023-08-30

## 2023-08-30 RX ADMIN — LIDOCAINE HYDROCHLORIDE 10 ML: 20 INJECTION, SOLUTION EPIDURAL; INFILTRATION; INTRACAUDAL at 09:04

## 2023-08-30 RX ADMIN — IOHEXOL 5 ML: 240 INJECTION, SOLUTION INTRATHECAL; INTRAVASCULAR; INTRAVENOUS; ORAL at 09:04

## 2023-08-30 RX ADMIN — LIDOCAINE HYDROCHLORIDE 10 ML: 10 INJECTION, SOLUTION EPIDURAL; INFILTRATION; INTRACAUDAL at 09:04

## 2023-08-30 ASSESSMENT — PAIN DESCRIPTION - PAIN TYPE
TYPE: CHRONIC PAIN
TYPE: CHRONIC PAIN

## 2023-08-30 ASSESSMENT — FIBROSIS 4 INDEX: FIB4 SCORE: 0.35

## 2023-08-30 NOTE — OP REPORT
Date of Service: 8/30/2023      Patient: Alie Gardner 38 y.o. female     MRN: [id number]      Physician/s: Bud Benítez MD     Pre-operative Diagnosis: Lumbosacral spondylosis, facet arthropathy. The patient was NOT seen for lumbar radiculopathy today.      Post-operative Diagnosis: Lumbosacral spondylosis, facet arthropathy. The patient was NOT seen for lumbar radiculopathy today.      Procedure: Medial Branch Blocks targeting the bilateral  L4-5 and L5-S1  facet joints #1     Description of procedure:     The risks, benefits, and alternatives of the procedure were reviewed and discussed with the patient.  Written informed consent was freely obtained. A pre-procedural time-out was conducted by the physician verifying patient’s identity, procedure to be performed, procedure site and side, and allergy verification. Appropriate equipment was determined to be in place for the procedure.            In the fluoroscopy suite the patient was placed in a prone position and the skin was prepped and draped in the usual sterile fashion. The fluoroscope was placed over the lower back at the appropriate angles, and the targets for injection were marked. A 27g needle was placed into each of the markings at the levels below, and approx 1mL of 1% Lidocaine was injected subcutaneously into the epidermal and dermal layers. The needle was removed intact.     Using an oblique view, A 22g 5 inch needle was then placed at the intersection of the transverse process and superior articular process at the L4-5 facet joint where the L3 medial branch runs, L5-S1 facet joint where the L4 medial branch runs, and S1 facet where the L5 dorsal ramus runs on the left side. The needle tips were then verified by AP, oblique, and lateral views.      Using an oblique view, A 22g 5 inch needle was then placed at the intersection of the transverse process and superior articular process at the L4-5 facet joint where the L3 medial branch runs,  L5-S1 facet joint where the L4 medial branch runs, and S1 facet where the L5 dorsal ramus runs  on the right side. The needle tips were then verified by AP, oblique, and lateral views.      In the AP view, less than 1mL of contrast dye was used to highlight the medial branch while the fluoroscope was running live at the levels above. Following negative aspiration, approximately 1mL of 2% lidocaine  was then injected at the above levels, and the needles were removed intact after restyleted. The patient's back was covered with a 4x4 gauze, the area was cleansed with sterile normal saline, and a dressing was applied.      There were no complications noted, the patient was hemodynamically stable, and tolerated the procedure well.      This was a challenging procedure given the patients Body mass index is 40.1 kg/m².. This required longer needles.  A typical procedure such as this would require 25g 3.5 inch needles.  This procedure required 22g 5 inch needles.  This added to the mental effort for complexity this procedure.  This also made acquiring fluoroscopic images more time-consuming and challenging.  Final fluoroscopic images were obtained and saved.      The patient had 100% pain relief of the index pain minutes post procedure.        Preprocedure pain 9/10 NRS  Postprocedure pain 0 /10 NRS        Follow-up as scheduled        Bud Benítez MD  Physical Medicine and Rehabilitation  Interventional Spine and Sports Physiatry  [facility]              CPT  Intraarticular joint or medial branch block (MBB) - lumbar or sacral (1st level):  51457-40-45-ie  Intraarticular joint or medial branch block (MBB) - lumbar or sacral (2nd level):  55251-96-47-gg

## 2023-08-30 NOTE — INTERVAL H&P NOTE
H&P reviewed. The patient was examined and there are no changes to the H&P     From medial branch block #1 targeting the bilateral L4-5 L5-S1 facet joints  100% pain relief of the index pain during the diagnostic phase  Preprocedure pain 8/10 NRS  Postprocedure pain 0 /10 NRS     Lungs clear to auscultation bilaterally.  No abdominal tenderness.  Heart regular rate and rhythm.  Vitals reviewed.    Proceed with the originally scheduled procedure.  The risks, benefits and alternatives were discussed.  The patient understands these.    Pre-Op Diagnosis Codes:     * Lumbar spondylosis [M47.816]  Procedure(s):  Diagnostic medial branch blocks targeting the BILATERAL L4-5 and L5-S1 facet joints with fluoroscopic guidance #2.        Bud Benítez MD  Physical Medicine and Rehabilitation  Interventional Spine and Sports Physiatry  [facility]

## 2023-08-30 NOTE — OR SURGEON
Immediate Post OP Note    Pre-Op Diagnosis Codes:     * Lumbar spondylosis [M47.816]      Post-Op Diagnosis Codes:     * Lumbar spondylosis [M47.816]      Procedure(s):  Diagnostic medial branch blocks targeting the BILATERAL L4-5 and L5-S1 facet joints with fluoroscopic guidance #2.   - Wound Class: Clean    Surgeon(s):  Bud Benítez M.D.    Anesthesiologist/Type of Anesthesia:  No anesthesia staff entered./Local    Surgical Staff:  Circulator: Janna Slater R.N.  Scrub Person: Nicci Severino C.N.A.  Radiology Technologist: Bebeto Meza    Specimens removed if any:  * No specimens in log *    Estimated Blood Loss: None    Findings: None    Complications: None        8/30/2023 9:12 AM Bud Benítez M.D.

## 2023-08-30 NOTE — PROGRESS NOTES
Dr. Benítez in to see patient, questions answered  0918 Dr. Benítez in to see patient, discharge orders received

## 2023-09-18 ENCOUNTER — OFFICE VISIT (OUTPATIENT)
Dept: PHYSICAL MEDICINE AND REHAB | Facility: MEDICAL CENTER | Age: 38
End: 2023-09-18
Payer: COMMERCIAL

## 2023-09-18 VITALS
BODY MASS INDEX: 40.18 KG/M2 | TEMPERATURE: 96.4 F | HEIGHT: 65 IN | HEART RATE: 99 BPM | WEIGHT: 241.18 LBS | DIASTOLIC BLOOD PRESSURE: 70 MMHG | SYSTOLIC BLOOD PRESSURE: 122 MMHG | OXYGEN SATURATION: 97 %

## 2023-09-18 DIAGNOSIS — M51.36 DDD (DEGENERATIVE DISC DISEASE), LUMBAR: ICD-10-CM

## 2023-09-18 DIAGNOSIS — M51.36 ANNULAR TEAR OF LUMBAR DISC: ICD-10-CM

## 2023-09-18 DIAGNOSIS — E66.01 MORBID OBESITY WITH BMI OF 40.0-44.9, ADULT (HCC): ICD-10-CM

## 2023-09-18 DIAGNOSIS — M54.50 LUMBOSACRAL PAIN: ICD-10-CM

## 2023-09-18 DIAGNOSIS — M47.816 LUMBAR SPONDYLOSIS: ICD-10-CM

## 2023-09-18 DIAGNOSIS — Z71.82 EXERCISE COUNSELING: ICD-10-CM

## 2023-09-18 PROCEDURE — 3078F DIAST BP <80 MM HG: CPT | Performed by: PHYSICAL MEDICINE & REHABILITATION

## 2023-09-18 PROCEDURE — 99214 OFFICE O/P EST MOD 30 MIN: CPT | Performed by: PHYSICAL MEDICINE & REHABILITATION

## 2023-09-18 PROCEDURE — 3074F SYST BP LT 130 MM HG: CPT | Performed by: PHYSICAL MEDICINE & REHABILITATION

## 2023-09-18 PROCEDURE — 1125F AMNT PAIN NOTED PAIN PRSNT: CPT | Performed by: PHYSICAL MEDICINE & REHABILITATION

## 2023-09-18 ASSESSMENT — PAIN SCALES - GENERAL: PAINLEVEL: 8=MODERATE-SEVERE PAIN

## 2023-09-18 ASSESSMENT — FIBROSIS 4 INDEX: FIB4 SCORE: 0.35

## 2023-09-18 ASSESSMENT — PATIENT HEALTH QUESTIONNAIRE - PHQ9: CLINICAL INTERPRETATION OF PHQ2 SCORE: 0

## 2023-09-18 NOTE — H&P (VIEW-ONLY)
Follow up patient note  Interventional spine and Pain  Physiatry (physical medicine and  Rehabilitation)       Chief complaint:   Chief Complaint   Patient presents with    Follow-Up     Lumbar spondylosis          HISTORY    Please see new patient note by Dr Benítez,  for more details.     HPI  Patient identification: Alie Gardner ,  1985,   With Diagnoses of Lumbar spondylosis, Annular tear of lumbar disc, DDD (degenerative disc disease), lumbar, Lumbosacral pain, Morbid obesity with BMI of 40.0-44.9, adult ([facility]), and Exercise counseling were pertinent to this visit.     Status post diagnostic medial branch blocks targeting the bilateral L4-5 and L5-S1 facet joints #1 and #2 with greater than 8% pain relief during the diagnostic phase.  After the patient's diagnostic phase the pain returned back to baseline.      Chronic bilateral axial low back pain 10 out of 10 intensity.  This is gradually been worsening.  Present since 2023.  Nonradiating. Rarely gets leg symptoms. She does get spasm. With decreased ROM. She has difficulty with ADLs including lower body dressing.     Previous procedures include SI joint injection and epidural steroid injection with improvement in leg pain.     In the past patient has had physical therapy with both land based therapy and aquatics therapy.     Medication management includes mobic, acetaminophen, diclofenac, zanaflex.        ROS Red Flags :   Fever, Chills, Sweats: Denies  Involuntary Weight Loss: Denies  Bowel/Bladder Incontinence: Denies  Saddle Anesthesia: Denies        PMHx:   Past Medical History:   Diagnosis Date    Heart murmur     IBD (inflammatory bowel disease)     MRSA (methicillin resistant Staphylococcus aureus)            PSHx:   Past Surgical History:   Procedure Laterality Date    CO INJ DX/THER AGNT PARAVERT FACET JOINT, SHILO* Bilateral 2023    Procedure: Diagnostic medial branch blocks targeting the BILATERAL L4-5 and L5-S1  facet joints with fluoroscopic guidance #2.   Note: 22-5. Diagnostic medial branch block #2 is only be done if procedure #1 is a positive block. This will be on a separate date from procedure #1.;  Surgeon: Bud Benítez M.D.;  [facility];  Service: Pain Management    MA INJ DX/THER AGNT PARAVERT FACET JOINT, SHILO* Bilateral 8/22/2023    Procedure: Diagnostic medial branch blocks targeting the BILATERAL L4-5 and L5-S1 facet joints with fluoroscopic guidance #1.  Note: 22-5.;  Surgeon: Bud Benítez M.D.;  Location: [facility];  Service: Pain Management    MA INJ LUMBAR/SACRAL,W/ IMAGING Left 6/2/2023    Procedure: LEFT L4-5 interlaminar epidural steroid injection;  Surgeon: Andrea Francois M.D.;  Location: [facility];  Service: Pain Management    MA INJECTION,SACROILIAC JOINT Bilateral 4/20/2023    Procedure: RIGHT and LEFT sacroiliac joint injections;  Surgeon: Andrea Francois M.D.;  Location: [facility];  Service: Pain Management    OTHER Left 2015    Left leg, debridement of wound with MRSA       Family history   Family History   Problem Relation Age of Onset    Cancer Mother         uterine and skin    Thyroid Mother         removed due to lump    Diabetes Father     Hypertension Father     Cancer Sister         ovarian    Alcohol abuse Brother          Medications:   Outpatient Medications Marked as Taking for the 9/18/23 encounter (Office Visit) with Bud Benítez M.D.   Medication Sig Dispense Refill    tizanidine (ZANAFLEX) 4 MG Tab Take 1 Tablet by mouth at bedtime as needed (muscle spasms). 30 Tablet 2    metFORMIN (GLUCOPHAGE) 500 MG Tab Take 1 Tablet by mouth 2 times a day with meals.      metFORMIN (GLUCOPHAGE) 500 MG Tab Take 1 Tablet by mouth 2 times a day with meals. 180 Tablet 3    Blood Glucose Test Strips Use one strip to test blood sugar three times daily before meals. 100 Strip 0    Blood Glucose Meter  Kit Test blood sugar as recommended by provider. blood glucose monitoring kit. 1 Kit 0    Lancets Sig: use TID and prn ssx high or low sugar. #100 RF x 3 100 Each 11    lidocaine (LIDODERM) 5 % Patch Place 1 Patch on the skin every 24 hours. Apply to site of pain. Wear for 12 hours, then remove for 12 hours 30 Patch 0    acetaminophen (TYLENOL) 325 MG Tab Take 650 mg by mouth every 6 hours as needed for Mild Pain.      diclofenac sodium (VOLTAREN) 1 % Gel Apply 4 g topically 3 times a day as needed (Joint or Muscle Pain).      ondansetron (ZOFRAN ODT) 4 MG TABLET DISPERSIBLE Take 1 Tablet by mouth every 6 hours as needed for Nausea/Vomiting. Dissolve in mouth. 10 Tablet 0    omeprazole (PRILOSEC) 20 MG delayed-release capsule Take 1 Capsule by mouth every day. 90 Capsule 3        Current Outpatient Medications on File Prior to Visit   Medication Sig Dispense Refill    tizanidine (ZANAFLEX) 4 MG Tab Take 1 Tablet by mouth at bedtime as needed (muscle spasms). 30 Tablet 2    metFORMIN (GLUCOPHAGE) 500 MG Tab Take 1 Tablet by mouth 2 times a day with meals.      metFORMIN (GLUCOPHAGE) 500 MG Tab Take 1 Tablet by mouth 2 times a day with meals. 180 Tablet 3    Blood Glucose Test Strips Use one strip to test blood sugar three times daily before meals. 100 Strip 0    Blood Glucose Meter Kit Test blood sugar as recommended by provider. blood glucose monitoring kit. 1 Kit 0    Lancets Sig: use TID and prn ssx high or low sugar. #100 RF x 3 100 Each 11    lidocaine (LIDODERM) 5 % Patch Place 1 Patch on the skin every 24 hours. Apply to site of pain. Wear for 12 hours, then remove for 12 hours 30 Patch 0    acetaminophen (TYLENOL) 325 MG Tab Take 650 mg by mouth every 6 hours as needed for Mild Pain.      diclofenac sodium (VOLTAREN) 1 % Gel Apply 4 g topically 3 times a day as needed (Joint or Muscle Pain).      ondansetron (ZOFRAN ODT) 4 MG TABLET DISPERSIBLE Take 1 Tablet by mouth every 6 hours as needed for  Nausea/Vomiting. Dissolve in mouth. 10 Tablet 0    omeprazole (PRILOSEC) 20 MG delayed-release capsule Take 1 Capsule by mouth every day. 90 Capsule 3     No current facility-administered medications on file prior to visit.         Allergies:   Allergies   Allergen Reactions    Gabapentin Unspecified     Seizure    Penicillins Unspecified     Previous hospitalization after penicillin - does not remember reaction    Cannot tolerate any Cillin    Penicillin G        Social Hx:   Social History     Socioeconomic History    Marital status:      Spouse name: Not on file    Number of children: Not on file    Years of education: Not on file    Highest education level: Not on file   Occupational History    Not on file   Tobacco Use    Smoking status: Former     Current packs/day: 0.10     Average packs/day: 0.1 packs/day for 17.0 years (1.7 ttl pk-yrs)     Types: Cigarettes    Smokeless tobacco: Never    Tobacco comments:     socially, only every few months   Vaping Use    Vaping Use: Former    Substances: Nicotine   Substance and Sexual Activity    Alcohol use: Yes     Alcohol/week: 1.2 oz     Types: 2 Standard drinks or equivalent per week     Comment: socially - rare    Drug use: No    Sexual activity: Yes     Partners: Male   Other Topics Concern    Not on file   Social History Narrative    Not on file     Social Determinants of Health     Financial Resource Strain: Not on file   Food Insecurity: No Food Insecurity (12/17/2019)    Hunger Vital Sign     Worried About Running Out of Food in the Last Year: Never true     Ran Out of Food in the Last Year: Never true   Transportation Needs: Unmet Transportation Needs (12/17/2019)    PRAPARE - Transportation     Lack of Transportation (Medical): Yes     Lack of Transportation (Non-Medical): No   Physical Activity: Not on file   Stress: Not on file   Social Connections: Not on file   Intimate Partner Violence: Not on file   Housing Stability: Not on file  "        EXAMINATION     Physical Exam:   Vitals: /70 (BP Location: Left arm, Patient Position: Sitting, BP Cuff Size: Large adult)   Pulse 99   Temp (!) 35.8 °C (96.4 °F) (Temporal)   Ht 1.651 m (5' 5\")   Wt 109 kg (241 lb 2.9 oz)   SpO2 97%     Constitutional:   Body Habitus: Body mass index is 40.13 kg/m².  Cooperation: Fully cooperates with exam  Appearance: Well-groomed no disheveled    Respiratory-  breathing comfortable on room air, no audible wheezing  Cardiovascular- capillary refills less than 2 seconds. No lower extremity edema is noted.   Psychiatric- alert and oriented ×3. Normal affect.    MSK and Neuro: -      Thoracic/Lumbar Spine/Sacral Spine/Hips   decreased active range of motion with flexion, lateral flexion, and rotation bilaterally.   There is decreased active range of motion with lumbar extension.    There is  pain with lumbar extension.   There is pain with facet loading maneuver (extension rotation) with axial low back pain on the BILATERAL side(s) L4-5 and L5-S1      Palpation:   No tenderness to palpation in midline at T1-T12 levels. No tenderness to palpation in the left and right of the midline T1-L5, NEGATIVE for tenderness to palpation to the para-midline region in the lower lumbar levels.  palpation over SI joint: negative bilaterally    palpation in hip or over the gluteus medius tendon insertion: negative bilaterally      Lumbar spine Special tests  Neuro tension  Straight leg test negative bilaterally    Slump test negative bilaterally      Key points for the international standards for neurological classification of spinal cord injury (ISNCSCI) to light touch.     Dermatome R L                                      L2 2 2   L3 2 2   L4 2 2   L5 2 2   S1 2 2   S2 2 2         Motor Exam Lower Extremities    ? Myotome R L   Hip flexion L2 5 5   Knee extension L3 5 5   Ankle dorsiflexion L4 5 5   Toe extension L5 5 5   Ankle plantarflexion S1 5 5               MEDICAL " "DECISION MAKING    DATA    Labs:   Lab Results   Component Value Date/Time    SODIUM 140 08/08/2023 08:49 AM    POTASSIUM 3.7 08/08/2023 08:49 AM    CHLORIDE 108 08/08/2023 08:49 AM    CO2 21 08/08/2023 08:49 AM    GLUCOSE 111 (H) 08/08/2023 08:49 AM    BUN 9 08/08/2023 08:49 AM    CREATININE 0.67 08/08/2023 08:49 AM        No results found for: \"PROTHROMBTM\", \"INR\"     Lab Results   Component Value Date/Time    WBC 8.9 08/08/2023 08:49 AM    RBC 4.67 08/08/2023 08:49 AM    HEMOGLOBIN 12.0 08/08/2023 08:49 AM    HEMATOCRIT 38.0 08/08/2023 08:49 AM    MCV 81.4 08/08/2023 08:49 AM    MCH 25.7 (L) 08/08/2023 08:49 AM    MCHC 31.6 (L) 08/08/2023 08:49 AM    MPV 9.7 08/08/2023 08:49 AM    NEUTSPOLYS 61.20 08/08/2023 08:49 AM    LYMPHOCYTES 32.50 08/08/2023 08:49 AM    MONOCYTES 4.90 08/08/2023 08:49 AM    EOSINOPHILS 0.70 08/08/2023 08:49 AM    BASOPHILS 0.40 08/08/2023 08:49 AM        Lab Results   Component Value Date/Time    HBA1C 6.4 (H) 08/08/2023 08:49 AM          Imaging:   I personally reviewed following images      MRI lumbar spine 2/26/2023  No areas of high-grade central canal or neuroforaminal stenosis.  Annular tear at L5-S1.  Some degenerative disc disease is seen at L5-S1 and L4-5.  There is facet arthropathy bilaterally at L4-5 and L5-S1..      I reviewed the following radiology reports                       Results for orders placed during the hospital encounter of 02/25/23    MR-CERVICAL SPINE-WITH & W/O    Impression  1.  No acute abnormality or abnormal enhancement in the cervical spine.  2.  Mild degenerative changes of the cervical spine without central canal or neural foraminal stenosis.      Results for orders placed during the hospital encounter of 02/25/23    MR-LUMBAR SPINE-WITH & W/O    Impression  1.  No acute abnormality or abnormal enhancement in the lumbar spine.  2.  Mild multilevel degenerative changes of the lumbar spine as described above.       Results for orders placed during the " hospital encounter of 23    MR-THORACIC SPINE-WITH & W/O    Impression  No significant abnormality is seen in the MR scan of the thoracic spine.                                                                      Results for orders placed during the hospital encounter of 23    CT-ABDOMEN-PELVIS WITH    Impression  1.  There is no acute inflammatory process within the abdomen or pelvis.  2.  There is no evidence of nephrolithiasis, urolithiasis, or hydronephrosis.                                Results for orders placed in visit on 22    DX-HAND 2- LEFT    Impression  1.  There is mild osteoarthritis of the left 1st CMC joint.   Results for orders placed in visit on 22    DX-HAND 3+ LEFT    Impression  1.  There is no fracture of the left hand.            Results for orders placed in visit on 23    DX-LUMBAR SPINE-2 OR 3 VIEWS    Impression  Unremarkable lumbar spine series.            INTERPRETING LOCATION:  [address] [place] [address], [id number]             Results for orders placed in visit on 22    DX-SHOULDER 2+ RIGHT    Impression  Normal radiographs of the right shoulder    Results for orders placed in visit on 23    DX-THORACIC SPINE-2 VIEWS    Impression  1.  There is minimal degenerative endplate spurring in the thoracic spine.            DIAGNOSIS   Visit Diagnoses     ICD-10-CM   1. Lumbar spondylosis  M47.816   2. Annular tear of lumbar disc  M51.36   3. DDD (degenerative disc disease), lumbar  M51.36   4. Lumbosacral pain  M54.50   5. Morbid obesity with BMI of 40.0-44.9, adult ([facility])  E66.01    Z68.41   6. Exercise counseling  Z71.82         ASSESSMENT and PLAN:     Alie Reinachiqui Gardner  1985 female      Alie was seen today for follow-up.    Diagnoses and all orders for this visit:    Lumbar spondylosis  -     Referral to Physical Medicine Rehab; Future    Annular tear of lumbar disc    DDD (degenerative disc disease), lumbar    Lumbosacral  pain    Morbid obesity with BMI of 40.0-44.9, adult ([facility])    Exercise counseling            Status post diagnostic medial branch blocks x2 targeting the BILATERAL L4-5 and L5-S1 facet joints with greater than 80% pain relief during the diagnostic phase.  This represents a positive diagnostic block.    I believe the patient is a good candidate for BILATERAL radiofrequency neurotomy targeting the medial branches innervating the L4-5 and L5-S1 facet joints    The risks benefits and alternatives to this procedure were discussed and the patient wishes to proceed with the procedure. Risks include but are not limited to damage to surrounding structures, infection, bleeding, worsening of pain which can be permanent, weakness which can be permanent. Benefits include pain relief, improved function. Alternatives includes not doing the procedure.       Medications: Continue Zanaflex as needed    Follow up: after the above diagnostic procedure     Thank you for allowing me to participate in the care of this patient. If you have any questions please not hesitate to contact me.             Please note that this dictation was created using voice recognition software. I have made every reasonable attempt to correct obvious errors but there may be errors of grammar and content that I may have overlooked prior to finalization of this note.      Bud Benítez MD  Interventional Spine and Sports Physiatry  Physical Medicine and Rehabilitation  [facility]

## 2023-09-18 NOTE — PROGRESS NOTES
Follow up patient note  Interventional spine and Pain  Physiatry (physical medicine and  Rehabilitation)       Chief complaint:   Chief Complaint   Patient presents with    Follow-Up     Lumbar spondylosis          HISTORY    Please see new patient note by Dr Benítez,  for more details.     HPI  Patient identification: Alie Gardner ,  1985,   With Diagnoses of Lumbar spondylosis, Annular tear of lumbar disc, DDD (degenerative disc disease), lumbar, Lumbosacral pain, Morbid obesity with BMI of 40.0-44.9, adult ([facility]), and Exercise counseling were pertinent to this visit.     Status post diagnostic medial branch blocks targeting the bilateral L4-5 and L5-S1 facet joints #1 and #2 with greater than 8% pain relief during the diagnostic phase.  After the patient's diagnostic phase the pain returned back to baseline.      Chronic bilateral axial low back pain 10 out of 10 intensity.  This is gradually been worsening.  Present since 2023.  Nonradiating. Rarely gets leg symptoms. She does get spasm. With decreased ROM. She has difficulty with ADLs including lower body dressing.     Previous procedures include SI joint injection and epidural steroid injection with improvement in leg pain.     In the past patient has had physical therapy with both land based therapy and aquatics therapy.     Medication management includes mobic, acetaminophen, diclofenac, zanaflex.        ROS Red Flags :   Fever, Chills, Sweats: Denies  Involuntary Weight Loss: Denies  Bowel/Bladder Incontinence: Denies  Saddle Anesthesia: Denies        PMHx:   Past Medical History:   Diagnosis Date    Heart murmur     IBD (inflammatory bowel disease)     MRSA (methicillin resistant Staphylococcus aureus)            PSHx:   Past Surgical History:   Procedure Laterality Date    NH INJ DX/THER AGNT PARAVERT FACET JOINT, SHILO* Bilateral 2023    Procedure: Diagnostic medial branch blocks targeting the BILATERAL L4-5 and L5-S1  facet joints with fluoroscopic guidance #2.   Note: 22-5. Diagnostic medial branch block #2 is only be done if procedure #1 is a positive block. This will be on a separate date from procedure #1.;  Surgeon: Bud Benítez M.D.;  [facility];  Service: Pain Management    HI INJ DX/THER AGNT PARAVERT FACET JOINT, SHILO* Bilateral 8/22/2023    Procedure: Diagnostic medial branch blocks targeting the BILATERAL L4-5 and L5-S1 facet joints with fluoroscopic guidance #1.  Note: 22-5.;  Surgeon: Bud Benítez M.D.;  Location: [facility];  Service: Pain Management    HI INJ LUMBAR/SACRAL,W/ IMAGING Left 6/2/2023    Procedure: LEFT L4-5 interlaminar epidural steroid injection;  Surgeon: Andrea Francois M.D.;  Location: [facility];  Service: Pain Management    HI INJECTION,SACROILIAC JOINT Bilateral 4/20/2023    Procedure: RIGHT and LEFT sacroiliac joint injections;  Surgeon: Andrea Francois M.D.;  Location: [facility];  Service: Pain Management    OTHER Left 2015    Left leg, debridement of wound with MRSA       Family history   Family History   Problem Relation Age of Onset    Cancer Mother         uterine and skin    Thyroid Mother         removed due to lump    Diabetes Father     Hypertension Father     Cancer Sister         ovarian    Alcohol abuse Brother          Medications:   Outpatient Medications Marked as Taking for the 9/18/23 encounter (Office Visit) with Bud Benítez M.D.   Medication Sig Dispense Refill    tizanidine (ZANAFLEX) 4 MG Tab Take 1 Tablet by mouth at bedtime as needed (muscle spasms). 30 Tablet 2    metFORMIN (GLUCOPHAGE) 500 MG Tab Take 1 Tablet by mouth 2 times a day with meals.      metFORMIN (GLUCOPHAGE) 500 MG Tab Take 1 Tablet by mouth 2 times a day with meals. 180 Tablet 3    Blood Glucose Test Strips Use one strip to test blood sugar three times daily before meals. 100 Strip 0    Blood Glucose Meter  Kit Test blood sugar as recommended by provider. blood glucose monitoring kit. 1 Kit 0    Lancets Sig: use TID and prn ssx high or low sugar. #100 RF x 3 100 Each 11    lidocaine (LIDODERM) 5 % Patch Place 1 Patch on the skin every 24 hours. Apply to site of pain. Wear for 12 hours, then remove for 12 hours 30 Patch 0    acetaminophen (TYLENOL) 325 MG Tab Take 650 mg by mouth every 6 hours as needed for Mild Pain.      diclofenac sodium (VOLTAREN) 1 % Gel Apply 4 g topically 3 times a day as needed (Joint or Muscle Pain).      ondansetron (ZOFRAN ODT) 4 MG TABLET DISPERSIBLE Take 1 Tablet by mouth every 6 hours as needed for Nausea/Vomiting. Dissolve in mouth. 10 Tablet 0    omeprazole (PRILOSEC) 20 MG delayed-release capsule Take 1 Capsule by mouth every day. 90 Capsule 3        Current Outpatient Medications on File Prior to Visit   Medication Sig Dispense Refill    tizanidine (ZANAFLEX) 4 MG Tab Take 1 Tablet by mouth at bedtime as needed (muscle spasms). 30 Tablet 2    metFORMIN (GLUCOPHAGE) 500 MG Tab Take 1 Tablet by mouth 2 times a day with meals.      metFORMIN (GLUCOPHAGE) 500 MG Tab Take 1 Tablet by mouth 2 times a day with meals. 180 Tablet 3    Blood Glucose Test Strips Use one strip to test blood sugar three times daily before meals. 100 Strip 0    Blood Glucose Meter Kit Test blood sugar as recommended by provider. blood glucose monitoring kit. 1 Kit 0    Lancets Sig: use TID and prn ssx high or low sugar. #100 RF x 3 100 Each 11    lidocaine (LIDODERM) 5 % Patch Place 1 Patch on the skin every 24 hours. Apply to site of pain. Wear for 12 hours, then remove for 12 hours 30 Patch 0    acetaminophen (TYLENOL) 325 MG Tab Take 650 mg by mouth every 6 hours as needed for Mild Pain.      diclofenac sodium (VOLTAREN) 1 % Gel Apply 4 g topically 3 times a day as needed (Joint or Muscle Pain).      ondansetron (ZOFRAN ODT) 4 MG TABLET DISPERSIBLE Take 1 Tablet by mouth every 6 hours as needed for  Nausea/Vomiting. Dissolve in mouth. 10 Tablet 0    omeprazole (PRILOSEC) 20 MG delayed-release capsule Take 1 Capsule by mouth every day. 90 Capsule 3     No current facility-administered medications on file prior to visit.         Allergies:   Allergies   Allergen Reactions    Gabapentin Unspecified     Seizure    Penicillins Unspecified     Previous hospitalization after penicillin - does not remember reaction    Cannot tolerate any Cillin    Penicillin G        Social Hx:   Social History     Socioeconomic History    Marital status:      Spouse name: Not on file    Number of children: Not on file    Years of education: Not on file    Highest education level: Not on file   Occupational History    Not on file   Tobacco Use    Smoking status: Former     Current packs/day: 0.10     Average packs/day: 0.1 packs/day for 17.0 years (1.7 ttl pk-yrs)     Types: Cigarettes    Smokeless tobacco: Never    Tobacco comments:     socially, only every few months   Vaping Use    Vaping Use: Former    Substances: Nicotine   Substance and Sexual Activity    Alcohol use: Yes     Alcohol/week: 1.2 oz     Types: 2 Standard drinks or equivalent per week     Comment: socially - rare    Drug use: No    Sexual activity: Yes     Partners: Male   Other Topics Concern    Not on file   Social History Narrative    Not on file     Social Determinants of Health     Financial Resource Strain: Not on file   Food Insecurity: No Food Insecurity (12/17/2019)    Hunger Vital Sign     Worried About Running Out of Food in the Last Year: Never true     Ran Out of Food in the Last Year: Never true   Transportation Needs: Unmet Transportation Needs (12/17/2019)    PRAPARE - Transportation     Lack of Transportation (Medical): Yes     Lack of Transportation (Non-Medical): No   Physical Activity: Not on file   Stress: Not on file   Social Connections: Not on file   Intimate Partner Violence: Not on file   Housing Stability: Not on file  "        EXAMINATION     Physical Exam:   Vitals: /70 (BP Location: Left arm, Patient Position: Sitting, BP Cuff Size: Large adult)   Pulse 99   Temp (!) 35.8 °C (96.4 °F) (Temporal)   Ht 1.651 m (5' 5\")   Wt 109 kg (241 lb 2.9 oz)   SpO2 97%     Constitutional:   Body Habitus: Body mass index is 40.13 kg/m².  Cooperation: Fully cooperates with exam  Appearance: Well-groomed no disheveled    Respiratory-  breathing comfortable on room air, no audible wheezing  Cardiovascular- capillary refills less than 2 seconds. No lower extremity edema is noted.   Psychiatric- alert and oriented ×3. Normal affect.    MSK and Neuro: -      Thoracic/Lumbar Spine/Sacral Spine/Hips   decreased active range of motion with flexion, lateral flexion, and rotation bilaterally.   There is decreased active range of motion with lumbar extension.    There is  pain with lumbar extension.   There is pain with facet loading maneuver (extension rotation) with axial low back pain on the BILATERAL side(s) L4-5 and L5-S1      Palpation:   No tenderness to palpation in midline at T1-T12 levels. No tenderness to palpation in the left and right of the midline T1-L5, NEGATIVE for tenderness to palpation to the para-midline region in the lower lumbar levels.  palpation over SI joint: negative bilaterally    palpation in hip or over the gluteus medius tendon insertion: negative bilaterally      Lumbar spine Special tests  Neuro tension  Straight leg test negative bilaterally    Slump test negative bilaterally      Key points for the international standards for neurological classification of spinal cord injury (ISNCSCI) to light touch.     Dermatome R L                                      L2 2 2   L3 2 2   L4 2 2   L5 2 2   S1 2 2   S2 2 2         Motor Exam Lower Extremities    ? Myotome R L   Hip flexion L2 5 5   Knee extension L3 5 5   Ankle dorsiflexion L4 5 5   Toe extension L5 5 5   Ankle plantarflexion S1 5 5               MEDICAL " "DECISION MAKING    DATA    Labs:   Lab Results   Component Value Date/Time    SODIUM 140 08/08/2023 08:49 AM    POTASSIUM 3.7 08/08/2023 08:49 AM    CHLORIDE 108 08/08/2023 08:49 AM    CO2 21 08/08/2023 08:49 AM    GLUCOSE 111 (H) 08/08/2023 08:49 AM    BUN 9 08/08/2023 08:49 AM    CREATININE 0.67 08/08/2023 08:49 AM        No results found for: \"PROTHROMBTM\", \"INR\"     Lab Results   Component Value Date/Time    WBC 8.9 08/08/2023 08:49 AM    RBC 4.67 08/08/2023 08:49 AM    HEMOGLOBIN 12.0 08/08/2023 08:49 AM    HEMATOCRIT 38.0 08/08/2023 08:49 AM    MCV 81.4 08/08/2023 08:49 AM    MCH 25.7 (L) 08/08/2023 08:49 AM    MCHC 31.6 (L) 08/08/2023 08:49 AM    MPV 9.7 08/08/2023 08:49 AM    NEUTSPOLYS 61.20 08/08/2023 08:49 AM    LYMPHOCYTES 32.50 08/08/2023 08:49 AM    MONOCYTES 4.90 08/08/2023 08:49 AM    EOSINOPHILS 0.70 08/08/2023 08:49 AM    BASOPHILS 0.40 08/08/2023 08:49 AM        Lab Results   Component Value Date/Time    HBA1C 6.4 (H) 08/08/2023 08:49 AM          Imaging:   I personally reviewed following images      MRI lumbar spine 2/26/2023  No areas of high-grade central canal or neuroforaminal stenosis.  Annular tear at L5-S1.  Some degenerative disc disease is seen at L5-S1 and L4-5.  There is facet arthropathy bilaterally at L4-5 and L5-S1..      I reviewed the following radiology reports                       Results for orders placed during the hospital encounter of 02/25/23    MR-CERVICAL SPINE-WITH & W/O    Impression  1.  No acute abnormality or abnormal enhancement in the cervical spine.  2.  Mild degenerative changes of the cervical spine without central canal or neural foraminal stenosis.      Results for orders placed during the hospital encounter of 02/25/23    MR-LUMBAR SPINE-WITH & W/O    Impression  1.  No acute abnormality or abnormal enhancement in the lumbar spine.  2.  Mild multilevel degenerative changes of the lumbar spine as described above.       Results for orders placed during the " hospital encounter of 23    MR-THORACIC SPINE-WITH & W/O    Impression  No significant abnormality is seen in the MR scan of the thoracic spine.                                                                      Results for orders placed during the hospital encounter of 23    CT-ABDOMEN-PELVIS WITH    Impression  1.  There is no acute inflammatory process within the abdomen or pelvis.  2.  There is no evidence of nephrolithiasis, urolithiasis, or hydronephrosis.                                Results for orders placed in visit on 22    DX-HAND 2- LEFT    Impression  1.  There is mild osteoarthritis of the left 1st CMC joint.   Results for orders placed in visit on 22    DX-HAND 3+ LEFT    Impression  1.  There is no fracture of the left hand.            Results for orders placed in visit on 23    DX-LUMBAR SPINE-2 OR 3 VIEWS    Impression  Unremarkable lumbar spine series.            INTERPRETING LOCATION:  [address] [place] [address], [id number]             Results for orders placed in visit on 22    DX-SHOULDER 2+ RIGHT    Impression  Normal radiographs of the right shoulder    Results for orders placed in visit on 23    DX-THORACIC SPINE-2 VIEWS    Impression  1.  There is minimal degenerative endplate spurring in the thoracic spine.            DIAGNOSIS   Visit Diagnoses     ICD-10-CM   1. Lumbar spondylosis  M47.816   2. Annular tear of lumbar disc  M51.36   3. DDD (degenerative disc disease), lumbar  M51.36   4. Lumbosacral pain  M54.50   5. Morbid obesity with BMI of 40.0-44.9, adult ([facility])  E66.01    Z68.41   6. Exercise counseling  Z71.82         ASSESSMENT and PLAN:     Alie Reinachiqui Gardner  1985 female      Alie was seen today for follow-up.    Diagnoses and all orders for this visit:    Lumbar spondylosis  -     Referral to Physical Medicine Rehab; Future    Annular tear of lumbar disc    DDD (degenerative disc disease), lumbar    Lumbosacral  pain    Morbid obesity with BMI of 40.0-44.9, adult ([facility])    Exercise counseling            Status post diagnostic medial branch blocks x2 targeting the BILATERAL L4-5 and L5-S1 facet joints with greater than 80% pain relief during the diagnostic phase.  This represents a positive diagnostic block.    I believe the patient is a good candidate for BILATERAL radiofrequency neurotomy targeting the medial branches innervating the L4-5 and L5-S1 facet joints    The risks benefits and alternatives to this procedure were discussed and the patient wishes to proceed with the procedure. Risks include but are not limited to damage to surrounding structures, infection, bleeding, worsening of pain which can be permanent, weakness which can be permanent. Benefits include pain relief, improved function. Alternatives includes not doing the procedure.       Medications: Continue Zanaflex as needed    Follow up: after the above diagnostic procedure     Thank you for allowing me to participate in the care of this patient. If you have any questions please not hesitate to contact me.             Please note that this dictation was created using voice recognition software. I have made every reasonable attempt to correct obvious errors but there may be errors of grammar and content that I may have overlooked prior to finalization of this note.      Bud Benítez MD  Interventional Spine and Sports Physiatry  Physical Medicine and Rehabilitation  [facility]

## 2023-10-03 ENCOUNTER — HOSPITAL ENCOUNTER (OUTPATIENT)
Facility: REHABILITATION | Age: 38
End: 2023-10-03
Attending: PHYSICAL MEDICINE & REHABILITATION | Admitting: PHYSICAL MEDICINE & REHABILITATION
Payer: MEDICAID

## 2023-10-03 ENCOUNTER — APPOINTMENT (OUTPATIENT)
Dept: RADIOLOGY | Facility: REHABILITATION | Age: 38
End: 2023-10-03
Attending: PHYSICAL MEDICINE & REHABILITATION
Payer: MEDICAID

## 2023-10-03 VITALS
RESPIRATION RATE: 16 BRPM | SYSTOLIC BLOOD PRESSURE: 128 MMHG | HEART RATE: 70 BPM | BODY MASS INDEX: 39.74 KG/M2 | HEIGHT: 65 IN | WEIGHT: 238.54 LBS | DIASTOLIC BLOOD PRESSURE: 72 MMHG | OXYGEN SATURATION: 98 % | TEMPERATURE: 97.9 F

## 2023-10-03 PROCEDURE — 64635 DESTROY LUMB/SAC FACET JNT: CPT

## 2023-10-03 PROCEDURE — 99153 MOD SED SAME PHYS/QHP EA: CPT

## 2023-10-03 PROCEDURE — 99152 MOD SED SAME PHYS/QHP 5/>YRS: CPT

## 2023-10-03 PROCEDURE — 700111 HCHG RX REV CODE 636 W/ 250 OVERRIDE (IP)

## 2023-10-03 PROCEDURE — 64636 DESTROY L/S FACET JNT ADDL: CPT

## 2023-10-03 RX ORDER — ROPIVACAINE HYDROCHLORIDE 5 MG/ML
INJECTION, SOLUTION EPIDURAL; INFILTRATION; PERINEURAL
Status: COMPLETED
Start: 2023-10-03 | End: 2023-10-03

## 2023-10-03 RX ORDER — MELOXICAM 7.5 MG/1
7.5 TABLET ORAL DAILY
Status: ON HOLD | COMMUNITY
End: 2024-02-27

## 2023-10-03 RX ORDER — LIDOCAINE HYDROCHLORIDE 10 MG/ML
INJECTION, SOLUTION EPIDURAL; INFILTRATION; INTRACAUDAL; PERINEURAL
Status: COMPLETED
Start: 2023-10-03 | End: 2023-10-03

## 2023-10-03 RX ORDER — MIDAZOLAM HYDROCHLORIDE 1 MG/ML
INJECTION INTRAMUSCULAR; INTRAVENOUS
Status: COMPLETED
Start: 2023-10-03 | End: 2023-10-03

## 2023-10-03 RX ADMIN — MIDAZOLAM 0.5 MG: 1 INJECTION, SOLUTION INTRAMUSCULAR; INTRAVENOUS at 09:25

## 2023-10-03 RX ADMIN — LIDOCAINE HYDROCHLORIDE 30 ML: 10 INJECTION, SOLUTION EPIDURAL; INFILTRATION; INTRACAUDAL; PERINEURAL at 09:35

## 2023-10-03 RX ADMIN — FENTANYL CITRATE 25 MCG: 50 INJECTION, SOLUTION INTRAMUSCULAR; INTRAVENOUS at 09:38

## 2023-10-03 RX ADMIN — ROPIVACAINE HYDROCHLORIDE 20 ML: 5 INJECTION EPIDURAL; INFILTRATION; PERINEURAL at 09:36

## 2023-10-03 RX ADMIN — FENTANYL CITRATE 25 MCG: 50 INJECTION, SOLUTION INTRAMUSCULAR; INTRAVENOUS at 09:25

## 2023-10-03 RX ADMIN — MIDAZOLAM 0.5 MG: 1 INJECTION, SOLUTION INTRAMUSCULAR; INTRAVENOUS at 09:38

## 2023-10-03 ASSESSMENT — PAIN DESCRIPTION - PAIN TYPE
TYPE: CHRONIC PAIN

## 2023-10-03 ASSESSMENT — FIBROSIS 4 INDEX: FIB4 SCORE: 0.35

## 2023-10-03 NOTE — INTERVAL H&P NOTE
H&P reviewed. The patient was examined and there are no changes to the H&P     Lungs clear to auscultation bilaterally.  No abdominal tenderness.  Heart regular rate and rhythm.  Vitals reviewed.    Proceed with the originally scheduled procedure.  The risks, benefits and alternatives were discussed.  The patient understands these.    Pre-Op Diagnosis Codes:     * Lumbar spondylosis [M47.816]  Procedure(s):  BILATERAL radiofrequency neurotomies medial branch targeting the L4-5 and L5-S1 facet joints with fluoroscopic guidance and sedation.        Note: plan on sedation 0.5mg versed and 25mcg fentanyl. Plan for 80 °C for 90 seconds for each neurotomy    Bud Benítez MD  Physical Medicine and Rehabilitation  Interventional Spine and Sports Physiatry  [facility]

## 2023-10-03 NOTE — OR SURGEON
Immediate Post OP Note    Pre-Op Diagnosis Codes:     * Lumbar spondylosis [M47.816]      Post-Op Diagnosis Codes:     * Lumbar spondylosis [M47.816]      Procedure(s):  BILATERAL radiofrequency neurotomies medial branch targeting the L4-5 and L5-S1 facet joints with fluoroscopic guidance and sedation.        Note: plan on sedation 0.5mg versed and 25mcg fentanyl. Plan for 80 °C for 90 seconds for each neurotomy - Wound Class: Clean    Surgeon(s):  Bud Benítez M.D.    Anesthesiologist/Type of Anesthesia:  No anesthesia staff entered./Local    Surgical Staff:  Circulator: Janna Slater R.N.  Scrub Person: Nicci Severino C.N.A.  Radiology Technologist: Bebeto Meza    Specimens removed if any:  * No specimens in log *    Estimated Blood Loss: None    Findings: None    Complications: None        10/3/2023 9:57 AM Bud Benítez M.D.

## 2023-10-03 NOTE — OP REPORT
Patient: Alie Gardner 38 y.o. female MRN: [id number]     Date of Service: 10/3/2023     Physician/s: Bud Benítez MD    Pre-operative Diagnosis: Lumbar spondylosis, facet arthropathy.      Post-operative Diagnosis: Lumbar spondylosis, facet arthropathy.     Procedure: Medial Branch Radiofrequency neurotomy targeting the bilateral L4-5 and L5-S1 facet joint(s) with sedation.     Description of procedure:    The patient was not treated for radiculopathy at this time    The risks, benefits, and alternatives of the procedure were reviewed and discussed with the patient.  Written informed consent was freely obtained. A pre-procedural time-out was conducted by the physician verifying patient’s identity, procedure to be performed, procedure site and side, and allergy verification. Appropriate equipment was determined to be in place for the procedure.     Moderation sedation was achieved with Versed (1mg) and Fentanyl (50mcg). Monitoring of the patients vital signs and respiratory status was provided by trained independent registered nurse during the entire course of the procedures and under my supervision and recoded in the patient’s medical record. The duration of sedation was over 10 minutes.     The patient's vital signs were carefully monitored before, throughout, and after the procedure.     In the fluoroscopy suite the patient was placed in a prone position, and a pillow was placed underneath the level of the umbilicus. The skin was prepped and draped in the usual sterile fashion. The fluoroscope was placed over the low back at the appropriate angles, and the targets for needle/probe placement were marked. A 25g needle was placed into each of the markings at three levels, and approx 1cc of 1% Lidocaine was injected subcutaneously into the epidermal and dermal layers. The needle was removed.     A 18 guage, 14.5 cm RFN cannula with a 10 mm active tip was then placed into the skin using fluoroscopic  guidance and advanced with an oblique view towards the intersection of the transverse process and superior articular process L4-5 facet joint where the L3 medial branch runs, L5-S1 facet joint where the L4 medial branch runs, and S1 facet where the L5 dorsal ramus runs  levels on the left side, where the medial branch runs. The needle/probe tips were then verified by AP, oblique, and lateral views.     Motor stimulation is used as an extra precaution to ensure the needle tips are off the lumbar nerve roots prior to each lesion. Following negative aspiration, a syringe was prepared with 10 mL of 1% lidocaine.  2mL of this syringe was injected at each level.  The needles are not moved, but fluoroscope guidance is used to ensure the needles have not moved. After a wait period of approximately 2 minutes, a radiofrequency lesion was then created at each level with a temperature of 80 degrees centigrade for 90 seconds.     The probes were adjusted to a 2nd location and images were saved in 2+ views views. Motor testing was done which confirmed no twitching in the leg.  And another radiofrequency lesion was made of 80 °C for 90 seconds. A 2nd radiofrequency lesion was made with 80°C for 90 seconds.      The cannulas were restyletted, and were then removed intact.     A 18 guage, 14.5 cm RFN cannula with a 10 mm active tip was then placed into the skin using fluoroscopic guidance and advanced with an oblique view towards the intersection of the transverse process and superior articular process L4-5 facet joint where the L3 medial branch runs, L5-S1 facet joint where the L4 medial branch runs, and S1 facet where the L5 dorsal ramus runs  levels on the right side, where the medial branch runs. The needle/probe tips were then verified by AP, oblique, and lateral views.       Motor stimulation is used as an extra precaution to ensure the needle tips are off the lumbar nerve roots prior to each lesion. Following negative  aspiration, a syringe was prepared with 10 mL of 1% lidocaine.  2mL of this syringe was injected at each level.  The needles are not moved, but fluoroscope guidance is used to ensure the needles have not moved. After a wait period of approximately 2 minutes, a radiofrequency lesion was then created at each level with a temperature of 80 degrees centigrade for 90 seconds.     The probes were adjusted to a 2nd location and images were saved in 2+ views views. Motor testing was done which confirmed no twitching in the leg.  And another radiofrequency lesion was made of 80 °C for 90 seconds. A 2nd radiofrequency lesion was made with 80°C for 90 seconds.      The cannulas were restyletted, and were then removed intact.     Fluoroscopic images in AP and lateral view were saved prior to each radiofrequency neurotomy.    This was a challenging procedure given the patients Body mass index is 39.69 kg/m².. This required longer needles.  A typical procedure such as this would require 10cm needles.  This procedure required 14.5cm needles.  This added to the mental effort for complexity this procedure.  This also made acquiring fluoroscopic images more time-consuming and challenging.  Final fluoroscopic images were obtained and saved.     The patient's back was covered with a 4x4 gauze, the area was cleansed with sterile normal saline, and a dressing was applied. There were no complications noted, the patient remained hemodynamically stable, and the patient tolerated the procedure well. The patient was examined in the postoperative area and her strength exam was identical as prior to the procedure.    The patient had 50% pain relief postprocedure  Preprocedure pain 10/10 NRS  Postprocedure pain 5 /10 NRS     Follow-up as scheduled    Bud Benítez MD  Interventional Spine and Pain  Physical Medicine and Rehabilitation  [facility]            CPT  Radiofrequency ablation (RFA) - lumbar or sacral (1st joint):   40803-42-12  Radiofrequency ablation (RFA) - lumbar or sacral (each additional joint):  64636-50-22 x 2   moderate procedural sedation first 15 minutes: [id number]

## 2023-10-03 NOTE — PROGRESS NOTES
0854 Pt received to pre procedure area. ID band and allergies verified. Vital signs taken and stable. Verified that patient has not taken NSAIDS, anticoagulants or blood thinners in past 5 days. Pt's history reviewed. Reviewed post op instructions with patient, questions answered, verbalized understanding. Pt seen by Dr. Benítez  pre procedure discussed, questions answered.  Blood sugar = 119.    0957  Pt received to recovery area, report received from procedure RN Janna . Vitals taken and stable. Patient tolerated po fluids and snack without difficulty. Dressing clean, dry and intact. Ice pack applied over dressing.     1020 Pt seen by Dr. Benítez post procedure, orders received for discharge. Patient ambulatory without difficulty. Pt discharged to designated .

## 2024-01-10 ENCOUNTER — APPOINTMENT (OUTPATIENT)
Dept: MEDICAL GROUP | Facility: PHYSICIAN GROUP | Age: 39
End: 2024-01-10
Payer: MEDICAID

## 2024-01-25 ENCOUNTER — APPOINTMENT (OUTPATIENT)
Dept: ADMISSIONS | Facility: MEDICAL CENTER | Age: 39
DRG: 621 | End: 2024-01-25
Attending: SURGERY
Payer: MEDICAID

## 2024-01-29 ENCOUNTER — OFFICE VISIT (OUTPATIENT)
Dept: PHYSICAL MEDICINE AND REHAB | Facility: MEDICAL CENTER | Age: 39
End: 2024-01-29
Payer: MEDICAID

## 2024-01-29 VITALS
BODY MASS INDEX: 39.69 KG/M2 | WEIGHT: 238.2 LBS | TEMPERATURE: 96.8 F | SYSTOLIC BLOOD PRESSURE: 124 MMHG | DIASTOLIC BLOOD PRESSURE: 72 MMHG | HEART RATE: 91 BPM | HEIGHT: 65 IN | OXYGEN SATURATION: 96 %

## 2024-01-29 DIAGNOSIS — M47.816 LUMBAR SPONDYLOSIS: ICD-10-CM

## 2024-01-29 DIAGNOSIS — M53.3 SACROILIAC JOINT DYSFUNCTION OF BOTH SIDES: ICD-10-CM

## 2024-01-29 DIAGNOSIS — M51.36 ANNULAR TEAR OF LUMBAR DISC: ICD-10-CM

## 2024-01-29 DIAGNOSIS — E66.9 OBESITY (BMI 30-39.9): ICD-10-CM

## 2024-01-29 DIAGNOSIS — M54.50 LUMBOSACRAL PAIN: ICD-10-CM

## 2024-01-29 DIAGNOSIS — M51.36 DDD (DEGENERATIVE DISC DISEASE), LUMBAR: ICD-10-CM

## 2024-01-29 DIAGNOSIS — Z71.82 EXERCISE COUNSELING: ICD-10-CM

## 2024-01-29 PROCEDURE — 99214 OFFICE O/P EST MOD 30 MIN: CPT | Performed by: PHYSICAL MEDICINE & REHABILITATION

## 2024-01-29 PROCEDURE — 3078F DIAST BP <80 MM HG: CPT | Performed by: PHYSICAL MEDICINE & REHABILITATION

## 2024-01-29 PROCEDURE — 1125F AMNT PAIN NOTED PAIN PRSNT: CPT | Performed by: PHYSICAL MEDICINE & REHABILITATION

## 2024-01-29 PROCEDURE — 3074F SYST BP LT 130 MM HG: CPT | Performed by: PHYSICAL MEDICINE & REHABILITATION

## 2024-01-29 ASSESSMENT — PATIENT HEALTH QUESTIONNAIRE - PHQ9: CLINICAL INTERPRETATION OF PHQ2 SCORE: 0

## 2024-01-29 ASSESSMENT — PAIN SCALES - GENERAL: PAINLEVEL: 6=MODERATE PAIN

## 2024-01-29 ASSESSMENT — FIBROSIS 4 INDEX: FIB4 SCORE: 0.35

## 2024-01-29 NOTE — PROGRESS NOTES
Follow up patient note  Interventional spine and Pain  Physiatry (physical medicine and  Rehabilitation)       Chief complaint:   Chief Complaint   Patient presents with    Back Pain     Post SP Ananda RFA L4-5          HISTORY    Please see new patient note by Dr Benítez,  for more details.     HPI  Patient identification: Alie Gardner ,  1985,   With Diagnoses of Lumbar spondylosis, Annular tear of lumbar disc, DDD (degenerative disc disease), lumbar, Lumbosacral pain, BMI 30-39.9, Exercise counseling, and Sacroiliac joint dysfunction of both sides were pertinent to this visit.     Status post diagnostic medial branch blocks targeting the bilateral L4-5 and L5-S1 facet joints #1 and #2 with greater than 80% pain relief during the diagnostic phase.  After the patient's diagnostic phase the pain returned back to baseline.  Status post medial branch radiofrequency neurotomy done on 10/3/2024 where the patient had 50% pain relief with preprocedure pain 10/10 NRS postprocedure pain 5/10 NRS continues to have pain relief.    The patient's pain is 5 out of 10 in intensity NRS axial aching nonradiating bilateral low back pain worse with lumbar extension improves with lumbar flexion.  She denies numbness tingling or weakness.    The patient does note that she has upcoming gastric bypass surgery towards the end of February.    In the past patient has had physical therapy with both land based therapy and aquatics therapy.     Medication management includes mobic, acetaminophen, diclofenac, zanaflex.        ROS Red Flags :   Fever, Chills, Sweats: Denies  Involuntary Weight Loss: Denies  Bowel/Bladder Incontinence: Denies  Saddle Anesthesia: Denies        PMHx:   Past Medical History:   Diagnosis Date    Heart murmur     IBD (inflammatory bowel disease)     MRSA (methicillin resistant Staphylococcus aureus)     2016       PSHx:   Past Surgical History:   Procedure Laterality Date    CT DSTR NROLYTC AGNT  PARVERTEB FCT SNGL LMBR/SACRAL Bilateral 10/3/2023    Procedure: BILATERAL radiofrequency neurotomies medial branch targeting the L4-5 and L5-S1 facet joints with fluoroscopic guidance and sedation.        Note: plan on sedation 0.5mg versed and 25mcg fentanyl. Plan for 80 °C for 90 seconds for each neurotomy;  Surgeon: Bud Benítez M.D.;  [facility];  Service: Pain Management    IA INJ DX/THER AGNT PARAVERT FACET JOINT, SHILO* Bilateral 8/30/2023    Procedure: Diagnostic medial branch blocks targeting the BILATERAL L4-5 and L5-S1 facet joints with fluoroscopic guidance #2.   Note: 22-5. Diagnostic medial branch block #2 is only be done if procedure #1 is a positive block. This will be on a separate date from procedure #1.;  Surgeon: Bud Benítez M.D.;  Location: [facility];  Service: Pain Management    IA INJ DX/THER AGNT PARAVERT FACET JOINT, SHILO* Bilateral 8/22/2023    Procedure: Diagnostic medial branch blocks targeting the BILATERAL L4-5 and L5-S1 facet joints with fluoroscopic guidance #1.  Note: 22-5.;  Surgeon: Bud Benítez M.D.;  [facility];  Service: Pain Management    IA INJ LUMBAR/SACRAL,W/ IMAGING Left 6/2/2023    Procedure: LEFT L4-5 interlaminar epidural steroid injection;  Surgeon: Andrea Francois M.D.;  Location: [facility];  Service: Pain Management    IA INJECTION,SACROILIAC JOINT Bilateral 4/20/2023    Procedure: RIGHT and LEFT sacroiliac joint injections;  Surgeon: Andrea Francois M.D.;  Location: [facility];  Service: Pain Management    OTHER Left 2015    Left leg, debridement of wound with MRSA       Family history   Family History   Problem Relation Age of Onset    Cancer Mother         uterine and skin    Thyroid Mother         removed due to lump    Diabetes Father     Hypertension Father     Cancer Sister         ovarian    Alcohol abuse Brother          Medications:    Outpatient Medications Marked as Taking for the 1/29/24 encounter (Office Visit) with Bud Benítez M.D.   Medication Sig Dispense Refill    meloxicam (MOBIC) 7.5 MG Tab Take 7.5 mg by mouth every day.      tizanidine (ZANAFLEX) 4 MG Tab Take 1 Tablet by mouth at bedtime as needed (muscle spasms). 30 Tablet 2    metFORMIN (GLUCOPHAGE) 500 MG Tab Take 1 Tablet by mouth 2 times a day with meals.      metFORMIN (GLUCOPHAGE) 500 MG Tab Take 1 Tablet by mouth 2 times a day with meals. 180 Tablet 3    Blood Glucose Test Strips Use one strip to test blood sugar three times daily before meals. 100 Strip 0    Blood Glucose Meter Kit Test blood sugar as recommended by provider. blood glucose monitoring kit. 1 Kit 0    Lancets Sig: use TID and prn ssx high or low sugar. #100 RF x 3 100 Each 11    lidocaine (LIDODERM) 5 % Patch Place 1 Patch on the skin every 24 hours. Apply to site of pain. Wear for 12 hours, then remove for 12 hours 30 Patch 0    acetaminophen (TYLENOL) 325 MG Tab Take 650 mg by mouth every 6 hours as needed for Mild Pain.      diclofenac sodium (VOLTAREN) 1 % Gel Apply 4 g topically 3 times a day as needed (Joint or Muscle Pain).      ondansetron (ZOFRAN ODT) 4 MG TABLET DISPERSIBLE Take 1 Tablet by mouth every 6 hours as needed for Nausea/Vomiting. Dissolve in mouth. 10 Tablet 0    omeprazole (PRILOSEC) 20 MG delayed-release capsule Take 1 Capsule by mouth every day. 90 Capsule 3        Current Outpatient Medications on File Prior to Visit   Medication Sig Dispense Refill    meloxicam (MOBIC) 7.5 MG Tab Take 7.5 mg by mouth every day.      tizanidine (ZANAFLEX) 4 MG Tab Take 1 Tablet by mouth at bedtime as needed (muscle spasms). 30 Tablet 2    metFORMIN (GLUCOPHAGE) 500 MG Tab Take 1 Tablet by mouth 2 times a day with meals.      metFORMIN (GLUCOPHAGE) 500 MG Tab Take 1 Tablet by mouth 2 times a day with meals. 180 Tablet 3    Blood Glucose Test Strips Use one strip to test blood sugar three times  daily before meals. 100 Strip 0    Blood Glucose Meter Kit Test blood sugar as recommended by provider. blood glucose monitoring kit. 1 Kit 0    Lancets Sig: use TID and prn ssx high or low sugar. #100 RF x 3 100 Each 11    lidocaine (LIDODERM) 5 % Patch Place 1 Patch on the skin every 24 hours. Apply to site of pain. Wear for 12 hours, then remove for 12 hours 30 Patch 0    acetaminophen (TYLENOL) 325 MG Tab Take 650 mg by mouth every 6 hours as needed for Mild Pain.      diclofenac sodium (VOLTAREN) 1 % Gel Apply 4 g topically 3 times a day as needed (Joint or Muscle Pain).      ondansetron (ZOFRAN ODT) 4 MG TABLET DISPERSIBLE Take 1 Tablet by mouth every 6 hours as needed for Nausea/Vomiting. Dissolve in mouth. 10 Tablet 0    omeprazole (PRILOSEC) 20 MG delayed-release capsule Take 1 Capsule by mouth every day. 90 Capsule 3     No current facility-administered medications on file prior to visit.         Allergies:   Allergies   Allergen Reactions    Gabapentin Unspecified     Seizure    Penicillins Unspecified     Previous hospitalization after penicillin - does not remember reaction    Cannot tolerate any Cillin    Penicillin G        Social Hx:   Social History     Socioeconomic History    Marital status:      Spouse name: Not on file    Number of children: Not on file    Years of education: Not on file    Highest education level: Not on file   Occupational History    Not on file   Tobacco Use    Smoking status: Former     Current packs/day: 0.10     Average packs/day: 0.1 packs/day for 17.0 years (1.7 ttl pk-yrs)     Types: Cigarettes    Smokeless tobacco: Never    Tobacco comments:     socially, only every few months   Vaping Use    Vaping Use: Former    Substances: Nicotine   Substance and Sexual Activity    Alcohol use: Yes     Alcohol/week: 1.2 oz     Types: 2 Standard drinks or equivalent per week     Comment: socially - rare    Drug use: No    Sexual activity: Yes     Partners: Male   Other Topics  "Concern    Not on file   Social History Narrative    Not on file     Social Determinants of Health     Financial Resource Strain: Not on file   Food Insecurity: No Food Insecurity (12/17/2019)    Hunger Vital Sign     Worried About Running Out of Food in the Last Year: Never true     Ran Out of Food in the Last Year: Never true   Transportation Needs: Unmet Transportation Needs (12/17/2019)    PRAPARE - Transportation     Lack of Transportation (Medical): Yes     Lack of Transportation (Non-Medical): No   Physical Activity: Not on file   Stress: Not on file   Social Connections: Not on file   Intimate Partner Violence: Not on file   Housing Stability: Not on file         EXAMINATION     Physical Exam:   Vitals: /72 (BP Location: Right arm, Patient Position: Sitting, BP Cuff Size: Adult)   Pulse 91   Temp 36 °C (96.8 °F) (Temporal)   Ht 1.651 m (5' 5\")   Wt 108 kg (238 lb 3.2 oz)   SpO2 96%     Constitutional:   Body Habitus: Body mass index is 39.64 kg/m².  Cooperation: Fully cooperates with exam  Appearance: Well-groomed no disheveled    Respiratory-  breathing comfortable on room air, no audible wheezing  Cardiovascular- capillary refills less than 2 seconds. No lower extremity edema is noted.   Psychiatric- alert and oriented ×3. Normal affect.    MSK and Neuro: -      Thoracic/Lumbar Spine/Sacral Spine/Hips   There are no signs of infection around the injection sites.   full  active range of motion with flexion, lateral flexion, and rotation bilaterally.   There is full  active range of motion with lumbar extension.    There is no  pain with lumbar extension.   There is no pain with facet loading maneuver (extension rotation) with axial low back pain on the BILATERAL side(s)       Palpation:   No tenderness to palpation in midline at T1-T12 levels. No tenderness to palpation in the left and right of the midline T1-L5, NEGATIVE for tenderness to palpation to the para-midline region in the lower lumbar " "levels.  palpation over SI joint: negative bilaterally    palpation in hip or over the gluteus medius tendon insertion: negative bilaterally      Lumbar spine Special tests  Neuro tension  Straight leg test negative bilaterally    Slump test negative bilaterally      Key points for the international standards for neurological classification of spinal cord injury (ISNCSCI) to light touch.     Dermatome R L                                      L2 2 2   L3 2 2   L4 2 2   L5 2 2   S1 2 2   S2 2 2         Motor Exam Lower Extremities    ? Myotome R L   Hip flexion L2 5 5   Knee extension L3 5 5   Ankle dorsiflexion L4 5 5   Toe extension L5 5 5   Ankle plantarflexion S1 5 5           MEDICAL DECISION MAKING    DATA    Labs:   Lab Results   Component Value Date/Time    SODIUM 140 08/08/2023 08:49 AM    POTASSIUM 3.7 08/08/2023 08:49 AM    CHLORIDE 108 08/08/2023 08:49 AM    CO2 21 08/08/2023 08:49 AM    GLUCOSE 111 (H) 08/08/2023 08:49 AM    BUN 9 08/08/2023 08:49 AM    CREATININE 0.67 08/08/2023 08:49 AM        No results found for: \"PROTHROMBTM\", \"INR\"     Lab Results   Component Value Date/Time    WBC 8.9 08/08/2023 08:49 AM    RBC 4.67 08/08/2023 08:49 AM    HEMOGLOBIN 12.0 08/08/2023 08:49 AM    HEMATOCRIT 38.0 08/08/2023 08:49 AM    MCV 81.4 08/08/2023 08:49 AM    MCH 25.7 (L) 08/08/2023 08:49 AM    MCHC 31.6 (L) 08/08/2023 08:49 AM    MPV 9.7 08/08/2023 08:49 AM    NEUTSPOLYS 61.20 08/08/2023 08:49 AM    LYMPHOCYTES 32.50 08/08/2023 08:49 AM    MONOCYTES 4.90 08/08/2023 08:49 AM    EOSINOPHILS 0.70 08/08/2023 08:49 AM    BASOPHILS 0.40 08/08/2023 08:49 AM        Lab Results   Component Value Date/Time    HBA1C 6.4 (H) 08/08/2023 08:49 AM          Imaging:   I personally reviewed following images      MRI lumbar spine 2/26/2023  No areas of high-grade central canal or neuroforaminal stenosis.  Annular tear at L5-S1.  Some degenerative disc disease is seen at L5-S1 and L4-5.  There is facet arthropathy bilaterally at " L4-5 and L5-S1..      I reviewed the following radiology reports                       Results for orders placed during the hospital encounter of 02/25/23    MR-CERVICAL SPINE-WITH & W/O    Impression  1.  No acute abnormality or abnormal enhancement in the cervical spine.  2.  Mild degenerative changes of the cervical spine without central canal or neural foraminal stenosis.      Results for orders placed during the hospital encounter of 02/25/23    MR-LUMBAR SPINE-WITH & W/O    Impression  1.  No acute abnormality or abnormal enhancement in the lumbar spine.  2.  Mild multilevel degenerative changes of the lumbar spine as described above.       Results for orders placed during the hospital encounter of 02/25/23    MR-THORACIC SPINE-WITH & W/O    Impression  No significant abnormality is seen in the MR scan of the thoracic spine.                                                                      Results for orders placed during the hospital encounter of 02/04/23    CT-ABDOMEN-PELVIS WITH    Impression  1.  There is no acute inflammatory process within the abdomen or pelvis.  2.  There is no evidence of nephrolithiasis, urolithiasis, or hydronephrosis.                                Results for orders placed in visit on 08/31/22    DX-HAND 2- LEFT    Impression  1.  There is mild osteoarthritis of the left 1st CMC joint.   Results for orders placed in visit on 01/25/22    DX-HAND 3+ LEFT    Impression  1.  There is no fracture of the left hand.            Results for orders placed in visit on 02/03/23    DX-LUMBAR SPINE-2 OR 3 VIEWS    Impression  Unremarkable lumbar spine series.            INTERPRETING LOCATION:  [address] [place] [address], [id number]             Results for orders placed in visit on 02/04/22    DX-SHOULDER 2+ RIGHT    Impression  Normal radiographs of the right shoulder    Results for orders placed in visit on 01/23/23    DX-THORACIC SPINE-2 VIEWS    Impression  1.  There is minimal degenerative endplate  spurring in the thoracic spine.            DIAGNOSIS   Visit Diagnoses     ICD-10-CM   1. Lumbar spondylosis  M47.816   2. Annular tear of lumbar disc  M51.36   3. DDD (degenerative disc disease), lumbar  M51.36   4. Lumbosacral pain  M54.50   5. BMI 30-39.9  E66.9   6. Exercise counseling  Z71.82   7. Sacroiliac joint dysfunction of both sides  M53.3         ASSESSMENT and PLAN:     Alie Gardner  1985 female      Alie was seen today for back pain.    Diagnoses and all orders for this visit:    Lumbar spondylosis  -     Referral to Physical Therapy    Annular tear of lumbar disc  -     Referral to Physical Therapy    DDD (degenerative disc disease), lumbar  -     Referral to Physical Therapy    Lumbosacral pain  -     Referral to Physical Therapy    BMI 30-39.9  -     Referral to Physical Therapy    Exercise counseling  -     Referral to Physical Therapy    Sacroiliac joint dysfunction of both sides  -     Referral to Physical Therapy        The patient overall had a significant improvement in 50% improvement in pain and function after the medial branch radiofrequency neurotomy.  We discussed additions to home exercise program.  The patient was doing aquatic space program mainly.  I believe at this time the patient would tolerate a land-based physical therapy program.  I placed a referral for this and we discussed additional exercises.    Medications: Continue Zanaflex during flares of pain.  Acetaminophen up to 1000 mg 3 times daily as needed not to exceed 3000 mg per 24-hours.  Patient should discuss NSAIDs with her gastric bypass surgeon.      Follow up: 3 months    Thank you for allowing me to participate in the care of this patient. If you have any questions please not hesitate to contact me.             Please note that this dictation was created using voice recognition software. I have made every reasonable attempt to correct obvious errors but there may be errors of grammar and  content that I may have overlooked prior to finalization of this note.      Bud Benítez MD  Interventional Spine and Sports Physiatry  Physical Medicine and Rehabilitation  [facility]

## 2024-02-01 ENCOUNTER — OFFICE VISIT (OUTPATIENT)
Dept: MEDICAL GROUP | Facility: CLINIC | Age: 39
End: 2024-02-01
Payer: MEDICAID

## 2024-02-01 VITALS
OXYGEN SATURATION: 98 % | TEMPERATURE: 98.2 F | HEART RATE: 72 BPM | HEIGHT: 66 IN | BODY MASS INDEX: 38.44 KG/M2 | RESPIRATION RATE: 20 BRPM | SYSTOLIC BLOOD PRESSURE: 116 MMHG | DIASTOLIC BLOOD PRESSURE: 62 MMHG | WEIGHT: 239.2 LBS

## 2024-02-01 DIAGNOSIS — Z13.21 ENCOUNTER FOR VITAMIN DEFICIENCY SCREENING: ICD-10-CM

## 2024-02-01 DIAGNOSIS — R59.9 SWOLLEN LYMPH NODES: ICD-10-CM

## 2024-02-01 DIAGNOSIS — E66.01 MORBID OBESITY (HCC): ICD-10-CM

## 2024-02-01 DIAGNOSIS — E66.9 OBESITY (BMI 30-39.9): ICD-10-CM

## 2024-02-01 DIAGNOSIS — E11.65 CONTROLLED TYPE 2 DIABETES MELLITUS WITH HYPERGLYCEMIA, WITHOUT LONG-TERM CURRENT USE OF INSULIN (HCC): ICD-10-CM

## 2024-02-01 PROBLEM — K29.70 HELICOBACTER PYLORI GASTRITIS: Status: ACTIVE | Noted: 2023-07-02

## 2024-02-01 PROBLEM — R01.1 HEART MURMUR: Status: ACTIVE | Noted: 2023-07-02

## 2024-02-01 PROBLEM — B96.81 HELICOBACTER PYLORI GASTRITIS: Status: RESOLVED | Noted: 2023-07-02 | Resolved: 2024-02-01

## 2024-02-01 PROBLEM — Z01.818 PREOP EXAMINATION: Status: RESOLVED | Noted: 2023-07-20 | Resolved: 2024-02-01

## 2024-02-01 PROBLEM — R68.89 FLU-LIKE SYMPTOMS: Status: RESOLVED | Noted: 2022-12-15 | Resolved: 2024-02-01

## 2024-02-01 PROBLEM — K29.70 HELICOBACTER PYLORI GASTRITIS: Status: RESOLVED | Noted: 2023-07-02 | Resolved: 2024-02-01

## 2024-02-01 PROBLEM — A49.02 METHICILLIN RESISTANT STAPHYLOCOCCUS AUREUS INFECTION: Status: ACTIVE | Noted: 2023-07-02

## 2024-02-01 PROBLEM — R00.0 TACHYCARDIA: Status: ACTIVE | Noted: 2023-07-04

## 2024-02-01 PROBLEM — R06.83 SNORING: Status: ACTIVE | Noted: 2023-07-04

## 2024-02-01 PROBLEM — B96.81 HELICOBACTER PYLORI GASTRITIS: Status: ACTIVE | Noted: 2023-07-02

## 2024-02-01 PROCEDURE — 3074F SYST BP LT 130 MM HG: CPT | Performed by: PHYSICIAN ASSISTANT

## 2024-02-01 PROCEDURE — 3078F DIAST BP <80 MM HG: CPT | Performed by: PHYSICIAN ASSISTANT

## 2024-02-01 PROCEDURE — 99214 OFFICE O/P EST MOD 30 MIN: CPT | Performed by: PHYSICIAN ASSISTANT

## 2024-02-01 RX ORDER — AZITHROMYCIN 250 MG/1
TABLET, FILM COATED ORAL
Qty: 6 TABLET | Refills: 0 | Status: ON HOLD | OUTPATIENT
Start: 2024-02-01 | End: 2024-02-27

## 2024-02-01 ASSESSMENT — FIBROSIS 4 INDEX: FIB4 SCORE: 0.35

## 2024-02-01 NOTE — PROGRESS NOTES
cc:  diabetes    Subjective:     Alie Gardner is a 38 y.o. female presenting for diabetes      Patient presents to the office for diabetes.  Patient did have a retinal scan but there is a problems getting a picture of the left eye.  She does report a previous injury to this eye.       Patient has had swelling of the right side of the neck for a month.  Patient indicates that mom has a history of cancer and she is concerned about the swelling.    Patient states that she does have lumbar degenerative disc disease.  She is unable to walk greater than 20 feet.  She does use a motorized scooter.  She has been told this will be permanent.  She is having weight loss surgery to help.      Review of systems:  See above.   Denies any symptoms unless previously indicated.        Current Outpatient Medications:     azithromycin (ZITHROMAX) 250 MG Tab, Take 2 tabs on day one and one tab days 2-5, Disp: 6 Tablet, Rfl: 0    meloxicam (MOBIC) 7.5 MG Tab, Take 7.5 mg by mouth every day., Disp: , Rfl:     tizanidine (ZANAFLEX) 4 MG Tab, Take 1 Tablet by mouth at bedtime as needed (muscle spasms)., Disp: 30 Tablet, Rfl: 2    metFORMIN (GLUCOPHAGE) 500 MG Tab, Take 1 Tablet by mouth 2 times a day with meals., Disp: 180 Tablet, Rfl: 3    Blood Glucose Test Strips, Use one strip to test blood sugar three times daily before meals., Disp: 100 Strip, Rfl: 0    Blood Glucose Meter Kit, Test blood sugar as recommended by provider. blood glucose monitoring kit., Disp: 1 Kit, Rfl: 0    Lancets, Sig: use TID and prn ssx high or low sugar. #100 RF x 3, Disp: 100 Each, Rfl: 11    lidocaine (LIDODERM) 5 % Patch, Place 1 Patch on the skin every 24 hours. Apply to site of pain. Wear for 12 hours, then remove for 12 hours, Disp: 30 Patch, Rfl: 0    acetaminophen (TYLENOL) 325 MG Tab, Take 650 mg by mouth every 6 hours as needed for Mild Pain., Disp: , Rfl:     diclofenac sodium (VOLTAREN) 1 % Gel, Apply 4 g topically 3 times a day as  "needed (Joint or Muscle Pain)., Disp: , Rfl:     ondansetron (ZOFRAN ODT) 4 MG TABLET DISPERSIBLE, Take 1 Tablet by mouth every 6 hours as needed for Nausea/Vomiting. Dissolve in mouth., Disp: 10 Tablet, Rfl: 0    omeprazole (PRILOSEC) 20 MG delayed-release capsule, Take 1 Capsule by mouth every day., Disp: 90 Capsule, Rfl: 3    Allergies, past medical history, past surgical history, family history, social history reviewed and updated    Objective:     Vitals: /62 (BP Location: Left arm, Patient Position: Sitting, BP Cuff Size: Adult)   Pulse 72   Temp 36.8 °C (98.2 °F) (Temporal)   Resp 20   Ht 1.664 m (5' 5.5\")   Wt 109 kg (239 lb 3.2 oz)   LMP 01/11/2024 (Exact Date)   SpO2 98%   BMI 39.20 kg/m²   General: Alert, pleasant, NAD  EYES:   PERRL, EOMI, no icterus or pallor.  Conjunctivae and lids normal.   HENT:  Normocephalic.  External ears normal.  Swelling of the neck on the right side.  Possible swollen lymph node.  Neck supple.     Respiratory: Normal respiratory effort.    Abdomen: obese  Skin: Warm, dry, no rashes.  Musculoskeletal: Uses cane with gait..    Extremities: Gait is slow..   Neurological: No tremors, sensation grossly intact, CN2-12 intact.  Psych:  Affect/mood is normal, judgement is good, memory is intact, grooming is appropriate.    Assessment/Plan:     Alie was seen today for establish care.    Diagnoses and all orders for this visit:    Controlled type 2 diabetes mellitus with hyperglycemia, without long-term current use of insulin (HCC)  -     POCT Retinal Eye Exam  -     Lipid Profile; Future  -     Comp Metabolic Panel; Future  -     HEMOGLOBIN A1C; Future  -     TSH WITH REFLEX TO FT4; Future  -     Referral to Ophthalmology    Will obtain labs at patient's convenience as she will be undergoing surgery.  Follow-up in approximately 3 months with labs as needed.    Morbid obesity (HCC)  -     Lipid Profile; Future  -     Comp Metabolic Panel; Future  Obesity (BMI " 30-39.9)  -     Lipid Profile; Future  -     Comp Metabolic Panel; Future  -     TSH WITH REFLEX TO FT4; Future    Labs ordered.  Patient will be having bariatric surgery on the 27th of this month.    Encounter for vitamin deficiency screening  -     VITAMIN D,25 HYDROXY (DEFICIENCY); Future    Lab work ordered to evaluate further.    Swollen lymph nodes  -     azithromycin (ZITHROMAX) 250 MG Tab; Take 2 tabs on day one and one tab days 2-5    Z-Denilson ordered to see if this will help with swelling of the lymph nodes.  If no improvement, will need to obtain an ultrasound to evaluate further.        Return in about 8 weeks (around 3/28/2024), or if symptoms worsen or fail to improve, for lymph node neck..    Please note that this dictation was created using voice recognition software. I have made every reasonable attempt to correct obvious errors, but expect that there are errors of grammar and possible content that I did not discover before finalizing note.

## 2024-02-02 ENCOUNTER — PRE-ADMISSION TESTING (OUTPATIENT)
Dept: ADMISSIONS | Facility: MEDICAL CENTER | Age: 39
DRG: 621 | End: 2024-02-02
Attending: SURGERY
Payer: MEDICAID

## 2024-02-02 LAB — RETINAL SCREEN: NORMAL

## 2024-02-14 ENCOUNTER — PRE-ADMISSION TESTING (OUTPATIENT)
Dept: ADMISSIONS | Facility: MEDICAL CENTER | Age: 39
DRG: 621 | End: 2024-02-14
Attending: SURGERY
Payer: MEDICAID

## 2024-02-14 DIAGNOSIS — Z01.812 PRE-PROCEDURAL LABORATORY EXAMINATION: ICD-10-CM

## 2024-02-14 DIAGNOSIS — Z01.810 PRE-PROCEDURAL CARDIOVASCULAR EXAMINATION: ICD-10-CM

## 2024-02-14 LAB
ANION GAP SERPL CALC-SCNC: 10 MMOL/L (ref 7–16)
BUN SERPL-MCNC: 13 MG/DL (ref 8–22)
CALCIUM SERPL-MCNC: 9.4 MG/DL (ref 8.5–10.5)
CHLORIDE SERPL-SCNC: 106 MMOL/L (ref 96–112)
CO2 SERPL-SCNC: 23 MMOL/L (ref 20–33)
CREAT SERPL-MCNC: 0.69 MG/DL (ref 0.5–1.4)
EKG IMPRESSION: NORMAL
ERYTHROCYTE [DISTWIDTH] IN BLOOD BY AUTOMATED COUNT: 45.8 FL (ref 35.9–50)
EST. AVERAGE GLUCOSE BLD GHB EST-MCNC: 126 MG/DL
GFR SERPLBLD CREATININE-BSD FMLA CKD-EPI: 113 ML/MIN/1.73 M 2
GLUCOSE SERPL-MCNC: 112 MG/DL (ref 65–99)
HBA1C MFR BLD: 6 % (ref 4–5.6)
HCT VFR BLD AUTO: 40.4 % (ref 37–47)
HGB BLD-MCNC: 12.6 G/DL (ref 12–16)
MCH RBC QN AUTO: 25.3 PG (ref 27–33)
MCHC RBC AUTO-ENTMCNC: 31.2 G/DL (ref 32.2–35.5)
MCV RBC AUTO: 81 FL (ref 81.4–97.8)
PLATELET # BLD AUTO: 478 K/UL (ref 164–446)
PMV BLD AUTO: 9.7 FL (ref 9–12.9)
POTASSIUM SERPL-SCNC: 4.1 MMOL/L (ref 3.6–5.5)
RBC # BLD AUTO: 4.99 M/UL (ref 4.2–5.4)
SODIUM SERPL-SCNC: 139 MMOL/L (ref 135–145)
WBC # BLD AUTO: 10 K/UL (ref 4.8–10.8)

## 2024-02-14 PROCEDURE — 83036 HEMOGLOBIN GLYCOSYLATED A1C: CPT

## 2024-02-14 PROCEDURE — 36415 COLL VENOUS BLD VENIPUNCTURE: CPT

## 2024-02-14 PROCEDURE — 85027 COMPLETE CBC AUTOMATED: CPT

## 2024-02-14 PROCEDURE — 93005 ELECTROCARDIOGRAM TRACING: CPT

## 2024-02-14 PROCEDURE — 80048 BASIC METABOLIC PNL TOTAL CA: CPT

## 2024-02-14 PROCEDURE — 93010 ELECTROCARDIOGRAM REPORT: CPT | Performed by: INTERNAL MEDICINE

## 2024-02-27 ENCOUNTER — HOSPITAL ENCOUNTER (INPATIENT)
Facility: MEDICAL CENTER | Age: 39
LOS: 1 days | DRG: 621 | End: 2024-02-28
Attending: SURGERY | Admitting: SURGERY
Payer: MEDICAID

## 2024-02-27 ENCOUNTER — ANESTHESIA EVENT (OUTPATIENT)
Dept: SURGERY | Facility: MEDICAL CENTER | Age: 39
DRG: 621 | End: 2024-02-27
Payer: MEDICAID

## 2024-02-27 ENCOUNTER — ANESTHESIA (OUTPATIENT)
Dept: SURGERY | Facility: MEDICAL CENTER | Age: 39
DRG: 621 | End: 2024-02-27
Payer: MEDICAID

## 2024-02-27 DIAGNOSIS — G89.18 POSTOPERATIVE PAIN: ICD-10-CM

## 2024-02-27 LAB — HCG UR QL: NEGATIVE

## 2024-02-27 PROCEDURE — 770001 HCHG ROOM/CARE - MED/SURG/GYN PRIV*

## 2024-02-27 PROCEDURE — 160041 HCHG SURGERY MINUTES - EA ADDL 1 MIN LEVEL 4: Performed by: SURGERY

## 2024-02-27 PROCEDURE — 160029 HCHG SURGERY MINUTES - 1ST 30 MINS LEVEL 4: Performed by: SURGERY

## 2024-02-27 PROCEDURE — 700105 HCHG RX REV CODE 258: Performed by: SURGERY

## 2024-02-27 PROCEDURE — 700111 HCHG RX REV CODE 636 W/ 250 OVERRIDE (IP): Mod: JZ | Performed by: SURGERY

## 2024-02-27 PROCEDURE — 700111 HCHG RX REV CODE 636 W/ 250 OVERRIDE (IP): Mod: JZ | Performed by: ANESTHESIOLOGY

## 2024-02-27 PROCEDURE — 160035 HCHG PACU - 1ST 60 MINS PHASE I: Performed by: SURGERY

## 2024-02-27 PROCEDURE — 700101 HCHG RX REV CODE 250: Performed by: ANESTHESIOLOGY

## 2024-02-27 PROCEDURE — 160002 HCHG RECOVERY MINUTES (STAT): Performed by: SURGERY

## 2024-02-27 PROCEDURE — 0BQT4ZZ REPAIR DIAPHRAGM, PERCUTANEOUS ENDOSCOPIC APPROACH: ICD-10-PCS | Performed by: SURGERY

## 2024-02-27 PROCEDURE — 0DJ68ZZ INSPECTION OF STOMACH, VIA NATURAL OR ARTIFICIAL OPENING ENDOSCOPIC: ICD-10-PCS | Performed by: SURGERY

## 2024-02-27 PROCEDURE — A9270 NON-COVERED ITEM OR SERVICE: HCPCS | Performed by: ANESTHESIOLOGY

## 2024-02-27 PROCEDURE — 0D164ZA BYPASS STOMACH TO JEJUNUM, PERCUTANEOUS ENDOSCOPIC APPROACH: ICD-10-PCS | Performed by: SURGERY

## 2024-02-27 PROCEDURE — 81025 URINE PREGNANCY TEST: CPT

## 2024-02-27 PROCEDURE — 700102 HCHG RX REV CODE 250 W/ 637 OVERRIDE(OP): Mod: UD | Performed by: SURGERY

## 2024-02-27 PROCEDURE — A9270 NON-COVERED ITEM OR SERVICE: HCPCS | Mod: UD | Performed by: SURGERY

## 2024-02-27 PROCEDURE — 160009 HCHG ANES TIME/MIN: Performed by: SURGERY

## 2024-02-27 PROCEDURE — 700111 HCHG RX REV CODE 636 W/ 250 OVERRIDE (IP): Performed by: ANESTHESIOLOGY

## 2024-02-27 PROCEDURE — 160048 HCHG OR STATISTICAL LEVEL 1-5: Performed by: SURGERY

## 2024-02-27 PROCEDURE — 700102 HCHG RX REV CODE 250 W/ 637 OVERRIDE(OP): Performed by: ANESTHESIOLOGY

## 2024-02-27 PROCEDURE — 160036 HCHG PACU - EA ADDL 30 MINS PHASE I: Performed by: SURGERY

## 2024-02-27 PROCEDURE — 700101 HCHG RX REV CODE 250: Performed by: SURGERY

## 2024-02-27 RX ORDER — PROMETHAZINE HYDROCHLORIDE 25 MG/1
25 SUPPOSITORY RECTAL EVERY 4 HOURS PRN
Status: DISCONTINUED | OUTPATIENT
Start: 2024-02-27 | End: 2024-02-28 | Stop reason: HOSPADM

## 2024-02-27 RX ORDER — HYDROMORPHONE HYDROCHLORIDE 1 MG/ML
0.5 INJECTION, SOLUTION INTRAMUSCULAR; INTRAVENOUS; SUBCUTANEOUS
Status: DISCONTINUED | OUTPATIENT
Start: 2024-02-27 | End: 2024-02-28 | Stop reason: HOSPADM

## 2024-02-27 RX ORDER — SODIUM CHLORIDE, SODIUM LACTATE, POTASSIUM CHLORIDE, CALCIUM CHLORIDE 600; 310; 30; 20 MG/100ML; MG/100ML; MG/100ML; MG/100ML
INJECTION, SOLUTION INTRAVENOUS CONTINUOUS
Status: ACTIVE | OUTPATIENT
Start: 2024-02-27 | End: 2024-02-27

## 2024-02-27 RX ORDER — HALOPERIDOL 5 MG/ML
1 INJECTION INTRAMUSCULAR EVERY 6 HOURS PRN
Status: DISCONTINUED | OUTPATIENT
Start: 2024-02-27 | End: 2024-02-28 | Stop reason: HOSPADM

## 2024-02-27 RX ORDER — ENOXAPARIN SODIUM 100 MG/ML
40 INJECTION SUBCUTANEOUS DAILY
Status: DISCONTINUED | OUTPATIENT
Start: 2024-02-28 | End: 2024-02-28 | Stop reason: HOSPADM

## 2024-02-27 RX ORDER — CELECOXIB 100 MG/1
100 CAPSULE ORAL 2 TIMES DAILY
Qty: 20 CAPSULE | Refills: 0 | Status: SHIPPED | OUTPATIENT
Start: 2024-02-27

## 2024-02-27 RX ORDER — ONDANSETRON 2 MG/ML
4 INJECTION INTRAMUSCULAR; INTRAVENOUS EVERY 4 HOURS PRN
Status: DISCONTINUED | OUTPATIENT
Start: 2024-02-27 | End: 2024-02-28 | Stop reason: HOSPADM

## 2024-02-27 RX ORDER — DIPHENHYDRAMINE HCL 25 MG
25 TABLET ORAL EVERY 6 HOURS PRN
Status: DISCONTINUED | OUTPATIENT
Start: 2024-02-27 | End: 2024-02-28 | Stop reason: HOSPADM

## 2024-02-27 RX ORDER — ENALAPRILAT 1.25 MG/ML
2.5 INJECTION INTRAVENOUS EVERY 6 HOURS PRN
Status: DISCONTINUED | OUTPATIENT
Start: 2024-02-27 | End: 2024-02-28 | Stop reason: HOSPADM

## 2024-02-27 RX ORDER — SCOLOPAMINE TRANSDERMAL SYSTEM 1 MG/1
1 PATCH, EXTENDED RELEASE TRANSDERMAL
Status: DISCONTINUED | OUTPATIENT
Start: 2024-02-27 | End: 2024-02-28 | Stop reason: HOSPADM

## 2024-02-27 RX ORDER — OXYCODONE HCL 5 MG/5 ML
5 SOLUTION, ORAL ORAL EVERY 4 HOURS PRN
Qty: 100 ML | Refills: 0 | Status: SHIPPED | OUTPATIENT
Start: 2024-02-27 | End: 2024-03-02

## 2024-02-27 RX ORDER — DIPHENHYDRAMINE HYDROCHLORIDE 50 MG/ML
12.5 INJECTION INTRAMUSCULAR; INTRAVENOUS EVERY 6 HOURS PRN
Status: DISCONTINUED | OUTPATIENT
Start: 2024-02-27 | End: 2024-02-28 | Stop reason: HOSPADM

## 2024-02-27 RX ORDER — BUPIVACAINE HYDROCHLORIDE AND EPINEPHRINE 5; 5 MG/ML; UG/ML
INJECTION, SOLUTION EPIDURAL; INTRACAUDAL; PERINEURAL
Status: DISCONTINUED | OUTPATIENT
Start: 2024-02-27 | End: 2024-02-27 | Stop reason: HOSPADM

## 2024-02-27 RX ORDER — ACETAMINOPHEN 500 MG
1000 TABLET ORAL EVERY 6 HOURS
Status: DISCONTINUED | OUTPATIENT
Start: 2024-02-28 | End: 2024-02-28 | Stop reason: HOSPADM

## 2024-02-27 RX ORDER — DEXTROSE MONOHYDRATE, SODIUM CHLORIDE, AND POTASSIUM CHLORIDE 50; 1.49; 4.5 G/1000ML; G/1000ML; G/1000ML
INJECTION, SOLUTION INTRAVENOUS CONTINUOUS
Status: DISCONTINUED | OUTPATIENT
Start: 2024-02-27 | End: 2024-02-28 | Stop reason: HOSPADM

## 2024-02-27 RX ORDER — ACETAMINOPHEN 10 MG/ML
1000 INJECTION, SOLUTION INTRAVENOUS EVERY 6 HOURS
Status: COMPLETED | OUTPATIENT
Start: 2024-02-27 | End: 2024-02-27

## 2024-02-27 RX ORDER — ONDANSETRON 2 MG/ML
4 INJECTION INTRAMUSCULAR; INTRAVENOUS
Status: COMPLETED | OUTPATIENT
Start: 2024-02-27 | End: 2024-02-27

## 2024-02-27 RX ORDER — SODIUM CHLORIDE, SODIUM LACTATE, POTASSIUM CHLORIDE, AND CALCIUM CHLORIDE .6; .31; .03; .02 G/100ML; G/100ML; G/100ML; G/100ML
500 INJECTION, SOLUTION INTRAVENOUS
Status: DISCONTINUED | OUTPATIENT
Start: 2024-02-27 | End: 2024-02-28 | Stop reason: HOSPADM

## 2024-02-27 RX ORDER — ROCURONIUM BROMIDE 10 MG/ML
INJECTION, SOLUTION INTRAVENOUS PRN
Status: DISCONTINUED | OUTPATIENT
Start: 2024-02-27 | End: 2024-02-27 | Stop reason: SURG

## 2024-02-27 RX ORDER — DEXAMETHASONE SODIUM PHOSPHATE 4 MG/ML
INJECTION, SOLUTION INTRA-ARTICULAR; INTRALESIONAL; INTRAMUSCULAR; INTRAVENOUS; SOFT TISSUE PRN
Status: DISCONTINUED | OUTPATIENT
Start: 2024-02-27 | End: 2024-02-27 | Stop reason: SURG

## 2024-02-27 RX ORDER — LIDOCAINE HYDROCHLORIDE 20 MG/ML
INJECTION, SOLUTION EPIDURAL; INFILTRATION; INTRACAUDAL; PERINEURAL PRN
Status: DISCONTINUED | OUTPATIENT
Start: 2024-02-27 | End: 2024-02-27 | Stop reason: SURG

## 2024-02-27 RX ORDER — HYDROMORPHONE HYDROCHLORIDE 2 MG/ML
INJECTION, SOLUTION INTRAMUSCULAR; INTRAVENOUS; SUBCUTANEOUS PRN
Status: DISCONTINUED | OUTPATIENT
Start: 2024-02-27 | End: 2024-02-27 | Stop reason: SURG

## 2024-02-27 RX ORDER — ONDANSETRON 2 MG/ML
INJECTION INTRAMUSCULAR; INTRAVENOUS PRN
Status: DISCONTINUED | OUTPATIENT
Start: 2024-02-27 | End: 2024-02-27 | Stop reason: SURG

## 2024-02-27 RX ORDER — DIPHENHYDRAMINE HYDROCHLORIDE 50 MG/ML
25 INJECTION INTRAMUSCULAR; INTRAVENOUS EVERY 6 HOURS PRN
Status: DISCONTINUED | OUTPATIENT
Start: 2024-02-27 | End: 2024-02-28 | Stop reason: HOSPADM

## 2024-02-27 RX ORDER — ACETAMINOPHEN 10 MG/ML
1 INJECTION, SOLUTION INTRAVENOUS
Status: COMPLETED | OUTPATIENT
Start: 2024-02-27 | End: 2024-02-27

## 2024-02-27 RX ORDER — ACETAMINOPHEN 10 MG/ML
1000 INJECTION, SOLUTION INTRAVENOUS EVERY 6 HOURS
Status: DISCONTINUED | OUTPATIENT
Start: 2024-02-27 | End: 2024-02-27

## 2024-02-27 RX ORDER — ACETAMINOPHEN 325 MG/1
650 TABLET ORAL EVERY 6 HOURS
Qty: 60 TABLET | Refills: 0 | Status: SHIPPED | OUTPATIENT
Start: 2024-02-27

## 2024-02-27 RX ORDER — HYDROMORPHONE HYDROCHLORIDE 1 MG/ML
0.1 INJECTION, SOLUTION INTRAMUSCULAR; INTRAVENOUS; SUBCUTANEOUS
Status: DISCONTINUED | OUTPATIENT
Start: 2024-02-27 | End: 2024-02-27 | Stop reason: HOSPADM

## 2024-02-27 RX ORDER — OXYCODONE HCL 5 MG/5 ML
10 SOLUTION, ORAL ORAL
Status: COMPLETED | OUTPATIENT
Start: 2024-02-27 | End: 2024-02-27

## 2024-02-27 RX ORDER — MIDAZOLAM HYDROCHLORIDE 1 MG/ML
INJECTION INTRAMUSCULAR; INTRAVENOUS PRN
Status: DISCONTINUED | OUTPATIENT
Start: 2024-02-27 | End: 2024-02-27 | Stop reason: SURG

## 2024-02-27 RX ORDER — ACETAMINOPHEN 500 MG
1000 TABLET ORAL EVERY 6 HOURS
Status: DISCONTINUED | OUTPATIENT
Start: 2024-02-28 | End: 2024-02-27

## 2024-02-27 RX ORDER — ONDANSETRON 4 MG/1
4 TABLET, ORALLY DISINTEGRATING ORAL EVERY 6 HOURS PRN
Qty: 18 TABLET | Refills: 0 | Status: SHIPPED | OUTPATIENT
Start: 2024-02-27

## 2024-02-27 RX ORDER — OXYCODONE HCL 5 MG/5 ML
10 SOLUTION, ORAL ORAL
Status: DISCONTINUED | OUTPATIENT
Start: 2024-02-27 | End: 2024-02-28 | Stop reason: HOSPADM

## 2024-02-27 RX ORDER — HYDROMORPHONE HYDROCHLORIDE 1 MG/ML
0.4 INJECTION, SOLUTION INTRAMUSCULAR; INTRAVENOUS; SUBCUTANEOUS
Status: DISCONTINUED | OUTPATIENT
Start: 2024-02-27 | End: 2024-02-27 | Stop reason: HOSPADM

## 2024-02-27 RX ORDER — ACETAMINOPHEN 500 MG
1000 TABLET ORAL EVERY 6 HOURS
Status: DISCONTINUED | OUTPATIENT
Start: 2024-03-03 | End: 2024-02-28 | Stop reason: HOSPADM

## 2024-02-27 RX ORDER — HYDROMORPHONE HYDROCHLORIDE 1 MG/ML
0.2 INJECTION, SOLUTION INTRAMUSCULAR; INTRAVENOUS; SUBCUTANEOUS
Status: DISCONTINUED | OUTPATIENT
Start: 2024-02-27 | End: 2024-02-27 | Stop reason: HOSPADM

## 2024-02-27 RX ORDER — OMEPRAZOLE 20 MG/1
20 CAPSULE, DELAYED RELEASE ORAL DAILY
Qty: 30 CAPSULE | Refills: 11 | Status: SHIPPED | OUTPATIENT
Start: 2024-02-27

## 2024-02-27 RX ORDER — ACETAMINOPHEN 500 MG
1000 TABLET ORAL EVERY 6 HOURS
Status: DISCONTINUED | OUTPATIENT
Start: 2024-03-03 | End: 2024-02-27

## 2024-02-27 RX ORDER — OXYCODONE HCL 5 MG/5 ML
5 SOLUTION, ORAL ORAL
Status: COMPLETED | OUTPATIENT
Start: 2024-02-27 | End: 2024-02-27

## 2024-02-27 RX ORDER — DIPHENHYDRAMINE HYDROCHLORIDE 50 MG/ML
12.5 INJECTION INTRAMUSCULAR; INTRAVENOUS
Status: DISCONTINUED | OUTPATIENT
Start: 2024-02-27 | End: 2024-02-27 | Stop reason: HOSPADM

## 2024-02-27 RX ORDER — MEPERIDINE HYDROCHLORIDE 25 MG/ML
6.25 INJECTION INTRAMUSCULAR; INTRAVENOUS; SUBCUTANEOUS
Status: DISCONTINUED | OUTPATIENT
Start: 2024-02-27 | End: 2024-02-27 | Stop reason: HOSPADM

## 2024-02-27 RX ORDER — HALOPERIDOL 5 MG/ML
1 INJECTION INTRAMUSCULAR
Status: DISCONTINUED | OUTPATIENT
Start: 2024-02-27 | End: 2024-02-27 | Stop reason: HOSPADM

## 2024-02-27 RX ORDER — LIDOCAINE 50 MG/G
1 PATCH TOPICAL PRN
COMMUNITY

## 2024-02-27 RX ORDER — CEFAZOLIN SODIUM 1 G/3ML
INJECTION, POWDER, FOR SOLUTION INTRAMUSCULAR; INTRAVENOUS PRN
Status: DISCONTINUED | OUTPATIENT
Start: 2024-02-27 | End: 2024-02-27 | Stop reason: SURG

## 2024-02-27 RX ORDER — OXYCODONE HCL 5 MG/5 ML
5 SOLUTION, ORAL ORAL
Status: DISCONTINUED | OUTPATIENT
Start: 2024-02-27 | End: 2024-02-28 | Stop reason: HOSPADM

## 2024-02-27 RX ORDER — POLYETHYLENE GLYCOL 3350 17 G/17G
17 POWDER, FOR SOLUTION ORAL DAILY
Qty: 20 EACH | Refills: 0 | Status: SHIPPED | OUTPATIENT
Start: 2024-02-27

## 2024-02-27 RX ADMIN — FENTANYL CITRATE 50 MCG: 50 INJECTION, SOLUTION INTRAMUSCULAR; INTRAVENOUS at 07:53

## 2024-02-27 RX ADMIN — DEXAMETHASONE SODIUM PHOSPHATE 6 MG: 4 INJECTION INTRA-ARTICULAR; INTRALESIONAL; INTRAMUSCULAR; INTRAVENOUS; SOFT TISSUE at 07:48

## 2024-02-27 RX ADMIN — FAMOTIDINE 20 MG: 10 INJECTION, SOLUTION INTRAVENOUS at 17:30

## 2024-02-27 RX ADMIN — METHOCARBAMOL 1000 MG: 100 INJECTION, SOLUTION INTRAMUSCULAR; INTRAVENOUS at 16:04

## 2024-02-27 RX ADMIN — MIDAZOLAM HYDROCHLORIDE 2 MG: 1 INJECTION, SOLUTION INTRAMUSCULAR; INTRAVENOUS at 07:26

## 2024-02-27 RX ADMIN — FENTANYL CITRATE 50 MCG: 50 INJECTION, SOLUTION INTRAMUSCULAR; INTRAVENOUS at 08:28

## 2024-02-27 RX ADMIN — PROPOFOL 150 MG: 10 INJECTION, EMULSION INTRAVENOUS at 07:32

## 2024-02-27 RX ADMIN — HYDROMORPHONE HYDROCHLORIDE 0.5 MG: 2 INJECTION INTRAMUSCULAR; INTRAVENOUS; SUBCUTANEOUS at 08:11

## 2024-02-27 RX ADMIN — FENTANYL CITRATE 50 MCG: 50 INJECTION, SOLUTION INTRAMUSCULAR; INTRAVENOUS at 07:32

## 2024-02-27 RX ADMIN — HYDROMORPHONE HYDROCHLORIDE 0.5 MG: 2 INJECTION INTRAMUSCULAR; INTRAVENOUS; SUBCUTANEOUS at 09:06

## 2024-02-27 RX ADMIN — CEFAZOLIN 2 G: 1 INJECTION, POWDER, FOR SOLUTION INTRAMUSCULAR; INTRAVENOUS at 07:42

## 2024-02-27 RX ADMIN — ONDANSETRON 4 MG: 2 INJECTION INTRAMUSCULAR; INTRAVENOUS at 12:04

## 2024-02-27 RX ADMIN — SODIUM CHLORIDE, POTASSIUM CHLORIDE, SODIUM LACTATE AND CALCIUM CHLORIDE: 600; 310; 30; 20 INJECTION, SOLUTION INTRAVENOUS at 06:50

## 2024-02-27 RX ADMIN — ROCURONIUM BROMIDE 60 MG: 50 INJECTION, SOLUTION INTRAVENOUS at 07:33

## 2024-02-27 RX ADMIN — METHOCARBAMOL 1000 MG: 100 INJECTION, SOLUTION INTRAMUSCULAR; INTRAVENOUS at 21:35

## 2024-02-27 RX ADMIN — HYDROMORPHONE HYDROCHLORIDE 0.5 MG: 2 INJECTION INTRAMUSCULAR; INTRAVENOUS; SUBCUTANEOUS at 08:00

## 2024-02-27 RX ADMIN — OXYCODONE HYDROCHLORIDE 10 MG: 5 SOLUTION ORAL at 10:05

## 2024-02-27 RX ADMIN — LIDOCAINE HYDROCHLORIDE 20 MG: 20 INJECTION, SOLUTION EPIDURAL; INFILTRATION; INTRACAUDAL at 07:32

## 2024-02-27 RX ADMIN — ROCURONIUM BROMIDE 20 MG: 50 INJECTION, SOLUTION INTRAVENOUS at 08:31

## 2024-02-27 RX ADMIN — OXYCODONE HYDROCHLORIDE 10 MG: 5 SOLUTION ORAL at 15:59

## 2024-02-27 RX ADMIN — ACETAMINOPHEN 1000 MG: 1000 INJECTION INTRAVENOUS at 18:00

## 2024-02-27 RX ADMIN — FENTANYL CITRATE 50 MCG: 50 INJECTION, SOLUTION INTRAMUSCULAR; INTRAVENOUS at 09:12

## 2024-02-27 RX ADMIN — OXYCODONE HYDROCHLORIDE 10 MG: 5 SOLUTION ORAL at 21:32

## 2024-02-27 RX ADMIN — SUGAMMADEX 200 MG: 100 INJECTION, SOLUTION INTRAVENOUS at 09:06

## 2024-02-27 RX ADMIN — SCOPOLAMINE 1 PATCH: 1.5 PATCH, EXTENDED RELEASE TRANSDERMAL at 06:51

## 2024-02-27 RX ADMIN — ONDANSETRON 4 MG: 2 INJECTION INTRAMUSCULAR; INTRAVENOUS at 09:02

## 2024-02-27 RX ADMIN — POTASSIUM CHLORIDE, DEXTROSE MONOHYDRATE AND SODIUM CHLORIDE: 150; 5; 450 INJECTION, SOLUTION INTRAVENOUS at 15:55

## 2024-02-27 RX ADMIN — ACETAMINOPHEN 1000 MG: 1000 INJECTION INTRAVENOUS at 23:20

## 2024-02-27 RX ADMIN — ACETAMINOPHEN 1 G: 1000 INJECTION INTRAVENOUS at 06:50

## 2024-02-27 RX ADMIN — ONDANSETRON 4 MG: 2 INJECTION INTRAMUSCULAR; INTRAVENOUS at 21:31

## 2024-02-27 RX ADMIN — FENTANYL CITRATE 50 MCG: 50 INJECTION, SOLUTION INTRAMUSCULAR; INTRAVENOUS at 08:42

## 2024-02-27 ASSESSMENT — LIFESTYLE VARIABLES
HOW MANY TIMES IN THE PAST YEAR HAVE YOU HAD 5 OR MORE DRINKS IN A DAY: 0
CONSUMPTION TOTAL: NEGATIVE
HAVE YOU EVER FELT YOU SHOULD CUT DOWN ON YOUR DRINKING: NO
DOES PATIENT WANT TO STOP DRINKING: NO
ON A TYPICAL DAY WHEN YOU DRINK ALCOHOL HOW MANY DRINKS DO YOU HAVE: 0
HAVE PEOPLE ANNOYED YOU BY CRITICIZING YOUR DRINKING: NO
ALCOHOL_USE: NO
TOTAL SCORE: 0
EVER HAD A DRINK FIRST THING IN THE MORNING TO STEADY YOUR NERVES TO GET RID OF A HANGOVER: NO
EVER FELT BAD OR GUILTY ABOUT YOUR DRINKING: NO
AVERAGE NUMBER OF DAYS PER WEEK YOU HAVE A DRINK CONTAINING ALCOHOL: 0
TOTAL SCORE: 0
TOTAL SCORE: 0

## 2024-02-27 ASSESSMENT — PAIN DESCRIPTION - PAIN TYPE
TYPE: SURGICAL PAIN
TYPE: ACUTE PAIN
TYPE: ACUTE PAIN;SURGICAL PAIN
TYPE: ACUTE PAIN;SURGICAL PAIN

## 2024-02-27 ASSESSMENT — PATIENT HEALTH QUESTIONNAIRE - PHQ9
2. FEELING DOWN, DEPRESSED, IRRITABLE, OR HOPELESS: NOT AT ALL
SUM OF ALL RESPONSES TO PHQ9 QUESTIONS 1 AND 2: 0
1. LITTLE INTEREST OR PLEASURE IN DOING THINGS: NOT AT ALL

## 2024-02-27 ASSESSMENT — COGNITIVE AND FUNCTIONAL STATUS - GENERAL
CLIMB 3 TO 5 STEPS WITH RAILING: A LITTLE
MOBILITY SCORE: 23
SUGGESTED CMS G CODE MODIFIER DAILY ACTIVITY: CH
SUGGESTED CMS G CODE MODIFIER MOBILITY: CI
DAILY ACTIVITIY SCORE: 24

## 2024-02-27 ASSESSMENT — PAIN SCALES - GENERAL: PAIN_LEVEL: 5

## 2024-02-27 ASSESSMENT — FIBROSIS 4 INDEX: FIB4 SCORE: 0.27

## 2024-02-27 NOTE — ANESTHESIA PREPROCEDURE EVALUATION
Case: [id number] Date/Time: 02/27/24 0715    Procedures:       LAPAROSCOPIC BO-EN-Y GASTRIC BYPASS (Abdomen)      REPAIR, HERNIA, HIATAL, LAPAROSCOPIC - POSS (Abdomen)      ESOPHAGOGASTRODUODENOSCOPY (Throat)    Anesthesia type: General    Pre-op diagnosis: MORBID OBESITY    Location: [place] [address] / SURGERY [facility]    Surgeons: Tom Gould M.D.            Relevant Problems   CARDIAC   (positive) Heart murmur      GI   (positive) Gastroesophageal reflux disease without esophagitis       Physical Exam    Airway   Mallampati: II  TM distance: >3 FB  Neck ROM: full       Cardiovascular - normal exam  Rhythm: regular  Rate: normal  (-) murmur     Dental - normal exam           Pulmonary - normal exam  Breath sounds clear to auscultation     Abdominal   (+) obese     Neurological - normal exam                   Anesthesia Plan    ASA 2       Plan - general       Airway plan will be ETT        Plan Factors:   Patient was previously instructed to abstain from smoking on day of procedure.  Patient did not smoke on day of procedure.      Induction: intravenous    Postoperative Plan: Postoperative administration of opioids is intended.    Pertinent diagnostic labs and testing reviewed    Informed Consent:    Anesthetic plan and risks discussed with patient.    Use of blood products discussed with: patient whom consented to blood products.

## 2024-02-27 NOTE — PROGRESS NOTES
Patient here from PACU.     Assessment complete.  A&O x 4. Patient educated on how to call appropriately.   Patient Declines Pain, N/V at this time.    4 Eyes Skin Assessment Completed    Head WDL  Ears WDL  Nose WDL  Mouth WDL  Neck WDL  Breast/Chest WDL  Shoulder Blades WDL  Spine WDL  (R) Arm/Elbow/Hand WDL  (L) Arm/Elbow/Hand WDL  Abdomen Incision x5 lap stabs, well approximated with dermabond and REINA.  Groin WDL  Coccyx/Buttocks WDL  (R) Leg WDL  (L) Leg WDL  (R) Heel/Foot/Toe WDL  (L) Heel/Foot/Toe WDL      Devices In Places Pulse Ox, SCD's, and Oxy Mask    Interventions In Place Pillows and Low Air Loss Mattress    Possible Skin Injury No    Pictures Uploaded Into Epic N/A  Wound Consult Placed N/A  RN Wound Prevention Protocol Ordered No

## 2024-02-27 NOTE — ANESTHESIA TIME REPORT
Anesthesia Start and Stop Event Times       Date Time Event    2/27/2024 07:21 AM Ready for Procedure    2/27/2024 07:26 AM Anesthesia Start    2/27/2024 09:22 AM Anesthesia Stop          Responsible Staff  02/27/24      Name Role Begin End    Doris Michele M.D. Anesthesiologist 02/27/24 07:26 AM 02/27/24 09:22 AM          Overtime Reason:  no overtime (within assigned shift)    Comments:

## 2024-02-27 NOTE — ANESTHESIA POSTPROCEDURE EVALUATION
Patient: Alie Gardner    Procedure Summary       Date: 02/27/24 Room / Location: [place] [address] / SURGERY [facility]    Anesthesia Start: 0726 Anesthesia Stop: 0922    Procedures:       LAPAROSCOPIC BO-EN-Y GASTRIC BYPASS (Abdomen)      REPAIR, HERNIA, HIATAL, LAPAROSCOPIC (Abdomen)      ESOPHAGOGASTRODUODENOSCOPY (Throat) Diagnosis: (MORBID OBESITY)    Surgeons: Tom Gould M.D. Responsible Provider: Doris Michele M.D.    Anesthesia Type: general ASA Status: 2            Final Anesthesia Type: general  Last vitals  BP   Blood Pressure: 131/63    Temp   36 °C (96.8 °F)    Pulse   80   Resp   14    SpO2   95 %      Anesthesia Post Evaluation    Patient location during evaluation: PACU  Patient participation: complete - patient participated  Level of consciousness: awake and alert  Pain score: 5    Airway patency: patent  Anesthetic complications: no  Cardiovascular status: hemodynamically stable  Respiratory status: acceptable  Hydration status: euvolemic    PONV: none          There were no known notable events for this encounter.

## 2024-02-27 NOTE — DISCHARGE INSTRUCTIONS
D/C instructions:    DIET: Upon discharge from the hospital begin post-operative diet as discussed in your bariatric handbook    ACTIVITIES: After discharge from the hospital, you may resume full routine activities. However, there should be no heavy lifting (greater than 15 pounds) and no strenuous activities until after your follow-up visit. Otherwise, routine activities of daily living are acceptable.    DRIVING: If you drive, you may drive whenever you are off pain medications and are able to perform the activities needed to drive, i.e. turning, bending, twisting, etc.    BATHING: You may get the wound wet at any time after leaving the hospital. You may shower, but do not submerge in a bath for at least a week. Dressings may come off after 48 hours.    PAIN MEDICATION: You will be given a prescription for pain medication at discharge. Please take these as directed. It is important to remember not to take medications on an empty stomach as this may cause nausea.    CALL IF YOU HAVE: (1) Fevers to more than 1010 F, (2) Unusual chest or leg pain, (3) Drainage or fluid from incision that may be foul smelling, increased tenderness or soreness at the wound or the wound edges are no longer together, redness or swelling at the incision site. Please do not hesitate to call with any other questions.     APPOINTMENT: Contact our office at [phone] for a follow-up appointment in 1 week following your procedure.    If you have any additional questions, please do not hesitate to call the office.    Office address:  [address] [place] [address], [address] [place] [address]

## 2024-02-27 NOTE — ANESTHESIA PROCEDURE NOTES
Airway    Date/Time: 2/27/2024 7:35 AM    Performed by: Doris Michele M.D.  Authorized by: Doris Michele M.D.    Location:  OR  Urgency:  Elective  Indications for Airway Management:  Anesthesia      Spontaneous Ventilation: absent    Sedation Level:  Deep  Preoxygenated: Yes    Patient Position:  Sniffing  Mask Difficulty Assessment:  1 - vent by mask  Final Airway Type:  Endotracheal airway  Final Endotracheal Airway:  ETT  Cuffed: Yes    Technique Used for Successful ETT Placement:  Direct laryngoscopy    Insertion Site:  Oral  Blade Type:  Jed  Laryngoscope Blade/Videolaryngoscope Blade Size:  3  ETT Size (mm):  7.0  Measured from:  Teeth  ETT to Teeth (cm):  20  Placement Verified by: auscultation and capnometry    Cormack-Lehane Classification:  Grade I - full view of glottis  Number of Attempts at Approach:  1

## 2024-02-27 NOTE — PROGRESS NOTES
Medication history reviewed with pt. Med rec is complete.  Allergies reviewed, per pt  Interviewed pt with  at bedside with permission from pt.      Patient has had any outpatient antibiotics in the last 30 days, pt started a Z-Denilson on 2/1/2024 for 5 day course.  Pt reports that she did finish course of antibiotic     Pt is not on any anticoagulants

## 2024-02-28 ENCOUNTER — PATIENT OUTREACH (OUTPATIENT)
Dept: SCHEDULING | Facility: IMAGING CENTER | Age: 39
End: 2024-02-28
Payer: MEDICAID

## 2024-02-28 VITALS
HEIGHT: 65 IN | OXYGEN SATURATION: 96 % | WEIGHT: 227.29 LBS | DIASTOLIC BLOOD PRESSURE: 66 MMHG | BODY MASS INDEX: 37.87 KG/M2 | HEART RATE: 67 BPM | SYSTOLIC BLOOD PRESSURE: 151 MMHG | TEMPERATURE: 97.9 F | RESPIRATION RATE: 18 BRPM

## 2024-02-28 LAB
ALBUMIN SERPL BCP-MCNC: 3.7 G/DL (ref 3.2–4.9)
ALBUMIN/GLOB SERPL: 1.1 G/DL
ALP SERPL-CCNC: 61 U/L (ref 30–99)
ALT SERPL-CCNC: 34 U/L (ref 2–50)
ANION GAP SERPL CALC-SCNC: 10 MMOL/L (ref 7–16)
AST SERPL-CCNC: 29 U/L (ref 12–45)
BILIRUB SERPL-MCNC: 0.3 MG/DL (ref 0.1–1.5)
BUN SERPL-MCNC: 10 MG/DL (ref 8–22)
CALCIUM ALBUM COR SERPL-MCNC: 8.6 MG/DL (ref 8.5–10.5)
CALCIUM SERPL-MCNC: 8.4 MG/DL (ref 8.5–10.5)
CHLORIDE SERPL-SCNC: 105 MMOL/L (ref 96–112)
CO2 SERPL-SCNC: 20 MMOL/L (ref 20–33)
CREAT SERPL-MCNC: 0.64 MG/DL (ref 0.5–1.4)
ERYTHROCYTE [DISTWIDTH] IN BLOOD BY AUTOMATED COUNT: 43.9 FL (ref 35.9–50)
GFR SERPLBLD CREATININE-BSD FMLA CKD-EPI: 115 ML/MIN/1.73 M 2
GLOBULIN SER CALC-MCNC: 3.5 G/DL (ref 1.9–3.5)
GLUCOSE SERPL-MCNC: 157 MG/DL (ref 65–99)
HCT VFR BLD AUTO: 37.3 % (ref 37–47)
HGB BLD-MCNC: 11.9 G/DL (ref 12–16)
MCH RBC QN AUTO: 25.3 PG (ref 27–33)
MCHC RBC AUTO-ENTMCNC: 31.9 G/DL (ref 32.2–35.5)
MCV RBC AUTO: 79.4 FL (ref 81.4–97.8)
PLATELET # BLD AUTO: 350 K/UL (ref 164–446)
PMV BLD AUTO: 10 FL (ref 9–12.9)
POTASSIUM SERPL-SCNC: 4.9 MMOL/L (ref 3.6–5.5)
PROT SERPL-MCNC: 7.2 G/DL (ref 6–8.2)
RBC # BLD AUTO: 4.7 M/UL (ref 4.2–5.4)
SODIUM SERPL-SCNC: 135 MMOL/L (ref 135–145)
WBC # BLD AUTO: 17.6 K/UL (ref 4.8–10.8)

## 2024-02-28 PROCEDURE — 36415 COLL VENOUS BLD VENIPUNCTURE: CPT

## 2024-02-28 PROCEDURE — 700101 HCHG RX REV CODE 250: Performed by: SURGERY

## 2024-02-28 PROCEDURE — 80053 COMPREHEN METABOLIC PANEL: CPT

## 2024-02-28 PROCEDURE — 85027 COMPLETE CBC AUTOMATED: CPT

## 2024-02-28 PROCEDURE — A9270 NON-COVERED ITEM OR SERVICE: HCPCS | Performed by: SURGERY

## 2024-02-28 PROCEDURE — 700111 HCHG RX REV CODE 636 W/ 250 OVERRIDE (IP): Performed by: SURGERY

## 2024-02-28 PROCEDURE — 700105 HCHG RX REV CODE 258: Performed by: SURGERY

## 2024-02-28 PROCEDURE — 700102 HCHG RX REV CODE 250 W/ 637 OVERRIDE(OP): Performed by: SURGERY

## 2024-02-28 RX ADMIN — METHOCARBAMOL 1000 MG: 100 INJECTION, SOLUTION INTRAMUSCULAR; INTRAVENOUS at 06:37

## 2024-02-28 RX ADMIN — POTASSIUM CHLORIDE, DEXTROSE MONOHYDRATE AND SODIUM CHLORIDE: 150; 5; 450 INJECTION, SOLUTION INTRAVENOUS at 02:33

## 2024-02-28 RX ADMIN — FAMOTIDINE 20 MG: 10 INJECTION, SOLUTION INTRAVENOUS at 06:32

## 2024-02-28 RX ADMIN — OXYCODONE HYDROCHLORIDE 10 MG: 5 SOLUTION ORAL at 09:15

## 2024-02-28 RX ADMIN — ONDANSETRON 4 MG: 2 INJECTION INTRAMUSCULAR; INTRAVENOUS at 06:32

## 2024-02-28 RX ADMIN — OXYCODONE HYDROCHLORIDE 10 MG: 5 SOLUTION ORAL at 02:31

## 2024-02-28 ASSESSMENT — PAIN DESCRIPTION - PAIN TYPE
TYPE: SURGICAL PAIN
TYPE: SURGICAL PAIN;ACUTE PAIN
TYPE: SURGICAL PAIN;ACUTE PAIN

## 2024-02-28 NOTE — CARE PLAN
The patient is Stable - Low risk of patient condition declining or worsening    Shift Goals  Clinical Goals: ERAS protocol  Patient Goals: Rest    Progress made toward(s) clinical / shift goals:  ERAS protocol followed, Pt. Resting comfortably

## 2024-02-28 NOTE — PROGRESS NOTES
Patient discharged home per MD orders. Patient ambulating without assistance, on RA saturating >90%. Gastric bypass discharge education provided, incisional care, follow up appointments, medication safety, patient demonstrated and verbalized understanding. All questions answered. Tolerating diet. Voiding without difficulty. IV removed. All patient belongings with patient at this time. Patient educated to call MD or return to ED for concerns.

## 2024-02-28 NOTE — DISCHARGE SUMMARY
Discharge Summary    CHIEF COMPLAINT ON ADMISSION  No chief complaint on file.      Reason for Admission  Morbid obesity (HCC)     Admission Date  2/27/2024    CODE STATUS  Full Code    HPI & HOSPITAL COURSE  This is a 38 y.o. female here with morbid obesity status post laparoscopic Rafiq-en-Y gastric bypass and laparoscopic hiatal hernia repair.  Patient is doing well on postop day #1 she is ambulating well, she is tolerating clear liquid diet and pain is controlled with oral medication    Therefore, she is discharged in good and stable condition to home with close outpatient follow-up.    The patient recovered much more quickly than anticipated on admission.    Discharge Date  02/28/24    FOLLOW UP ITEMS POST DISCHARGE  none    DISCHARGE DIAGNOSES  Active Problems:    Postoperative pain (POA: Unknown)  Resolved Problems:    * No resolved hospital problems. *      FOLLOW UP  Future Appointments   Date Time Provider Department Center   4/30/2024  2:00 PM Bud Bentíez M.D. [facility] None     Tom Gould M.D.  [address] [facility] [address]  [facility] [id number] [phone]    Follow up in 2 week(s)        MEDICATIONS ON DISCHARGE     Medication List        START taking these medications        Instructions   celecoxib 100 MG Caps  Commonly known as: CeleBREX   Take 1 Capsule by mouth 2 times a day. Take scheduled for 3 days then as needed for pain after  Dose: 100 mg     oxyCODONE 5 MG/5ML solution  Commonly known as: Roxicodone   Take 5 mL by mouth every four hours as needed for Severe Pain for up to 4 days.  Dose: 5 mg     polyethylene glycol/lytes Pack  Commonly known as: Miralax   Mix 1 Packet per package instructions and drink by mouth every day. While taking narcotics, hold if greater then 3 BMs a day  Dose: 17 g            CHANGE how you take these medications        Instructions   acetaminophen 325 MG Tabs  What changed:   when to take this  reasons to take this  Commonly known as: Tylenol   Take 2 Tablets by  mouth every 6 hours. take scheduled for 3 days post-op then as needed after  Dose: 650 mg     ondansetron 4 MG Tbdp  What changed: additional instructions  Commonly known as: Zofran ODT   Dissolve 1 Tablet by mouth every 6 hours as needed for Nausea/Vomiting.  Dose: 4 mg            CONTINUE taking these medications        Instructions   * Blood Glucose Test Strips   Doctor's comments: Or per formulary preference. ICD-10 code: E11.65 Uncontrolled type 2 Diabetes Mellitus  Use one strip to test blood sugar three times daily before meals.     * Blood Glucose Meter Kit   Doctor's comments: Or per formulary preference. ICD-10 code: E11.65 Uncontrolled type 2 Diabetes Mellitus  Test blood sugar as recommended by provider. blood glucose monitoring kit.     Lancets   Sig: use TID and prn ssx high or low sugar. #100 RF x 3     lidocaine 5 % Ptch  Commonly known as: Lidoderm   Place 1 Patch on the skin as needed (Apply's on back for pain).  Dose: 1 Patch     omeprazole 20 MG delayed-release capsule  Commonly known as: PriLOSEC   Doctor's comments: Open capsule and mix in 10cc H2O  Take 1 Capsule by mouth every day. Open capsule and mix in 10 mL of water  Dose: 20 mg     tizanidine 4 MG Tabs  Commonly known as: Zanaflex   Take 1 Tablet by mouth at bedtime as needed (muscle spasms).  Dose: 4 mg           * This list has 2 medication(s) that are the same as other medications prescribed for you. Read the directions carefully, and ask your doctor or other care provider to review them with you.                STOP taking these medications      azithromycin 250 MG Tabs  Commonly known as: Zithromax     meloxicam 7.5 MG Tabs  Commonly known as: Mobic              Allergies  Allergies   Allergen Reactions    Gabapentin Unspecified     Seizure    Penicillin G      Previous hospitalization after penicillin - does not remember reaction    Cannot tolerate any Cillin    Penicillins Unspecified     Previous hospitalization after penicillin  - does not remember reaction    Cannot tolerate any Cillin       DIET  Orders Placed This Encounter   Procedures    Diet Order Diet: Clear Liquid; Miscellaneous modifications: (optional): Bariatric     Standing Status:   Standing     Number of Occurrences:   1     Order Specific Question:   Diet:     Answer:   Clear Liquid [10]     Order Specific Question:   Miscellaneous modifications: (optional)     Answer:   Bariatric [3]       ACTIVITY  As tolerated.  Weight bearing as tolerated    CONSULTATIONS  none    PROCEDURES  2/27/24 - Laparoscopic lenny-en-y gastric bypass and hiatal hernia repair    LABORATORY  Lab Results   Component Value Date    SODIUM 135 02/28/2024    POTASSIUM 4.9 02/28/2024    CHLORIDE 105 02/28/2024    CO2 20 02/28/2024    GLUCOSE 157 (H) 02/28/2024    BUN 10 02/28/2024    CREATININE 0.64 02/28/2024        Lab Results   Component Value Date    WBC 17.6 (H) 02/28/2024    HEMOGLOBIN 11.9 (L) 02/28/2024    HEMATOCRIT 37.3 02/28/2024    PLATELETCT 350 02/28/2024        Total time of the discharge process exceeds 20 minutes.

## 2024-02-28 NOTE — CARE PLAN
The patient is Stable - Low risk of patient condition declining or worsening    Shift Goals  Clinical Goals: Pain/Nausea Management; Mobility/Ambulation  Patient Goals: Comfort    Progress made toward(s) clinical / shift goals:  Patient medicated per MAR. Non-pharmacologic comfort measures implemented. Safety discussed. Education provided. Ambulation and repositioning encouraged.     Problem: Knowledge Deficit - Standard  Goal: Patient and family/care givers will demonstrate understanding of plan of care, disease process/condition, diagnostic tests and medications  Outcome: Progressing     Problem: Pain - Standard  Goal: Alleviation of pain or a reduction in pain to the patient’s comfort goal  Outcome: Progressing     Problem: Mobility  Goal: Patient's capacity to carry out activities will improve  Outcome: Progressing

## 2024-02-28 NOTE — PROGRESS NOTES
Bedside report received.  Assessment complete.  A&O x 4. Patient calls appropriately.  Patient ambulates without assist.    Patient has 4/10 pain. Pain managed with prescribed medications.  Denies N&V. Tolerating diet.  Surgical lap stabs are all well approximated with dermabnicole and REINA.  + void  Patient denies SOB.  Patient pleasant with staff and sitting up in bed.  Review plan with of care with patient. Call light and personal belongings within reach. Hourly rounding in place. All needs met at this time.

## 2024-02-28 NOTE — PROGRESS NOTES
"       S: Alie Gardner  is a 38 y.o.  female admitted for lenny. Doing well, souleymane po, ambulating, pain controlled      O:  /59   Pulse (!) 55   Temp 36.7 °C (98.1 °F) (Temporal)   Resp 18   Ht 1.651 m (5' 5\")   Wt 103 kg (227 lb 4.7 oz)   SpO2 95%   Intake/Output                               02/26/24 0700 - 02/27/24 0659 (Not Admitted) 02/27/24 0700 - 02/28/24 0659 02/28/24 0700 - 02/29/24 0659     [id number] 5256-5891 Total 6544-9701 1019-0017 Total 9134-9079 2776-2205 Total                    Intake    P.O.  --  -- --  --  30 30  --  -- --    P.O. -- -- -- -- 30 30 -- -- --    I.V.  --  -- --  1300  -- 1300  --  -- --    Volume (mL) (lactated ringers infusion) -- -- -- 1300 -- 1300 -- -- --    Total Intake -- -- -- 1300 30 1330 -- -- --       Output    Urine  --  -- --  --  -- --  --  -- --    Number of Times Voided -- -- -- 1 x -- 1 x -- -- --    Stool  --  -- --  --  -- --  --  -- --    Number of Times Stooled -- -- -- -- 0 x 0 x -- -- --    Blood  --  -- --  50  -- 50  --  -- --    Est. Blood Loss -- -- -- 50 -- 50 -- -- --    Total Output -- -- -- 50 -- 50 -- -- --       Net I/O     -- -- -- 1250 30 1280 -- -- --          Recent Labs     02/28/24  0547   SODIUM 135   POTASSIUM 4.9   CHLORIDE 105   CO2 20   GLUCOSE 157*   BUN 10   CREATININE 0.64   CALCIUM 8.4*     Recent Labs     02/28/24  0547   WBC 17.6*   RBC 4.70   HEMOGLOBIN 11.9*   HEMATOCRIT 37.3   MCV 79.4*   MCH 25.3*   MCHC 31.9*   RDW 43.9   PLATELETCT 350   MPV 10.0       Alert and Oriented x3, No Acute Distress  Normal Respiratory Effort  Abdomen soft, appropriately tender  Incisions/Bandages clean/dry/intact  Extremities warm and well perfused    A/P:  Ready for DC home    Tom Gould MD  [facility]  "

## 2024-02-28 NOTE — PROGRESS NOTES
"Received report from previous shift RN at 1900.  Assessment complete.  A&O x 4. Patient calls appropriately.  Patient ambulates with standby assist.   Patient has 7/10 pain. Pain managed with prescribed medications per MAR.  Pt complains of nausea. Tolerating bariatric clear liquid diet.  Skin per flowsheets.  + void, - flatus, - BM.  Patient denies SOB on RA.  /68   Pulse (!) 52   Temp 36.1 °C (97 °F) (Temporal)   Resp 18   Ht 1.651 m (5' 5\")   Wt 103 kg (227 lb 4.7 oz)   LMP 02/12/2024 (Exact Date)   SpO2 97%   BMI 37.82 kg/m²     Patient pleasant and cooperative throughout assessment.  Reviewed plan of care with patient, pt verbalizes understanding. Call light and personal belongings with in reach. Hourly rounding in place. All needs met at this time.    "

## 2024-03-27 ENCOUNTER — HOSPITAL ENCOUNTER (OUTPATIENT)
Dept: LAB | Facility: MEDICAL CENTER | Age: 39
End: 2024-03-27
Attending: CLINICAL NURSE SPECIALIST
Payer: MEDICAID

## 2024-03-27 LAB
25(OH)D3 SERPL-MCNC: 27 NG/ML (ref 30–100)
ALBUMIN SERPL BCP-MCNC: 4.2 G/DL (ref 3.2–4.9)
ALBUMIN/GLOB SERPL: 1.3 G/DL
ALP SERPL-CCNC: 64 U/L (ref 30–99)
ALT SERPL-CCNC: 22 U/L (ref 2–50)
ANION GAP SERPL CALC-SCNC: 12 MMOL/L (ref 7–16)
AST SERPL-CCNC: 23 U/L (ref 12–45)
BASOPHILS # BLD AUTO: 0.5 % (ref 0–1.8)
BASOPHILS # BLD: 0.04 K/UL (ref 0–0.12)
BILIRUB SERPL-MCNC: 0.4 MG/DL (ref 0.1–1.5)
BUN SERPL-MCNC: 7 MG/DL (ref 8–22)
CALCIUM ALBUM COR SERPL-MCNC: 9 MG/DL (ref 8.5–10.5)
CALCIUM SERPL-MCNC: 9.2 MG/DL (ref 8.5–10.5)
CHLORIDE SERPL-SCNC: 110 MMOL/L (ref 96–112)
CHOLEST SERPL-MCNC: 137 MG/DL (ref 100–199)
CO2 SERPL-SCNC: 20 MMOL/L (ref 20–33)
CREAT SERPL-MCNC: 0.68 MG/DL (ref 0.5–1.4)
EOSINOPHIL # BLD AUTO: 0.04 K/UL (ref 0–0.51)
EOSINOPHIL NFR BLD: 0.5 % (ref 0–6.9)
ERYTHROCYTE [DISTWIDTH] IN BLOOD BY AUTOMATED COUNT: 45.9 FL (ref 35.9–50)
FASTING STATUS PATIENT QL REPORTED: NORMAL
FERRITIN SERPL-MCNC: 18.6 NG/ML (ref 10–291)
FOLATE SERPL-MCNC: 16.6 NG/ML
GFR SERPLBLD CREATININE-BSD FMLA CKD-EPI: 114 ML/MIN/1.73 M 2
GLOBULIN SER CALC-MCNC: 3.3 G/DL (ref 1.9–3.5)
GLUCOSE SERPL-MCNC: 92 MG/DL (ref 65–99)
HCT VFR BLD AUTO: 41 % (ref 37–47)
HDLC SERPL-MCNC: 42 MG/DL
HGB BLD-MCNC: 12.7 G/DL (ref 12–16)
IMM GRANULOCYTES # BLD AUTO: 0.02 K/UL (ref 0–0.11)
IMM GRANULOCYTES NFR BLD AUTO: 0.2 % (ref 0–0.9)
IRON SATN MFR SERPL: 10 % (ref 15–55)
IRON SERPL-MCNC: 32 UG/DL (ref 40–170)
LDLC SERPL CALC-MCNC: 73 MG/DL
LYMPHOCYTES # BLD AUTO: 3.07 K/UL (ref 1–4.8)
LYMPHOCYTES NFR BLD: 36.9 % (ref 22–41)
MCH RBC QN AUTO: 25 PG (ref 27–33)
MCHC RBC AUTO-ENTMCNC: 31 G/DL (ref 32.2–35.5)
MCV RBC AUTO: 80.6 FL (ref 81.4–97.8)
MONOCYTES # BLD AUTO: 0.4 K/UL (ref 0–0.85)
MONOCYTES NFR BLD AUTO: 4.8 % (ref 0–13.4)
NEUTROPHILS # BLD AUTO: 4.74 K/UL (ref 1.82–7.42)
NEUTROPHILS NFR BLD: 57.1 % (ref 44–72)
NRBC # BLD AUTO: 0 K/UL
NRBC BLD-RTO: 0 /100 WBC (ref 0–0.2)
PLATELET # BLD AUTO: 330 K/UL (ref 164–446)
PMV BLD AUTO: 11.1 FL (ref 9–12.9)
POTASSIUM SERPL-SCNC: 4 MMOL/L (ref 3.6–5.5)
PROT SERPL-MCNC: 7.5 G/DL (ref 6–8.2)
RBC # BLD AUTO: 5.09 M/UL (ref 4.2–5.4)
SODIUM SERPL-SCNC: 142 MMOL/L (ref 135–145)
TIBC SERPL-MCNC: 310 UG/DL (ref 250–450)
TRIGL SERPL-MCNC: 112 MG/DL (ref 0–149)
UIBC SERPL-MCNC: 278 UG/DL (ref 110–370)
VIT B12 SERPL-MCNC: 624 PG/ML (ref 211–911)
WBC # BLD AUTO: 8.3 K/UL (ref 4.8–10.8)

## 2024-03-27 PROCEDURE — 83550 IRON BINDING TEST: CPT

## 2024-03-27 PROCEDURE — 82306 VITAMIN D 25 HYDROXY: CPT

## 2024-03-27 PROCEDURE — 84425 ASSAY OF VITAMIN B-1: CPT

## 2024-03-27 PROCEDURE — 82728 ASSAY OF FERRITIN: CPT

## 2024-03-27 PROCEDURE — 83540 ASSAY OF IRON: CPT

## 2024-03-27 PROCEDURE — 36415 COLL VENOUS BLD VENIPUNCTURE: CPT

## 2024-03-27 PROCEDURE — 82607 VITAMIN B-12: CPT

## 2024-03-27 PROCEDURE — 82746 ASSAY OF FOLIC ACID SERUM: CPT

## 2024-03-27 PROCEDURE — 85025 COMPLETE CBC W/AUTO DIFF WBC: CPT

## 2024-03-27 PROCEDURE — 80061 LIPID PANEL: CPT

## 2024-03-27 PROCEDURE — 80053 COMPREHEN METABOLIC PANEL: CPT

## 2024-04-01 LAB — VIT B1 BLD-MCNC: 130 NMOL/L (ref 70–180)

## 2024-04-30 ENCOUNTER — APPOINTMENT (OUTPATIENT)
Dept: PHYSICAL MEDICINE AND REHAB | Facility: MEDICAL CENTER | Age: 39
End: 2024-04-30
Payer: MEDICAID

## 2024-05-28 ENCOUNTER — APPOINTMENT (OUTPATIENT)
Dept: PHYSICAL MEDICINE AND REHAB | Facility: MEDICAL CENTER | Age: 39
End: 2024-05-28
Payer: MEDICAID

## 2024-05-28 DIAGNOSIS — M47.816 LUMBAR SPONDYLOSIS: ICD-10-CM

## 2024-05-28 PROCEDURE — 99999 PR NO CHARGE: CPT | Performed by: PHYSICAL MEDICINE & REHABILITATION

## 2024-06-05 ENCOUNTER — APPOINTMENT (OUTPATIENT)
Dept: PHYSICAL MEDICINE AND REHAB | Facility: MEDICAL CENTER | Age: 39
End: 2024-06-05
Payer: MEDICAID

## 2025-01-08 ENCOUNTER — APPOINTMENT (OUTPATIENT)
Dept: MEDICAL GROUP | Facility: CLINIC | Age: 40
End: 2025-01-08
Payer: MEDICAID

## 2025-01-08 ENCOUNTER — HOSPITAL ENCOUNTER (OUTPATIENT)
Facility: MEDICAL CENTER | Age: 40
End: 2025-01-08
Attending: PHYSICIAN ASSISTANT
Payer: MEDICAID

## 2025-01-08 VITALS
HEART RATE: 75 BPM | RESPIRATION RATE: 18 BRPM | HEIGHT: 65 IN | WEIGHT: 156.09 LBS | TEMPERATURE: 97.9 F | SYSTOLIC BLOOD PRESSURE: 110 MMHG | BODY MASS INDEX: 26.01 KG/M2 | DIASTOLIC BLOOD PRESSURE: 60 MMHG | OXYGEN SATURATION: 75 %

## 2025-01-08 DIAGNOSIS — M79.2 NERVE PAIN: ICD-10-CM

## 2025-01-08 DIAGNOSIS — Z13.21 ENCOUNTER FOR VITAMIN DEFICIENCY SCREENING: ICD-10-CM

## 2025-01-08 DIAGNOSIS — D50.9 IRON DEFICIENCY ANEMIA, UNSPECIFIED IRON DEFICIENCY ANEMIA TYPE: ICD-10-CM

## 2025-01-08 DIAGNOSIS — E11.65 CONTROLLED TYPE 2 DIABETES MELLITUS WITH HYPERGLYCEMIA, WITHOUT LONG-TERM CURRENT USE OF INSULIN (HCC): ICD-10-CM

## 2025-01-08 DIAGNOSIS — R20.0 NUMBNESS: ICD-10-CM

## 2025-01-08 DIAGNOSIS — Z23 NEED FOR VACCINATION: ICD-10-CM

## 2025-01-08 DIAGNOSIS — R53.1 WEAKNESS: ICD-10-CM

## 2025-01-08 LAB
HBA1C MFR BLD: 5.6 % (ref ?–5.8)
POCT INT CON NEG: NEGATIVE
POCT INT CON POS: POSITIVE

## 2025-01-08 PROCEDURE — 99214 OFFICE O/P EST MOD 30 MIN: CPT | Mod: 25 | Performed by: PHYSICIAN ASSISTANT

## 2025-01-08 PROCEDURE — 3078F DIAST BP <80 MM HG: CPT | Performed by: PHYSICIAN ASSISTANT

## 2025-01-08 PROCEDURE — 83036 HEMOGLOBIN GLYCOSYLATED A1C: CPT | Performed by: PHYSICIAN ASSISTANT

## 2025-01-08 PROCEDURE — 82570 ASSAY OF URINE CREATININE: CPT

## 2025-01-08 PROCEDURE — 82043 UR ALBUMIN QUANTITATIVE: CPT

## 2025-01-08 PROCEDURE — 90471 IMMUNIZATION ADMIN: CPT

## 2025-01-08 PROCEDURE — 3074F SYST BP LT 130 MM HG: CPT | Performed by: PHYSICIAN ASSISTANT

## 2025-01-08 PROCEDURE — 90656 IIV3 VACC NO PRSV 0.5 ML IM: CPT

## 2025-01-08 RX ORDER — LIDOCAINE 50 MG/G
1 PATCH TOPICAL PRN
Qty: 10 PATCH | Refills: 5 | Status: SHIPPED | OUTPATIENT
Start: 2025-01-08

## 2025-01-08 ASSESSMENT — PATIENT HEALTH QUESTIONNAIRE - PHQ9: CLINICAL INTERPRETATION OF PHQ2 SCORE: 0

## 2025-01-08 ASSESSMENT — FIBROSIS 4 INDEX: FIB4 SCORE: 0.58

## 2025-01-09 ENCOUNTER — HOSPITAL ENCOUNTER (OUTPATIENT)
Dept: RADIOLOGY | Facility: MEDICAL CENTER | Age: 40
End: 2025-01-09
Attending: PHYSICIAN ASSISTANT
Payer: MEDICAID

## 2025-01-09 DIAGNOSIS — R20.0 NUMBNESS: ICD-10-CM

## 2025-01-09 DIAGNOSIS — M79.2 NERVE PAIN: ICD-10-CM

## 2025-01-09 DIAGNOSIS — R53.1 WEAKNESS: ICD-10-CM

## 2025-01-09 DIAGNOSIS — E11.65 CONTROLLED TYPE 2 DIABETES MELLITUS WITH HYPERGLYCEMIA, WITHOUT LONG-TERM CURRENT USE OF INSULIN (HCC): ICD-10-CM

## 2025-01-09 LAB
CREAT UR-MCNC: 55.41 MG/DL
MICROALBUMIN UR-MCNC: <1.2 MG/DL
MICROALBUMIN/CREAT UR: NORMAL MG/G (ref 0–30)

## 2025-01-09 PROCEDURE — 72148 MRI LUMBAR SPINE W/O DYE: CPT

## 2025-01-09 NOTE — PROGRESS NOTES
cc:  pain    Subjective:     Alie Gardner is a 39 y.o. female presenting for pain        History of Present Illness  The patient is a 39-year-old female who presents to the office today with concerns about pain. She has not been seen in almost a year. She was previously seen by Dr. Benítez with physiatry.    She reports experiencing pain in her tailbone and numbness in her legs for the past 1 to 2 weeks. Her  has observed that she does not perceive his touch during foot massages. 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 Her manager is aware of her degenerative disc disease and accommodates her need to rest. She does not have any metallic implants in her body and does not experience claustrophobia. She recalls experiencing pain during her previous MRI scan while lying down. She describes her current pain level as consistently between 8 and 10 for the past 2 to 3 weeks. She has been advised to lose weight before considering surgical intervention for her discs. She occasionally experiences rapid onset of tingling sensations, prompting her to sit down. She is seeking advice on pain management options beyond powdered Tylenol. She has found lidocaine patches effective for nerve spasm spots on her spine. She has found relief from an edible substance. She was previously prescribed gabapentin but discontinued it due to an adverse reaction that resulted in full-body numbness and an emergency room visit. She was previously under the care of Dr. Benítez, but her Medicaid was temporarily suspended due to a name change during her relocation process. She believes she needs to reestablish care with a neurosurgeon. She has undergone bariatric surgery.    She has a history of anemia and has been taking iron supplements. She maintains a strict diet and has experienced significant hair growth since her last visit.    She is due for an  influenza vaccine. She is overdue for a Pap smear, having not had one in 3 to 4 years. She had a mammogram a few years ago when she was at her heaviest weight and was experiencing ear pain and breast tenderness.    ALLERGIES  The patient has an allergic reaction to GABAPENTIN, which caused her entire body to become numb and required a visit to the ER.    MEDICATIONS  Current: lidocaine patches  Discontinued: gabapentin    IMMUNIZATIONS  She is due for an influenza vaccine.       Review of systems:  See above.   Denies any symptoms unless previously indicated.        Current Outpatient Medications:     lidocaine (LIDODERM) 5 % Patch, Place 1 Patch on the skin as needed (Apply's on back for pain)., Disp: 10 Patch, Rfl: 5    acetaminophen (TYLENOL) 325 MG Tab, Take 2 Tablets by mouth every 6 hours. take scheduled for 3 days post-op then as needed after, Disp: 60 Tablet, Rfl: 0    celecoxib (CELEBREX) 100 MG Cap, Take 1 Capsule by mouth 2 times a day. Take scheduled for 3 days then as needed for pain after (Patient not taking: Reported on 1/8/2025), Disp: 20 Capsule, Rfl: 0    omeprazole (PRILOSEC) 20 MG delayed-release capsule, Take 1 Capsule by mouth every day. Open capsule and mix in 10 mL of water (Patient not taking: Reported on 1/8/2025), Disp: 30 Capsule, Rfl: 11    polyethylene glycol/lytes (MIRALAX) Pack, Mix 1 Packet per package instructions and drink by mouth every day. While taking narcotics, hold if greater then 3 BMs a day (Patient not taking: Reported on 1/8/2025), Disp: 20 Each, Rfl: 0    ondansetron (ZOFRAN ODT) 4 MG TABLET DISPERSIBLE, Dissolve 1 Tablet by mouth every 6 hours as needed for Nausea/Vomiting. (Patient not taking: Reported on 1/8/2025), Disp: 18 Tablet, Rfl: 0    tizanidine (ZANAFLEX) 4 MG Tab, Take 1 Tablet by mouth at bedtime as needed (muscle spasms). (Patient not taking: Reported on 1/8/2025), Disp: 30 Tablet, Rfl: 2    Blood Glucose Test Strips, Use one strip to test blood sugar three  "times daily before meals. (Patient not taking: Reported on 1/8/2025), Disp: 100 Strip, Rfl: 0    Blood Glucose Meter Kit, Test blood sugar as recommended by provider. blood glucose monitoring kit. (Patient not taking: Reported on 1/8/2025), Disp: 1 Kit, Rfl: 0    Lancets, Sig: use TID and prn ssx high or low sugar. #100 RF x 3 (Patient not taking: Reported on 1/8/2025), Disp: 100 Each, Rfl: 11    Allergies, past medical history, past surgical history, family history, social history reviewed and updated    Objective:     Vitals: /60   Pulse 75   Temp 36.6 °C (97.9 °F)   Resp 18   Ht 1.651 m (5' 5\")   Wt 70.8 kg (156 lb 1.4 oz)   SpO2 (!) 75%   BMI 25.97 kg/m²   General: Alert, pleasant, appears to be in some pain  EYES:   PERRL, EOMI, no icterus or pallor.  Conjunctivae and lids normal.   HENT:  Normocephalic.  External ears normal.  Neck supple.    Respiratory: Normal respiratory effort.    Abdomen: Not obese  Skin: Warm, dry, no rashes.  Musculoskeletal:   Moves all extremities well.    Neurological: No tremors, sensation grossly intact, CN2-12 intact.  Psych:  Affect/mood is normal, judgement is good, memory is intact, grooming is appropriate.      Results  Laboratory Studies  A1c was 5.6.       Assessment/Plan:     Alie was seen today for other.    Diagnoses and all orders for this visit:    Nerve pain  -     MR-LUMBAR SPINE-W/O; Future  -     Referral to Neurosurgery  -     Referral to Pain Clinic  -     lidocaine (LIDODERM) 5 % Patch; Place 1 Patch on the skin as needed (Apply's on back for pain).    Weakness  -     MR-LUMBAR SPINE-W/O; Future  -     Referral to Neurosurgery  -     Referral to Pain Clinic  -     VITAMIN B12; Future  -     FOLATE; Future  -     VITAMIN B1; Future  -     VITAMIN B2 (RIBOFLAVIN); Future  -     VITAMIN B6; Future  -     lidocaine (LIDODERM) 5 % Patch; Place 1 Patch on the skin as needed (Apply's on back for pain).    Numbness  -     MR-LUMBAR SPINE-W/O; Future  -  "    Referral to Neurosurgery  -     Referral to Pain Clinic  -     VITAMIN B12; Future  -     FOLATE; Future  -     VITAMIN B1; Future  -     VITAMIN B2 (RIBOFLAVIN); Future  -     VITAMIN B6; Future  -     lidocaine (LIDODERM) 5 % Patch; Place 1 Patch on the skin as needed (Apply's on back for pain).    Controlled type 2 diabetes mellitus with hyperglycemia, without long-term current use of insulin (HCC)  -     Microalbumin Creat Ratio Urine (Clinic Collect); Future  -     POCT A1C  -     Lipid Profile; Future  -     Comp Metabolic Panel; Future  -     TSH WITH REFLEX TO FT4; Future    Iron deficiency anemia, unspecified iron deficiency anemia type  -     IRON/TOTAL IRON BIND; Future  -     VITAMIN B12; Future  -     FOLATE; Future  -     FERRITIN; Future  -     VITAMIN B1; Future  -     VITAMIN B2 (RIBOFLAVIN); Future  -     VITAMIN B6; Future    Encounter for vitamin deficiency screening  -     VITAMIN D,25 HYDROXY (DEFICIENCY); Future    Need for vaccination  -     INFLUENZA VACCINE TRI INJ (PF)        Assessment & Plan  1. Nerve pain, weakness, and numbness.  An updated MRI will be obtained to evaluate further. New referrals to neurosurgery and physiatry will be submitted. Lidocaine patches will be refilled.    2. Type 2 diabetes, controlled.  Her A1c is 5.6, indicating improved control since bariatric surgery. Labs will be obtained to evaluate further.    3. Iron deficiency anemia/vitamin deficiency screen.  Labs have been ordered to evaluate further, including B12, folic acid, B1, B2, B6, liver and kidney function, and thyroid function.    4. Need for vaccination.  The influenza vaccine will be administered today.    Follow-up  The patient will follow up in 4 to 6 weeks with test results.    PROCEDURE  The patient has undergone bariatric surgery.    Return in about 4 weeks (around 2/5/2025), or if symptoms worsen or fail to improve, for 4-6 weeks labs.    Please note that this dictation was created using voice  recognition software. I have made every reasonable attempt to correct obvious errors, but expect that there are errors of grammar and possible content that I did not discover before finalizing note.

## 2025-01-10 ENCOUNTER — TELEPHONE (OUTPATIENT)
Dept: MEDICAL GROUP | Facility: CLINIC | Age: 40
End: 2025-01-10
Payer: MEDICAID

## 2025-01-10 NOTE — TELEPHONE ENCOUNTER
DOCUMENTATION OF PAR STATUS:    1. Name of Medication & Dose: Lidocaine Patches      2. Name of Prescription Coverage Company & phone #: [employer]     3. Date Prior Auth Submitted: 1/10/25    4. What information was given to obtain insurance decision? Dx code     5. Prior Auth Status? Denied    6. Patient Notified: N\A

## 2025-01-16 ENCOUNTER — OFFICE VISIT (OUTPATIENT)
Dept: PHYSICAL MEDICINE AND REHAB | Facility: MEDICAL CENTER | Age: 40
End: 2025-01-16
Payer: MEDICAID

## 2025-01-16 VITALS
SYSTOLIC BLOOD PRESSURE: 136 MMHG | DIASTOLIC BLOOD PRESSURE: 74 MMHG | BODY MASS INDEX: 25.06 KG/M2 | WEIGHT: 150.4 LBS | OXYGEN SATURATION: 99 % | TEMPERATURE: 98.8 F | HEIGHT: 65 IN | HEART RATE: 96 BPM

## 2025-01-16 DIAGNOSIS — M54.51 VERTEBROGENIC LOW BACK PAIN: ICD-10-CM

## 2025-01-16 DIAGNOSIS — R20.0 BILATERAL LEG NUMBNESS: ICD-10-CM

## 2025-01-16 DIAGNOSIS — M47.816 LUMBAR SPONDYLOSIS: ICD-10-CM

## 2025-01-16 DIAGNOSIS — M79.2 NEURALGIA: ICD-10-CM

## 2025-01-16 DIAGNOSIS — M54.50 LUMBOSACRAL PAIN: ICD-10-CM

## 2025-01-16 DIAGNOSIS — M51.369 ANNULAR TEAR OF LUMBAR DISC: ICD-10-CM

## 2025-01-16 DIAGNOSIS — Z91.81 RISK FOR FALLS: ICD-10-CM

## 2025-01-16 PROCEDURE — 3075F SYST BP GE 130 - 139MM HG: CPT | Performed by: PHYSICAL MEDICINE & REHABILITATION

## 2025-01-16 PROCEDURE — 1125F AMNT PAIN NOTED PAIN PRSNT: CPT | Performed by: PHYSICAL MEDICINE & REHABILITATION

## 2025-01-16 PROCEDURE — 3078F DIAST BP <80 MM HG: CPT | Performed by: PHYSICAL MEDICINE & REHABILITATION

## 2025-01-16 PROCEDURE — 99214 OFFICE O/P EST MOD 30 MIN: CPT | Performed by: PHYSICAL MEDICINE & REHABILITATION

## 2025-01-16 ASSESSMENT — PAIN SCALES - GENERAL: PAINLEVEL_OUTOF10: 8=MODERATE-SEVERE PAIN

## 2025-01-16 ASSESSMENT — PATIENT HEALTH QUESTIONNAIRE - PHQ9: CLINICAL INTERPRETATION OF PHQ2 SCORE: 0

## 2025-01-16 ASSESSMENT — FIBROSIS 4 INDEX: FIB4 SCORE: 0.58

## 2025-01-16 NOTE — Clinical Note
Meron Montez P.A.-C. ,   For the back pain we have her scheduled for medial branch radiofrequency neurotomy try believe will help with this.  For the numbness and tingling in the legs it is less likely this is coming from the spine and more likely this is secondary to the rapid weight loss after gastric bypass.  I also have an EMG that is scheduled for her.  I recommend that you investigating for a nutritional/vitamin/labs cause for the numbness and tingling.    Thank you,  Bud Benítez MD

## 2025-01-16 NOTE — H&P (VIEW-ONLY)
Follow up patient note  Interventional spine and Pain  Physiatry (physical medicine and  Rehabilitation)       Chief complaint:   Chief Complaint   Patient presents with    Follow-Up     Leg numbness          HISTORY    Please see new patient note by Dr Benítez,  for more details.     HPI  Patient identification: Alie Gardner ,  1985,   With Diagnoses of Lumbar spondylosis, Annular tear of lumbar disc, Vertebrogenic low back pain type I modic changes at L5-S1, Lumbosacral pain, Risk for falls, Neuralgia, and Bilateral leg numbness were pertinent to this visit.       Verbal consent was obtained for Curt copilot : Yes      History of Present Illness  The patient is a 39-year-old female presenting for a follow-up visit.    She reports experiencing bilateral lower back pain, accompanied by paresthesia extending down the anterior aspect of both legs, including the toes. To alleviate these symptoms, she frequently clenches her toes. The onset of paresthesia is typically associated with the recurrence of pain, a phenomenon that has been occurring daily for the past two weeks. Prior to this, she had not experienced any leg numbness. She also reports episodes of leg weakness, which have resulted in falls on several occasions. An MRI scan conducted a week ago revealed disc tearing. She describes the pain as sharp and penetrating, seemingly originating from within the spine. Additionally, she experiences shooting pain in her coccyx after sitting for approximately 5 to 10 minutes, which radiates into her toes. Dr. Montez suggested that this could be due to nerve involvement. She has found some relief from her symptoms through the use of edibles, which provide approximately 10 hours of analgesia. However, she notes that her prescribed medications have been largely ineffective over the past 2 to 3 months. Her back pain has been severe enough to cause edema and disrupt her sleep, often limiting her to just 6  hours of rest. She has an upcoming appointment with a neurosurgeon and is currently awaiting a prescription for lidocaine patches from her pharmacy. She has lost a significant amount of weight, totaling 115 pounds, through strict dietary control and avoidance of fatty foods. Her diet primarily consists of seafood, specifically salmon and tuna, with occasional chicken and beef.         ROS Red Flags :     Fever, Chills, Sweats: Denies  Involuntary Weight Loss: Denies  Bowel/Bladder Incontinence: Denies  Saddle Anesthesia: Denies        PMHx:   Past Medical History:   Diagnosis Date    Anemia     Arthritis     oesteo, hand and spine    DDD (degenerative disc disease), lumbar     L4 L5    Flu-like symptoms 12/15/2022    Heart murmur     Helicobacter pylori gastritis 07/02/2023    IBD (inflammatory bowel disease)     Morbid obesity with BMI of 40.0-44.9, adult ([facility]) 02/04/2022    MRSA (methicillin resistant Staphylococcus aureus)     2016    Preop examination 07/20/2023    Psychiatric problem     depresssion, anxiety, PTSD -from sound of heart monitor.       PSHx:   Past Surgical History:   Procedure Laterality Date    OK UPPER GI ENDOSCOPY,DIAGNOSIS N/A 2/27/2024    Procedure: ESOPHAGOGASTRODUODENOSCOPY;  Surgeon: Tom Gould M.D.;  Location: SURGERY [facility];  Service: General    GASTRIC BYPASS LAPAROSCOPIC N/A 2/27/2024    Procedure: LAPAROSCOPIC BO-EN-Y GASTRIC BYPASS;  Surgeon: Tom Gould M.D.;  Location: SURGERY [facility];  Service: General    OK DSTR NROLYTC AGNT PARVERTEB FCT SNGL LMBR/SACRAL Bilateral 10/3/2023    Procedure: BILATERAL radiofrequency neurotomies medial branch targeting the L4-5 and L5-S1 facet joints with fluoroscopic guidance and sedation.        Note: plan on sedation 0.5mg versed and 25mcg fentanyl. Plan for 80 °C for 90 seconds for each neurotomy;  Surgeon: Bud Benítez M.D.;  Location: [facility]ERY [facility];  Service: Pain Management    OK INJ DX/THER AGNT PARAVERT  FACET JOINT, SHILO* Bilateral 8/30/2023    Procedure: Diagnostic medial branch blocks targeting the BILATERAL L4-5 and L5-S1 facet joints with fluoroscopic guidance #2.   Note: 22-5. Diagnostic medial branch block #2 is only be done if procedure #1 is a positive block. This will be on a separate date from procedure #1.;  Surgeon: Bud Benítez M.D.;  Location: [facility]ERY [facility];  Service: Pain Management    GA INJ DX/THER AGNT PARAVERT FACET JOINT, SHILO* Bilateral 8/22/2023    Procedure: Diagnostic medial branch blocks targeting the BILATERAL L4-5 and L5-S1 facet joints with fluoroscopic guidance #1.  Note: 22-5.;  Surgeon: Bud Benítez M.D.;  Location: [facility]ERY [facility];  Service: Pain Management    GA INJ LUMBAR/SACRAL,W/ IMAGING Left 6/2/2023    Procedure: LEFT L4-5 interlaminar epidural steroid injection;  Surgeon: Andrea Francois M.D.;  Location: [facility]ERY [facility];  Service: Pain Management    GA INJECTION,SACROILIAC JOINT Bilateral 4/20/2023    Procedure: RIGHT and LEFT sacroiliac joint injections;  Surgeon: Andrea Francois M.D.;  Location: SURGERY [facility];  Service: Pain Management    OTHER Left 2015    Left leg, debridement of wound with MRSA       Family history   Family History   Problem Relation Age of Onset    Cancer Mother         uterine and skin    Thyroid Mother         removed due to lump    Diabetes Father     Hypertension Father     Cancer Sister         ovarian    Alcohol abuse Brother          Medications:   Outpatient Medications Marked as Taking for the 1/16/25 encounter (Office Visit) with Bud Benítez M.D.   Medication Sig Dispense Refill    lidocaine (LIDODERM) 5 % Patch Place 1 Patch on the skin as needed (Apply's on back for pain). 10 Patch 5    acetaminophen (TYLENOL) 325 MG Tab Take 2 Tablets by mouth every 6 hours. take scheduled for 3 days post-op then as needed after 60 Tablet 0        Current Outpatient Medications on File  Prior to Visit   Medication Sig Dispense Refill    lidocaine (LIDODERM) 5 % Patch Place 1 Patch on the skin as needed (Apply's on back for pain). 10 Patch 5    acetaminophen (TYLENOL) 325 MG Tab Take 2 Tablets by mouth every 6 hours. take scheduled for 3 days post-op then as needed after 60 Tablet 0    celecoxib (CELEBREX) 100 MG Cap Take 1 Capsule by mouth 2 times a day. Take scheduled for 3 days then as needed for pain after (Patient not taking: Reported on 1/8/2025) 20 Capsule 0    omeprazole (PRILOSEC) 20 MG delayed-release capsule Take 1 Capsule by mouth every day. Open capsule and mix in 10 mL of water (Patient not taking: Reported on 1/8/2025) 30 Capsule 11    polyethylene glycol/lytes (MIRALAX) Pack Mix 1 Packet per package instructions and drink by mouth every day. While taking narcotics, hold if greater then 3 BMs a day (Patient not taking: Reported on 1/8/2025) 20 Each 0    ondansetron (ZOFRAN ODT) 4 MG TABLET DISPERSIBLE Dissolve 1 Tablet by mouth every 6 hours as needed for Nausea/Vomiting. (Patient not taking: Reported on 1/8/2025) 18 Tablet 0    tizanidine (ZANAFLEX) 4 MG Tab Take 1 Tablet by mouth at bedtime as needed (muscle spasms). (Patient not taking: Reported on 1/8/2025) 30 Tablet 2    Blood Glucose Test Strips Use one strip to test blood sugar three times daily before meals. (Patient not taking: Reported on 1/8/2025) 100 Strip 0    Blood Glucose Meter Kit Test blood sugar as recommended by provider. blood glucose monitoring kit. (Patient not taking: Reported on 1/8/2025) 1 Kit 0    Lancets Sig: use TID and prn ssx high or low sugar. #100 RF x 3 (Patient not taking: Reported on 1/8/2025) 100 Each 11     No current facility-administered medications on file prior to visit.         Allergies:   Allergies   Allergen Reactions    Gabapentin Unspecified     Seizure    Penicillin G      Previous hospitalization after penicillin - does not remember reaction    Cannot tolerate any Cillin    Penicillins  Unspecified     Previous hospitalization after penicillin - does not remember reaction    Cannot tolerate any Cillin       Social Hx:   Social History     Socioeconomic History    Marital status:      Spouse name: Not on file    Number of children: Not on file    Years of education: Not on file    Highest education level: 12th grade   Occupational History    Not on file   Tobacco Use    Smoking status: Former     Current packs/day: 0.10     Average packs/day: 0.1 packs/day for 17.0 years (1.7 ttl pk-yrs)     Types: Cigarettes    Smokeless tobacco: Never    Tobacco comments:     socially, only every few months   Vaping Use    Vaping status: Former    Substances: Nicotine   Substance and Sexual Activity    Alcohol use: Yes     Alcohol/week: 1.2 oz     Types: 2 Standard drinks or equivalent per week     Comment: socially - rare    Drug use: No    Sexual activity: Yes     Partners: Male     Birth control/protection: None   Other Topics Concern    Not on file   Social History Narrative    Not on file     Social Drivers of Health     Financial Resource Strain: Medium Risk (2/27/2024)    Overall Financial Resource Strain (CARDIA)     Difficulty of Paying Living Expenses: Somewhat hard   Food Insecurity: Food Insecurity Present (2/27/2024)    Hunger Vital Sign     Worried About Running Out of Food in the Last Year: Sometimes true     Ran Out of Food in the Last Year: Sometimes true   Transportation Needs: No Transportation Needs (2/27/2024)    PRAPARE - Transportation     Lack of Transportation (Medical): No     Lack of Transportation (Non-Medical): No   Physical Activity: Insufficiently Active (2/27/2024)    Exercise Vital Sign     Days of Exercise per Week: 4 days     Minutes of Exercise per Session: 30 min   Stress: No Stress Concern Present (2/27/2024)    [employer] [place] [employer] - Occupational Stress Questionnaire     Feeling of Stress : Only a little   Social Connections: Moderately Isolated  "(2/27/2024)    Social Connection and Isolation Panel [NHANES]     Frequency of Communication with Friends and Family: Three times a week     Frequency of Social Gatherings with Friends and Family: Twice a week     Attends [employer] Services: Never     Active Member of Clubs or Organizations: No     Attends Club or Organization Meetings: Never     Marital Status:    Intimate Partner Violence: Not on file   Housing Stability: High Risk (2/27/2024)    Housing Stability Vital Sign     Unable to Pay for Housing in the Last Year: Yes     Number of Places Lived in the Last Year: 2     Unstable Housing in the Last Year: No         EXAMINATION     Physical Exam:   Vitals: /74 (BP Location: Right arm, Patient Position: Sitting, BP Cuff Size: Large adult)   Pulse 96   Temp 37.1 °C (98.8 °F) (Temporal)   Ht 1.651 m (5' 5\")   Wt 68.2 kg (150 lb 6.4 oz)   SpO2 99%     Constitutional:   Body Habitus: Body mass index is 25.03 kg/m².  Cooperation: Fully cooperates with exam  Appearance: Well-groomed no disheveled    Respiratory-  breathing comfortable on room air, no audible wheezing  Cardiovascular- capillary refills less than 2 seconds. No lower extremity edema is noted.   Psychiatric- alert and oriented ×3. Normal affect.    MSK and Neuro: -    Physical Exam         Thoracic/Lumbar Spine/Sacral Spine/Hips   There are no signs of infection around the injection sites.   decreased active range of motion with flexion, lateral flexion, and rotation bilaterally.   There is decreased active range of motion with lumbar extension.    There is  pain with lumbar extension.   There is pain with facet loading maneuver (extension rotation) with axial low back pain on the BILATERAL side(s) L4-5 and L5-S1      Palpation:   No tenderness to palpation in midline at T1-T12 levels. No tenderness to palpation in the left and right of the midline T1-L5, NEGATIVE for tenderness to palpation to the para-midline region in the lower " "lumbar levels.  palpation over SI joint: negative bilaterally    palpation in hip or over the gluteus medius tendon insertion: negative bilaterally      Lumbar spine Special tests  Neuro tension  Straight leg test negative bilaterally    Slump test negative bilaterally      Key points for the international standards for neurological classification of spinal cord injury (ISNCSCI) to light touch.     Dermatome R L                                      L2 2 2   L3 1 1   L4 1 1   L5 1 1   S1 1 1   S2 1 1         Motor Exam Lower Extremities    ? Myotome R L   Hip flexion L2 5 5   Knee extension L3 5 5   Ankle dorsiflexion L4 5 5   Toe extension L5 5 5   Ankle plantarflexion S1 5 5         MEDICAL DECISION MAKING    DATA    Labs:   Lab Results   Component Value Date/Time    SODIUM 142 03/27/2024 01:06 PM    POTASSIUM 4.0 03/27/2024 01:06 PM    CHLORIDE 110 03/27/2024 01:06 PM    CO2 20 03/27/2024 01:06 PM    GLUCOSE 92 03/27/2024 01:06 PM    BUN 7 (L) 03/27/2024 01:06 PM    CREATININE 0.68 03/27/2024 01:06 PM        No results found for: \"PROTHROMBTM\", \"INR\"     Lab Results   Component Value Date/Time    WBC 8.3 03/27/2024 01:06 PM    RBC 5.09 03/27/2024 01:06 PM    HEMOGLOBIN 12.7 03/27/2024 01:06 PM    HEMATOCRIT 41.0 03/27/2024 01:06 PM    MCV 80.6 (L) 03/27/2024 01:06 PM    MCH 25.0 (L) 03/27/2024 01:06 PM    MCHC 31.0 (L) 03/27/2024 01:06 PM    MPV 11.1 03/27/2024 01:06 PM    NEUTSPOLYS 57.10 03/27/2024 01:06 PM    LYMPHOCYTES 36.90 03/27/2024 01:06 PM    MONOCYTES 4.80 03/27/2024 01:06 PM    EOSINOPHILS 0.50 03/27/2024 01:06 PM    BASOPHILS 0.50 03/27/2024 01:06 PM        Lab Results   Component Value Date/Time    HBA1C 5.6 01/08/2025 04:45 PM          Imaging:   I personally reviewed following images          Results  - Imaging (MRI of the lumbar spine, 01/09/2025):    - Degenerative disc disease worst at L5-S1 with a high intensity zone consistent with an annular tear    - Type 1 Modic changes at L5-S1    - Facet " arthropathy, worst bilaterally at L4-5 and L5-S1    - No areas of high grade central canal or neuroforaminal stenosis       I reviewed the following radiology reports                       Results for orders placed during the hospital encounter of 02/25/23    MR-CERVICAL SPINE-WITH & W/O    Impression  1.  No acute abnormality or abnormal enhancement in the cervical spine.  2.  Mild degenerative changes of the cervical spine without central canal or neural foraminal stenosis.    Results for orders placed during the hospital encounter of 01/09/25    MR-LUMBAR SPINE-W/O    Impression  1. There is an annular fissure on the left at the L5-S1 level.  2. There is no significant disc herniation or canal stenosis.  3. Posterior facet osteoarthrosis with foraminal narrowing as detailed above.  4. Cholelithiasis.    Results for orders placed during the hospital encounter of 02/25/23    MR-LUMBAR SPINE-WITH & W/O    Impression  1.  No acute abnormality or abnormal enhancement in the lumbar spine.  2.  Mild multilevel degenerative changes of the lumbar spine as described above.      Results for orders placed during the hospital encounter of 01/09/25    MR-LUMBAR SPINE-W/O    Impression  1. There is an annular fissure on the left at the L5-S1 level.  2. There is no significant disc herniation or canal stenosis.  3. Posterior facet osteoarthrosis with foraminal narrowing as detailed above.  4. Cholelithiasis.   Results for orders placed during the hospital encounter of 02/25/23    MR-THORACIC SPINE-WITH & W/O    Impression  No significant abnormality is seen in the MR scan of the thoracic spine.                                                                      Results for orders placed during the hospital encounter of 02/04/23    CT-ABDOMEN-PELVIS WITH    Impression  1.  There is no acute inflammatory process within the abdomen or pelvis.  2.  There is no evidence of nephrolithiasis, urolithiasis, or hydronephrosis.                                 Results for orders placed in visit on 22    DX-HAND 2- LEFT    Impression  1.  There is mild osteoarthritis of the left 1st CMC joint.   Results for orders placed in visit on 22    DX-HAND 3+ LEFT    Impression  1.  There is no fracture of the left hand.            Results for orders placed in visit on 23    DX-LUMBAR SPINE-2 OR 3 VIEWS    Impression  Unremarkable lumbar spine series.            INTERPRETING LOCATION:  [address] [place] [address], [id number]             Results for orders placed in visit on 22    DX-SHOULDER 2+ RIGHT    Impression  Normal radiographs of the right shoulder    Results for orders placed in visit on 23    DX-THORACIC SPINE-2 VIEWS    Impression  1.  There is minimal degenerative endplate spurring in the thoracic spine.            DIAGNOSIS   Visit Diagnoses     ICD-10-CM   1. Lumbar spondylosis  M47.816   2. Annular tear of lumbar disc  M51.369   3. Vertebrogenic low back pain type I modic changes at L5-S1  M54.51   4. Lumbosacral pain  M54.50   5. Risk for falls  Z91.81   6. Neuralgia  M79.2   7. Bilateral leg numbness  R20.0         ASSESSMENT and PLAN:     Alie Huang Jj  1985 female      Alie was seen today for follow-up.    Diagnoses and all orders for this visit:    Lumbar spondylosis  -     Referral to Physical Medicine Rehab  -     Referral to EMG - Physiatry (PMR)    Annular tear of lumbar disc  -     Referral to EMG - Physiatry (PMR)    Vertebrogenic low back pain type I modic changes at L5-S1  -     Referral to EMG - Physiatry (PMR)    Lumbosacral pain  -     Referral to EMG - Physiatry (PMR)    Risk for falls  -     Patient identified as fall risk.  Appropriate orders and counseling given.  -     Referral to EMG - Physiatry (PMR)    Neuralgia  -     Referral to EMG - Physiatry (PMR)    Bilateral leg numbness  -     Referral to EMG - Physiatry (PMR)        Assessment & Plan  Bilateral low back pain.  The  patient's back pain may be attributed to the regeneration of previously treated nerves. She underwent an ablation procedure on both sides at L4-5 and L5-S1 in 2023, which provided relief. She previously had greater than 50% pain relief for greater than 6 months and has had a recurrence of the same pain.     A repeat of this procedure will be scheduled to manage her current back pain. An order for medial branch radiofrequency neurotomy at L4-5 and L5-S1 on both sides of the low back has been placed.    The risks benefits and alternatives to this procedure were discussed and the patient wishes to proceed with the procedure. Risks include but are not limited to damage to surrounding structures, infection, bleeding, worsening of pain which can be permanent, weakness which can be permanent. Benefits include pain relief, improved function. Alternatives includes not doing the procedure.        Numbness and tingling in bilateral legs.  The MRI of the lumbar spine from January 9, 2025, shows degenerative disc disease and facet arthropathy, but no high-grade central canal or neuroforaminal stenosis. The numbness in the legs could be due to other nerve issues. An EMG will be ordered to evaluate the nerve function in her legs.  I think it is less likely that the numbness and tingling is secondary to spine or nerve compression.  Given the patient has lost 100 pounds after gastric bypass surgery it is certainly possible that it is nutritional or vitamin reason for the changes with numbness and tingling and neuropathy in the bilateral lower extremities.  I recommend the patient's PCP to investigate for other causes of neuropathy yet    PROCEDURE  The patient underwent an ablation procedure on both sides at L4-5 and L5-S1 in 2023.        Follow up: Approximately 6 weeks after the above procedure    Thank you for allowing me to participate in the care of this patient. If you have any questions please not hesitate to contact me.              Please note that this dictation was created using voice recognition software. I have made every reasonable attempt to correct obvious errors but there may be errors of grammar and content that I may have overlooked prior to finalization of this note.      Bud Benítez MD  Interventional Spine and Sports Physiatry  Physical Medicine and Rehabilitation  [facility]       Addendum:  Physical therapy prescribed.  Exercises were also given to the patient previously.

## 2025-01-16 NOTE — PROGRESS NOTES
Follow up patient note  Interventional spine and Pain  Physiatry (physical medicine and  Rehabilitation)       Chief complaint:   Chief Complaint   Patient presents with    Follow-Up     Leg numbness          HISTORY    Please see new patient note by Dr Benítez,  for more details.     HPI  Patient identification: Alie Gardner ,  1985,   With Diagnoses of Lumbar spondylosis, Annular tear of lumbar disc, Vertebrogenic low back pain type I modic changes at L5-S1, Lumbosacral pain, Risk for falls, Neuralgia, and Bilateral leg numbness were pertinent to this visit.       Verbal consent was obtained for Curt copilot : Yes      History of Present Illness  The patient is a 39-year-old female presenting for a follow-up visit.    She reports experiencing bilateral lower back pain, accompanied by paresthesia extending down the anterior aspect of both legs, including the toes. To alleviate these symptoms, she frequently clenches her toes. The onset of paresthesia is typically associated with the recurrence of pain, a phenomenon that has been occurring daily for the past two weeks. Prior to this, she had not experienced any leg numbness. She also reports episodes of leg weakness, which have resulted in falls on several occasions. An MRI scan conducted a week ago revealed disc tearing. She describes the pain as sharp and penetrating, seemingly originating from within the spine. Additionally, she experiences shooting pain in her coccyx after sitting for approximately 5 to 10 minutes, which radiates into her toes. Dr. Montez suggested that this could be due to nerve involvement. She has found some relief from her symptoms through the use of edibles, which provide approximately 10 hours of analgesia. However, she notes that her prescribed medications have been largely ineffective over the past 2 to 3 months. Her back pain has been severe enough to cause edema and disrupt her sleep, often limiting her to just 6  hours of rest. She has an upcoming appointment with a neurosurgeon and is currently awaiting a prescription for lidocaine patches from her pharmacy. She has lost a significant amount of weight, totaling 115 pounds, through strict dietary control and avoidance of fatty foods. Her diet primarily consists of seafood, specifically salmon and tuna, with occasional chicken and beef.         ROS Red Flags :     Fever, Chills, Sweats: Denies  Involuntary Weight Loss: Denies  Bowel/Bladder Incontinence: Denies  Saddle Anesthesia: Denies        PMHx:   Past Medical History:   Diagnosis Date    Anemia     Arthritis     oesteo, hand and spine    DDD (degenerative disc disease), lumbar     L4 L5    Flu-like symptoms 12/15/2022    Heart murmur     Helicobacter pylori gastritis 07/02/2023    IBD (inflammatory bowel disease)     Morbid obesity with BMI of 40.0-44.9, adult ([facility]) 02/04/2022    MRSA (methicillin resistant Staphylococcus aureus)     2016    Preop examination 07/20/2023    Psychiatric problem     depresssion, anxiety, PTSD -from sound of heart monitor.       PSHx:   Past Surgical History:   Procedure Laterality Date    SD UPPER GI ENDOSCOPY,DIAGNOSIS N/A 2/27/2024    Procedure: ESOPHAGOGASTRODUODENOSCOPY;  Surgeon: Tom Gould M.D.;  Location: SURGERY [facility];  Service: General    GASTRIC BYPASS LAPAROSCOPIC N/A 2/27/2024    Procedure: LAPAROSCOPIC BO-EN-Y GASTRIC BYPASS;  Surgeon: Tom Gould M.D.;  Location: SURGERY [facility];  Service: General    SD DSTR NROLYTC AGNT PARVERTEB FCT SNGL LMBR/SACRAL Bilateral 10/3/2023    Procedure: BILATERAL radiofrequency neurotomies medial branch targeting the L4-5 and L5-S1 facet joints with fluoroscopic guidance and sedation.        Note: plan on sedation 0.5mg versed and 25mcg fentanyl. Plan for 80 °C for 90 seconds for each neurotomy;  Surgeon: Bud Benítez M.D.;  Location: [facility];  Service: Pain Management    SD INJ DX/THER AGNT PARAVERT  FACET JOINT, SHILO* Bilateral 8/30/2023    Procedure: Diagnostic medial branch blocks targeting the BILATERAL L4-5 and L5-S1 facet joints with fluoroscopic guidance #2.   Note: 22-5. Diagnostic medial branch block #2 is only be done if procedure #1 is a positive block. This will be on a separate date from procedure #1.;  Surgeon: Bud Benítez M.D.;  Location: [facility];  Service: Pain Management    ND INJ DX/THER AGNT PARAVERT FACET JOINT, SHILO* Bilateral 8/22/2023    Procedure: Diagnostic medial branch blocks targeting the BILATERAL L4-5 and L5-S1 facet joints with fluoroscopic guidance #1.  Note: 22-5.;  Surgeon: Bud Benítez M.D.;  Location: [facility];  Service: Pain Management    ND INJ LUMBAR/SACRAL,W/ IMAGING Left 6/2/2023    Procedure: LEFT L4-5 interlaminar epidural steroid injection;  Surgeon: Andrea Francois M.D.;  Location: [facility];  Service: Pain Management    ND INJECTION,SACROILIAC JOINT Bilateral 4/20/2023    Procedure: RIGHT and LEFT sacroiliac joint injections;  Surgeon: Andrea Francois M.D.;  Location: [facility];  Service: Pain Management    OTHER Left 2015    Left leg, debridement of wound with MRSA       Family history   Family History   Problem Relation Age of Onset    Cancer Mother         uterine and skin    Thyroid Mother         removed due to lump    Diabetes Father     Hypertension Father     Cancer Sister         ovarian    Alcohol abuse Brother          Medications:   Outpatient Medications Marked as Taking for the 1/16/25 encounter (Office Visit) with Bud Benítez M.D.   Medication Sig Dispense Refill    lidocaine (LIDODERM) 5 % Patch Place 1 Patch on the skin as needed (Apply's on back for pain). 10 Patch 5    acetaminophen (TYLENOL) 325 MG Tab Take 2 Tablets by mouth every 6 hours. take scheduled for 3 days post-op then as needed after 60 Tablet 0        Current Outpatient Medications on File  Prior to Visit   Medication Sig Dispense Refill    lidocaine (LIDODERM) 5 % Patch Place 1 Patch on the skin as needed (Apply's on back for pain). 10 Patch 5    acetaminophen (TYLENOL) 325 MG Tab Take 2 Tablets by mouth every 6 hours. take scheduled for 3 days post-op then as needed after 60 Tablet 0    celecoxib (CELEBREX) 100 MG Cap Take 1 Capsule by mouth 2 times a day. Take scheduled for 3 days then as needed for pain after (Patient not taking: Reported on 1/8/2025) 20 Capsule 0    omeprazole (PRILOSEC) 20 MG delayed-release capsule Take 1 Capsule by mouth every day. Open capsule and mix in 10 mL of water (Patient not taking: Reported on 1/8/2025) 30 Capsule 11    polyethylene glycol/lytes (MIRALAX) Pack Mix 1 Packet per package instructions and drink by mouth every day. While taking narcotics, hold if greater then 3 BMs a day (Patient not taking: Reported on 1/8/2025) 20 Each 0    ondansetron (ZOFRAN ODT) 4 MG TABLET DISPERSIBLE Dissolve 1 Tablet by mouth every 6 hours as needed for Nausea/Vomiting. (Patient not taking: Reported on 1/8/2025) 18 Tablet 0    tizanidine (ZANAFLEX) 4 MG Tab Take 1 Tablet by mouth at bedtime as needed (muscle spasms). (Patient not taking: Reported on 1/8/2025) 30 Tablet 2    Blood Glucose Test Strips Use one strip to test blood sugar three times daily before meals. (Patient not taking: Reported on 1/8/2025) 100 Strip 0    Blood Glucose Meter Kit Test blood sugar as recommended by provider. blood glucose monitoring kit. (Patient not taking: Reported on 1/8/2025) 1 Kit 0    Lancets Sig: use TID and prn ssx high or low sugar. #100 RF x 3 (Patient not taking: Reported on 1/8/2025) 100 Each 11     No current facility-administered medications on file prior to visit.         Allergies:   Allergies   Allergen Reactions    Gabapentin Unspecified     Seizure    Penicillin G      Previous hospitalization after penicillin - does not remember reaction    Cannot tolerate any Cillin    Penicillins  Unspecified     Previous hospitalization after penicillin - does not remember reaction    Cannot tolerate any Cillin       Social Hx:   Social History     Socioeconomic History    Marital status:      Spouse name: Not on file    Number of children: Not on file    Years of education: Not on file    Highest education level: 12th grade   Occupational History    Not on file   Tobacco Use    Smoking status: Former     Current packs/day: 0.10     Average packs/day: 0.1 packs/day for 17.0 years (1.7 ttl pk-yrs)     Types: Cigarettes    Smokeless tobacco: Never    Tobacco comments:     socially, only every few months   Vaping Use    Vaping status: Former    Substances: Nicotine   Substance and Sexual Activity    Alcohol use: Yes     Alcohol/week: 1.2 oz     Types: 2 Standard drinks or equivalent per week     Comment: socially - rare    Drug use: No    Sexual activity: Yes     Partners: Male     Birth control/protection: None   Other Topics Concern    Not on file   Social History Narrative    Not on file     Social Drivers of Health     Financial Resource Strain: Medium Risk (2/27/2024)    Overall Financial Resource Strain (CARDIA)     Difficulty of Paying Living Expenses: Somewhat hard   Food Insecurity: Food Insecurity Present (2/27/2024)    Hunger Vital Sign     Worried About Running Out of Food in the Last Year: Sometimes true     Ran Out of Food in the Last Year: Sometimes true   Transportation Needs: No Transportation Needs (2/27/2024)    PRAPARE - Transportation     Lack of Transportation (Medical): No     Lack of Transportation (Non-Medical): No   Physical Activity: Insufficiently Active (2/27/2024)    Exercise Vital Sign     Days of Exercise per Week: 4 days     Minutes of Exercise per Session: 30 min   Stress: No Stress Concern Present (2/27/2024)    [place] [employer] - Occupational Stress Questionnaire     Feeling of Stress : Only a little   Social Connections: Moderately Isolated  "(2/27/2024)    Social Connection and Isolation Panel [NHANES]     Frequency of Communication with Friends and Family: Three times a week     Frequency of Social Gatherings with Friends and Family: Twice a week     Attends Anabaptism Services: Never     Active Member of Clubs or Organizations: No     Attends Club or Organization Meetings: Never     Marital Status:    Intimate Partner Violence: Not on file   Housing Stability: High Risk (2/27/2024)    Housing Stability Vital Sign     Unable to Pay for Housing in the Last Year: Yes     Number of Places Lived in the Last Year: 2     Unstable Housing in the Last Year: No         EXAMINATION     Physical Exam:   Vitals: /74 (BP Location: Right arm, Patient Position: Sitting, BP Cuff Size: Large adult)   Pulse 96   Temp 37.1 °C (98.8 °F) (Temporal)   Ht 1.651 m (5' 5\")   Wt 68.2 kg (150 lb 6.4 oz)   SpO2 99%     Constitutional:   Body Habitus: Body mass index is 25.03 kg/m².  Cooperation: Fully cooperates with exam  Appearance: Well-groomed no disheveled    Respiratory-  breathing comfortable on room air, no audible wheezing  Cardiovascular- capillary refills less than 2 seconds. No lower extremity edema is noted.   Psychiatric- alert and oriented ×3. Normal affect.    MSK and Neuro: -    Physical Exam         Thoracic/Lumbar Spine/Sacral Spine/Hips   There are no signs of infection around the injection sites.   decreased active range of motion with flexion, lateral flexion, and rotation bilaterally.   There is decreased active range of motion with lumbar extension.    There is  pain with lumbar extension.   There is pain with facet loading maneuver (extension rotation) with axial low back pain on the BILATERAL side(s) L4-5 and L5-S1      Palpation:   No tenderness to palpation in midline at T1-T12 levels. No tenderness to palpation in the left and right of the midline T1-L5, NEGATIVE for tenderness to palpation to the para-midline region in the lower " "lumbar levels.  palpation over SI joint: negative bilaterally    palpation in hip or over the gluteus medius tendon insertion: negative bilaterally      Lumbar spine Special tests  Neuro tension  Straight leg test negative bilaterally    Slump test negative bilaterally      Key points for the international standards for neurological classification of spinal cord injury (ISNCSCI) to light touch.     Dermatome R L                                      L2 2 2   L3 1 1   L4 1 1   L5 1 1   S1 1 1   S2 1 1         Motor Exam Lower Extremities    ? Myotome R L   Hip flexion L2 5 5   Knee extension L3 5 5   Ankle dorsiflexion L4 5 5   Toe extension L5 5 5   Ankle plantarflexion S1 5 5         MEDICAL DECISION MAKING    DATA    Labs:   Lab Results   Component Value Date/Time    SODIUM 142 03/27/2024 01:06 PM    POTASSIUM 4.0 03/27/2024 01:06 PM    CHLORIDE 110 03/27/2024 01:06 PM    CO2 20 03/27/2024 01:06 PM    GLUCOSE 92 03/27/2024 01:06 PM    BUN 7 (L) 03/27/2024 01:06 PM    CREATININE 0.68 03/27/2024 01:06 PM        No results found for: \"PROTHROMBTM\", \"INR\"     Lab Results   Component Value Date/Time    WBC 8.3 03/27/2024 01:06 PM    RBC 5.09 03/27/2024 01:06 PM    HEMOGLOBIN 12.7 03/27/2024 01:06 PM    HEMATOCRIT 41.0 03/27/2024 01:06 PM    MCV 80.6 (L) 03/27/2024 01:06 PM    MCH 25.0 (L) 03/27/2024 01:06 PM    MCHC 31.0 (L) 03/27/2024 01:06 PM    MPV 11.1 03/27/2024 01:06 PM    NEUTSPOLYS 57.10 03/27/2024 01:06 PM    LYMPHOCYTES 36.90 03/27/2024 01:06 PM    MONOCYTES 4.80 03/27/2024 01:06 PM    EOSINOPHILS 0.50 03/27/2024 01:06 PM    BASOPHILS 0.50 03/27/2024 01:06 PM        Lab Results   Component Value Date/Time    HBA1C 5.6 01/08/2025 04:45 PM          Imaging:   I personally reviewed following images          Results  - Imaging (MRI of the lumbar spine, 01/09/2025):    - Degenerative disc disease worst at L5-S1 with a high intensity zone consistent with an annular tear    - Type 1 Modic changes at L5-S1    - Facet " arthropathy, worst bilaterally at L4-5 and L5-S1    - No areas of high grade central canal or neuroforaminal stenosis       I reviewed the following radiology reports                       Results for orders placed during the hospital encounter of 02/25/23    MR-CERVICAL SPINE-WITH & W/O    Impression  1.  No acute abnormality or abnormal enhancement in the cervical spine.  2.  Mild degenerative changes of the cervical spine without central canal or neural foraminal stenosis.    Results for orders placed during the hospital encounter of 01/09/25    MR-LUMBAR SPINE-W/O    Impression  1. There is an annular fissure on the left at the L5-S1 level.  2. There is no significant disc herniation or canal stenosis.  3. Posterior facet osteoarthrosis with foraminal narrowing as detailed above.  4. Cholelithiasis.    Results for orders placed during the hospital encounter of 02/25/23    MR-LUMBAR SPINE-WITH & W/O    Impression  1.  No acute abnormality or abnormal enhancement in the lumbar spine.  2.  Mild multilevel degenerative changes of the lumbar spine as described above.      Results for orders placed during the hospital encounter of 01/09/25    MR-LUMBAR SPINE-W/O    Impression  1. There is an annular fissure on the left at the L5-S1 level.  2. There is no significant disc herniation or canal stenosis.  3. Posterior facet osteoarthrosis with foraminal narrowing as detailed above.  4. Cholelithiasis.   Results for orders placed during the hospital encounter of 02/25/23    MR-THORACIC SPINE-WITH & W/O    Impression  No significant abnormality is seen in the MR scan of the thoracic spine.                                                                      Results for orders placed during the hospital encounter of 02/04/23    CT-ABDOMEN-PELVIS WITH    Impression  1.  There is no acute inflammatory process within the abdomen or pelvis.  2.  There is no evidence of nephrolithiasis, urolithiasis, or hydronephrosis.                                 Results for orders placed in visit on 22    DX-HAND 2- LEFT    Impression  1.  There is mild osteoarthritis of the left 1st CMC joint.   Results for orders placed in visit on 22    DX-HAND 3+ LEFT    Impression  1.  There is no fracture of the left hand.            Results for orders placed in visit on 23    DX-LUMBAR SPINE-2 OR 3 VIEWS    Impression  Unremarkable lumbar spine series.            INTERPRETING LOCATION:  [address] [place] [address], [id number]             Results for orders placed in visit on 22    DX-SHOULDER 2+ RIGHT    Impression  Normal radiographs of the right shoulder    Results for orders placed in visit on 23    DX-THORACIC SPINE-2 VIEWS    Impression  1.  There is minimal degenerative endplate spurring in the thoracic spine.            DIAGNOSIS   Visit Diagnoses     ICD-10-CM   1. Lumbar spondylosis  M47.816   2. Annular tear of lumbar disc  M51.369   3. Vertebrogenic low back pain type I modic changes at L5-S1  M54.51   4. Lumbosacral pain  M54.50   5. Risk for falls  Z91.81   6. Neuralgia  M79.2   7. Bilateral leg numbness  R20.0         ASSESSMENT and PLAN:     Alie Huang Jj  1985 female      Alie was seen today for follow-up.    Diagnoses and all orders for this visit:    Lumbar spondylosis  -     Referral to Physical Medicine Rehab  -     Referral to EMG - Physiatry (PMR)    Annular tear of lumbar disc  -     Referral to EMG - Physiatry (PMR)    Vertebrogenic low back pain type I modic changes at L5-S1  -     Referral to EMG - Physiatry (PMR)    Lumbosacral pain  -     Referral to EMG - Physiatry (PMR)    Risk for falls  -     Patient identified as fall risk.  Appropriate orders and counseling given.  -     Referral to EMG - Physiatry (PMR)    Neuralgia  -     Referral to EMG - Physiatry (PMR)    Bilateral leg numbness  -     Referral to EMG - Physiatry (PMR)        Assessment & Plan  Bilateral low back pain.  The  patient's back pain may be attributed to the regeneration of previously treated nerves. She underwent an ablation procedure on both sides at L4-5 and L5-S1 in 2023, which provided relief. She previously had greater than 50% pain relief for greater than 6 months and has had a recurrence of the same pain.     A repeat of this procedure will be scheduled to manage her current back pain. An order for medial branch radiofrequency neurotomy at L4-5 and L5-S1 on both sides of the low back has been placed.    The risks benefits and alternatives to this procedure were discussed and the patient wishes to proceed with the procedure. Risks include but are not limited to damage to surrounding structures, infection, bleeding, worsening of pain which can be permanent, weakness which can be permanent. Benefits include pain relief, improved function. Alternatives includes not doing the procedure.        Numbness and tingling in bilateral legs.  The MRI of the lumbar spine from January 9, 2025, shows degenerative disc disease and facet arthropathy, but no high-grade central canal or neuroforaminal stenosis. The numbness in the legs could be due to other nerve issues. An EMG will be ordered to evaluate the nerve function in her legs.  I think it is less likely that the numbness and tingling is secondary to spine or nerve compression.  Given the patient has lost 100 pounds after gastric bypass surgery it is certainly possible that it is nutritional or vitamin reason for the changes with numbness and tingling and neuropathy in the bilateral lower extremities.  I recommend the patient's PCP to investigate for other causes of neuropathy yet    PROCEDURE  The patient underwent an ablation procedure on both sides at L4-5 and L5-S1 in 2023.        Follow up: Approximately 6 weeks after the above procedure    Thank you for allowing me to participate in the care of this patient. If you have any questions please not hesitate to contact me.              Please note that this dictation was created using voice recognition software. I have made every reasonable attempt to correct obvious errors but there may be errors of grammar and content that I may have overlooked prior to finalization of this note.      Bud Benítez MD  Interventional Spine and Sports Physiatry  Physical Medicine and Rehabilitation  [facility]       Addendum:  Physical therapy prescribed.  Exercises were also given to the patient previously.

## 2025-01-23 ENCOUNTER — OFFICE VISIT (OUTPATIENT)
Dept: PHYSICAL MEDICINE AND REHAB | Facility: MEDICAL CENTER | Age: 40
End: 2025-01-23
Payer: MEDICAID

## 2025-01-23 ENCOUNTER — HOSPITAL ENCOUNTER (OUTPATIENT)
Dept: LAB | Facility: MEDICAL CENTER | Age: 40
End: 2025-01-23
Attending: PHYSICIAN ASSISTANT
Payer: MEDICAID

## 2025-01-23 VITALS
TEMPERATURE: 97.7 F | DIASTOLIC BLOOD PRESSURE: 66 MMHG | WEIGHT: 150 LBS | HEART RATE: 77 BPM | OXYGEN SATURATION: 98 % | HEIGHT: 65 IN | BODY MASS INDEX: 24.99 KG/M2 | SYSTOLIC BLOOD PRESSURE: 108 MMHG

## 2025-01-23 DIAGNOSIS — Z13.21 ENCOUNTER FOR VITAMIN DEFICIENCY SCREENING: ICD-10-CM

## 2025-01-23 DIAGNOSIS — D50.9 IRON DEFICIENCY ANEMIA, UNSPECIFIED IRON DEFICIENCY ANEMIA TYPE: ICD-10-CM

## 2025-01-23 DIAGNOSIS — M51.369 ANNULAR TEAR OF LUMBAR DISC: ICD-10-CM

## 2025-01-23 DIAGNOSIS — R53.1 WEAKNESS: ICD-10-CM

## 2025-01-23 DIAGNOSIS — R20.0 NUMBNESS: ICD-10-CM

## 2025-01-23 DIAGNOSIS — E11.65 CONTROLLED TYPE 2 DIABETES MELLITUS WITH HYPERGLYCEMIA, WITHOUT LONG-TERM CURRENT USE OF INSULIN (HCC): ICD-10-CM

## 2025-01-23 DIAGNOSIS — M47.816 LUMBAR SPONDYLOSIS: ICD-10-CM

## 2025-01-23 LAB
25(OH)D3 SERPL-MCNC: 32 NG/ML (ref 30–100)
ALBUMIN SERPL BCP-MCNC: 3.9 G/DL (ref 3.2–4.9)
ALBUMIN/GLOB SERPL: 1.2 G/DL
ALP SERPL-CCNC: 61 U/L (ref 30–99)
ALT SERPL-CCNC: 7 U/L (ref 2–50)
ANION GAP SERPL CALC-SCNC: 9 MMOL/L (ref 7–16)
AST SERPL-CCNC: 11 U/L (ref 12–45)
BILIRUB SERPL-MCNC: 0.6 MG/DL (ref 0.1–1.5)
BUN SERPL-MCNC: 11 MG/DL (ref 8–22)
CALCIUM ALBUM COR SERPL-MCNC: 9.1 MG/DL (ref 8.5–10.5)
CALCIUM SERPL-MCNC: 9 MG/DL (ref 8.4–10.2)
CHLORIDE SERPL-SCNC: 107 MMOL/L (ref 96–112)
CHOLEST SERPL-MCNC: 132 MG/DL (ref 100–199)
CO2 SERPL-SCNC: 24 MMOL/L (ref 20–33)
CREAT SERPL-MCNC: 0.59 MG/DL (ref 0.5–1.4)
FASTING STATUS PATIENT QL REPORTED: NORMAL
FERRITIN SERPL-MCNC: 9.2 NG/ML (ref 10–291)
FOLATE SERPL-MCNC: 12.5 NG/ML
GFR SERPLBLD CREATININE-BSD FMLA CKD-EPI: 117 ML/MIN/1.73 M 2
GLOBULIN SER CALC-MCNC: 3.2 G/DL (ref 1.9–3.5)
GLUCOSE SERPL-MCNC: 90 MG/DL (ref 65–99)
HDLC SERPL-MCNC: 51 MG/DL
IRON SATN MFR SERPL: 16 % (ref 15–55)
IRON SERPL-MCNC: 57 UG/DL (ref 40–170)
LDLC SERPL CALC-MCNC: 68 MG/DL
POTASSIUM SERPL-SCNC: 4.2 MMOL/L (ref 3.6–5.5)
PROT SERPL-MCNC: 7.1 G/DL (ref 6–8.2)
SODIUM SERPL-SCNC: 140 MMOL/L (ref 135–145)
TIBC SERPL-MCNC: 363 UG/DL (ref 250–450)
TRIGL SERPL-MCNC: 65 MG/DL (ref 0–149)
TSH SERPL DL<=0.005 MIU/L-ACNC: 0.75 UIU/ML (ref 0.38–5.33)
UIBC SERPL-MCNC: 306 UG/DL (ref 110–370)
VIT B12 SERPL-MCNC: 464 PG/ML (ref 211–911)

## 2025-01-23 PROCEDURE — 3074F SYST BP LT 130 MM HG: CPT | Performed by: STUDENT IN AN ORGANIZED HEALTH CARE EDUCATION/TRAINING PROGRAM

## 2025-01-23 PROCEDURE — 84443 ASSAY THYROID STIM HORMONE: CPT

## 2025-01-23 PROCEDURE — 95909 NRV CNDJ TST 5-6 STUDIES: CPT | Performed by: STUDENT IN AN ORGANIZED HEALTH CARE EDUCATION/TRAINING PROGRAM

## 2025-01-23 PROCEDURE — 84425 ASSAY OF VITAMIN B-1: CPT

## 2025-01-23 PROCEDURE — 82728 ASSAY OF FERRITIN: CPT

## 2025-01-23 PROCEDURE — 80053 COMPREHEN METABOLIC PANEL: CPT

## 2025-01-23 PROCEDURE — 82607 VITAMIN B-12: CPT

## 2025-01-23 PROCEDURE — 95886 MUSC TEST DONE W/N TEST COMP: CPT | Mod: RT | Performed by: STUDENT IN AN ORGANIZED HEALTH CARE EDUCATION/TRAINING PROGRAM

## 2025-01-23 PROCEDURE — 82746 ASSAY OF FOLIC ACID SERUM: CPT

## 2025-01-23 PROCEDURE — 84252 ASSAY OF VITAMIN B-2: CPT

## 2025-01-23 PROCEDURE — 36415 COLL VENOUS BLD VENIPUNCTURE: CPT

## 2025-01-23 PROCEDURE — 3078F DIAST BP <80 MM HG: CPT | Performed by: STUDENT IN AN ORGANIZED HEALTH CARE EDUCATION/TRAINING PROGRAM

## 2025-01-23 PROCEDURE — 82306 VITAMIN D 25 HYDROXY: CPT

## 2025-01-23 PROCEDURE — 83550 IRON BINDING TEST: CPT

## 2025-01-23 PROCEDURE — 83540 ASSAY OF IRON: CPT

## 2025-01-23 PROCEDURE — 80061 LIPID PANEL: CPT

## 2025-01-23 PROCEDURE — 84207 ASSAY OF VITAMIN B-6: CPT

## 2025-01-23 ASSESSMENT — FIBROSIS 4 INDEX: FIB4 SCORE: 0.58

## 2025-01-23 ASSESSMENT — PAIN SCALES - GENERAL: PAINLEVEL_OUTOF10: 8=MODERATE-SEVERE PAIN

## 2025-01-23 NOTE — PROGRESS NOTES
"Electromyography Report          Name: Alie Gardner    MRN: [id number]   YOB: 1985 (39 y.o.)   Sex: female   Vitals:  Vitals:    01/23/25 1320   BP: 108/66   Weight: 68 kg (150 lb)   Height: 1.651 m (5' 5\")     Body mass index is 24.96 kg/m².    Examining Physician: Ford Reyna D.O.       EMG Examination Date: 1/23/2025     Impression:      This is an otherwise normal technically limited study.   This study did show significant decrease in amplitude of the right fibular study however this is likely due to technical error.  She also had no response to bilateral sural nerves which is more indicative of taking limitation versus a true bilateral sural neuropathy  There is no electrodiagnostic evidence of a bilateral lower extremity large fiber neuropathy, plexopathy, or radiculopathy in the segments tested    Recommendations: May consider repeat EMG/NCS testing in 3-6 months.     History: This a pleasant 39-year-old female who presents today for bilateral lower extremity EMGs.  She was referred by Dr. Benítez.  Patient notes.  Send bilateral lower back numbness and tingling in the anterior aspect of both legs including the toes. pain is described as sharp and penetrating a previous MRI of the low back showed an annular fissure at the L5-S1 level as well as diffuse facet arthropathy.  Of note patient has lost a significant amount of weight in the past year      Relevant Medical Hx:  Thyroid disease  negative  Cancer   negative    Diabetes  negative  Autoimmune disease negative    Physical exam:    GEN: No acute distress, well nourished, well developed  HEENT: Normal cephalic, PERRLA, EOMI  EXT: No clubbing cyanosis or edema  MUSCULOSKELETAL:   No asymmetry, mass or tenderness to palpation.   Strength is 5/5 in bilateral  lower extremities  NEURO: Patient is alert, awake and oriented x 3, muscle tone is normal without clonus.  DTR's are symmetrical and normal in bilateral upper and lower " extremities.   Sensation to light touch is normal    Nerve Conduction Studies and Electromyography  Examination Findings:       Evaluation of the right Fibular (EDB) motor nerve showed reduced amplitude (0.93 mV).    The left sural Sensory and the right sural Sensory nerves showed no response.    All remaining nerves (as indicated in the following tables) were within normal limits.    All examined muscles (as indicated in the following table) showed no evidence of electrical instability.               Nerve conduction studies (NCS) and electromyography (EMG) are utilized to evaluate direct or indirect damage to the peripheral nervous system. NCS are performed to measure the nerve(s) response(s) to electrostimulation across a given nerve segment. EMG evaluates the passive and active electrical activity of the muscle(s) in question.  Muscles are innervated by specific peripheral nerves and roots. Often times, several nerves the muscle to be examined in order to determine the presence or absence of the disease process. Furthermore, nerves and muscles may need to be tested in a [id number] comparison, as well as in additional extremities, as this may be crucial in characterizing the extent of the disease process, which may be diffuse or isolated and of varying degree of severity. The extent of the neurodiagnostic exam is justified as it may help arrive to a proper diagnosis, which ultimately may contribute to better management of the patient. Therefore, the nerves to muscles examined during the study were medically necessary.    Unless otherwise noted, temperature of the extremity(s) study was monitored before and during the examination and remained between 32 and 36 degrees C for the upper extremities, and between 30 and 36 degrees C for the lower extremities. The patient tolerated testing well, without any complications. The study was done with a monopolar needle examination.   Reference values are from the [facility] normative data guidelines and [facility] related to age guidelines.     cpt 28469. Nerve conduction studies,  5-6 studies  cpt 46271. Nerve conduction studies, 7-8 studies    Ford Reyna D.O.

## 2025-01-26 LAB — VIT B1 BLD-MCNC: 89 NMOL/L (ref 70–180)

## 2025-01-27 LAB — VIT B6 SERPL-MCNC: 16.8 NMOL/L (ref 20–125)

## 2025-01-28 ENCOUNTER — TELEPHONE (OUTPATIENT)
Dept: PHYSICAL MEDICINE AND REHAB | Facility: MEDICAL CENTER | Age: 40
End: 2025-01-28
Payer: MEDICAID

## 2025-01-28 NOTE — TELEPHONE ENCOUNTER
Pt EMG got denied from her Insurance, recommendation was Conservative management, including PT, Pt stated that she had PT 2023, no current.    Kindly put a referral for PT.    Referral to [facility].    Thank you  Kourtney

## 2025-01-29 ENCOUNTER — APPOINTMENT (OUTPATIENT)
Dept: RADIOLOGY | Facility: REHABILITATION | Age: 40
End: 2025-01-29
Attending: PHYSICAL MEDICINE & REHABILITATION
Payer: MEDICAID

## 2025-01-29 ENCOUNTER — HOSPITAL ENCOUNTER (OUTPATIENT)
Facility: REHABILITATION | Age: 40
End: 2025-01-29
Attending: PHYSICAL MEDICINE & REHABILITATION | Admitting: PHYSICAL MEDICINE & REHABILITATION
Payer: MEDICAID

## 2025-01-29 VITALS
HEART RATE: 51 BPM | BODY MASS INDEX: 25.2 KG/M2 | OXYGEN SATURATION: 95 % | SYSTOLIC BLOOD PRESSURE: 119 MMHG | WEIGHT: 151.24 LBS | RESPIRATION RATE: 16 BRPM | HEIGHT: 65 IN | DIASTOLIC BLOOD PRESSURE: 64 MMHG | TEMPERATURE: 97.5 F

## 2025-01-29 LAB — VIT B2 SERPL-SCNC: 10 NMOL/L (ref 5–50)

## 2025-01-29 PROCEDURE — 700111 HCHG RX REV CODE 636 W/ 250 OVERRIDE (IP): Mod: JZ

## 2025-01-29 PROCEDURE — 64635 DESTROY LUMB/SAC FACET JNT: CPT

## 2025-01-29 PROCEDURE — 99153 MOD SED SAME PHYS/QHP EA: CPT

## 2025-01-29 PROCEDURE — 99152 MOD SED SAME PHYS/QHP 5/>YRS: CPT

## 2025-01-29 PROCEDURE — 64636 DESTROY L/S FACET JNT ADDL: CPT

## 2025-01-29 RX ORDER — ROPIVACAINE HYDROCHLORIDE 5 MG/ML
INJECTION, SOLUTION EPIDURAL; INFILTRATION; PERINEURAL
Status: COMPLETED
Start: 2025-01-29 | End: 2025-01-29

## 2025-01-29 RX ORDER — LIDOCAINE HYDROCHLORIDE 10 MG/ML
INJECTION, SOLUTION EPIDURAL; INFILTRATION; INTRACAUDAL; PERINEURAL
Status: COMPLETED
Start: 2025-01-29 | End: 2025-01-29

## 2025-01-29 RX ORDER — MIDAZOLAM HYDROCHLORIDE 1 MG/ML
INJECTION INTRAMUSCULAR; INTRAVENOUS
Status: COMPLETED
Start: 2025-01-29 | End: 2025-01-29

## 2025-01-29 RX ADMIN — FENTANYL CITRATE 50 MCG: 50 INJECTION, SOLUTION INTRAMUSCULAR; INTRAVENOUS at 10:48

## 2025-01-29 RX ADMIN — MIDAZOLAM HYDROCHLORIDE 1 MG: 1 INJECTION, SOLUTION INTRAMUSCULAR; INTRAVENOUS at 10:48

## 2025-01-29 RX ADMIN — ROPIVACAINE HYDROCHLORIDE 20 ML: 5 INJECTION, SOLUTION EPIDURAL; INFILTRATION; PERINEURAL at 10:30

## 2025-01-29 RX ADMIN — LIDOCAINE HYDROCHLORIDE 30 ML: 10 INJECTION, SOLUTION EPIDURAL; INFILTRATION; INTRACAUDAL; PERINEURAL at 10:56

## 2025-01-29 ASSESSMENT — PAIN DESCRIPTION - PAIN TYPE
TYPE: CHRONIC PAIN
TYPE: CHRONIC PAIN

## 2025-01-29 ASSESSMENT — FIBROSIS 4 INDEX: FIB4 SCORE: 0.49

## 2025-01-29 NOTE — INTERVAL H&P NOTE
Consented Procedure: bilateral radiofrequency neurotomies medial branch targeting the L4-5 and L5-S1 facet joints with fluoroscopic guidance and sedation…Note: plan for sedation with 1mg versed and 50 mcg fentanyl. Plan for 80 °C for 90 seconds for each neurotomy  I have examined the patient, provided the risks, benefits, and alternatives to the procedure(s) indicated on the signed consent form, and the patient wishes to proceed.    H&P reviewed. The patient was examined and there are no changes to the H&P      Bud Benítez M.D.  01/29/25 10:43 AM

## 2025-01-29 NOTE — OP REPORT
Patient: Alie Gardner 39 y.o. female MRN: [id number]     Date of Service: 1/29/2025     Physician/s: Bud Benítez MD    Pre-operative Diagnosis: Lumbar spondylosis, facet arthropathy.      Post-operative Diagnosis: Lumbar spondylosis, facet arthropathy.     Procedure: Medial Branch Radiofrequency neurotomy targeting the bilateral L4-5 and L5-S1 facet joint(s) with sedation.     Description of procedure:    The patient was not treated for radiculopathy at this time    The risks, benefits, and alternatives of the procedure were reviewed and discussed with the patient.  Written informed consent was freely obtained. A pre-procedural time-out was conducted by the physician verifying patient’s identity, procedure to be performed, procedure site and side, and allergy verification. Appropriate equipment was determined to be in place for the procedure.     Moderation sedation was achieved with Versed (1mg) and Fentanyl (50mcg). Monitoring of the patients vital signs and respiratory status was provided by trained independent registered nurse during the entire course of the procedures and under my supervision and recoded in the patient’s medical record. The duration of sedation was over 10 minutes.     The patient's vital signs were carefully monitored before, throughout, and after the procedure.     In the fluoroscopy suite the patient was placed in a prone position, and a pillow was placed underneath the level of the umbilicus. The skin was prepped and draped in the usual sterile fashion. The fluoroscope was placed over the low back at the appropriate angles, and the targets for needle/probe placement were marked. A 25g needle was placed into each of the markings at three levels, and approx 1cc of 1% Lidocaine was injected subcutaneously into the epidermal and dermal layers. The needle was removed.     A 18 guage, 10 cm RFN cannula with a 10 mm active tip was then placed into the skin using fluoroscopic guidance  and advanced with an oblique view towards the intersection of the transverse process and superior articular process L4-5 facet joint where the L3 medial branch runs  levels on the right side, where the medial branch runs. The needle/probe tips were then verified by AP, oblique, and lateral views.     A 18 guage, 10 cm RFN cannula with a 10 mm active tip was then placed into the skin using fluoroscopic guidance and advanced with an oblique view towards the intersection of the transverse process and superior articular process L5-S1 facet joint where the L4 medial branch runs levels on the right side, where the medial branch runs. The needle/probe tips were then verified by AP, oblique, and lateral views.     Then A 18 guage, 10 cm RFN cannula with a 10 mm active tip was then placed into the skin using fluoroscopic guidance and advanced with an oblique view towards the intersection of the transverse process and superior articular process S1 facet where the L5 dorsal ramus runs  levels on the right side, where the medial branch runs. The needle/probe tips were then verified by AP, oblique, and lateral views.       Motor stimulation is used as an extra precaution to ensure the needle tips are off the lumbar nerve roots prior to each lesion. Following negative aspiration, a syringe was prepared with 10 mL of 1% lidocaine.  2mL of this syringe was injected at each level.  The needles are not moved, but fluoroscope guidance is used to ensure the needles have not moved. After a wait period of approximately 2 minutes, a radiofrequency lesion was then created at each level with a temperature of 80 degrees centigrade for 90 seconds.     The probes were adjusted to a 2nd location and images were saved in 2+ views views. Motor testing was done which confirmed no twitching in the leg.  And another radiofrequency lesion was made of 80 °C for 90 seconds. A 2nd radiofrequency lesion was made with 80°C for 90 seconds.      The  cannulas were restyletted, and were then removed intact.     A 18 guage, 10 cm RFN cannula with a 10 mm active tip was then placed into the skin using fluoroscopic guidance and advanced with an oblique view towards the intersection of the transverse process and superior articular process L4-5 facet joint where the L3 medial branch runs  levels on the left side, where the medial branch runs. The needle/probe tips were then verified by AP, oblique, and lateral views.     A 18 guage, 10 cm RFN cannula with a 10 mm active tip was then placed into the skin using fluoroscopic guidance and advanced with an oblique view towards the intersection of the transverse process and superior articular process L5-S1 facet joint where the L4 medial branch runs levels on the left side, where the medial branch runs. The needle/probe tips were then verified by AP, oblique, and lateral views.     Then A 18 guage, 10 cm RFN cannula with a 10 mm active tip was then placed into the skin using fluoroscopic guidance and advanced with an oblique view towards the intersection of the transverse process and superior articular process S1 facet where the L5 dorsal ramus runs  levels on the left side, where the medial branch runs. The needle/probe tips were then verified by AP, oblique, and lateral views.       Motor stimulation is used as an extra precaution to ensure the needle tips are off the lumbar nerve roots prior to each lesion. Following negative aspiration, a syringe was prepared with 10 mL of 1% lidocaine.  2mL of this syringe was injected at each level.  The needles are not moved, but fluoroscope guidance is used to ensure the needles have not moved. After a wait period of approximately 2 minutes, a radiofrequency lesion was then created at each level with a temperature of 80 degrees centigrade for 90 seconds.     The probes were adjusted to a 2nd location and images were saved in 2+ views views. Motor testing was done which confirmed no  twitching in the leg.  And another radiofrequency lesion was made of 80 °C for 90 seconds. A 2nd radiofrequency lesion was made with 80°C for 90 seconds.      The cannulas were restyletted, and were then removed intact.     Fluoroscopic images in AP and lateral view were saved prior to each radiofrequency neurotomy.    The patient's back was covered with a 4x4 gauze, the area was cleansed with sterile normal saline, and a dressing was applied. There were no complications noted, the patient remained hemodynamically stable, and the patient tolerated the procedure well. The patient was examined in the postoperative area and her strength exam was identical as prior to the procedure.      The patient had 90 percent pain relief postprocedure  Preprocedure pain 8/10 NRS  Postprocedure pain 1 /10 NRS     Follow-up as scheduled    Bud Benítez MD  Interventional Spine and Pain  Physical Medicine and Rehabilitation  [facility]            CPT  Radiofrequency ablation (RFA) - lumbar or sacral (1st joint):  26732-49  Radiofrequency ablation (RFA) - lumbar or sacral (each additional joint):  90433-07   moderate procedural sedation first 15 minutes: [id number]

## 2025-01-29 NOTE — OR SURGEON
Immediate Post OP Note    Pre-Op Diagnosis Codes:      * Lumbar spondylosis [M47.816]      Post-Op Diagnosis Codes:     * Lumbar spondylosis [M47.816]      Procedure(s):  bilateral radiofrequency neurotomies medial branch targeting the L4-5 and L5-S1 facet joints with fluoroscopic guidance and sedation    sedation with 1mg versed and 50 mcg fentanyl.     - Wound Class: Clean    Surgeon(s):  Bud Benítez M.D.    Anesthesiologist/Type of Anesthesia:  No anesthesia staff entered./Local    Surgical Staff:  Circulator: Margaux Olsen R.N.  Scrub Person: Nicole Hartmann  Radiology Technologist: Letha Glaser    Specimens removed if any:  * No specimens in log *    Estimated Blood Loss: None    Findings: None    Complications: None        1/29/2025 11:15 AM Bud Benítez M.D.

## 2025-01-30 ENCOUNTER — TELEPHONE (OUTPATIENT)
Dept: PHYSICAL MEDICINE AND REHAB | Facility: MEDICAL CENTER | Age: 40
End: 2025-01-30
Payer: MEDICAID

## 2025-01-30 NOTE — TELEPHONE ENCOUNTER
Called for post-sp check-up. Pt reported the following regarding the procedure site: bilateral radiofrequency neurotomies medial branch targeting the L4-5 and L5-S1 facet joints with fluoroscopic guidance and sedation     Change in pain?: still in pain and early to tell if there is an improvement    Concerns?: tender on her tailbone    Confirmed [facility] appt?: yes

## 2025-02-25 ENCOUNTER — HOSPITAL ENCOUNTER (OUTPATIENT)
Dept: LAB | Facility: MEDICAL CENTER | Age: 40
End: 2025-02-25
Attending: CLINICAL NURSE SPECIALIST
Payer: MEDICAID

## 2025-02-25 LAB
25(OH)D3 SERPL-MCNC: 37 NG/ML (ref 30–100)
ALBUMIN SERPL BCP-MCNC: 4.1 G/DL (ref 3.2–4.9)
ALBUMIN/GLOB SERPL: 1.2 G/DL
ALP SERPL-CCNC: 59 U/L (ref 30–99)
ALT SERPL-CCNC: 11 U/L (ref 2–50)
ANION GAP SERPL CALC-SCNC: 10 MMOL/L (ref 7–16)
AST SERPL-CCNC: 18 U/L (ref 12–45)
BASOPHILS # BLD AUTO: 0.9 % (ref 0–1.8)
BASOPHILS # BLD: 0.06 K/UL (ref 0–0.12)
BILIRUB SERPL-MCNC: 0.5 MG/DL (ref 0.1–1.5)
BUN SERPL-MCNC: 13 MG/DL (ref 8–22)
CALCIUM ALBUM COR SERPL-MCNC: 8.9 MG/DL (ref 8.5–10.5)
CALCIUM SERPL-MCNC: 9 MG/DL (ref 8.5–10.5)
CHLORIDE SERPL-SCNC: 106 MMOL/L (ref 96–112)
CHOLEST SERPL-MCNC: 141 MG/DL (ref 100–199)
CO2 SERPL-SCNC: 24 MMOL/L (ref 20–33)
CREAT SERPL-MCNC: 0.74 MG/DL (ref 0.5–1.4)
EOSINOPHIL # BLD AUTO: 0.02 K/UL (ref 0–0.51)
EOSINOPHIL NFR BLD: 0.3 % (ref 0–6.9)
ERYTHROCYTE [DISTWIDTH] IN BLOOD BY AUTOMATED COUNT: 45.4 FL (ref 35.9–50)
FASTING STATUS PATIENT QL REPORTED: NORMAL
FERRITIN SERPL-MCNC: 12.2 NG/ML (ref 10–291)
FOLATE SERPL-MCNC: 13.4 NG/ML
GFR SERPLBLD CREATININE-BSD FMLA CKD-EPI: 105 ML/MIN/1.73 M 2
GLOBULIN SER CALC-MCNC: 3.3 G/DL (ref 1.9–3.5)
GLUCOSE SERPL-MCNC: 91 MG/DL (ref 65–99)
HCT VFR BLD AUTO: 32.8 % (ref 37–47)
HDLC SERPL-MCNC: 62 MG/DL
HGB BLD-MCNC: 10 G/DL (ref 12–16)
IMM GRANULOCYTES # BLD AUTO: 0.01 K/UL (ref 0–0.11)
IMM GRANULOCYTES NFR BLD AUTO: 0.1 % (ref 0–0.9)
IRON SATN MFR SERPL: 8 % (ref 15–55)
IRON SERPL-MCNC: 30 UG/DL (ref 40–170)
LDLC SERPL CALC-MCNC: 66 MG/DL
LYMPHOCYTES # BLD AUTO: 3.11 K/UL (ref 1–4.8)
LYMPHOCYTES NFR BLD: 46.6 % (ref 22–41)
MCH RBC QN AUTO: 23.8 PG (ref 27–33)
MCHC RBC AUTO-ENTMCNC: 30.5 G/DL (ref 32.2–35.5)
MCV RBC AUTO: 78.1 FL (ref 81.4–97.8)
MONOCYTES # BLD AUTO: 0.34 K/UL (ref 0–0.85)
MONOCYTES NFR BLD AUTO: 5.1 % (ref 0–13.4)
NEUTROPHILS # BLD AUTO: 3.14 K/UL (ref 1.82–7.42)
NEUTROPHILS NFR BLD: 47 % (ref 44–72)
NRBC # BLD AUTO: 0 K/UL
NRBC BLD-RTO: 0 /100 WBC (ref 0–0.2)
PLATELET # BLD AUTO: 442 K/UL (ref 164–446)
PMV BLD AUTO: 10.5 FL (ref 9–12.9)
POTASSIUM SERPL-SCNC: 3.7 MMOL/L (ref 3.6–5.5)
PROT SERPL-MCNC: 7.4 G/DL (ref 6–8.2)
RBC # BLD AUTO: 4.2 M/UL (ref 4.2–5.4)
SODIUM SERPL-SCNC: 140 MMOL/L (ref 135–145)
TIBC SERPL-MCNC: 390 UG/DL (ref 250–450)
TRIGL SERPL-MCNC: 64 MG/DL (ref 0–149)
UIBC SERPL-MCNC: 360 UG/DL (ref 110–370)
VIT B12 SERPL-MCNC: 396 PG/ML (ref 211–911)
WBC # BLD AUTO: 6.7 K/UL (ref 4.8–10.8)

## 2025-02-25 PROCEDURE — 36415 COLL VENOUS BLD VENIPUNCTURE: CPT

## 2025-02-25 PROCEDURE — 82306 VITAMIN D 25 HYDROXY: CPT

## 2025-02-25 PROCEDURE — 80053 COMPREHEN METABOLIC PANEL: CPT

## 2025-02-25 PROCEDURE — 82746 ASSAY OF FOLIC ACID SERUM: CPT

## 2025-02-25 PROCEDURE — 82728 ASSAY OF FERRITIN: CPT

## 2025-02-25 PROCEDURE — 82607 VITAMIN B-12: CPT

## 2025-02-25 PROCEDURE — 83550 IRON BINDING TEST: CPT

## 2025-02-25 PROCEDURE — 80061 LIPID PANEL: CPT

## 2025-02-25 PROCEDURE — 85025 COMPLETE CBC W/AUTO DIFF WBC: CPT

## 2025-02-25 PROCEDURE — 83540 ASSAY OF IRON: CPT

## 2025-02-25 PROCEDURE — 84425 ASSAY OF VITAMIN B-1: CPT

## 2025-02-27 LAB — VIT B1 BLD-MCNC: 94 NMOL/L (ref 70–180)

## 2025-03-10 ENCOUNTER — APPOINTMENT (OUTPATIENT)
Dept: PHYSICAL MEDICINE AND REHAB | Facility: MEDICAL CENTER | Age: 40
End: 2025-03-10
Payer: MEDICAID

## 2025-03-27 ENCOUNTER — OFFICE VISIT (OUTPATIENT)
Dept: PHYSICAL MEDICINE AND REHAB | Facility: MEDICAL CENTER | Age: 40
End: 2025-03-27
Payer: MEDICAID

## 2025-03-27 VITALS
SYSTOLIC BLOOD PRESSURE: 128 MMHG | DIASTOLIC BLOOD PRESSURE: 70 MMHG | OXYGEN SATURATION: 99 % | HEART RATE: 79 BPM | HEIGHT: 65 IN | TEMPERATURE: 97.3 F | WEIGHT: 151 LBS | BODY MASS INDEX: 25.16 KG/M2

## 2025-03-27 DIAGNOSIS — Z91.81 RISK FOR FALLS: ICD-10-CM

## 2025-03-27 DIAGNOSIS — M51.360 DISCOGENIC LOW BACK PAIN: ICD-10-CM

## 2025-03-27 DIAGNOSIS — M51.369 ANNULAR TEAR OF LUMBAR DISC: ICD-10-CM

## 2025-03-27 DIAGNOSIS — M54.50 LUMBOSACRAL PAIN: ICD-10-CM

## 2025-03-27 DIAGNOSIS — M47.816 LUMBAR SPONDYLOSIS: ICD-10-CM

## 2025-03-27 PROCEDURE — 99214 OFFICE O/P EST MOD 30 MIN: CPT | Performed by: PHYSICAL MEDICINE & REHABILITATION

## 2025-03-27 PROCEDURE — 1125F AMNT PAIN NOTED PAIN PRSNT: CPT | Performed by: PHYSICAL MEDICINE & REHABILITATION

## 2025-03-27 PROCEDURE — 3074F SYST BP LT 130 MM HG: CPT | Performed by: PHYSICAL MEDICINE & REHABILITATION

## 2025-03-27 PROCEDURE — 3078F DIAST BP <80 MM HG: CPT | Performed by: PHYSICAL MEDICINE & REHABILITATION

## 2025-03-27 ASSESSMENT — FIBROSIS 4 INDEX: FIB4 SCORE: 0.48

## 2025-03-27 ASSESSMENT — PAIN SCALES - GENERAL: PAINLEVEL_OUTOF10: 10=SEVERE PAIN

## 2025-03-27 ASSESSMENT — PATIENT HEALTH QUESTIONNAIRE - PHQ9
CLINICAL INTERPRETATION OF PHQ2 SCORE: 1
SUM OF ALL RESPONSES TO PHQ QUESTIONS 1-9: 8
5. POOR APPETITE OR OVEREATING: 1 - SEVERAL DAYS

## 2025-03-27 NOTE — PROGRESS NOTES
Follow up patient note  Interventional spine and Pain  Physiatry (physical medicine and  Rehabilitation)       Chief complaint:   Chief Complaint   Patient presents with    Follow-Up     Back pain          HISTORY    Please see new patient note by Dr Benítez,  for more details.     HPI  Patient identification: Alie Gardner ,  1985,   With Diagnoses of Discogenic low back pain with positive discogram at L4-5 and L5-S1 done at [facility], Lumbar spondylosis, Annular tear of lumbar disc, Lumbosacral pain, and Risk for falls were pertinent to this visit.       Verbal consent was obtained for Curt copilot : Yes      History of Present Illness  The patient, a 39-year-old female, presents for a follow-up visit. She is status post medial branch radiofrequency neurotomy performed on 2025, targeting the bilateral lumbar 4, lumbar 5, and sacral 1 facet joints. She continues to experience bilateral axial lumbar pain, rated at 10 out of 10 in intensity, which is chronic and constant. The patient is currently under the care of  neurosurgery and is scheduled for a discogram and subsequent follow-up with the neurosurgery team. She does not report any radicular pain. I reviewed the imaging study results and the EMG and nerve conduction test data from 2025, which revealed no electrodiagnostic evidence of radiculopathy or neuropathy. Additionally, I reviewed the operative note from Dr. Santosh Zacarias dated 2025, indicating that the discogram study was consistent with pain originating from the L5-S1 disc.    The patient reports an escalation in discomfort following the radiofrequency neurotomy, particularly at the S1, L5, and L4 levels. Although the procedure provided temporary relief, she experienced paresthesia on her left side when the neurotomy was performed at the L4-L5 level. She has been informed that tenderness at the L4-L5 level is expected due to the nature of the  procedure. Her pain intensity varies with movement, and activities such as driving exacerbate her discomfort. She finds some relief using heated seats in her vehicle. The patient describes a sensation akin to fluid dripping down her back and occasionally experiences severe pain that necessitates complete cessation of activity. She also reports decreased sensation in her left leg since the discogram. Due to the chronic nature of her condition and the lack of improvement, she has been advised to consider surgical intervention.    MEDICATIONS  - Current medications:    - Lidoderm patches    - Acetaminophen    - Zanaflex         ROS Red Flags :     Fever, Chills, Sweats: Denies  Involuntary Weight Loss: Denies  Bowel/Bladder Incontinence: Denies  Saddle Anesthesia: Denies        PMHx:   Past Medical History:   Diagnosis Date    Anemia     Arthritis     oesteo, hand and spine    DDD (degenerative disc disease), lumbar     L4 L5    Flu-like symptoms 12/15/2022    Heart murmur     Helicobacter pylori gastritis 07/02/2023    IBD (inflammatory bowel disease)     Morbid obesity with BMI of 40.0-44.9, adult ([facility]) 02/04/2022    MRSA (methicillin resistant Staphylococcus aureus)     2016    Preop examination 07/20/2023    Psychiatric problem     depresssion, anxiety, PTSD -from sound of heart monitor.       PSHx:   Past Surgical History:   Procedure Laterality Date    RADIO FREQUENCY ABLATION ADDITIONAL LEVEL Bilateral 1/29/2025    Procedure: bilateral radiofrequency neurotomies medial branch targeting the L4-5 and L5-S1 facet joints with fluoroscopic guidance and sedation…Note: plan for sedation with 1mg versed and 50 mcg fentanyl. Plan for 80 °C for 90 seconds for each neurotomy;  Surgeon: Bud Benítez M.D.;  Location: [facility];  Service: Pain Management    IA UPPER GI ENDOSCOPY,DIAGNOSIS N/A 2/27/2024    Procedure: ESOPHAGOGASTRODUODENOSCOPY;  Surgeon: Tom Gould M.D.;  Location: SURGERY [facility];  Service: General    GASTRIC BYPASS LAPAROSCOPIC N/A 2/27/2024    Procedure: LAPAROSCOPIC BO-EN-Y GASTRIC BYPASS;  Surgeon: Tom Gould M.D.;  Location: SURGERY [facility];  Service: General    AR DSTR NROLYTC AGNT PARVERTEB FCT SNGL LMBR/SACRAL Bilateral 10/3/2023    Procedure: BILATERAL radiofrequency neurotomies medial branch targeting the L4-5 and L5-S1 facet joints with fluoroscopic guidance and sedation.        Note: plan on sedation 0.5mg versed and 25mcg fentanyl. Plan for 80 °C for 90 seconds for each neurotomy;  Surgeon: Bud Benítez M.D.;  Location: [facility];  Service: Pain Management    AR INJ DX/THER AGNT PARAVERT FACET JOINT, SHILO* Bilateral 8/30/2023    Procedure: Diagnostic medial branch blocks targeting the BILATERAL L4-5 and L5-S1 facet joints with fluoroscopic guidance #2.   Note: 22-5. Diagnostic medial branch block #2 is only be done if procedure #1 is a positive block. This will be on a separate date from procedure #1.;  Surgeon: Bud Benítez M.D.;  Location: [facility];  Service: Pain Management    AR INJ DX/THER AGNT PARAVERT FACET JOINT, SHILO* Bilateral 8/22/2023    Procedure: Diagnostic medial branch blocks targeting the BILATERAL L4-5 and L5-S1 facet joints with fluoroscopic guidance #1.  Note: 22-5.;  Surgeon: Bud Benítez M.D.;  Location: [facility];  Service: Pain Management    AR INJ LUMBAR/SACRAL,W/ IMAGING Left 6/2/2023    Procedure: LEFT L4-5 interlaminar epidural steroid injection;  Surgeon: Andrea Francois M.D.;  Location: [facility];  Service: Pain Management    AR INJECTION,SACROILIAC JOINT Bilateral 4/20/2023    Procedure: RIGHT and LEFT sacroiliac joint injections;  Surgeon: Andrea Francois M.D.;  Location: [facility];  Service: Pain Management    OTHER Left 2015    Left leg, debridement of wound with MRSA       Family history   Family History   Problem Relation  Age of Onset    Cancer Mother         uterine and skin    Thyroid Mother         removed due to lump    Diabetes Father     Hypertension Father     Cancer Sister         ovarian    Alcohol abuse Brother          Medications:   Outpatient Medications Marked as Taking for the 3/27/25 encounter (Office Visit) with Bud Benítez M.D.   Medication Sig Dispense Refill    lidocaine (LIDODERM) 5 % Patch Place 1 Patch on the skin as needed (Apply's on back for pain). 10 Patch 5    acetaminophen (TYLENOL) 325 MG Tab Take 2 Tablets by mouth every 6 hours. take scheduled for 3 days post-op then as needed after 60 Tablet 0        Current Outpatient Medications on File Prior to Visit   Medication Sig Dispense Refill    lidocaine (LIDODERM) 5 % Patch Place 1 Patch on the skin as needed (Apply's on back for pain). 10 Patch 5    acetaminophen (TYLENOL) 325 MG Tab Take 2 Tablets by mouth every 6 hours. take scheduled for 3 days post-op then as needed after 60 Tablet 0    celecoxib (CELEBREX) 100 MG Cap Take 1 Capsule by mouth 2 times a day. Take scheduled for 3 days then as needed for pain after (Patient not taking: Reported on 1/8/2025) 20 Capsule 0    omeprazole (PRILOSEC) 20 MG delayed-release capsule Take 1 Capsule by mouth every day. Open capsule and mix in 10 mL of water (Patient not taking: Reported on 1/8/2025) 30 Capsule 11    polyethylene glycol/lytes (MIRALAX) Pack Mix 1 Packet per package instructions and drink by mouth every day. While taking narcotics, hold if greater then 3 BMs a day (Patient not taking: Reported on 1/8/2025) 20 Each 0    ondansetron (ZOFRAN ODT) 4 MG TABLET DISPERSIBLE Dissolve 1 Tablet by mouth every 6 hours as needed for Nausea/Vomiting. (Patient not taking: Reported on 1/8/2025) 18 Tablet 0    tizanidine (ZANAFLEX) 4 MG Tab Take 1 Tablet by mouth at bedtime as needed (muscle spasms). (Patient not taking: Reported on 1/8/2025) 30 Tablet 2    Blood Glucose Test Strips Use one strip to test blood  sugar three times daily before meals. (Patient not taking: Reported on 1/8/2025) 100 Strip 0    Blood Glucose Meter Kit Test blood sugar as recommended by provider. blood glucose monitoring kit. (Patient not taking: Reported on 1/8/2025) 1 Kit 0    Lancets Sig: use TID and prn ssx high or low sugar. #100 RF x 3 (Patient not taking: Reported on 1/8/2025) 100 Each 11     No current facility-administered medications on file prior to visit.         Allergies:   Allergies   Allergen Reactions    Gabapentin Unspecified     Seizure    Penicillin G      Previous hospitalization after penicillin - does not remember reaction    Cannot tolerate any Cillin    Penicillins Unspecified     Previous hospitalization after penicillin - does not remember reaction    Cannot tolerate any Cillin       Social Hx:   Social History     Socioeconomic History    Marital status:      Spouse name: Not on file    Number of children: Not on file    Years of education: Not on file    Highest education level: 12th grade   Occupational History    Not on file   Tobacco Use    Smoking status: Former     Current packs/day: 0.10     Average packs/day: 0.1 packs/day for 17.0 years (1.7 ttl pk-yrs)     Types: Cigarettes    Smokeless tobacco: Never    Tobacco comments:     socially, only every few months   Vaping Use    Vaping status: Former    Substances: Nicotine   Substance and Sexual Activity    Alcohol use: Yes     Alcohol/week: 1.2 oz     Types: 2 Standard drinks or equivalent per week     Comment: socially - rare    Drug use: No    Sexual activity: Yes     Partners: Male     Birth control/protection: None   Other Topics Concern    Not on file   Social History Narrative    Not on file     Social Drivers of Health     Financial Resource Strain: Medium Risk (2/27/2024)    Overall Financial Resource Strain (CARDIA)     Difficulty of Paying Living Expenses: Somewhat hard   Food Insecurity: Food Insecurity Present (2/27/2024)    Hunger Vital Sign  "    Worried About Running Out of Food in the Last Year: Sometimes true     Ran Out of Food in the Last Year: Sometimes true   Transportation Needs: No Transportation Needs (2/27/2024)    PRAPARE - Transportation     Lack of Transportation (Medical): No     Lack of Transportation (Non-Medical): No   Physical Activity: Insufficiently Active (2/27/2024)    Exercise Vital Sign     Days of Exercise per Week: 4 days     Minutes of Exercise per Session: 30 min   Stress: No Stress Concern Present (2/27/2024)    [employer] [place] [employer] - Occupational Stress Questionnaire     Feeling of Stress : Only a little   Social Connections: Moderately Isolated (2/27/2024)    Social Connection and Isolation Panel [NHANES]     Frequency of Communication with Friends and Family: Three times a week     Frequency of Social Gatherings with Friends and Family: Twice a week     Attends Anabaptist Services: Never     Active Member of Clubs or Organizations: No     Attends Club or Organization Meetings: Never     Marital Status:    Intimate Partner Violence: Not on file   Housing Stability: High Risk (2/27/2024)    Housing Stability Vital Sign     Unable to Pay for Housing in the Last Year: Yes     Number of Places Lived in the Last Year: 2     Unstable Housing in the Last Year: No         EXAMINATION     Physical Exam:   Vitals: /70 (BP Location: Right arm, Patient Position: Sitting, BP Cuff Size: Adult)   Pulse 79   Temp 36.3 °C (97.3 °F) (Temporal)   Ht 1.651 m (5' 5\")   Wt 68.5 kg (151 lb)   SpO2 99%     Constitutional:   Body Habitus: Body mass index is 25.13 kg/m².  Cooperation: Fully cooperates with exam  Appearance: Well-groomed no disheveled    Respiratory-  breathing comfortable on room air, no audible wheezing  Cardiovascular- capillary refills less than 2 seconds. No lower extremity edema is noted.   Psychiatric- alert and oriented ×3. Normal affect.    MSK and Neuro: -    Physical Exam  There are " "paresthesias on the left side of the musculoskeletal system with sensation 1 out of 2 to light touch at L4 and L5.  Paresthesias were also present at the previous visit in this distribution as well.  Strength is otherwise 5 out of 5 in the bilateral lower extremities.       MEDICAL DECISION MAKING    DATA    Labs:   Lab Results   Component Value Date/Time    SODIUM 140 02/25/2025 11:26 AM    POTASSIUM 3.7 02/25/2025 11:26 AM    CHLORIDE 106 02/25/2025 11:26 AM    CO2 24 02/25/2025 11:26 AM    GLUCOSE 91 02/25/2025 11:26 AM    BUN 13 02/25/2025 11:26 AM    CREATININE 0.74 02/25/2025 11:26 AM        No results found for: \"PROTHROMBTM\", \"INR\"     Lab Results   Component Value Date/Time    WBC 6.7 02/25/2025 11:26 AM    RBC 4.20 02/25/2025 11:26 AM    HEMOGLOBIN 10.0 (L) 02/25/2025 11:26 AM    HEMATOCRIT 32.8 (L) 02/25/2025 11:26 AM    MCV 78.1 (L) 02/25/2025 11:26 AM    MCH 23.8 (L) 02/25/2025 11:26 AM    MCHC 30.5 (L) 02/25/2025 11:26 AM    MPV 10.5 02/25/2025 11:26 AM    NEUTSPOLYS 47.00 02/25/2025 11:26 AM    LYMPHOCYTES 46.60 (H) 02/25/2025 11:26 AM    MONOCYTES 5.10 02/25/2025 11:26 AM    EOSINOPHILS 0.30 02/25/2025 11:26 AM    BASOPHILS 0.90 02/25/2025 11:26 AM        Lab Results   Component Value Date/Time    HBA1C 5.6 01/08/2025 04:45 PM          Imaging:   I personally reviewed following images          Results  - Imaging (MRI of the lumbar spine, 01/09/2025):    - Degenerative disc disease worst at L5-S1 with a high intensity zone consistent with an annular tear    - Type 1 Modic changes at L5-S1    - Facet arthropathy, worst bilaterally at L4-5 and L5-S1    - No areas of high grade central canal or neuroforaminal stenosis      - Imaging:    - Discogram study consistent with pain from the L5-S1 disk    - Testing:    - EMG and nerve conduction test (01/27/2025) showed no electrodiagnostic evidence of radiculopathy or neuropathy       I reviewed the following radiology reports                       Results for " orders placed during the hospital encounter of 02/25/23    MR-CERVICAL SPINE-WITH & W/O    Impression  1.  No acute abnormality or abnormal enhancement in the cervical spine.  2.  Mild degenerative changes of the cervical spine without central canal or neural foraminal stenosis.    Results for orders placed during the hospital encounter of 01/09/25    MR-LUMBAR SPINE-W/O    Impression  1. There is an annular fissure on the left at the L5-S1 level.  2. There is no significant disc herniation or canal stenosis.  3. Posterior facet osteoarthrosis with foraminal narrowing as detailed above.  4. Cholelithiasis.    Results for orders placed during the hospital encounter of 02/25/23    MR-LUMBAR SPINE-WITH & W/O    Impression  1.  No acute abnormality or abnormal enhancement in the lumbar spine.  2.  Mild multilevel degenerative changes of the lumbar spine as described above.      Results for orders placed during the hospital encounter of 01/09/25    MR-LUMBAR SPINE-W/O    Impression  1. There is an annular fissure on the left at the L5-S1 level.  2. There is no significant disc herniation or canal stenosis.  3. Posterior facet osteoarthrosis with foraminal narrowing as detailed above.  4. Cholelithiasis.   Results for orders placed during the hospital encounter of 02/25/23    MR-THORACIC SPINE-WITH & W/O    Impression  No significant abnormality is seen in the MR scan of the thoracic spine.                                                                      Results for orders placed during the hospital encounter of 02/04/23    CT-ABDOMEN-PELVIS WITH    Impression  1.  There is no acute inflammatory process within the abdomen or pelvis.  2.  There is no evidence of nephrolithiasis, urolithiasis, or hydronephrosis.                                Results for orders placed in visit on 08/31/22    DX-HAND 2- LEFT    Impression  1.  There is mild osteoarthritis of the left 1st CMC joint.   Results for orders placed in visit on  22    DX-HAND 3+ LEFT    Impression  1.  There is no fracture of the left hand.            Results for orders placed in visit on 23    DX-LUMBAR SPINE-2 OR 3 VIEWS    Impression  Unremarkable lumbar spine series.            INTERPRETING LOCATION:  [address] [place] [address] [place], [address]             Results for orders placed in visit on 22    DX-SHOULDER 2+ RIGHT    Impression  Normal radiographs of the right shoulder    Results for orders placed in visit on 23    DX-THORACIC SPINE-2 VIEWS    Impression  1.  There is minimal degenerative endplate spurring in the thoracic spine.            DIAGNOSIS   Visit Diagnoses     ICD-10-CM   1. Discogenic low back pain with positive discogram at L4-5 and L5-S1 done at [facility]  M51.360   2. Lumbar spondylosis  M47.816   3. Annular tear of lumbar disc  M51.369   4. Lumbosacral pain  M54.50   5. Risk for falls  Z91.81         ASSESSMENT and PLAN:     Alie Huang Jj  1985 female      Alie was seen today for follow-up.    Diagnoses and all orders for this visit:    Discogenic low back pain with positive discogram at L4-5 and L5-S1 done at [facility]    Lumbar spondylosis    Annular tear of lumbar disc    Lumbosacral pain    Risk for falls  -     Patient identified as fall risk.  Appropriate orders and counseling given.        Assessment & Plan  The patient had no significant improvement after a medial branch radiofrequency neurotomy, suggesting that the facet joints are not involved in the primary low back pain. The pain is likely originating from the L4-5 and L5-S1 disc spaces, as indicated by positive discograms. On physical exam, there are paresthesias on the left with sensation 1 out of 2 to light touch at L4 and L5. Strength is otherwise 5 out of 5 in the bilateral lower extremities. The patient attributes the numbness to the discogram. Continue Lidoderm patches as needed. Continue acetaminophen as needed  up to 1000 mg up to three times daily, not to exceed 3000 mg/day. Continue Zanaflex as needed.      The patient reports increased discomfort and numbness on the left side following the discogram, particularly affecting the L4-5 and L5-S1 disc spaces. The pain is severe, sometimes requiring her to stop activities completely. Continue Lidoderm patches as needed. Continue acetaminophen as needed up to 1000 mg up to three times daily, not to exceed 3000 mg/day. Continue Zanaflex as needed.    PROCEDURE  The patient underwent a medial branch radiofrequency neurotomy targeting the bilateral lumbar 4, 5 and lumbar 5, sacral 1 facet joints on 01/29/2025.        Follow up: As needed with me.  Continue to follow-up with [facility]    Thank you for allowing me to participate in the care of this patient. If you have any questions please not hesitate to contact me.             Please note that this dictation was created using voice recognition software. I have made every reasonable attempt to correct obvious errors but there may be errors of grammar and content that I may have overlooked prior to finalization of this note.      Bud Benítez MD  Interventional Spine and Sports Physiatry  Physical Medicine and Rehabilitation  [facility]       Addendum:  Physical therapy prescribed.  Exercises were also given to the patient previously.

## 2025-04-28 NOTE — ANESTHESIA POSTPROCEDURE EVALUATION
Patient: Alie Lowe    Procedure Summary     Date:  12/17/19 Room / Location:      Anesthesia Start:  2324 Anesthesia Stop:  12/18/19 0511    Procedure:  Labor Epidural Diagnosis:      Scheduled Providers:   Responsible Provider:  Carlin Stafford D.O.    Anesthesia Type:  epidural ASA Status:  2          Final Anesthesia Type: epidural  Last vitals  BP   Blood Pressure: 127/59    Temp   36.3 °C (97.4 °F)    Pulse   Pulse: (!) 53   Resp   16    SpO2   92 %      Anesthesia Post Evaluation    Patient location during evaluation: floor  Patient participation: complete - patient participated  Level of consciousness: awake and alert  Pain score: 6    Airway patency: patent  Anesthetic complications: no  Cardiovascular status: hemodynamically stable  Respiratory status: acceptable  Hydration status: euvolemic    PONV: none           Nurse Pain Score: 8 (NPRS)         Pt noted to be hyperventilating and looking anxious. Triage RN aware.

## 2025-07-14 ENCOUNTER — HOSPITAL ENCOUNTER (OUTPATIENT)
Dept: CARDIOLOGY | Facility: MEDICAL CENTER | Age: 40
End: 2025-07-14
Attending: NEUROLOGICAL SURGERY
Payer: MEDICAID

## 2025-07-14 ENCOUNTER — HOSPITAL ENCOUNTER (OUTPATIENT)
Dept: LAB | Facility: MEDICAL CENTER | Age: 40
End: 2025-07-14
Attending: NEUROLOGICAL SURGERY
Payer: MEDICAID

## 2025-07-14 LAB
ABO GROUP BLD: NORMAL
ANION GAP SERPL CALC-SCNC: 12 MMOL/L (ref 7–16)
APTT PPP: 28.2 SEC (ref 24.7–36)
BASOPHILS # BLD AUTO: 0.9 % (ref 0–1.8)
BASOPHILS # BLD: 0.05 K/UL (ref 0–0.12)
BLD GP AB SCN SERPL QL: NORMAL
BUN SERPL-MCNC: 15 MG/DL (ref 8–22)
CALCIUM SERPL-MCNC: 9 MG/DL (ref 8.5–10.5)
CHLORIDE SERPL-SCNC: 109 MMOL/L (ref 96–112)
CO2 SERPL-SCNC: 22 MMOL/L (ref 20–33)
COMMENT 1642: NORMAL
CREAT SERPL-MCNC: 0.71 MG/DL (ref 0.5–1.4)
EKG IMPRESSION: NORMAL
EOSINOPHIL # BLD AUTO: 0.02 K/UL (ref 0–0.51)
EOSINOPHIL NFR BLD: 0.4 % (ref 0–6.9)
ERYTHROCYTE [DISTWIDTH] IN BLOOD BY AUTOMATED COUNT: 46.1 FL (ref 35.9–50)
GFR SERPLBLD CREATININE-BSD FMLA CKD-EPI: 110 ML/MIN/1.73 M 2
GLUCOSE SERPL-MCNC: 82 MG/DL (ref 65–99)
HCT VFR BLD AUTO: 30.1 % (ref 37–47)
HGB BLD-MCNC: 8.6 G/DL (ref 12–16)
HYPOCHROMIA BLD QL SMEAR: ABNORMAL
IMM GRANULOCYTES # BLD AUTO: 0.01 K/UL (ref 0–0.11)
IMM GRANULOCYTES NFR BLD AUTO: 0.2 % (ref 0–0.9)
INR PPP: 1.02 (ref 0.87–1.13)
LYMPHOCYTES # BLD AUTO: 1.96 K/UL (ref 1–4.8)
LYMPHOCYTES NFR BLD: 36.2 % (ref 22–41)
MCH RBC QN AUTO: 21.9 PG (ref 27–33)
MCHC RBC AUTO-ENTMCNC: 28.6 G/DL (ref 32.2–35.5)
MCV RBC AUTO: 76.8 FL (ref 81.4–97.8)
MONOCYTES # BLD AUTO: 0.31 K/UL (ref 0–0.85)
MONOCYTES NFR BLD AUTO: 5.7 % (ref 0–13.4)
MORPHOLOGY BLD-IMP: NORMAL
NEUTROPHILS # BLD AUTO: 3.07 K/UL (ref 1.82–7.42)
NEUTROPHILS NFR BLD: 56.6 % (ref 44–72)
NRBC # BLD AUTO: 0 K/UL
NRBC BLD-RTO: 0 /100 WBC (ref 0–0.2)
OVALOCYTES BLD QL SMEAR: NORMAL
PLATELET # BLD AUTO: 442 K/UL (ref 164–446)
PLATELET BLD QL SMEAR: NORMAL
PMV BLD AUTO: 9.7 FL (ref 9–12.9)
POIKILOCYTOSIS BLD QL SMEAR: NORMAL
POTASSIUM SERPL-SCNC: 4.1 MMOL/L (ref 3.6–5.5)
PROTHROMBIN TIME: 13.4 SEC (ref 12–14.6)
RBC # BLD AUTO: 3.92 M/UL (ref 4.2–5.4)
RBC BLD AUTO: PRESENT
RH BLD: NORMAL
SODIUM SERPL-SCNC: 143 MMOL/L (ref 135–145)
WBC # BLD AUTO: 5.4 K/UL (ref 4.8–10.8)

## 2025-07-14 PROCEDURE — 93005 ELECTROCARDIOGRAM TRACING: CPT | Mod: TC | Performed by: NEUROLOGICAL SURGERY

## 2025-07-14 PROCEDURE — 36415 COLL VENOUS BLD VENIPUNCTURE: CPT

## 2025-07-14 PROCEDURE — 85610 PROTHROMBIN TIME: CPT

## 2025-07-14 PROCEDURE — 86850 RBC ANTIBODY SCREEN: CPT

## 2025-07-14 PROCEDURE — 85730 THROMBOPLASTIN TIME PARTIAL: CPT

## 2025-07-14 PROCEDURE — 86901 BLOOD TYPING SEROLOGIC RH(D): CPT

## 2025-07-14 PROCEDURE — 86900 BLOOD TYPING SEROLOGIC ABO: CPT

## 2025-07-14 PROCEDURE — 80048 BASIC METABOLIC PNL TOTAL CA: CPT

## 2025-07-14 PROCEDURE — 85025 COMPLETE CBC W/AUTO DIFF WBC: CPT

## 2025-07-15 ENCOUNTER — APPOINTMENT (OUTPATIENT)
Dept: ADMISSIONS | Facility: MEDICAL CENTER | Age: 40
End: 2025-07-15
Attending: NEUROLOGICAL SURGERY
Payer: MEDICAID

## 2025-07-21 ENCOUNTER — PRE-ADMISSION TESTING (OUTPATIENT)
Dept: ADMISSIONS | Facility: MEDICAL CENTER | Age: 40
End: 2025-07-21
Attending: NEUROLOGICAL SURGERY
Payer: MEDICAID

## 2025-07-21 NOTE — OR NURSING
@4007 Received call from preregistation that patient cannot make her in person preadmit appointment that was scheduled for today.  Patient rescheduled for 7/23/2025 @ 9080.  @4288 Call made to Dr. Bunn office to update on the above information. Message left for Dr. Oni Rivera surgery scheduler.

## 2025-07-23 ENCOUNTER — PRE-ADMISSION TESTING (OUTPATIENT)
Dept: ADMISSIONS | Facility: MEDICAL CENTER | Age: 40
End: 2025-07-23
Attending: NEUROLOGICAL SURGERY
Payer: MEDICAID

## 2025-07-23 NOTE — PREADMIT AVS NOTE
Current Medications   Medication Instructions    Ferrous Sulfate (IRON PO) Follow instructions from surgeon or specialist.    Ascorbic Acid (VITAMIN C PO) Stop 7 days before surgery    Multiple Vitamins-Minerals (MULTIVITAMIN WOMEN 50+ PO) Stop 7 days before surgery    polyethylene glycol/lytes (MIRALAX) 17 g Pack Hold medication day of procedure    lidocaine (LIDODERM) 5 % Patch Hold medication day of procedure    acetaminophen (TYLENOL) 325 MG Tab As needed medication, may take if needed, including morning of procedure

## 2025-07-27 ENCOUNTER — ANESTHESIA EVENT (OUTPATIENT)
Dept: SURGERY | Facility: MEDICAL CENTER | Age: 40
End: 2025-07-27
Payer: MEDICAID

## 2025-07-28 ENCOUNTER — ANESTHESIA (OUTPATIENT)
Dept: SURGERY | Facility: MEDICAL CENTER | Age: 40
End: 2025-07-28
Payer: MEDICAID

## 2025-07-28 ENCOUNTER — APPOINTMENT (OUTPATIENT)
Dept: RADIOLOGY | Facility: MEDICAL CENTER | Age: 40
End: 2025-07-28
Attending: NEUROLOGICAL SURGERY
Payer: MEDICAID

## 2025-07-28 ENCOUNTER — APPOINTMENT (OUTPATIENT)
Dept: RADIOLOGY | Facility: MEDICAL CENTER | Age: 40
End: 2025-07-28
Attending: SURGERY
Payer: MEDICAID

## 2025-07-28 ENCOUNTER — HOSPITAL ENCOUNTER (OUTPATIENT)
Facility: MEDICAL CENTER | Age: 40
End: 2025-07-28
Attending: NEUROLOGICAL SURGERY | Admitting: NEUROLOGICAL SURGERY
Payer: MEDICAID

## 2025-07-28 ASSESSMENT — FIBROSIS 4 INDEX
FIB4 SCORE: 0.49
FIB4 SCORE: 0.49

## 2025-07-28 NOTE — PROGRESS NOTES
Medication history reviewed with PT at bedside    [facility] is complete per PT reporting    Allergies reviewed.     Patient denies taking any outpatient antibiotics in the last 30 days.     Patient denies taking any antimicrobials (antivirals, antifungals and antiparasitics) in the last 30 days.    Patient is not taking anticoagulants.    Preferred pharmacy for this encounter - [facility] ([phone]).

## 2025-09-02 ENCOUNTER — APPOINTMENT (OUTPATIENT)
Dept: OBGYN | Facility: CLINIC | Age: 40
End: 2025-09-02
Payer: MEDICAID

## (undated) DEVICE — TROCAR 5X100 NON BLADED Z-TH - READ KII (6/BX)

## (undated) DEVICE — SYRINGE 50ML CATHETER TIP (40EA/BX 4BX/CA)

## (undated) DEVICE — SYSTEM CLEARIFY VISUALIZATION (10EA/PK)

## (undated) DEVICE — GOWN SURGEONS X-LARGE - DISP. (30/CA)

## (undated) DEVICE — GOWN SURGICAL XX-LARGE - (28EA/CA) SIRUS NON REINFORCED

## (undated) DEVICE — TUBING LAPAROSCOPIC PLUME DEVICE (10EA/CA)

## (undated) DEVICE — GLOVE BIOGEL PI INDICATOR SZ 8.0 SURGICAL PF LF -(50/BX 4BX/CA)

## (undated) DEVICE — TROCAR Z THREAD12MM OPTICAL - NON BLADED (6/BX)

## (undated) DEVICE — RELOAD ENDO GIA 60 ART MEDIUM THICK(6EA/CA)

## (undated) DEVICE — SUTURE 4-0 MONOCRYL PLUS PS-2 - 27 INCH (36/BX)

## (undated) DEVICE — TUBING CLEARLINK DUO-VENT - C-FLO (48EA/CA)

## (undated) DEVICE — GLOVE SZ 7.5 BIOGEL PI MICRO - PF LF (50PR/BX)

## (undated) DEVICE — ENDO GIA 60 ART VASCULAR MEDIUM RELOADS (6EA/CA)

## (undated) DEVICE — SET LEADWIRE 5 LEAD BEDSIDE DISPOSABLE ECG (1SET OF 5/EA)

## (undated) DEVICE — CHLORAPREP 26 ML APPLICATOR - ORANGE TINT(25/CA)

## (undated) DEVICE — SET TUBING PNEUMOCLEAR HIGH FLOW SMOKE EVACUATION (10EA/BX)

## (undated) DEVICE — COVER LIGHT HANDLE ALC PLUS DISP (18EA/BX)

## (undated) DEVICE — MAT PATIENT POSITIONING PREVALON (10EA/CA)

## (undated) DEVICE — ENDO GIA 45 ART MEDIUM THICK RELOADS (6EA/CA)

## (undated) DEVICE — SUTURE 2-0 STRATAFIX SPIRAL PDS SH (12EA/BX)

## (undated) DEVICE — DERMABOND ADVANCED - (12EA/BX)

## (undated) DEVICE — SYRINGE STERILE 10 ML LL (200/BX)

## (undated) DEVICE — SUTURE GENERAL

## (undated) DEVICE — ELECTRODE DUAL RETURN W/ CORD - (50/PK)

## (undated) DEVICE — HANDPIECE THUNDERBEAT 5MM 45CM FRONT GRIP TYPE S (5EA/BX)

## (undated) DEVICE — SUCTION INSTRUMENT YANKAUER BULBOUS TIP W/O VENT (50EA/CA)

## (undated) DEVICE — SENSOR OXIMETER ADULT SPO2 RD SET (20EA/BX)

## (undated) DEVICE — DEVICE CLOSURE ABSORBABLE BLUE SYNETURE V-LOC 1 GS-22 L12 IN (12EA/CT)

## (undated) DEVICE — SET SUCTION/IRRIGATION WITH DISPOSABLE TIP (6/CA )PART #0250-070-520 IS A SUB

## (undated) DEVICE — SCISSORS 5MM CVD (6EA/BX)

## (undated) DEVICE — GOWN WARMING X-LARGE FLEX - (20/CA)

## (undated) DEVICE — SHELL STAPLING POWER FOR SIGNIA HANDLE STAPLING SYS(6EA/PK)

## (undated) DEVICE — PLUMEPEN ULTRA 3/8 IN X 10 FT HOSE (20EA/CA)

## (undated) DEVICE — SUTURE 2-0 SILK SH (36PK/BX)

## (undated) DEVICE — CANNULA W/SEAL12X100ZTHREAD - (12/BX)

## (undated) DEVICE — CANNULA W/SEAL 5X100 Z-THRE - ADED KII (12/BX)

## (undated) DEVICE — CANISTER SUCTION 3000ML MECHANICAL FILTER AUTO SHUTOFF MEDI-VAC NONSTERILE LF DISP  (40EA/CA)

## (undated) DEVICE — APPLIER ENDOCLIP 5MM - (6EA/CA)

## (undated) DEVICE — GLOVESZ 8.5 BIOGEL PI MICRO - PF LF (50PR/BX)

## (undated) DEVICE — SET EXTENSION WITH 2 PORTS (48EA/CA) ***PART #2C8610 IS A SUBSTITUTE*****

## (undated) DEVICE — TUBE CALLIBRATION LAP BAND

## (undated) DEVICE — PACK GASTRIC BANDING OR - (1/CA)

## (undated) DEVICE — LACTATED RINGERS INJ 1000 ML - (14EA/CA 60CA/PF)

## (undated) DEVICE — SODIUM CHL IRRIGATION 0.9% 1000ML (12EA/CA)

## (undated) DEVICE — GLOVE BIOGEL INDICATOR SZ 8 SURGICAL PF LTX - (50/BX 4BX/CA)